# Patient Record
Sex: FEMALE | Race: WHITE | NOT HISPANIC OR LATINO | Employment: OTHER | URBAN - NONMETROPOLITAN AREA
[De-identification: names, ages, dates, MRNs, and addresses within clinical notes are randomized per-mention and may not be internally consistent; named-entity substitution may affect disease eponyms.]

---

## 2017-01-11 ENCOUNTER — HOSPITAL ENCOUNTER (OUTPATIENT)
Dept: GENERAL RADIOLOGY | Facility: HOSPITAL | Age: 57
Discharge: HOME | End: 2017-01-11
Attending: FAMILY MEDICINE

## 2017-01-18 ENCOUNTER — OFFICE VISIT (OUTPATIENT)
Dept: ORTHOPEDIC SURGERY | Facility: CLINIC | Age: 57
End: 2017-01-18

## 2017-01-18 VITALS — RESPIRATION RATE: 16 BRPM | HEIGHT: 63 IN | BODY MASS INDEX: 39.51 KG/M2 | WEIGHT: 223 LBS

## 2017-01-18 DIAGNOSIS — S91.302D OPEN WOUND OF FOOT EXCEPT TOES WITH COMPLICATION, LEFT, SUBSEQUENT ENCOUNTER: ICD-10-CM

## 2017-01-18 DIAGNOSIS — R60.0 EDEMA OF EXTREMITY OF UNKNOWN CAUSE: Primary | ICD-10-CM

## 2017-01-18 PROCEDURE — 99213 OFFICE O/P EST LOW 20 MIN: CPT | Performed by: PODIATRIST

## 2017-01-18 NOTE — PROGRESS NOTES
Subjective   Patient ID: Izabella Agudelo is a 56 y.o. female being seen in follow up for left diabetic foot wound, right plantar heel wound  She is here with her sister who states that been putting lotion on her lateral left foot wound.  She says she's been scratching her legs causing redness and erythema warmth and wounds on her anterior legs.    History of Present Illness    Review of Systems   Constitutional: Negative for diaphoresis, fever and unexpected weight change.   HENT: Negative for dental problem and sore throat.    Eyes: Negative for visual disturbance.   Respiratory: Negative for shortness of breath.    Cardiovascular: Negative for chest pain.   Gastrointestinal: Negative for abdominal pain, constipation, diarrhea, nausea and vomiting.   Genitourinary: Negative for difficulty urinating and frequency.   Neurological: Negative for headaches.   Hematological: Does not bruise/bleed easily.   All other systems reviewed and are negative.      Past Medical History   Diagnosis Date   • Cardiac abnormality    • Diabetes    • Glaucoma         No past surgical history on file.    No Known Allergies    Objective   Physical Exam   Constitutional: She is oriented to person, place, and time. She appears well-developed and well-nourished.   Neurological: She is alert and oriented to person, place, and time.   Psychiatric: She has a normal mood and affect.     Ortho Exam  Ortho Exam  Her a distal leg has multiple superficial excoriations and skin tears it's warm and erythematous.  May have a slight superficial cellulitis or skin infection.  The lateral foot wound is completely dry and crusted over with entire lateral aspect of the foot is still very dry flaky and cracked.  There is no more open wound no drainage no sign of infection.  Clinically I am more concerned about her leg today than I am her foot wound.  She also has 2+ edema to the left lower extremity.      Assessment/Plan  cellulitis of the left leg,  healed left foot wound, cirrhosis of skin, diabetes, edema of the lower extremity  Independent Review of Radiographic Studies:      Laboratory and Other Studies:     Medical Decision Making:        Procedures  Debridement Note        Izabella was seen today for wound check and follow-up.    Diagnoses and all orders for this visit:    Edema of extremity of unknown cause    Open wound of foot except toes with complication, left, subsequent encounter        Recommendations/Plan: I discussed with her a sister that we need to get some moisture in her scan in order to help prevent future wounds.  I want her to use a good diabetic foot cream or lotion.  I told her not to use Vaseline.  I recommend she keep a close eye on her leg as well if this gets any worse she is to let me know.  She may need some topical or oral antibiotics to help with this.  I also recommended compression stockings but she says she will wear them.  I'll see her back in about a month to ensure she's not developed any further wounds.      Return in about 4 weeks (around 2/15/2017).  Patient agreeable to call or return sooner for any concerns.

## 2017-01-18 NOTE — MR AVS SNAPSHOT
Izabella Katlyn Salcedoby   1/18/2017 1:15 PM   Office Visit    Dept Phone:  [phone]   Encounter #:  [id number]    Provider:  Franklyn Manning DPM   Department:  [facility]                Your Full Care Plan              Your Updated Medication List          This list is accurate as of: 1/18/17  1:51 PM.  Always use your most recent med list.                acarbose 25 MG tablet   Commonly known as:  PRECOSE       ARIPiprazole 30 MG tablet   Commonly known as:  ABILIFY       azithromycin 250 MG tablet   Commonly known as:  ZITHROMAX       B-D UF III MINI PEN NEEDLES 31G X 5 MM misc   Generic drug:  Insulin Pen Needle       carvedilol 12.5 MG tablet   Commonly known as:  COREG       ciprofloxacin 500 MG tablet   Commonly known as:  CIPRO       clindamycin 150 MG capsule   Commonly known as:  CLEOCIN       divalproex 500 MG 24 hr tablet   Commonly known as:  DEPAKOTE       fluconazole 150 MG tablet   Commonly known as:  DIFLUCAN       gabapentin 300 MG capsule   Commonly known as:  NEURONTIN       LANTUS SOLOSTAR 100 UNIT/ML injection pen   Generic drug:  Insulin Glargine       latanoprost 0.005 % ophthalmic solution   Commonly known as:  XALATAN       losartan 100 MG tablet   Commonly known as:  COZAAR       loxapine 25 MG capsule   Commonly known as:  LOXITANE       metFORMIN 500 MG tablet   Commonly known as:  GLUCOPHAGE       NOVOLOG FLEXPEN 100 UNIT/ML solution pen-injector sc pen   Generic drug:  insulin aspart       ONE TOUCH ULTRA TEST test strip   Generic drug:  glucose blood       ONETOUCH DELICA LANCETS 33G misc       raNITIdine 150 MG tablet   Commonly known as:  ZANTAC       silver sulfadiazine 1 % cream   Commonly known as:  SILVADENE, SSD       simvastatin 40 MG tablet   Commonly known as:  ZOCOR       sulfamethoxazole-trimethoprim 800-160 MG per tablet   Commonly known as:  BACTRIM DS,SEPTRA DS       traMADol 50 MG tablet    "  Commonly known as:  ULTRAM               You Were Diagnosed With        Codes Comments    Edema of extremity of unknown cause    -  Primary ICD-10-CM: R60.0  ICD-9-CM: 782.3     Open wound of foot except toes with complication, left, subsequent encounter     ICD-10-CM: S91.302D  ICD-9-CM: V58.89, 892.1       Medications to be Given to You by a Medical Professional     Due       Frequency    11/4/2016 collagenase ointment  Every 24 Hours Scheduled      Instructions     None    Patient Instructions History      Upcoming Appointments     Visit Type Date Time Department    OFFICE VISIT 1/18/2017  1:15 PM [facility]      MyChart Signup     Our records indicate that you have declined [employer] signup. If you would like to sign up for [employer], please email [email or web address] or call [phone] to obtain an activation code.             Other Info from Your Visit           Allergies     No Known Allergies      Reason for Visit     Left Foot - Follow-up     Wound Check           Vital Signs     Respirations Height Weight Body Mass Index Smoking Status       16 63\" (160 cm) 223 lb (101 kg) 39.5 kg/m2 Never Smoker       Problems and Diagnoses Noted     Fluid collection (edema) in the arms, legs, hands and feet    -  Primary    Open wound of foot except toes with complication, left, subsequent encounter            "

## 2017-05-24 ENCOUNTER — TRANSCRIBE ORDERS (OUTPATIENT)
Dept: MAMMOGRAPHY | Facility: HOSPITAL | Age: 57
End: 2017-05-24

## 2017-05-24 DIAGNOSIS — Z13.9 SCREENING: Primary | ICD-10-CM

## 2017-05-25 ENCOUNTER — TRANSCRIBE ORDERS (OUTPATIENT)
Dept: ADMINISTRATIVE | Facility: HOSPITAL | Age: 57
End: 2017-05-25

## 2017-05-25 ENCOUNTER — HOSPITAL ENCOUNTER (OUTPATIENT)
Dept: GENERAL RADIOLOGY | Facility: HOSPITAL | Age: 57
Discharge: HOME | End: 2017-05-25
Admitting: FAMILY MEDICINE

## 2017-05-25 DIAGNOSIS — M25.472 PAIN AND SWELLING OF ANKLE, LEFT: Primary | ICD-10-CM

## 2017-05-25 DIAGNOSIS — M25.572 PAIN AND SWELLING OF ANKLE, LEFT: Primary | ICD-10-CM

## 2017-05-25 PROCEDURE — 73620 X-RAY EXAM OF FOOT: CPT

## 2017-12-13 ENCOUNTER — APPOINTMENT (OUTPATIENT)
Dept: LAB | Facility: HOSPITAL | Age: 57
End: 2017-12-13

## 2017-12-13 ENCOUNTER — TRANSCRIBE ORDERS (OUTPATIENT)
Dept: ADMINISTRATIVE | Facility: HOSPITAL | Age: 57
End: 2017-12-13

## 2017-12-13 DIAGNOSIS — F39 PSYCHOSIS, AFFECTIVE (HCC): Primary | ICD-10-CM

## 2017-12-13 LAB
ALBUMIN SERPL-MCNC: 4.5 G/DL (ref 3.5–5)
ALBUMIN/GLOB SERPL: 1.6 G/DL (ref 1–2)
ALP SERPL-CCNC: 53 U/L (ref 38–126)
ALT SERPL W P-5'-P-CCNC: 30 U/L (ref 13–69)
ANION GAP SERPL CALCULATED.3IONS-SCNC: 14.3 MMOL/L
AST SERPL-CCNC: 18 U/L (ref 15–46)
BILIRUB SERPL-MCNC: 0.2 MG/DL (ref 0.2–1.3)
BUN BLD-MCNC: 9 MG/DL (ref 7–20)
BUN/CREAT SERPL: 18 (ref 7.1–23.5)
CALCIUM SPEC-SCNC: 9.7 MG/DL (ref 8.4–10.2)
CHLORIDE SERPL-SCNC: 89 MMOL/L (ref 98–107)
CHOLEST SERPL-MCNC: 127 MG/DL (ref 0–199)
CO2 SERPL-SCNC: 32 MMOL/L (ref 26–30)
CREAT BLD-MCNC: 0.5 MG/DL (ref 0.6–1.3)
GFR SERPL CREATININE-BSD FRML MDRD: 128 ML/MIN/1.73
GLOBULIN UR ELPH-MCNC: 2.9 GM/DL
GLUCOSE BLD-MCNC: 79 MG/DL (ref 74–98)
HDLC SERPL-MCNC: 43 MG/DL (ref 40–60)
LDLC SERPL CALC-MCNC: 66 MG/DL (ref 0–99)
LDLC/HDLC SERPL: 1.54 {RATIO}
POTASSIUM BLD-SCNC: 4.3 MMOL/L (ref 3.5–5.1)
PROT SERPL-MCNC: 7.4 G/DL (ref 6.3–8.2)
SODIUM BLD-SCNC: 131 MMOL/L (ref 137–145)
TRIGL SERPL-MCNC: 88 MG/DL
VLDLC SERPL-MCNC: 17.6 MG/DL

## 2017-12-13 PROCEDURE — 80165 DIPROPYLACETIC ACID FREE: CPT | Performed by: PSYCHIATRY & NEUROLOGY

## 2017-12-13 PROCEDURE — 80061 LIPID PANEL: CPT | Performed by: PSYCHIATRY & NEUROLOGY

## 2017-12-13 PROCEDURE — 80053 COMPREHEN METABOLIC PANEL: CPT | Performed by: PSYCHIATRY & NEUROLOGY

## 2017-12-13 PROCEDURE — 36415 COLL VENOUS BLD VENIPUNCTURE: CPT | Performed by: PSYCHIATRY & NEUROLOGY

## 2017-12-19 LAB — VALPROATE FREE SERPL-MCNC: 19.1 UG/ML (ref 6–22)

## 2019-01-14 ENCOUNTER — APPOINTMENT (OUTPATIENT)
Dept: GENERAL RADIOLOGY | Facility: HOSPITAL | Age: 59
End: 2019-01-14

## 2019-01-14 ENCOUNTER — HOSPITAL ENCOUNTER (INPATIENT)
Facility: HOSPITAL | Age: 59
LOS: 3 days | Discharge: HOME | End: 2019-01-17
Attending: EMERGENCY MEDICINE | Admitting: INTERNAL MEDICINE

## 2019-01-14 ENCOUNTER — APPOINTMENT (OUTPATIENT)
Dept: CARDIOLOGY | Facility: HOSPITAL | Age: 59
End: 2019-01-14
Attending: INTERNAL MEDICINE

## 2019-01-14 DIAGNOSIS — Z78.9 IMPAIRED MOBILITY AND ADLS: ICD-10-CM

## 2019-01-14 DIAGNOSIS — J18.9 PNEUMONIA OF LEFT LOWER LOBE DUE TO INFECTIOUS ORGANISM: Primary | ICD-10-CM

## 2019-01-14 DIAGNOSIS — Z74.09 IMPAIRED MOBILITY AND ADLS: ICD-10-CM

## 2019-01-14 DIAGNOSIS — Z74.09 IMPAIRED FUNCTIONAL MOBILITY, BALANCE, GAIT, AND ENDURANCE: ICD-10-CM

## 2019-01-14 DIAGNOSIS — J96.01 ACUTE RESPIRATORY FAILURE WITH HYPOXIA (HCC): ICD-10-CM

## 2019-01-14 DIAGNOSIS — J44.9 CHRONIC OBSTRUCTIVE PULMONARY DISEASE, UNSPECIFIED COPD TYPE (HCC): ICD-10-CM

## 2019-01-14 DIAGNOSIS — Z79.4 TYPE 2 DIABETES MELLITUS WITH COMPLICATION, WITH LONG-TERM CURRENT USE OF INSULIN (HCC): ICD-10-CM

## 2019-01-14 DIAGNOSIS — G93.41 ACUTE METABOLIC ENCEPHALOPATHY: ICD-10-CM

## 2019-01-14 DIAGNOSIS — E11.8 TYPE 2 DIABETES MELLITUS WITH COMPLICATION, WITH LONG-TERM CURRENT USE OF INSULIN (HCC): ICD-10-CM

## 2019-01-14 DIAGNOSIS — E16.2 HYPOGLYCEMIA: ICD-10-CM

## 2019-01-14 PROBLEM — F20.0 PARANOID SCHIZOPHRENIA: Status: ACTIVE | Noted: 2019-01-14

## 2019-01-14 PROBLEM — I10 ESSENTIAL HYPERTENSION: Status: ACTIVE | Noted: 2019-01-14

## 2019-01-14 PROBLEM — E87.1 HYPONATREMIA: Status: ACTIVE | Noted: 2019-01-14

## 2019-01-14 LAB
BACTERIA UR QL AUTO: ABNORMAL /HPF
BASOPHILS # BLD AUTO: 0.02 10*3/MM3 (ref 0–0.2)
BASOPHILS NFR BLD AUTO: 0.4 % (ref 0–2.5)
BILIRUB UR QL STRIP: NEGATIVE
CLARITY UR: CLEAR
COLOR UR: YELLOW
D-LACTATE SERPL-SCNC: 2 MMOL/L (ref 0.5–2)
DEPRECATED RDW RBC AUTO: 41.5 FL (ref 37–54)
EOSINOPHIL # BLD AUTO: 0.09 10*3/MM3 (ref 0–0.7)
EOSINOPHIL NFR BLD AUTO: 1.6 % (ref 0–7)
ERYTHROCYTE [DISTWIDTH] IN BLOOD BY AUTOMATED COUNT: 14.4 % (ref 11.5–14.5)
FLUAV AG NPH QL: NEGATIVE
FLUBV AG NPH QL IA: NEGATIVE
GLUCOSE BLDC GLUCOMTR-MCNC: 143 MG/DL (ref 70–130)
GLUCOSE BLDC GLUCOMTR-MCNC: 230 MG/DL (ref 70–130)
GLUCOSE BLDC GLUCOMTR-MCNC: 273 MG/DL (ref 70–130)
GLUCOSE BLDC GLUCOMTR-MCNC: 283 MG/DL (ref 70–130)
GLUCOSE UR STRIP-MCNC: ABNORMAL MG/DL
GRAN CASTS URNS QL MICRO: ABNORMAL /LPF
HBA1C MFR BLD: 6.6 % (ref 3–6)
HCT VFR BLD AUTO: 35.6 % (ref 37–47)
HGB BLD-MCNC: 12.1 G/DL (ref 12–16)
HGB UR QL STRIP.AUTO: ABNORMAL
HOLD SPECIMEN: NORMAL
HOLD SPECIMEN: NORMAL
HYALINE CASTS UR QL AUTO: ABNORMAL /LPF
IMM GRANULOCYTES # BLD AUTO: 0.03 10*3/MM3 (ref 0–0.06)
IMM GRANULOCYTES NFR BLD AUTO: 0.5 % (ref 0–0.6)
KETONES UR QL STRIP: ABNORMAL
LEUKOCYTE ESTERASE UR QL STRIP.AUTO: ABNORMAL
LYMPHOCYTES # BLD AUTO: 0.19 10*3/MM3 (ref 0.6–3.4)
LYMPHOCYTES NFR BLD AUTO: 3.3 % (ref 10–50)
MCH RBC QN AUTO: 26.9 PG (ref 27–31)
MCHC RBC AUTO-ENTMCNC: 34 G/DL (ref 30–37)
MCV RBC AUTO: 79.3 FL (ref 81–99)
MONOCYTES # BLD AUTO: 0.3 10*3/MM3 (ref 0–0.9)
MONOCYTES NFR BLD AUTO: 5.3 % (ref 0–12)
NEUTROPHILS # BLD AUTO: 5.07 10*3/MM3 (ref 2–6.9)
NEUTROPHILS NFR BLD AUTO: 88.9 % (ref 37–80)
NITRITE UR QL STRIP: NEGATIVE
NRBC BLD AUTO-RTO: 0 /100 WBC (ref 0–0)
NT-PROBNP SERPL-MCNC: 2230 PG/ML (ref 0–125)
PH UR STRIP.AUTO: 7 [PH] (ref 5–8)
PLATELET # BLD AUTO: 192 10*3/MM3 (ref 130–400)
PMV BLD AUTO: 8.8 FL (ref 6–12)
PROCALCITONIN SERPL-MCNC: 0.26 NG/ML
PROT UR QL STRIP: ABNORMAL
RBC # BLD AUTO: 4.49 10*6/MM3 (ref 4.2–5.4)
RBC # UR: ABNORMAL /HPF
REF LAB TEST METHOD: ABNORMAL
SP GR UR STRIP: 1.01 (ref 1–1.03)
SQUAMOUS #/AREA URNS HPF: ABNORMAL /HPF
TROPONIN I SERPL-MCNC: 0.05 NG/ML (ref 0–0.03)
UROBILINOGEN UR QL STRIP: ABNORMAL
WBC CLUMPS # UR AUTO: ABNORMAL /HPF
WBC NRBC COR # BLD: 5.7 10*3/MM3 (ref 4.8–10.8)
WBC UR QL AUTO: ABNORMAL /HPF
WHOLE BLOOD HOLD SPECIMEN: NORMAL
WHOLE BLOOD HOLD SPECIMEN: NORMAL

## 2019-01-14 PROCEDURE — 83605 ASSAY OF LACTIC ACID: CPT | Performed by: EMERGENCY MEDICINE

## 2019-01-14 PROCEDURE — 36415 COLL VENOUS BLD VENIPUNCTURE: CPT

## 2019-01-14 PROCEDURE — 94640 AIRWAY INHALATION TREATMENT: CPT

## 2019-01-14 PROCEDURE — 94799 UNLISTED PULMONARY SVC/PX: CPT

## 2019-01-14 PROCEDURE — 25010000002 FUROSEMIDE PER 20 MG: Performed by: EMERGENCY MEDICINE

## 2019-01-14 PROCEDURE — 84484 ASSAY OF TROPONIN QUANT: CPT | Performed by: EMERGENCY MEDICINE

## 2019-01-14 PROCEDURE — 87040 BLOOD CULTURE FOR BACTERIA: CPT | Performed by: EMERGENCY MEDICINE

## 2019-01-14 PROCEDURE — 83880 ASSAY OF NATRIURETIC PEPTIDE: CPT | Performed by: EMERGENCY MEDICINE

## 2019-01-14 PROCEDURE — 84145 PROCALCITONIN (PCT): CPT | Performed by: EMERGENCY MEDICINE

## 2019-01-14 PROCEDURE — 87804 INFLUENZA ASSAY W/OPTIC: CPT | Performed by: EMERGENCY MEDICINE

## 2019-01-14 PROCEDURE — 83036 HEMOGLOBIN GLYCOSYLATED A1C: CPT | Performed by: INTERNAL MEDICINE

## 2019-01-14 PROCEDURE — 25010000002 METHYLPREDNISOLONE PER 125 MG: Performed by: EMERGENCY MEDICINE

## 2019-01-14 PROCEDURE — 81001 URINALYSIS AUTO W/SCOPE: CPT | Performed by: EMERGENCY MEDICINE

## 2019-01-14 PROCEDURE — 71046 X-RAY EXAM CHEST 2 VIEWS: CPT

## 2019-01-14 PROCEDURE — 25010000002 CEFTRIAXONE SODIUM-DEXTROSE 1-3.74 GM-%(50ML) RECONSTITUTED SOLUTION: Performed by: EMERGENCY MEDICINE

## 2019-01-14 PROCEDURE — 80053 COMPREHEN METABOLIC PANEL: CPT | Performed by: EMERGENCY MEDICINE

## 2019-01-14 PROCEDURE — 85025 COMPLETE CBC W/AUTO DIFF WBC: CPT

## 2019-01-14 PROCEDURE — 99223 1ST HOSP IP/OBS HIGH 75: CPT | Performed by: INTERNAL MEDICINE

## 2019-01-14 PROCEDURE — 25010000002 ENOXAPARIN PER 10 MG: Performed by: INTERNAL MEDICINE

## 2019-01-14 PROCEDURE — 99285 EMERGENCY DEPT VISIT HI MDM: CPT

## 2019-01-14 PROCEDURE — 25010000002 AZITHROMYCIN: Performed by: EMERGENCY MEDICINE

## 2019-01-14 PROCEDURE — 93306 TTE W/DOPPLER COMPLETE: CPT

## 2019-01-14 PROCEDURE — 93005 ELECTROCARDIOGRAM TRACING: CPT

## 2019-01-14 PROCEDURE — 82962 GLUCOSE BLOOD TEST: CPT

## 2019-01-14 PROCEDURE — 93005 ELECTROCARDIOGRAM TRACING: CPT | Performed by: EMERGENCY MEDICINE

## 2019-01-14 RX ORDER — ONDANSETRON 2 MG/ML
4 INJECTION INTRAMUSCULAR; INTRAVENOUS EVERY 6 HOURS PRN
Status: DISCONTINUED | OUTPATIENT
Start: 2019-01-14 | End: 2019-01-17 | Stop reason: HOSPADM

## 2019-01-14 RX ORDER — ATORVASTATIN CALCIUM 20 MG/1
20 TABLET, FILM COATED ORAL NIGHTLY
Status: DISCONTINUED | OUTPATIENT
Start: 2019-01-14 | End: 2019-01-17 | Stop reason: HOSPADM

## 2019-01-14 RX ORDER — CEFTRIAXONE 1 G/50ML
1 INJECTION, SOLUTION INTRAVENOUS ONCE
Status: COMPLETED | OUTPATIENT
Start: 2019-01-14 | End: 2019-01-14

## 2019-01-14 RX ORDER — SODIUM CHLORIDE 0.9 % (FLUSH) 0.9 %
10 SYRINGE (ML) INJECTION AS NEEDED
Status: DISCONTINUED | OUTPATIENT
Start: 2019-01-14 | End: 2019-01-17 | Stop reason: HOSPADM

## 2019-01-14 RX ORDER — DEXTROSE MONOHYDRATE 25 G/50ML
25 INJECTION, SOLUTION INTRAVENOUS
Status: DISCONTINUED | OUTPATIENT
Start: 2019-01-14 | End: 2019-01-15

## 2019-01-14 RX ORDER — LOXAPINE SUCCINATE 25 MG/1
50 TABLET ORAL NIGHTLY
Status: DISCONTINUED | OUTPATIENT
Start: 2019-01-14 | End: 2019-01-14

## 2019-01-14 RX ORDER — ASCORBIC ACID 500 MG
500 TABLET ORAL 2 TIMES DAILY
Status: ON HOLD | COMMUNITY
End: 2020-11-16

## 2019-01-14 RX ORDER — CARVEDILOL 12.5 MG/1
12.5 TABLET ORAL 2 TIMES DAILY WITH MEALS
Status: DISCONTINUED | OUTPATIENT
Start: 2019-01-14 | End: 2019-01-17 | Stop reason: HOSPADM

## 2019-01-14 RX ORDER — ACETAMINOPHEN 325 MG/1
650 TABLET ORAL EVERY 4 HOURS PRN
Status: DISCONTINUED | OUTPATIENT
Start: 2019-01-14 | End: 2019-01-17 | Stop reason: HOSPADM

## 2019-01-14 RX ORDER — LOXAPINE SUCCINATE 25 MG/1
50 TABLET ORAL NIGHTLY
Status: DISCONTINUED | OUTPATIENT
Start: 2019-01-14 | End: 2019-01-17 | Stop reason: HOSPADM

## 2019-01-14 RX ORDER — SODIUM CHLORIDE 0.9 % (FLUSH) 0.9 %
3 SYRINGE (ML) INJECTION EVERY 12 HOURS SCHEDULED
Status: DISCONTINUED | OUTPATIENT
Start: 2019-01-14 | End: 2019-01-17 | Stop reason: HOSPADM

## 2019-01-14 RX ORDER — ARIPIPRAZOLE 5 MG/1
30 TABLET ORAL DAILY
Status: DISCONTINUED | OUTPATIENT
Start: 2019-01-14 | End: 2019-01-16

## 2019-01-14 RX ORDER — SODIUM CHLORIDE FOR INHALATION 3 %
4 VIAL, NEBULIZER (ML) INHALATION ONCE
Status: COMPLETED | OUTPATIENT
Start: 2019-01-14 | End: 2019-01-14

## 2019-01-14 RX ORDER — GABAPENTIN 300 MG/1
300 CAPSULE ORAL EVERY 12 HOURS SCHEDULED
Status: DISCONTINUED | OUTPATIENT
Start: 2019-01-14 | End: 2019-01-17 | Stop reason: HOSPADM

## 2019-01-14 RX ORDER — IPRATROPIUM BROMIDE AND ALBUTEROL SULFATE 2.5; .5 MG/3ML; MG/3ML
3 SOLUTION RESPIRATORY (INHALATION)
Status: DISCONTINUED | OUTPATIENT
Start: 2019-01-14 | End: 2019-01-17 | Stop reason: HOSPADM

## 2019-01-14 RX ORDER — CEFTRIAXONE 1 G/50ML
1 INJECTION, SOLUTION INTRAVENOUS EVERY 24 HOURS
Status: DISCONTINUED | OUTPATIENT
Start: 2019-01-15 | End: 2019-01-17 | Stop reason: HOSPADM

## 2019-01-14 RX ORDER — FUROSEMIDE 20 MG/1
20 TABLET ORAL DAILY PRN
COMMUNITY

## 2019-01-14 RX ORDER — IPRATROPIUM BROMIDE AND ALBUTEROL SULFATE 2.5; .5 MG/3ML; MG/3ML
3 SOLUTION RESPIRATORY (INHALATION) ONCE
Status: COMPLETED | OUTPATIENT
Start: 2019-01-14 | End: 2019-01-14

## 2019-01-14 RX ORDER — FUROSEMIDE 10 MG/ML
40 INJECTION INTRAMUSCULAR; INTRAVENOUS ONCE
Status: COMPLETED | OUTPATIENT
Start: 2019-01-14 | End: 2019-01-14

## 2019-01-14 RX ORDER — FAMOTIDINE 20 MG/1
20 TABLET, FILM COATED ORAL 2 TIMES DAILY
Status: DISCONTINUED | OUTPATIENT
Start: 2019-01-14 | End: 2019-01-17 | Stop reason: HOSPADM

## 2019-01-14 RX ORDER — ASCORBIC ACID 500 MG
500 TABLET ORAL 2 TIMES DAILY
Status: DISCONTINUED | OUTPATIENT
Start: 2019-01-14 | End: 2019-01-17 | Stop reason: HOSPADM

## 2019-01-14 RX ORDER — SODIUM CHLORIDE 0.9 % (FLUSH) 0.9 %
3-10 SYRINGE (ML) INJECTION AS NEEDED
Status: DISCONTINUED | OUTPATIENT
Start: 2019-01-14 | End: 2019-01-17 | Stop reason: HOSPADM

## 2019-01-14 RX ORDER — L.ACID,PARA/B.BIFIDUM/S.THERM 8B CELL
1 CAPSULE ORAL 2 TIMES DAILY
Status: DISCONTINUED | OUTPATIENT
Start: 2019-01-14 | End: 2019-01-17 | Stop reason: HOSPADM

## 2019-01-14 RX ORDER — IPRATROPIUM BROMIDE AND ALBUTEROL SULFATE 2.5; .5 MG/3ML; MG/3ML
3 SOLUTION RESPIRATORY (INHALATION) EVERY 4 HOURS PRN
Status: DISCONTINUED | OUTPATIENT
Start: 2019-01-14 | End: 2019-01-17 | Stop reason: HOSPADM

## 2019-01-14 RX ORDER — DEXTROSE AND SODIUM CHLORIDE 5; .45 G/100ML; G/100ML
75 INJECTION, SOLUTION INTRAVENOUS CONTINUOUS
Status: DISCONTINUED | OUTPATIENT
Start: 2019-01-14 | End: 2019-01-15

## 2019-01-14 RX ORDER — DIVALPROEX SODIUM 500 MG/1
1000 TABLET, EXTENDED RELEASE ORAL 2 TIMES DAILY
Status: DISCONTINUED | OUTPATIENT
Start: 2019-01-14 | End: 2019-01-17 | Stop reason: HOSPADM

## 2019-01-14 RX ORDER — GUAIFENESIN 600 MG/1
600 TABLET, EXTENDED RELEASE ORAL EVERY 12 HOURS SCHEDULED
Status: DISCONTINUED | OUTPATIENT
Start: 2019-01-14 | End: 2019-01-17 | Stop reason: HOSPADM

## 2019-01-14 RX ORDER — METHYLPREDNISOLONE SODIUM SUCCINATE 125 MG/2ML
125 INJECTION, POWDER, LYOPHILIZED, FOR SOLUTION INTRAMUSCULAR; INTRAVENOUS ONCE
Status: COMPLETED | OUTPATIENT
Start: 2019-01-14 | End: 2019-01-14

## 2019-01-14 RX ADMIN — ATORVASTATIN CALCIUM 20 MG: 20 TABLET, FILM COATED ORAL at 21:40

## 2019-01-14 RX ADMIN — Medication 1 CAPSULE: at 21:40

## 2019-01-14 RX ADMIN — OXYCODONE HYDROCHLORIDE AND ACETAMINOPHEN 500 MG: 500 TABLET ORAL at 21:40

## 2019-01-14 RX ADMIN — FAMOTIDINE 20 MG: 20 TABLET, FILM COATED ORAL at 21:40

## 2019-01-14 RX ADMIN — GABAPENTIN 300 MG: 300 CAPSULE ORAL at 21:40

## 2019-01-14 RX ADMIN — SODIUM CHLORIDE 500 ML: 9 INJECTION, SOLUTION INTRAVENOUS at 14:59

## 2019-01-14 RX ADMIN — CEFTRIAXONE 1 G: 1 INJECTION, SOLUTION INTRAVENOUS at 11:03

## 2019-01-14 RX ADMIN — FUROSEMIDE 40 MG: 10 INJECTION, SOLUTION INTRAMUSCULAR; INTRAVENOUS at 09:53

## 2019-01-14 RX ADMIN — SODIUM CHLORIDE SOLN NEBU 3% 4 ML: 3 NEBU SOLN at 16:32

## 2019-01-14 RX ADMIN — SODIUM CHLORIDE 500 ML: 9 INJECTION, SOLUTION INTRAVENOUS at 13:11

## 2019-01-14 RX ADMIN — ARIPIPRAZOLE 30 MG: 5 TABLET ORAL at 15:48

## 2019-01-14 RX ADMIN — IPRATROPIUM BROMIDE AND ALBUTEROL SULFATE 3 ML: .5; 3 SOLUTION RESPIRATORY (INHALATION) at 20:02

## 2019-01-14 RX ADMIN — DEXTROSE AND SODIUM CHLORIDE 75 ML/HR: 5; 450 INJECTION, SOLUTION INTRAVENOUS at 15:48

## 2019-01-14 RX ADMIN — IPRATROPIUM BROMIDE AND ALBUTEROL SULFATE 3 ML: .5; 3 SOLUTION RESPIRATORY (INHALATION) at 09:55

## 2019-01-14 RX ADMIN — DIVALPROEX SODIUM 1000 MG: 500 TABLET, EXTENDED RELEASE ORAL at 21:40

## 2019-01-14 RX ADMIN — IPRATROPIUM BROMIDE AND ALBUTEROL SULFATE 3 ML: .5; 3 SOLUTION RESPIRATORY (INHALATION) at 16:32

## 2019-01-14 RX ADMIN — CARVEDILOL 12.5 MG: 12.5 TABLET, FILM COATED ORAL at 19:10

## 2019-01-14 RX ADMIN — METHYLPREDNISOLONE SODIUM SUCCINATE 125 MG: 125 INJECTION, POWDER, FOR SOLUTION INTRAMUSCULAR; INTRAVENOUS at 09:53

## 2019-01-14 RX ADMIN — AZITHROMYCIN 500 MG: 500 INJECTION, POWDER, LYOPHILIZED, FOR SOLUTION INTRAVENOUS at 11:34

## 2019-01-14 RX ADMIN — ENOXAPARIN SODIUM 40 MG: 100 INJECTION SUBCUTANEOUS at 15:48

## 2019-01-14 RX ADMIN — GUAIFENESIN 600 MG: 600 TABLET, EXTENDED RELEASE ORAL at 21:40

## 2019-01-14 NOTE — ED NOTES
House supervisor called back saying that the room is being changed for the pt, she is now going to room 326.     Clari Ramirez, RN  01/14/19 1909

## 2019-01-14 NOTE — ED PROVIDER NOTES
TRIAGE CHIEF COMPLAINT:     Nursing and triage notes reviewed    Chief Complaint   Patient presents with   • Hypoglycemia   • Cough      HPI: Izabella Agudelo is a 58 y.o. female who presents to the emergency department complaining of hypoglycemia as well as cough.  Patient was found down at home by family.  Patient has a history of paranoid schizophrenia and does not speak much due to this condition some most history was obtained through family.  Patient's glucose was found to be 49 by EMS.  Patient was given dextrose prior to arrival.  Patient does have a history of COPD and uses oxygen at night but not around-the-clock.  Was found have a low oxygen saturation of 88% on room air.  Per family patient has had increased coughing over the past few days and they've noticed some wheezing as well.    REVIEW OF SYSTEMS: All other systems reviewed and are negative     PAST MEDICAL HISTORY:   Past Medical History:   Diagnosis Date   • Cardiac abnormality    • Diabetes (CMS/[facility])    • Glaucoma    • Schizophrenia, chronic condition (CMS/[facility])         FAMILY HISTORY:   Family History   Problem Relation Age of Onset   • Diabetes Other    • Glaucoma Other         SOCIAL HISTORY:   Social History     Socioeconomic History   • Marital status: Single     Spouse name: Not on file   • Number of children: Not on file   • Years of education: Not on file   • Highest education level: Not on file   Social Needs   • Financial resource strain: Not on file   • Food insecurity - worry: Not on file   • Food insecurity - inability: Not on file   • Transportation needs - medical: Not on file   • Transportation needs - non-medical: Not on file   Occupational History   • Not on file   Tobacco Use   • Smoking status: Never Smoker   Substance and Sexual Activity   • Alcohol use: No   • Drug use: No   • Sexual activity: Defer   Other Topics Concern   • Not on file   Social History Narrative   • Not on file        SURGICAL HISTORY:   History  reviewed. No pertinent surgical history.     CURRENT MEDICATIONS:      Medication List      CONTINUE taking these medications    B-D UF III MINI PEN NEEDLES 31G X 5 MM misc  Generic drug:  Insulin Pen Needle     ONETOUCH DELICA LANCETS 33G misc        ASK your doctor about these medications    acarbose 25 MG tablet  Commonly known as:  PRECOSE     ARIPiprazole 30 MG tablet  Commonly known as:  ABILIFY     azithromycin 250 MG tablet  Commonly known as:  ZITHROMAX     carvedilol 12.5 MG tablet  Commonly known as:  COREG     ciprofloxacin 500 MG tablet  Commonly known as:  CIPRO     clindamycin 150 MG capsule  Commonly known as:  CLEOCIN     divalproex 500 MG 24 hr tablet  Commonly known as:  DEPAKOTE     fluconazole 150 MG tablet  Commonly known as:  DIFLUCAN     furosemide 20 MG tablet  Commonly known as:  LASIX     gabapentin 300 MG capsule  Commonly known as:  NEURONTIN     LANTUS SOLOSTAR 100 UNIT/ML injection pen  Generic drug:  Insulin Glargine     latanoprost 0.005 % ophthalmic solution  Commonly known as:  XALATAN     losartan 100 MG tablet  Commonly known as:  COZAAR     loxapine 25 MG capsule  Commonly known as:  LOXITANE     metFORMIN 500 MG tablet  Commonly known as:  GLUCOPHAGE     NOVOLOG FLEXPEN 100 UNIT/ML solution pen-injector sc pen  Generic drug:  insulin aspart     ONE TOUCH ULTRA TEST test strip  Generic drug:  glucose blood     raNITIdine 150 MG tablet  Commonly known as:  ZANTAC     silver sulfadiazine 1 % cream  Commonly known as:  SILVADENE, SSD     simvastatin 40 MG tablet  Commonly known as:  ZOCOR     sulfamethoxazole-trimethoprim 800-160 MG per tablet  Commonly known as:  BACTRIM DS,SEPTRA DS     traMADol 50 MG tablet  Commonly known as:  ULTRAM     vitamin C 500 MG tablet  Commonly known as:  ASCORBIC ACID           ALLERGIES: Patient has no known allergies.     PHYSICAL EXAM:   VITAL SIGNS:   Vitals:    01/14/19 0849   BP: 142/77   Pulse: 80   Resp: 14   Temp: 98.3 °F (36.8 °C)   SpO2:  (!) 88%      CONSTITUTIONAL: Awake, appears non-toxic   HENT: Atraumatic, normocephalic, oral mucosa pink and moist, airway patent.  EYES: Conjunctiva clear   NECK: Trachea midline, non-tender, supple   CARDIOVASCULAR: Normal heart rate, Normal rhythm, No murmurs, rubs, gallops   PULMONARY/CHEST: Increased work of breathing but there are some diffuse scattered crackles with faint wheezes to auscultation throughout the lung fields.  ABDOMINAL: Non-distended, soft, non-tender - no rebound or guarding.  NEUROLOGIC: Non-focal, moving all four extremities, no gross sensory or motor deficits.   EXTREMITIES: No clubbing, cyanosis, or edema   SKIN: Warm, Dry, No erythema, No rash     ED COURSE / MEDICAL DECISION MAKING:   Izabella Agudelo is a 58 y.o. female who presents to the emergency department for evaluation of hypoglycemia and shortness of breath.  Does have some crackles and faint wheezes on auscultation throughout but does not appear to be in respiratory distress.  Patient is eating well I'll evaluate the patient.  EKG obtained on arrival was interpreted by me and revealed atrial fibrillation with a rate of 71 bpm.  There are some nonspecific ST and T wave changes.  There is no previous EKG for comparison.  This is an abnormal-appearing EKG.  obtained labs and imaging for further evaluation.  Chest x-ray revealed a left lower lobe pneumonia.  Patient also had an elevated BNP slightly elevated troponin.  Further discussion with family revealed that patient probably has some heart failure and is seen Dr. Garcia for this in the past.  Cultures and lactic acid were obtained.  Slight elevation in patients procalcitonin.   Patient placed on antibiotic's.  Given her hypoxia and laboratory findings we will admit patient to the hospital for further treatment and observation.    DECISION TO DISCHARGE/ADMIT: see ED care timeline     FINAL IMPRESSION:   1 -- pneumonia   2 -- hypoglycemia  3 --     Electronically signed by:  Luh Sampson MD, 1/14/2019 9:35 AM       Luh Sampson MD  01/14/19 1211

## 2019-01-14 NOTE — ED NOTES
Dr. Laughlin, Hospitalist transferred to Dr. Sampson at this time.     Arianne Hunt  01/14/19 2831

## 2019-01-14 NOTE — ED NOTES
Called house supervisor and explained to her that she needed to call the 3rd floor to let them know they were getting a pt.  She said she did tell them and would call them back to straighten it out.     Clari Ramirez, RN  01/14/19 7124

## 2019-01-14 NOTE — ED NOTES
Called 3rd floor to give report.  I was told by an RN that they were not aware that they were getting a pt and did not know what nurse was taking them.     Clari Ramirez, RN  01/14/19 7242

## 2019-01-14 NOTE — PLAN OF CARE
Problem: Patient Care Overview  Goal: Plan of Care Review  Outcome: Ongoing (interventions implemented as appropriate)   01/14/19 6124   Coping/Psychosocial   Plan of Care Reviewed With patient   OTHER   Outcome Summary new admit       Problem: Pneumonia (Adult)  Goal: Signs and Symptoms of Listed Potential Problems Will be Absent, Minimized or Managed (Pneumonia)  Outcome: Ongoing (interventions implemented as appropriate)      Problem: Fall Risk (Adult)  Goal: Identify Related Risk Factors and Signs and Symptoms  Outcome: Ongoing (interventions implemented as appropriate)    Goal: Absence of Fall  Outcome: Ongoing (interventions implemented as appropriate)      Problem: Skin Injury Risk (Adult)  Goal: Identify Related Risk Factors and Signs and Symptoms  Outcome: Ongoing (interventions implemented as appropriate)    Goal: Skin Health and Integrity  Outcome: Ongoing (interventions implemented as appropriate)      Problem: Urinary Tract Infection (Adult)  Goal: Signs and Symptoms of Listed Potential Problems Will be Absent, Minimized or Managed (Urinary Tract Infection)  Outcome: Ongoing (interventions implemented as appropriate)

## 2019-01-14 NOTE — H&P
[facility]   HISTORY AND PHYSICAL      Name:  Izabella Agudelo   Age:  58 y.o.  Sex:  female  :  1960  MRN:  [id number]   Visit Number:  [id number]  Admission Date:  2019  Date Of Service:  19  Primary Care Physician:  Francoise Bailey MD    Chief Complaint:     Generalized weakness and altered mental status.    History Of Presenting Illness:      This is a 58-year-old female with history of diabetes mellitus type 2 on insulin at home, schizophrenia was brought to the emergency room by ambulance with the above complaints.  The history is obtained from the patient as well as the medical chart from the emergency room.  Unfortunately, family members are not currently available.    Apparently, patient was noted to be unresponsive at home by her family and EMS was called.  When the EMS checked her fingerstick glucose level it was in the 40s and she received IV dextrose.  Patient was subsequently brought to the emergency room.  She was evaluated in the emergency room.  She was alert but was noted to have pulse ox oxygen saturation of 88% on room air.  She was afebrile and otherwise hemodynamically stable.  Blood work done in the emergency room showed blood glucose level of 102.  CMP and CBC otherwise was within normal limits.  Her pro-calcitonin was 0.26 and lactic acid was 2.  Chest x-ray showed left lower lobe pneumonia.  She was also noted to have a troponin of 0.053 and BNP of 2230.    Patient is currently lying down on the bed and is comfortable at rest.  She is able to answer a few questions appropriately.  She denies any heart problems.  She states that she lives with her mother and sister who give her the insulins.  She states that she takes Lantus as well as NovoLog.    Review Of Systems:     General ROS: Patient denies any fevers, chills or loss of consciousness. Complains of generalized weakness.  Respiratory ROS: Cough and chest  congestion.  Cardiovascular ROS: No history of chest pain or palpitations.   Gastrointestinal ROS: No history of nausea and vomiting. Denies any abdominal pain. No diarrhea.  Genito-Urinary ROS: No history of dysuria or hematuria.  Musculoskeletal ROS: No muscle pain. No calf pain.  Neurological ROS: No history of any focal weakness.  History of confusion.  Dermatological ROS: No history of any redness or pruritis.     Past Medical History:    Past Medical History:   Diagnosis Date   • Cardiac abnormality    • CHF (congestive heart failure) (CMS/[facility])    • COPD (chronic obstructive pulmonary disease) (CMS/[facility])    • Diabetes (CMS/[facility])    • Glaucoma    • Schizophrenia, chronic condition (CMS/[facility])      Past Surgical history:    History reviewed. No pertinent surgical history.    Social History:    Social History     Socioeconomic History   • Marital status: Single     Spouse name: Not on file   • Number of children: Not on file   • Years of education: Not on file   • Highest education level: Not on file   Social Needs   • Financial resource strain: Not on file   • Food insecurity - worry: Not on file   • Food insecurity - inability: Not on file   • Transportation needs - medical: Not on file   • Transportation needs - non-medical: Not on file   Occupational History   • Not on file   Tobacco Use   • Smoking status: Never Smoker   Substance and Sexual Activity   • Alcohol use: No   • Drug use: No   • Sexual activity: Defer   Other Topics Concern   • Not on file   Social History Narrative   • Not on file     Family History:    Family History   Problem Relation Age of Onset   • Diabetes Other    • Glaucoma Other      Allergies:      Patient has no known allergies.    Home Medications:    Prior to Admission Medications     Prescriptions Last Dose Informant Patient Reported? Taking?    acarbose (PRECOSE) 25 MG tablet 1/13/2019  Yes Yes    TAKE 1 TABLET BY MOUTH 3 TIMES A DAY AT THE START OF EACH MAIN MEAL.    ARIPiprazole  (ABILIFY) 30 MG tablet 1/13/2019  Yes Yes    20 mg.    B-D UF III MINI PEN NEEDLES 31G X 5 MM misc 1/13/2019  Yes Yes    USE 3 TIMES A DAY    carvedilol (COREG) 12.5 MG tablet 1/13/2019  Yes Yes    TAKE 1 TABLET BY ORAL ROUTE 2 TIMES EVERY DAY WITH FOOD    divalproex (DEPAKOTE) 500 MG 24 hr tablet 1/13/2019  Yes Yes    TAKE 2 TABLETS BY MOUTH TWICE A DAY    furosemide (LASIX) 20 MG tablet 1/13/2019  Yes Yes    Take 20 mg by mouth Daily As Needed.    gabapentin (NEURONTIN) 300 MG capsule 1/13/2019  Yes Yes    2 (Two) Times a Day.    LANTUS SOLOSTAR 100 UNIT/ML injection pen 1/13/2019  Yes Yes    INJECT 90 UNITS SUBCUTANEOUSLY AT BEDTIME OR AS DIRECTED    loxapine (LOXITANE) 25 MG capsule 1/13/2019  Yes Yes    TAKE 2 CAPSULES AT BEDTIME    metFORMIN (GLUCOPHAGE) 500 MG tablet 1/13/2019  Yes Yes    TAKE 2 TABLETS TWICE A DAY WITH MORNING AND EVENING MEALS    NOVOLOG FLEXPEN 100 UNIT/ML solution pen-injector sc pen 1/13/2019  Yes Yes    INJECT 14 UNITS BEFORE EACH MEAL    ONE TOUCH ULTRA TEST test strip 1/13/2019  Yes Yes    TEST 2 TIMES A DAY    ONETOUCH DELICA LANCETS 33G misc 1/13/2019  Yes Yes    TEST TWICE A DAY    ranitidine (ZANTAC) 150 MG tablet 1/13/2019  Yes Yes    TAKE 1 TABLET BY MOUTH TWICE A DAY    simvastatin (ZOCOR) 40 MG tablet 1/13/2019  Yes Yes    Take 40 mg by mouth Every Night.    vitamin C (ASCORBIC ACID) 500 MG tablet 1/13/2019  Yes Yes    Take 500 mg by mouth 2 (Two) Times a Day.    azithromycin (ZITHROMAX) 250 MG tablet   Yes No    TAKE 2 TABLETS BY MOUTH TODAY, THEN TAKE 1 TABLET DAILY FOR 4 DAYS    ciprofloxacin (CIPRO) 500 MG tablet   Yes No    TAKE 1 TABLET BY ORAL ROUTE EVERY 12 HOURS    clindamycin (CLEOCIN) 150 MG capsule   Yes No    collagenase ointment   No No    fluconazole (DIFLUCAN) 150 MG tablet   Yes No    TAKE 1 TABLET BY MOUTH ONCE. MAY REPEAT IN 1 WEEK.    latanoprost (XALATAN) 0.005 % ophthalmic solution   Yes No    losartan (COZAAR) 100 MG tablet   Yes No    silver  sulfadiazine (SILVADENE, SSD) 1 % cream   Yes No    APPLY TO AFFECTED AREA TWICE A DAY    sulfamethoxazole-trimethoprim (BACTRIM DS,SEPTRA DS) 800-160 MG per tablet   Yes No    TAKE 1 TABLET BY MOUTH EVERY 12 HOURS    traMADol (ULTRAM) 50 MG tablet   Yes No    TAKE 1 TABLET BY MOUTH EVERY 6 HOURS AS NEEDED FOR PAIN           ED Medications:    Medications   sodium chloride 0.9 % flush 10 mL (not administered)   sodium chloride 0.9 % bolus 500 mL (not administered)   ipratropium-albuterol (DUO-NEB) nebulizer solution 3 mL (3 mL Nebulization Given 1/14/19 0955)   methylPREDNISolone sodium succinate (SOLU-Medrol) injection 125 mg (125 mg Intravenous Given 1/14/19 0953)   furosemide (LASIX) injection 40 mg (40 mg Intravenous Given 1/14/19 0953)   cefTRIAXone (ROCEPHIN) IVPB 1 g/50ml dextrose (premix) (0 g Intravenous Stopped 1/14/19 1135)   AZITHROMYCIN 500 MG/250 ML 0.9% NS IVPB (vial-mate) (0 mg Intravenous Stopped 1/14/19 1244)     Vital Signs:    Temp:  [98 °F (36.7 °C)-98.3 °F (36.8 °C)] 98 °F (36.7 °C)  Heart Rate:  [74-80] 76  Resp:  [14-20] 18  BP: (118-157)/(73-90) 146/90        01/14/19  0849 01/14/19  1311   Weight: 86.2 kg (190 lb) 73.1 kg (161 lb 3.2 oz)     Body mass index is 26.99 kg/m².    Physical Exam:    General Appearance:  Alert and cooperative, not in any acute distress.   Head:  Atraumatic and normocephalic, without obvious abnormality.   Eyes:          PERRLA, conjunctivae and sclerae normal, no Icterus. No pallor. Extraocular movements are within normal limits.   Ears:  Ears appear intact with no abnormalities noted.   Throat: No oral lesions, no thrush, oral mucosa dry.  Edontulous.   Neck: Supple, trachea midline, no thyromegaly, no carotid bruit.   Back:   No kyphoscoliosis present. No tenderness to palpation,   range of motion normal.   Lungs:   Chest shape is normal. Breath sounds heard bilaterally equally.  No wheezing.  Bilateral basal crackles heard. No Pleural rub or bronchial  breathing.   Heart:  Normal S1 and S2, no murmur, no gallop, no rub. No JVD.   Abdomen:   Normal bowel sounds, no masses, no organomegaly. Soft, non-tender, obese, no guarding, no rebound tenderness.  Surgical scar noted in the right upper quadrant.   Extremities: Moves all extremities well, 2+ edema, no cyanosis, no clubbing.  Ecchymotic patches/bruising noted in bilateral knee joints.  No skin breakdown noted in the legs.   Pulses: Pulses palpable and equal bilaterally.   Skin: No bleeding or rash.   Neurologic: Alert and oriented x 3. Moves all four limbs equally. No tremors. No facial asymmetry.     Laboratory data:    I have reviewed the labs done in the emergency room.    Results from last 7 days   Lab Units  01/14/19   0854   SODIUM mmol/L  123*   POTASSIUM mmol/L  3.6   CHLORIDE mmol/L  83*   CO2 mmol/L  30.0   BUN mg/dL  12   CREATININE mg/dL  0.30*   CALCIUM mg/dL  8.6   BILIRUBIN mg/dL  0.6   ALK PHOS U/L  56   ALT (SGPT) U/L  26   AST (SGOT) U/L  39   GLUCOSE mg/dL  102*     Results from last 7 days   Lab Units  01/14/19   0854   WBC 10*3/mm3  5.70   HEMOGLOBIN g/dL  12.1   HEMATOCRIT %  35.6*   PLATELETS 10*3/mm3  192         Results from last 7 days   Lab Units  01/14/19   0854   TROPONIN I ng/mL  0.053*     Results from last 7 days   Lab Units  01/14/19   0854   PROBNP pg/mL  2,230.0*                 Results from last 7 days   Lab Units  01/14/19   1027   COLOR UA   Yellow   GLUCOSE UA   100 mg/dL (Trace)*   KETONES UA   15 mg/dL (1+)*   LEUKOCYTES UA   Trace*   PH, URINE   7.0   BILIRUBIN UA   Negative   UROBILINOGEN UA   1.0 E.U./dL     EKG:      EKG done in the emergency room was reviewed by me.  It shows atrial fibrillation with a ventricular rate of 71 bpm.  Left axis deviation noted.  No abnormal Q waves noted.  No significant ST-T changes noted.    Radiology:    Imaging Results (last 72 hours)     Procedure Component Value Units Date/Time    XR Chest 2 View [[id number]] Collected:  01/14/19 1023      Updated:  01/14/19 1034    Narrative:       TWO VIEW CHEST     INDICATION: Shortness of breath.     FINDINGS: Two views compared with 02/28/2012. EKG leads overlie the  chest. Cardiac silhouette is mildly prominent. The lung volumes are  diminished. No pneumothorax. Hazy airspace opacification involves the  left mid to lower lung zone. No significant effusion. Minimal  atelectasis or early infiltrate at the right base as well. Degenerative  changes in the spine and shoulders.       Impression:       Patchy airspace opacification greatest in the left lung as  described. Findings may represent developing infiltrates from pneumonia.  Short-term follow-up to resolution is recommended.     This report was finalized on 1/14/2019 10:32 AM by Rober Tirado MD.          Pneumonia of left lower lobe due to infectious organism (CMS/[facility])    Assessment:    1.  Acute metabolic encephalopathy secondary to hypoglycemia and hyponatremia, present on admission.  2.  Acute hypoxic respiratory failure secondary to #3, present on admission.  3.  Left lower lobe bacterial pneumonia, community-acquired, present on admission.  4.  Hyponatremia, likely secondary to #3, present on admission.  5.  Diabetes mellitus type 2 with insulin therapy.  6.  Paranoid schizophrenia.  7.  COPD, no evidence of exacerbation.  8.  Essential hypertension.    Plan:    Ms. Agudelo is currently being admitted to the medical floor with telemetry.  I do not suspect congestive heart failure as she looks pretty dry.  Unfortunately, she did get a dose of Lasix initially in the emergency room.  She is currently getting a normal saline bolus of 500 mL.  I will give her another 500 mL of normal saline bolus and place her on dextrose normal saline at 75 mL per hour.  She will be placed on hypoglycemia protocol.  We will hold insulin therapy at this time.    She does have left lower lobe pneumonia which seems to be community-acquired.  Blood cultures have been drawn.  We  will get sputum cultures.  She will be continued on Rocephin and azithromycin.  She will also be placed on bronchodilators and mucolytic agents as well as lactobacillus supplements.  She will be placed on Lovenox for DVT prophylaxis.  Further recommendations depend upon her clinical course.    Alvarez Laughlin MD  01/14/19  1:42 PM    Dictated utilizing Dragon dictation.

## 2019-01-15 LAB
ANION GAP SERPL CALCULATED.3IONS-SCNC: 13.5 MMOL/L (ref 10–20)
ANISOCYTOSIS BLD QL: ABNORMAL
BUN BLD-MCNC: 11 MG/DL (ref 7–20)
BUN/CREAT SERPL: 36.7 (ref 7.1–23.5)
CALCIUM SPEC-SCNC: 8.5 MG/DL (ref 8.4–10.2)
CHLORIDE SERPL-SCNC: 85 MMOL/L (ref 98–107)
CO2 SERPL-SCNC: 32 MMOL/L (ref 26–30)
CREAT BLD-MCNC: 0.3 MG/DL (ref 0.6–1.3)
DEPRECATED RDW RBC AUTO: 42.4 FL (ref 37–54)
ERYTHROCYTE [DISTWIDTH] IN BLOOD BY AUTOMATED COUNT: 14.7 % (ref 11.5–14.5)
GFR SERPL CREATININE-BSD FRML MDRD: >150 ML/MIN/1.73
GLUCOSE BLD-MCNC: 193 MG/DL (ref 74–98)
GLUCOSE BLDC GLUCOMTR-MCNC: 156 MG/DL (ref 70–130)
GLUCOSE BLDC GLUCOMTR-MCNC: 209 MG/DL (ref 70–130)
GLUCOSE BLDC GLUCOMTR-MCNC: 224 MG/DL (ref 70–130)
GLUCOSE BLDC GLUCOMTR-MCNC: 249 MG/DL (ref 70–130)
HCT VFR BLD AUTO: 34.2 % (ref 37–47)
HGB BLD-MCNC: 11.3 G/DL (ref 12–16)
LYMPHOCYTES # BLD MANUAL: 0.65 10*3/MM3 (ref 0.6–3.4)
LYMPHOCYTES NFR BLD MANUAL: 12 % (ref 10–50)
LYMPHOCYTES NFR BLD MANUAL: 3 % (ref 0–12)
MCH RBC QN AUTO: 26.3 PG (ref 27–31)
MCHC RBC AUTO-ENTMCNC: 33 G/DL (ref 30–37)
MCV RBC AUTO: 79.7 FL (ref 81–99)
MONOCYTES # BLD AUTO: 0.16 10*3/MM3 (ref 0–0.9)
NEUTROPHILS # BLD AUTO: 4.17 10*3/MM3 (ref 2–6.9)
NEUTROPHILS NFR BLD MANUAL: 73 % (ref 37–80)
NEUTS BAND NFR BLD MANUAL: 4 % (ref 0–6)
PLATELET # BLD AUTO: 201 10*3/MM3 (ref 130–400)
PMV BLD AUTO: 9.6 FL (ref 6–12)
POTASSIUM BLD-SCNC: 3.5 MMOL/L (ref 3.5–5.1)
RBC # BLD AUTO: 4.29 10*6/MM3 (ref 4.2–5.4)
SCAN SLIDE: NORMAL
SMALL PLATELETS BLD QL SMEAR: ADEQUATE
SODIUM BLD-SCNC: 127 MMOL/L (ref 137–145)
TROPONIN I SERPL-MCNC: 0.03 NG/ML (ref 0–0.03)
TSH SERPL DL<=0.05 MIU/L-ACNC: 1.03 MIU/ML (ref 0.47–4.68)
VARIANT LYMPHS NFR BLD MANUAL: 8 % (ref 0–0)
WBC MORPH BLD: NORMAL
WBC NRBC COR # BLD: 5.41 10*3/MM3 (ref 4.8–10.8)

## 2019-01-15 PROCEDURE — 80048 BASIC METABOLIC PNL TOTAL CA: CPT | Performed by: INTERNAL MEDICINE

## 2019-01-15 PROCEDURE — 99232 SBSQ HOSP IP/OBS MODERATE 35: CPT | Performed by: INTERNAL MEDICINE

## 2019-01-15 PROCEDURE — 87205 SMEAR GRAM STAIN: CPT | Performed by: INTERNAL MEDICINE

## 2019-01-15 PROCEDURE — 25010000002 ENOXAPARIN PER 10 MG: Performed by: INTERNAL MEDICINE

## 2019-01-15 PROCEDURE — 63710000001 INSULIN ASPART PER 5 UNITS: Performed by: INTERNAL MEDICINE

## 2019-01-15 PROCEDURE — 94799 UNLISTED PULMONARY SVC/PX: CPT

## 2019-01-15 PROCEDURE — 82962 GLUCOSE BLOOD TEST: CPT

## 2019-01-15 PROCEDURE — 25010000002 AZITHROMYCIN: Performed by: INTERNAL MEDICINE

## 2019-01-15 PROCEDURE — 97162 PT EVAL MOD COMPLEX 30 MIN: CPT

## 2019-01-15 PROCEDURE — 97165 OT EVAL LOW COMPLEX 30 MIN: CPT

## 2019-01-15 PROCEDURE — 87070 CULTURE OTHR SPECIMN AEROBIC: CPT | Performed by: INTERNAL MEDICINE

## 2019-01-15 PROCEDURE — 85025 COMPLETE CBC W/AUTO DIFF WBC: CPT | Performed by: INTERNAL MEDICINE

## 2019-01-15 PROCEDURE — 84484 ASSAY OF TROPONIN QUANT: CPT | Performed by: INTERNAL MEDICINE

## 2019-01-15 PROCEDURE — 85007 BL SMEAR W/DIFF WBC COUNT: CPT | Performed by: INTERNAL MEDICINE

## 2019-01-15 PROCEDURE — 25010000002 CEFTRIAXONE SODIUM-DEXTROSE 1-3.74 GM-%(50ML) RECONSTITUTED SOLUTION: Performed by: INTERNAL MEDICINE

## 2019-01-15 PROCEDURE — 84443 ASSAY THYROID STIM HORMONE: CPT | Performed by: INTERNAL MEDICINE

## 2019-01-15 RX ORDER — SODIUM CHLORIDE FOR INHALATION 3 %
4 VIAL, NEBULIZER (ML) INHALATION ONCE
Status: COMPLETED | OUTPATIENT
Start: 2019-01-15 | End: 2019-01-15

## 2019-01-15 RX ORDER — DEXTROSE MONOHYDRATE 25 G/50ML
25 INJECTION, SOLUTION INTRAVENOUS
Status: DISCONTINUED | OUTPATIENT
Start: 2019-01-15 | End: 2019-01-17 | Stop reason: HOSPADM

## 2019-01-15 RX ORDER — NICOTINE POLACRILEX 4 MG
1 LOZENGE BUCCAL
Status: DISCONTINUED | OUTPATIENT
Start: 2019-01-15 | End: 2019-01-17 | Stop reason: HOSPADM

## 2019-01-15 RX ADMIN — ACETAMINOPHEN 650 MG: 325 TABLET, FILM COATED ORAL at 09:55

## 2019-01-15 RX ADMIN — DIVALPROEX SODIUM 1000 MG: 500 TABLET, EXTENDED RELEASE ORAL at 09:57

## 2019-01-15 RX ADMIN — GABAPENTIN 300 MG: 300 CAPSULE ORAL at 09:57

## 2019-01-15 RX ADMIN — IPRATROPIUM BROMIDE AND ALBUTEROL SULFATE 3 ML: .5; 3 SOLUTION RESPIRATORY (INHALATION) at 07:23

## 2019-01-15 RX ADMIN — IPRATROPIUM BROMIDE AND ALBUTEROL SULFATE 3 ML: .5; 3 SOLUTION RESPIRATORY (INHALATION) at 19:43

## 2019-01-15 RX ADMIN — CARVEDILOL 12.5 MG: 12.5 TABLET, FILM COATED ORAL at 09:57

## 2019-01-15 RX ADMIN — INSULIN ASPART 3 UNITS: 100 INJECTION, SOLUTION INTRAVENOUS; SUBCUTANEOUS at 12:00

## 2019-01-15 RX ADMIN — CARVEDILOL 12.5 MG: 12.5 TABLET, FILM COATED ORAL at 17:25

## 2019-01-15 RX ADMIN — GUAIFENESIN 600 MG: 600 TABLET, EXTENDED RELEASE ORAL at 22:20

## 2019-01-15 RX ADMIN — GUAIFENESIN 600 MG: 600 TABLET, EXTENDED RELEASE ORAL at 09:57

## 2019-01-15 RX ADMIN — Medication 1 CAPSULE: at 09:57

## 2019-01-15 RX ADMIN — INSULIN ASPART 3 UNITS: 100 INJECTION, SOLUTION INTRAVENOUS; SUBCUTANEOUS at 22:20

## 2019-01-15 RX ADMIN — OXYCODONE HYDROCHLORIDE AND ACETAMINOPHEN 500 MG: 500 TABLET ORAL at 22:20

## 2019-01-15 RX ADMIN — SODIUM CHLORIDE SOLN NEBU 3% 4 ML: 3 NEBU SOLN at 00:59

## 2019-01-15 RX ADMIN — DIVALPROEX SODIUM 1000 MG: 500 TABLET, EXTENDED RELEASE ORAL at 22:20

## 2019-01-15 RX ADMIN — OXYCODONE HYDROCHLORIDE AND ACETAMINOPHEN 500 MG: 500 TABLET ORAL at 09:57

## 2019-01-15 RX ADMIN — FAMOTIDINE 20 MG: 20 TABLET, FILM COATED ORAL at 22:20

## 2019-01-15 RX ADMIN — SODIUM CHLORIDE, PRESERVATIVE FREE 3 ML: 5 INJECTION INTRAVENOUS at 22:20

## 2019-01-15 RX ADMIN — ATORVASTATIN CALCIUM 20 MG: 20 TABLET, FILM COATED ORAL at 22:20

## 2019-01-15 RX ADMIN — GABAPENTIN 300 MG: 300 CAPSULE ORAL at 22:20

## 2019-01-15 RX ADMIN — FAMOTIDINE 20 MG: 20 TABLET, FILM COATED ORAL at 09:57

## 2019-01-15 RX ADMIN — CEFTRIAXONE 1 G: 1 INJECTION, SOLUTION INTRAVENOUS at 12:00

## 2019-01-15 RX ADMIN — IPRATROPIUM BROMIDE AND ALBUTEROL SULFATE 3 ML: .5; 3 SOLUTION RESPIRATORY (INHALATION) at 00:59

## 2019-01-15 RX ADMIN — ARIPIPRAZOLE 30 MG: 5 TABLET ORAL at 09:56

## 2019-01-15 RX ADMIN — DEXTROSE AND SODIUM CHLORIDE 75 ML/HR: 5; 450 INJECTION, SOLUTION INTRAVENOUS at 05:45

## 2019-01-15 RX ADMIN — AZITHROMYCIN 500 MG: 500 INJECTION, POWDER, LYOPHILIZED, FOR SOLUTION INTRAVENOUS at 12:30

## 2019-01-15 RX ADMIN — INSULIN ASPART 3 UNITS: 100 INJECTION, SOLUTION INTRAVENOUS; SUBCUTANEOUS at 17:25

## 2019-01-15 RX ADMIN — ENOXAPARIN SODIUM 40 MG: 100 INJECTION SUBCUTANEOUS at 16:00

## 2019-01-15 RX ADMIN — Medication 1 CAPSULE: at 22:20

## 2019-01-15 RX ADMIN — LOXAPINE SUCCINATE 50 MG: 25 TABLET ORAL at 22:20

## 2019-01-15 NOTE — PROGRESS NOTES
Adult Nutrition  Assessment/PES    Patient Name:  Izabella Agudelo  YOB: 1960  MRN: [id number]  Admit Date:  1/14/2019    Assessment Date:  1/15/2019    Comments:  Rec #1: Continue current diet order; Maintaining positive trends. Pt may benefit from Consistent Carbohydrate diet modifier being added to diet order. Pt receiving 75% PO intake over 1 meal. Rec #2: Consider MVI with minerals daily. Rec #3: Continue to monitor/replace electrolytes PRN. RD to follow pt. Consult RD PRN.       Reason for Assessment     Row Name 01/15/19 1346          Reason for Assessment    Reason For Assessment  diagnosis/disease state;identified at risk by screening criteria     Diagnosis  diabetes diagnosis/complications;cardiac disease;pulmonary disease;infection/sepsis;neurologic conditions Pneumonia, Acute Resp. Fx, hypoxia, DM2, AMS, Hyponatremia, Acute met. enceph. AMS, COPD, HTN             Labs/Tests/Procedures/Meds     Row Name 01/15/19 1357          Labs/Procedures/Meds    Lab Results Reviewed  reviewed, pertinent     Lab Results Comments  Low: Na, Cl, Creat   High: Glu, HgbA1C        Medications    Pertinent Medications Reviewed  reviewed           Estimated/Assessed Needs     Row Name 01/15/19 1358          Calculation Measurements    Weight Used For Calculations  61.9 kg (136 lb 8 oz) ABW        Estimated/Assessed Needs    Additional Documentation  Fluid Requirements (Group);Prospect-St. Jeor Equation (Group);Protein Requirements (Group);Calorie Requirements (Group)        Calorie Requirements    Weight Used For Calorie Calculations  -- AF 1.3     Estimated Calorie Need Method  Prospect-St Jeor     Estimated Calorie Requirement Comment  ~8569-8620        KCAL/KG    14 Kcal/Kg (kcal)  866.82     15 Kcal/Kg (kcal)  928.74     18 Kcal/Kg (kcal)  1114.49     20 Kcal/Kg (kcal)  1238.32     25 Kcal/Kg (kcal)  1547.9     30 Kcal/Kg (kcal)  1857.48     35 Kcal/Kg (kcal)  2167.06     40 Kcal/Kg (kcal)  2476.64      45 Kcal/Kg (kcal)  2786.22     50 Kcal/Kg (kcal)  3095.8        Cochran-St. Jeor Equation    RMR (Cochran-St. Jeor Equation)  1201.86        Protein Requirements    Est Protein Requirement Amount (gms/kg)  1.5 gm protein 74-93 gm     Estimated Protein Requirements (gms/day)  92.87        Fluid Requirements    Estimated Fluid Requirement Method  Vasile-Segar Formula     Vasile-Segar Method (over 20 kg)  2738.32         Nutrition Prescription Ordered     Row Name 01/15/19 1400          Nutrition Prescription PO    Current PO Diet  Regular         Evaluation of Received Nutrient/Fluid Intake     Row Name 01/15/19 1358          Calculation Measurements    Weight Used For Calculations  61.9 kg (136 lb 8 oz) ABW        PO Evaluation    Number of Days PO Intake Evaluated  1 day     Number of Meals  1     % PO Intake  75         Evaluation of Prescribed Nutrient/Fluid Intake     Row Name 01/15/19 1358          Calculation Measurements    Weight Used For Calculations  61.9 kg (136 lb 8 oz) ABW             Problem/Interventions:  Problem 1     Row Name 01/15/19 1400          Nutrition Diagnoses Problem 1    Problem 1  Impaired Nutrient Utilization     Etiology (related to)  Medical Diagnosis     Endocrine  DM2     Signs/Symptoms (evidenced by)  Biochemical     Specific Labs Noted  Glucose;HgbA1C         Problem 2     Row Name 01/15/19 1401          Nutrition Diagnoses Problem 2    Problem 2  Impaired Nutrient Utilization     Etiology (related to)  Medical Diagnosis     Pulmonary/Critical Care  Pneumonia     Signs/Symptoms (evidenced by)  Biochemical     Specific Labs Noted  Other (comment) Procalcitonin         Problem 3     Row Name 01/15/19 1402          Nutrition Diagnoses Problem 3    Problem 3  Increased Nutrient Needs     Macronutrient  Kcal;Carbohydrate;Protein     Micronutrient  Vitamin;Mineral     Etiology (related to)  Medical Diagnosis     Pulmonary/Critical Care  COPD     Signs/Symptoms (evidenced by)  Other  (comment) pulmonary dysfunction             Intervention Goal     Row Name 01/15/19 1403          Intervention Goal    General  Meet nutritional needs for age/condition     PO  Meet estimated needs;Increase intake;PO intake (%)     PO Intake %  100 %     Weight  Maintain weight         Nutrition Intervention     Row Name 01/15/19 1404          Nutrition Intervention    RD/Tech Action  Follow Tx progress;Care plan reviewd;Encourage intake         Nutrition Prescription     Row Name 01/15/19 1404          Nutrition Prescription PO    PO Prescription  Other (comment) Continue current diet order     Common Modifiers  Consistent Carbohydrate Add modifier     New PO Prescription Ordered?  No, recommended        Other Orders    Obtain Weight  Daily     Obtain Weight Ordered?  No, recommended     Supplement  Vitamin mineral supplement     Supplement Ordered?  No, recommended         Education/Evaluation     Row Name 01/15/19 140          Education    Education  Will Instruct as appropriate        Monitor/Evaluation    Monitor  Per protocol;I&O;PO intake;Pertinent labs;Weight           Electronically signed by:  Shanice Wong RD  01/15/19 2:06 PM

## 2019-01-15 NOTE — PROGRESS NOTES
Continued Stay Note  RYLEY Rees     Patient Name: Izabella Agudelo  MRN: [id number]  Today's Date: 1/15/2019    Admit Date: 1/14/2019    Discharge Plan     Row Name 01/15/19 1101       Plan    Plan  Home     Patient/Family in Agreement with Plan  yes    Plan Comments  With pt permission called her sister Nazia Horvath Confirmed address and phone number  PT is independent with ADLS  with encouragement  Has oxygen 2 [employer] HS only  [employer]   She is able to afford  insulin medication  Glucose monitor strips  Sister drives her to the MD She reports that she usually checks her blood sugar at night but didn't the night before the admission pt refused glucose check  That nght She does not have home health at this time         Discharge Codes    No documentation.       Expected Discharge Date and Time     Expected Discharge Date Expected Discharge Time    Jan 17, 2019             Emi Pena

## 2019-01-15 NOTE — PLAN OF CARE
Problem: Patient Care Overview  Goal: Plan of Care Review  Outcome: Ongoing (interventions implemented as appropriate)   01/15/19 1327   Coping/Psychosocial   Plan of Care Reviewed With patient   OTHER   Outcome Summary Initial eval and tx completed. Impairments identified. Relevant goals established. Pt in agreement with POC.   Plan of Care Review   Progress no change

## 2019-01-15 NOTE — THERAPY EVALUATION
Acute Care - Occupational Therapy Initial Evaluation   [facility]     Patient Name: Izabella Agudelo  : 1960  MRN: [id number]  Today's Date: 1/15/2019  Onset of Illness/Injury or Date of Surgery: 19  Date of Referral to OT: 19  Referring Physician: Truman    Admit Date: 2019       ICD-10-CM ICD-9-CM   1. Pneumonia of left lower lobe due to infectious organism (CMS/HCC) J18.1 486   2. Acute respiratory failure with hypoxia (CMS/HCC) J96.01 518.81   3. Hypoglycemia E16.2 251.2   4. Impaired mobility and ADLs Z74.09 799.89     Patient Active Problem List   Diagnosis   • Pneumonia of left lower lobe due to infectious organism (CMS/HCC)   • Acute respiratory failure with hypoxia (CMS/HCC)   • Acute metabolic encephalopathy   • Hypoglycemia   • Type 2 diabetes mellitus with complication, with long-term current use of insulin (CMS/HCC)   • Paranoid schizophrenia (CMS/HCC)   • Hyponatremia   • Essential hypertension     Past Medical History:   Diagnosis Date   • Cardiac abnormality    • CHF (congestive heart failure) (CMS/HCC)    • COPD (chronic obstructive pulmonary disease) (CMS/HCC)    • Diabetes (CMS/HCC)    • Glaucoma    • Schizophrenia, chronic condition (CMS/HCC)      History reviewed. No pertinent surgical history.       OT ASSESSMENT FLOWSHEET (last 72 hours)      Occupational Therapy Evaluation     Row Name 01/15/19 1116                   OT Evaluation Time/Intention    Subjective Information  dizziness  -HE        Document Type  evaluation  -HE        Mode of Treatment  occupational therapy  -HE        Patient Effort  good  -HE        Symptoms Noted During/After Treatment  dizziness  -HE           General Information    Patient Profile Reviewed?  yes  -HE        Onset of Illness/Injury or Date of Surgery  19  -HE        Referring Physician  Truman  -GEOVANNA        Patient Observations  cooperative  -HE        General Observations of Patient  Pt received in supine on [id number]  -HE         Prior Level of Function  independent:;all household mobility;community mobility;transfer;ADL's  -HE        Equipment Currently Used at Home  oxygen  -HE        Existing Precautions/Restrictions  fall;oxygen therapy device and L/min  -HE           Relationship/Environment    Lives With  sibling(s)  -HE        Name(s) of Who Lives With Patient  Shweta Horvath  -HE        Family Caregiver if Needed  sibling(s)  -HE        Family Caregiver Names  Nazia Horvath  -HE           Resource/Environmental Concerns    Current Living Arrangements  hotel/motel  -HE        Resource/Environmental Concerns  home accessibility  -HE        Home Accessibility Concerns  stairs to enter home  -HE        Transportation Concerns  car, none  -HE           Cognitive Assessment/Intervention- PT/OT    Orientation Status (Cognition)  person;place  -HE        Follows Commands (Cognition)  follows one step commands;follows two step commands  -HE           Bed Mobility Assessment/Treatment    Bed Mobility Assessment/Treatment  supine-sit  -HE        Supine-Sit [place] (Bed Mobility)  verbal cues;minimum assist (75% patient effort)  -HE        Bed Mobility, Safety Issues  decreased use of arms for pushing/pulling;decreased use of legs for bridging/pushing  -HE        Assistive Device (Bed Mobility)  bed rails;head of bed elevated;draw sheet  -HE           Functional Mobility    Functional Mobility- Ind. Level  minimum assist (75% patient effort)  -HE        Functional Mobility- Device  other (see comments) Hand held assistance  -HE        Functional Mobility-Distance (Feet)  12  -HE           Transfer Assessment/Treatment    Transfer Assessment/Treatment  sit-stand transfer;stand-sit transfer  -HE           Sit-Stand Transfer    Sit-Stand [place] (Transfers)  minimum assist (75% patient effort);verbal cues  -HE        Assistive Device (Sit-Stand Transfers)  -- HHA  -HE           Stand-Sit Transfer    Stand-Sit [place]  (Transfers)  minimum assist (75% patient effort);verbal cues  -HE        Assistive Device (Stand-Sit Transfers)  -- HHA  -HE           ADL Assessment/Intervention    BADL Assessment/Intervention  bathing;upper body dressing;lower body dressing;grooming;feeding;toileting  -           Bathing Assessment/Intervention    Bathing [place] Level  moderate assist (50% patient effort)  -HE        Assistive Devices (Bathing)  long-handled sponge  -HE        Bathing Position  edge of bed sitting  -HE           Upper Body Dressing Assessment/Training    Upper Body Dressing [place] Level  set up  -HE        Upper Body Dressing Position  edge of bed sitting  -HE           Lower Body Dressing Assessment/Training    Lower Body Dressing [place] Level  lower body dressing skills;moderate assist (50% patient effort)  -HE        Lower Body Dressing Position  edge of bed sitting  -HE           Grooming Assessment/Training    [place] Level (Grooming)  set up;verbal cues  -HE        Grooming Position  edge of bed sitting  -HE           Self-Feeding Assessment/Training    [place] Level (Feeding)  independent  -HE        Position (Self-Feeding)  sitting up in bed  -HE           Toileting Assessment/Training    [place] Level (Toileting)  toileting skills;moderate assist (50% patient effort)  -HE        Assistive Devices (Toileting)  commode, bedside with drop arms  -HE           Positioning and Restraints    Pre-Treatment Position  in bed  -HE        Post Treatment Position  chair  -HE        In Chair  sitting;call light within reach;encouraged to call for assist  -HE           Pain Assessment    Additional Documentation  Pain Scale: Numbers Pre/Post-Treatment (Group)  -           Pain Scale: Numbers Pre/Post-Treatment    Pain Scale: Numbers, Pretreatment  0/10 - no pain  -        Pain Scale: Numbers, Post-Treatment  0/10 - no pain  -           Wound 01/14/19 1311 Right lateral elbow abrasion    Wound -  Properties Group Date first assessed: 01/14/19  -BN Time first assessed: 1311  -BN Present On Admission : yes  -BN Side: Right  -BN Orientation: lateral  -BN Location: elbow  -BN Type: abrasion  -BN Stage, Pressure Injury: other (see comments)  -BN Additional Comments: right elbow abrasion from fall  -BN Wound Outcome: Healed  -BN       Wound 01/14/19 1311 Right lateral knee abrasion    Wound - Properties Group Date first assessed: 01/14/19  -BN Time first assessed: 1311  -BN Present On Admission : yes  -BN Side: Right  -BN Orientation: lateral  -BN Location: knee  -BN Type: abrasion  -BN Stage, Pressure Injury: other (see comments)  -BN Additional Comments: right knee abrasion from fall at home  -BN Wound Outcome: Other  -BN       Clinical Impression (OT)    Date of Referral to OT  01/14/19  -HE        OT Diagnosis  Impaired ADLs and mobility  -HE        Criteria for Skilled Therapeutic Interventions Met (OT Eval)  yes  -HE        Rehab Potential (OT Eval)  good, to achieve stated therapy goals  -HE        Therapy Frequency (OT Eval)  3 times/wk  -HE        Care Plan Review (OT)  evaluation/treatment results reviewed;care plan/treatment goals reviewed  -HE        Anticipated Discharge Disposition (OT)  home;home with assist;home with home health  -HE           OT Goals    Bed Mobility Goal Selection (OT)  bed mobility, OT goal 1  -HE        Transfer Goal Selection (OT)  transfer, OT goal 1  -HE        Dressing Goal Selection (OT)  dressing, OT goal 1  -HE        Toileting Goal Selection (OT)  toileting, OT goal 1  -HE        Strength Goal Selection (OT)  strength, OT goal 1  -HE        Functional Mobility Goal Selection (OT)  functional mobility, OT goal 1  -HE        Additional Documentation  Strength Goal Selection (OT) (Row);Functional Mobility Selection (OT) (Row)  -HE           Bed Mobility Goal 1 (OT)    Activity/Assistive Device (Bed Mobility Goal 1, OT)  supine to sit  -HE        [place] Level/Cues  Needed (Bed Mobility Goal 1, OT)  verbal cues required  -HE        Time Frame (Bed Mobility Goal 1, OT)  1 week  -HE        Progress/Outcomes (Bed Mobility Goal 1, OT)  goal ongoing  -HE           Dressing Goal 1 (OT)    Activity/Assistive Device (Dressing Goal 1, OT)  lower body dressing  -HE        [place]/Cues Needed (Dressing Goal 1, OT)  verbal cues required;minimum assist (75% or more patient effort)  -HE        Time Frame (Dressing Goal 1, OT)  5 days  -HE        Progress/Outcome (Dressing Goal 1, OT)  goal ongoing  -HE           Toileting Goal 1 (OT)    Activity/Device (Toileting Goal 1, OT)  commode, bedside without drop arms;toileting skills, all  -HE        [place] Level/Cues Needed (Toileting Goal 1, OT)  standby assist  -HE        Time Frame (Toileting Goal 1, OT)  1 week  -HE        Progress/Outcome (Toileting Goal 1, OT)  goal ongoing  -HE           Strength Goal 1 (OT)    Strength Goal 1 (OT)  -- Pt to complete 10 reps x 1 set BUE ex with min resistance.  -HE        Time Frame (Strength Goal 1, OT)  3 days  -HE        Progress/Outcome (Strength Goal 1, OT)  goal ongoing  -HE           Functional Mobility Goal 1 (OT)    Activity/Assistive Device (Functional Mobility Goal 1, OT)  walker, rolling  -HE        [place] Level/Cues Needed (Functional Mobility Goal 1, OT)  standby assist  -HE        Distance Goal 1 (Functional Mobility, OT)  200  -HE        Time Frame (Functional Mobility Goal 1, OT)  1 week  -HE        Progress/Outcome (Functional Mobility Goal 1, OT)  goal ongoing  -HE           Patient Education Goal (OT)    Activity (Patient Education Goal, OT)  -- Pt to be independent with HEP.  -HE        [place]/Cues/Accuracy (Memory Goal 2, OT)  demonstrates adequately  -HE        Time Frame (Patient Education Goal, OT)  by discharge  -HE        Progress/Outcome (Patient Education Goal, OT)  goal ongoing  -HE          User Key  (r) = Recorded By, (t) = Taken By, (c) = Cosigned By     Initials Name Effective Dates    Mony Pollack RN 11/30/18 -     Vishal Ruano 03/07/18 -          Occupational Therapy Education     Title: PT OT SLP Therapies (In Progress)     Topic: Occupational Therapy (In Progress)     Point: Precautions (In Progress)     Description: Instruct learner(s) on prescribed precautions during self-care and functional transfers.    Learning Progress Summary           Patient Acceptance, E, NR by  at 1/15/2019  1:26 PM    Comment:  OT instructed on fall prevention.                               User Key     Initials Effective Dates Name Provider Type Discipline     03/07/18 -  Vishal Rivero Occupational Therapist OT                  OT Recommendation and Plan  Outcome Summary/Treatment Plan (OT)  Anticipated Discharge Disposition (OT): home, home with assist, home with home health  Therapy Frequency (OT Eval): 3 times/wk  Plan of Care Review  Plan of Care Reviewed With: patient  Plan of Care Reviewed With: patient  Outcome Summary: Initial eval and tx completed. Impairments identified. Relevant goals established. Pt in agreement with POC.    Outcome Measures     Row Name 01/15/19 1300 01/15/19 1116          How much help from another is currently needed...    Putting on and taking off regular lower body clothing?  --  2  -HE     Bathing (including washing, rinsing, and drying)  --  2  -HE     Toileting (which includes using toilet bed pan or urinal)  --  3  -HE     Putting on and taking off regular upper body clothing  --  4  -HE     Taking care of personal grooming (such as brushing teeth)  --  3  -HE     Eating meals  --  4  -HE     Score  --  18  -HE        Functional Assessment    Outcome Measure Options  10 Meter Walk  -HE  AM-PAC 6 Clicks Daily Activity (OT)  -HE       User Key  (r) = Recorded By, (t) = Taken By, (c) = Cosigned By    Initials Name Provider Type    Vishal Ruano Occupational Therapist          Time Calculation:   Time Calculation-  OT     Row Name 01/15/19 1329             Time Calculation- OT    OT Start Time  1116  -HE      OT Received On  01/15/19  -HE      OT Goal Re-Cert Due Date  01/25/19  -HE        User Key  (r) = Recorded By, (t) = Taken By, (c) = Cosigned By    Initials Name Provider Type    Vishal Ruano Occupational Therapist        Therapy Suggested Charges     Code   Minutes Charges    None           Therapy Charges for Today     Code Description Service Date Service Provider Modifiers Qty    [id number]  OT EVAL LOW COMPLEXITY 4 1/15/2019 Vishal Rivero GO 1               Vishal Rivero  1/15/2019

## 2019-01-15 NOTE — PLAN OF CARE
Problem: Patient Care Overview  Goal: Plan of Care Review  Outcome: Ongoing (interventions implemented as appropriate)   01/15/19 0721   Coping/Psychosocial   Plan of Care Reviewed With patient   Plan of Care Review   Progress improving     Goal: Individualization and Mutuality  Outcome: Ongoing (interventions implemented as appropriate)    Goal: Discharge Needs Assessment  Outcome: Ongoing (interventions implemented as appropriate)    Goal: Interprofessional Rounds/Family Conf  Outcome: Ongoing (interventions implemented as appropriate)      Problem: Pneumonia (Adult)  Goal: Signs and Symptoms of Listed Potential Problems Will be Absent, Minimized or Managed (Pneumonia)  Outcome: Ongoing (interventions implemented as appropriate)      Problem: Fall Risk (Adult)  Goal: Absence of Fall  Outcome: Ongoing (interventions implemented as appropriate)      Problem: Skin Injury Risk (Adult)  Goal: Skin Health and Integrity  Outcome: Ongoing (interventions implemented as appropriate)      Problem: Urinary Tract Infection (Adult)  Goal: Signs and Symptoms of Listed Potential Problems Will be Absent, Minimized or Managed (Urinary Tract Infection)  Outcome: Ongoing (interventions implemented as appropriate)

## 2019-01-15 NOTE — PLAN OF CARE
Problem: Patient Care Overview  Goal: Plan of Care Review  Outcome: Ongoing (interventions implemented as appropriate)   01/14/19 2030   Coping/Psychosocial   Plan of Care Reviewed With patient   OTHER   Outcome Summary pleasantly confused patient with bed alarm on at all times-IV fluids infusing-monitor blood sugar with coverage per protocol-monitor labs and continue to monitor patient   Coping/Psychosocial   Patient Agreement with Plan of Care agrees   Plan of Care Review   Progress no change     Goal: Individualization and Mutuality  Outcome: Ongoing (interventions implemented as appropriate)    Goal: Discharge Needs Assessment  Outcome: Ongoing (interventions implemented as appropriate)    Goal: Interprofessional Rounds/Family Conf  Outcome: Ongoing (interventions implemented as appropriate)      Problem: Pneumonia (Adult)  Goal: Signs and Symptoms of Listed Potential Problems Will be Absent, Minimized or Managed (Pneumonia)  Outcome: Ongoing (interventions implemented as appropriate)      Problem: Fall Risk (Adult)  Goal: Identify Related Risk Factors and Signs and Symptoms  Outcome: Outcome(s) achieved Date Met: 01/15/19    Goal: Absence of Fall  Outcome: Ongoing (interventions implemented as appropriate)      Problem: Skin Injury Risk (Adult)  Goal: Identify Related Risk Factors and Signs and Symptoms  Outcome: Outcome(s) achieved Date Met: 01/15/19    Goal: Skin Health and Integrity  Outcome: Ongoing (interventions implemented as appropriate)      Problem: Urinary Tract Infection (Adult)  Goal: Signs and Symptoms of Listed Potential Problems Will be Absent, Minimized or Managed (Urinary Tract Infection)  Outcome: Ongoing (interventions implemented as appropriate)

## 2019-01-15 NOTE — PROGRESS NOTES
[facility]    Name:  Izabella Agudelo   Age:  58 y.o.  Sex:  female  :  1960  MRN:  [id number]   Visit Number:  [id number]  Admission Date:  2019  Date Of Service:  01/15/19  Primary Care Physician:  Francoise Bailey MD     LOS: 1 day :  Patient Care Team:  Francoise Bailey MD as PCP - General:    Chief Complaint:      Follow-up of pneumonia.    Subjective / Interval History:     Ms. Agudelo is currently lying down in the bed and is feeling better.  She has not had any further episodes of hypoglycemia.  She has mild cough but denies any shortness of breath.  Denies any abdominal pain, nausea or vomiting.    Review of Systems:     General ROS: Patient denies any fevers, chills or loss of consciousness.  Respiratory ROS: Complains of cough and chest congestion.  Cardiovascular ROS: Denies chest pain or palpitations. No history of exertional chest pain.  Gastrointestinal ROS: Denies nausea and vomiting. Denies any abdominal pain. No diarrhea.  Neurological ROS: Denies any focal weakness. No loss of consciousness. Denies any numbness.  Dermatological ROS: Denies any redness or pruritis.    Vital Signs:    Temp:  [97.2 °F (36.2 °C)-98 °F (36.7 °C)] 97.6 °F (36.4 °C)  Heart Rate:  [56-91] 57  Resp:  [18-19] 19  BP: (154-178)/(81-99) 168/99    Intake and output:    I/O last 3 completed shifts:  In: 2336.3 [P.O.:240; I.V.:1046.3; IV Piggyback:1050]  Out: 2600 [Urine:2600]  No intake/output data recorded.    Physical Examination:    General Appearance:  Alert and cooperative, not in any acute distress.   Head:  Atraumatic and normocephalic, without obvious abnormality.   Eyes:          PERRLA, conjunctivae and sclerae normal, no Icterus. No pallor. Extra-occular movements are within normal limits.   Neck: Supple, trachea midline, no thyromegaly, no carotid bruit.   Lungs:   Chest shape is normal. Breath sounds heard bilaterally equally.  No wheezing.   Occasional scattered crackles heard. No Pleural rub or bronchial breathing.   Heart:  Normal S1 and S2, no murmur, no gallop, no rub. No JVD   Abdomen:   Normal bowel sounds, no masses, no organomegaly. Soft, non-tender, obese, no guarding, no rebound tenderness.   Extremities: Moves all extremities well, 1+ edema, no cyanosis, no clubbing. Ecchymotic patches/bruising noted in bilateral knee joints.   Skin: No bleeding or rash.   Neurologic: Awake, alert and oriented times 3. Moves all 4 extremities equally.   Laboratory results:    Results from last 7 days   Lab Units  01/15/19   0548  01/14/19   0854   SODIUM mmol/L  127*  123*   POTASSIUM mmol/L  3.5  3.6   CHLORIDE mmol/L  85*  83*   CO2 mmol/L  32.0*  30.0   BUN mg/dL  11  12   CREATININE mg/dL  0.30*  0.30*   CALCIUM mg/dL  8.5  8.6   BILIRUBIN mg/dL   --   0.6   ALK PHOS U/L   --   56   ALT (SGPT) U/L   --   26   AST (SGOT) U/L   --   39   GLUCOSE mg/dL  193*  102*     Results from last 7 days   Lab Units  01/15/19   0548  01/14/19   0854   WBC 10*3/mm3  5.41  5.70   HEMOGLOBIN g/dL  11.3*  12.1   HEMATOCRIT %  34.2*  35.6*   PLATELETS 10*3/mm3  201  192         Results from last 7 days   Lab Units  01/15/19   0548  01/14/19   0854   TROPONIN I ng/mL  0.026  0.053*     Results from last 7 days   Lab Units  01/14/19   1105  01/14/19   1100   BLOODCX   No growth at 24 hours  No growth at 24 hours     I have reviewed the patient's laboratory results.    Radiology results:    Imaging Results (last 24 hours)     ** No results found for the last 24 hours. **        Medication Review:     I have reviewed the patients active and prn medications.       Pneumonia of left lower lobe due to infectious organism (CMS/HCC)    Hypoglycemia    Acute respiratory failure with hypoxia (CMS/HCC)    Acute metabolic encephalopathy    Type 2 diabetes mellitus with complication, with long-term current use of insulin (CMS/HCC)    Paranoid schizophrenia (CMS/HCC)    Hyponatremia     Essential hypertension    Assessment:    1.  Acute metabolic encephalopathy secondary to hypoglycemia and hyponatremia, present on admission, resolved.  2.  Acute hypoxic respiratory failure secondary to #3, present on admission.  3.  Left lower lobe bacterial pneumonia, community-acquired, present on admission.  4.  Hyponatremia, likely secondary to #3, present on admission.  5.  Diabetes mellitus type 2 with insulin therapy.  6.  Paranoid schizophrenia.  7.  COPD, no evidence of exacerbation.  8.  Essential hypertension.    Plan:    Ms. Agudelo is currently doing better and has not had any further episodes of hypoglycemia.  We will start her on subcutaneous insulin protocol for coverage but I will hold off on Levemir at this time.  She is hemodynamically stable and I will discontinue the IV fluids at this time.  She will be continued on IV antibiotic therapy with Rocephin and oral azithromycin.  She will be continued on lactobacillus supplements.  We will consult physical therapy to get him out of bed and get walking.  She will be continued on bronchodilators and mucolytic agents.  She is on Lovenox for DVT prophylaxis.  An echocardiogram was done yesterday and the results are currently pending.  Hopefully, she should be able to go back home in the next 1-2 days.  She lives with her mother and sister Nazia.    Alvarez Laughlin MD  01/15/19  2:56 PM    Dictated utilizing Dragon dictation.

## 2019-01-16 LAB
GLUCOSE BLDC GLUCOMTR-MCNC: 126 MG/DL (ref 70–130)
GLUCOSE BLDC GLUCOMTR-MCNC: 192 MG/DL (ref 70–130)
GLUCOSE BLDC GLUCOMTR-MCNC: 245 MG/DL (ref 70–130)
GLUCOSE BLDC GLUCOMTR-MCNC: 274 MG/DL (ref 70–130)
MAXIMAL PREDICTED HEART RATE: 163 BPM
STRESS TARGET HR: 139 BPM

## 2019-01-16 PROCEDURE — 94799 UNLISTED PULMONARY SVC/PX: CPT

## 2019-01-16 PROCEDURE — 82962 GLUCOSE BLOOD TEST: CPT

## 2019-01-16 PROCEDURE — 97116 GAIT TRAINING THERAPY: CPT

## 2019-01-16 PROCEDURE — 25010000002 ENOXAPARIN PER 10 MG: Performed by: INTERNAL MEDICINE

## 2019-01-16 PROCEDURE — 25010000002 CEFTRIAXONE SODIUM-DEXTROSE 1-3.74 GM-%(50ML) RECONSTITUTED SOLUTION: Performed by: INTERNAL MEDICINE

## 2019-01-16 PROCEDURE — 97110 THERAPEUTIC EXERCISES: CPT

## 2019-01-16 PROCEDURE — 25010000002 AZITHROMYCIN: Performed by: INTERNAL MEDICINE

## 2019-01-16 PROCEDURE — 63710000001 INSULIN ASPART PER 5 UNITS: Performed by: INTERNAL MEDICINE

## 2019-01-16 PROCEDURE — 63710000001 INSULIN DETEMIR PER 5 UNITS: Performed by: INTERNAL MEDICINE

## 2019-01-16 PROCEDURE — 99232 SBSQ HOSP IP/OBS MODERATE 35: CPT | Performed by: INTERNAL MEDICINE

## 2019-01-16 RX ORDER — ARIPIPRAZOLE 5 MG/1
20 TABLET ORAL DAILY
Status: DISCONTINUED | OUTPATIENT
Start: 2019-01-17 | End: 2019-01-17 | Stop reason: HOSPADM

## 2019-01-16 RX ADMIN — GUAIFENESIN 600 MG: 600 TABLET, EXTENDED RELEASE ORAL at 21:53

## 2019-01-16 RX ADMIN — OXYCODONE HYDROCHLORIDE AND ACETAMINOPHEN 500 MG: 500 TABLET ORAL at 21:53

## 2019-01-16 RX ADMIN — AZITHROMYCIN 500 MG: 500 INJECTION, POWDER, LYOPHILIZED, FOR SOLUTION INTRAVENOUS at 11:59

## 2019-01-16 RX ADMIN — CARVEDILOL 12.5 MG: 12.5 TABLET, FILM COATED ORAL at 10:00

## 2019-01-16 RX ADMIN — INSULIN ASPART 2 UNITS: 100 INJECTION, SOLUTION INTRAVENOUS; SUBCUTANEOUS at 17:28

## 2019-01-16 RX ADMIN — CEFTRIAXONE 1 G: 1 INJECTION, SOLUTION INTRAVENOUS at 11:18

## 2019-01-16 RX ADMIN — FAMOTIDINE 20 MG: 20 TABLET, FILM COATED ORAL at 21:53

## 2019-01-16 RX ADMIN — GABAPENTIN 300 MG: 300 CAPSULE ORAL at 21:53

## 2019-01-16 RX ADMIN — DIVALPROEX SODIUM 1000 MG: 500 TABLET, EXTENDED RELEASE ORAL at 10:00

## 2019-01-16 RX ADMIN — INSULIN ASPART 4 UNITS: 100 INJECTION, SOLUTION INTRAVENOUS; SUBCUTANEOUS at 21:53

## 2019-01-16 RX ADMIN — DIVALPROEX SODIUM 1000 MG: 500 TABLET, EXTENDED RELEASE ORAL at 21:53

## 2019-01-16 RX ADMIN — CARVEDILOL 12.5 MG: 12.5 TABLET, FILM COATED ORAL at 17:28

## 2019-01-16 RX ADMIN — IPRATROPIUM BROMIDE AND ALBUTEROL SULFATE 3 ML: .5; 3 SOLUTION RESPIRATORY (INHALATION) at 12:54

## 2019-01-16 RX ADMIN — FAMOTIDINE 20 MG: 20 TABLET, FILM COATED ORAL at 10:04

## 2019-01-16 RX ADMIN — GUAIFENESIN 600 MG: 600 TABLET, EXTENDED RELEASE ORAL at 10:00

## 2019-01-16 RX ADMIN — ATORVASTATIN CALCIUM 20 MG: 20 TABLET, FILM COATED ORAL at 21:53

## 2019-01-16 RX ADMIN — ARIPIPRAZOLE 30 MG: 5 TABLET ORAL at 10:00

## 2019-01-16 RX ADMIN — ENOXAPARIN SODIUM 40 MG: 100 INJECTION SUBCUTANEOUS at 15:22

## 2019-01-16 RX ADMIN — INSULIN DETEMIR 15 UNITS: 100 INJECTION, SOLUTION SUBCUTANEOUS at 21:54

## 2019-01-16 RX ADMIN — SODIUM CHLORIDE, PRESERVATIVE FREE 3 ML: 5 INJECTION INTRAVENOUS at 10:04

## 2019-01-16 RX ADMIN — SODIUM CHLORIDE, PRESERVATIVE FREE 3 ML: 5 INJECTION INTRAVENOUS at 21:53

## 2019-01-16 RX ADMIN — IPRATROPIUM BROMIDE AND ALBUTEROL SULFATE 3 ML: .5; 3 SOLUTION RESPIRATORY (INHALATION) at 07:35

## 2019-01-16 RX ADMIN — IPRATROPIUM BROMIDE AND ALBUTEROL SULFATE 3 ML: .5; 3 SOLUTION RESPIRATORY (INHALATION) at 18:29

## 2019-01-16 RX ADMIN — Medication 1 CAPSULE: at 10:00

## 2019-01-16 RX ADMIN — Medication 1 CAPSULE: at 21:53

## 2019-01-16 RX ADMIN — OXYCODONE HYDROCHLORIDE AND ACETAMINOPHEN 500 MG: 500 TABLET ORAL at 10:00

## 2019-01-16 RX ADMIN — GABAPENTIN 300 MG: 300 CAPSULE ORAL at 10:04

## 2019-01-16 RX ADMIN — LOXAPINE SUCCINATE 50 MG: 25 TABLET ORAL at 21:53

## 2019-01-16 RX ADMIN — IPRATROPIUM BROMIDE AND ALBUTEROL SULFATE 3 ML: .5; 3 SOLUTION RESPIRATORY (INHALATION) at 00:14

## 2019-01-16 RX ADMIN — INSULIN ASPART 4 UNITS: 100 INJECTION, SOLUTION INTRAVENOUS; SUBCUTANEOUS at 11:59

## 2019-01-16 NOTE — PLAN OF CARE
Problem: Fall Risk (Adult)  Goal: Absence of Fall  Outcome: Ongoing (interventions implemented as appropriate)   01/16/19 8784   Fall Risk (Adult)   Absence of Fall making progress toward outcome

## 2019-01-16 NOTE — THERAPY TREATMENT NOTE
Acute Care - Physical Therapy Treatment Note  [facility]     Patient Name: Izabella Agudelo  : 1960  MRN: [id number]  Today's Date: 2019  Onset of Illness/Injury or Date of Surgery: 19  Date of Referral to PT: 19  Referring Physician: Alvarez Laughlin MD    Admit Date: 2019    Visit Dx:    ICD-10-CM ICD-9-CM   1. Pneumonia of left lower lobe due to infectious organism (CMS/HCC) J18.1 486   2. Acute respiratory failure with hypoxia (CMS/HCC) J96.01 518.81   3. Hypoglycemia E16.2 251.2   4. Impaired mobility and ADLs Z74.09 799.89   5. Impaired functional mobility, balance, gait, and endurance Z74.09 V49.89     Patient Active Problem List   Diagnosis   • Pneumonia of left lower lobe due to infectious organism (CMS/HCC)   • Acute respiratory failure with hypoxia (CMS/HCC)   • Acute metabolic encephalopathy   • Hypoglycemia   • Type 2 diabetes mellitus with complication, with long-term current use of insulin (CMS/HCC)   • Paranoid schizophrenia (CMS/HCC)   • Hyponatremia   • Essential hypertension       Therapy Treatment    Rehabilitation Treatment Summary     Row Name 19 1014             Treatment Time/Intention    Discipline  physical therapist  -LM      Document Type  therapy note (daily note)  -LM      Subjective Information  no complaints  -LM      Mode of Treatment  physical therapy  -LM      Patient/Family Observations  Pt in room with RN.  -LM      Care Plan Review  care plan/treatment goals reviewed;patient/other agree to care plan  -LM      Therapy Frequency (PT Clinical Impression)  daily  -LM      Patient Effort  good  -LM      Existing Precautions/Restrictions  fall  -LM      Patient Response to Treatment  Tolerated well.  -LM      Recorded by [LM] Danelle Alexandre, PT 19 1331      Row Name 19 1014             Safety Issues, Functional Mobility    Safety Issues Affecting Function (Mobility)  insight into deficits/self awareness;safety precautions  follow-through/compliance  -LM      Impairments Affecting Function (Mobility)  balance;cognition;endurance/activity tolerance;strength  -LM      Recorded by [LM] Danelle Alexandre, PT 01/16/19 1331      Row Name 01/16/19 1014             Bed Mobility Assessment/Treatment    Bed Mobility Assessment/Treatment  supine-sit  -LM      Supine-Sit [place] (Bed Mobility)  minimum assist (75% patient effort)  -LM      Bed Mobility, Safety Issues  decreased use of arms for pushing/pulling;decreased use of legs for bridging/pushing;impaired trunk control for bed mobility  -LM      Assistive Device (Bed Mobility)  bed rails;head of bed elevated  -LM      Recorded by [LM] Danelle Alexandre, PT 01/16/19 1331      Row Name 01/16/19 1014             Transfer Assessment/Treatment    Transfer Assessment/Treatment  sit-stand transfer;stand-sit transfer;bed-chair transfer  -LM      Recorded by [LM] Danelle Alexandre, PT 01/16/19 1331      Row Name 01/16/19 1014             Bed-Chair Transfer    Bed-Chair [place] (Transfers)  minimum assist (75% patient effort)  -LM      Assistive Device (Bed-Chair Transfers)  other (see comments) HHA  -LM      Recorded by [LM] Danelle Alexandre, PT 01/16/19 1331      Row Name 01/16/19 1014             Sit-Stand Transfer    Sit-Stand [place] (Transfers)  minimum assist (75% patient effort)  -LM      Assistive Device (Sit-Stand Transfers)  other (see comments) HHA  -LM      Recorded by [LM] Danelle Alexandre, PT 01/16/19 1331      Row Name 01/16/19 1014             Stand-Sit Transfer    Stand-Sit [place] (Transfers)  minimum assist (75% patient effort)  -LM      Assistive Device (Stand-Sit Transfers)  other (see comments) HHA  -LM      Recorded by [LM] Danelle Alexandre, PT 01/16/19 1331      Row Name 01/16/19 1014             Gait/Stairs Assessment/Training    Gait/Stairs Assessment/Training  gait/ambulation independence;gait/ambulation assistive device  -LM      [place] Level (Gait)  minimum assist  (75% patient effort)  -LM      Assistive Device (Gait)  other (see comments) HHA  -LM      Distance in Feet (Gait)  16  -LM      Pattern (Gait)  step-through  -LM      Deviations/Abnormal Patterns (Gait)  gait speed decreased;stride length decreased  -LM      Comment (Gait/Stairs)  Pt fears falling;leans backward  -LM      Recorded by [LM] Danelle Alexandre, PT 01/16/19 1331      Row Name 01/16/19 1014             Therapeutic Exercise    Lower Extremity (Therapeutic Exercise)  heel slides, bilateral;LAQ (long arc quad), bilateral;marching while seated;SLR (straight leg raise), bilateral  -LM      Exercise Type (Therapeutic Exercise)  AAROM (active assistive range of motion);AROM (active range of motion)  -LM      Position (Therapeutic Exercise)  seated  -LM      Sets/Reps (Therapeutic Exercise)  1 x 10 reps  -LM      Recorded by [LM] Danelle Alexandre, PT 01/16/19 1331      Row Name 01/16/19 1014             Pain Scale: Numbers Pre/Post-Treatment    Pain Scale: Numbers, Pretreatment  0/10 - no pain  -LM      Pain Scale: Numbers, Post-Treatment  0/10 - no pain  -LM      Recorded by [LM] Danelle Alexandre, PT 01/16/19 1331      Row Name                Wound 01/14/19 1311 Right lateral elbow abrasion    Wound - Properties Group Date first assessed: 01/14/19 [BN] Time first assessed: 1311 [BN] Present On Admission : yes [BN] Side: Right [BN] Orientation: lateral [BN] Location: elbow [BN] Type: abrasion [BN] Stage, Pressure Injury: other (see comments) [BN] Additional Comments: right elbow abrasion from fall [BN] Wound Outcome: Healed [BN] Recorded by:  [BN] Mony Taylor RN 01/14/19 1528    Row Name                Wound 01/14/19 1311 Right lateral knee abrasion    Wound - Properties Group Date first assessed: 01/14/19 [BN] Time first assessed: 1311 [BN] Present On Admission : yes [BN] Side: Right [BN] Orientation: lateral [BN] Location: knee [BN] Type: abrasion [BN] Stage, Pressure Injury: other (see comments) [BN]  Additional Comments: right knee abrasion from fall at home [BN] Wound Outcome: Other [BN] Recorded by:  [BN] Mony Taylor RN 01/14/19 1529    Row Name 01/16/19 1014             Outcome Summary/Treatment Plan (PT)    Daily Summary of Progress (PT)  progress toward functional goals as expected  -LM      Plan for Continued Treatment (PT)  Cont PT per POC.  -LM      Anticipated Discharge Disposition (PT)  home with home health  -LM      Recorded by [LM] Danelle Alexandre, PT 01/16/19 1331        User Key  (r) = Recorded By, (t) = Taken By, (c) = Cosigned By    Initials Name Effective Dates Discipline    BN Mony Taylor RN 11/30/18 -  Nurse    LM Danelle Alexandre, PT 04/03/18 -  PT          Wound 01/14/19 1311 Right lateral elbow abrasion (Active)   Dressing Appearance open to air 1/16/2019  8:00 AM   Closure Open to air 1/15/2019  8:00 PM       Wound 01/14/19 1311 Right lateral knee abrasion (Active)   Closure Open to air 1/16/2019  8:00 AM       Rehab Goal Summary     Row Name 01/16/19 1014             Bed Mobility Goal 1 (PT)    Progress/Outcomes (Bed Mobility Goal 1, PT)  goal ongoing  -LM         Transfer Goal 1 (PT)    Progress/Outcome (Transfer Goal 1, PT)  goal ongoing  -LM         Gait Training Goal 1 (PT)    Progress/Outcome (Gait Training Goal 1, PT)  goal ongoing  -LM         Patient Education Goal (PT)    Progress/Outcome (Patient Education Goal, PT)  goal ongoing  -LM        User Key  (r) = Recorded By, (t) = Taken By, (c) = Cosigned By    Initials Name Provider Type Discipline    LM Danelle Alexandre, PT Physical Therapist PT          Physical Therapy Education     Title: PT OT SLP Therapies (In Progress)     Topic: Physical Therapy (In Progress)     Point: Mobility training (Done)     Learning Progress Summary           Patient Acceptance, E,TB, VU by CASEY at 1/16/2019  1:32 PM    Comment:  Purpose of PT/POC.    Acceptance, E, VU by  at 1/15/2019  9:53 PM    Comment:  Role of PT                    Point: Home exercise program (Done)     Learning Progress Summary           Patient Acceptance, E,TB, VU by  at 1/16/2019  1:32 PM    Comment:  Purpose of PT/POC.                   Point: Precautions (Done)     Learning Progress Summary           Patient Acceptance, E,TB, VU by  at 1/16/2019  1:32 PM    Comment:  Purpose of PT/POC.                               User Key     Initials Effective Dates Name Provider Type Discipline     04/03/18 -  Lisa López, PT Physical Therapist PT     04/03/18 -  Danelle Alexandre, PT Physical Therapist PT                PT Recommendation and Plan  Anticipated Discharge Disposition (PT): home with home health  Therapy Frequency (PT Clinical Impression): daily  Outcome Summary/Treatment Plan (PT)  Daily Summary of Progress (PT): progress toward functional goals as expected  Plan for Continued Treatment (PT): Cont PT per POC.  Anticipated Discharge Disposition (PT): home with home health  Plan of Care Reviewed With: patient  Progress: improving  Outcome Summary: PT tx completed. Pt performs LE ther ex as indicated on care map. She is able to perform all mobility, including ambulating 16' with HHA, with min A. Trial RW.  Outcome Measures     Row Name 01/16/19 1300 01/16/19 1014 01/15/19 1300       How much help from another person do you currently need...    Turning from your back to your side while in flat bed without using bedrails?  3  -LM  --  --    Moving from lying on back to sitting on the side of a flat bed without bedrails?  3  -LM  --  --    Moving to and from a bed to a chair (including a wheelchair)?  3  -LM  --  --    Standing up from a chair using your arms (e.g., wheelchair, bedside chair)?  3  -LM  --  --    Climbing 3-5 steps with a railing?  2  -LM  --  --    To walk in hospital room?  3  -LM  --  --    AM-PAC 6 Clicks Score  17  -LM  --  --       Functional Assessment    Outcome Measure Options  AM-PAC 6 Clicks Basic Mobility (PT)  -LM  AM-PAC 6 Clicks Basic  Mobility (PT)  -  10 Meter Walk  -    Row Name 01/15/19 1127 01/15/19 1116          How much help from another person do you currently need...    Turning from your back to your side while in flat bed without using bedrails?  3  -JR  --     Moving from lying on back to sitting on the side of a flat bed without bedrails?  3  -JR  --     Moving to and from a bed to a chair (including a wheelchair)?  3  -JR  --     Standing up from a chair using your arms (e.g., wheelchair, bedside chair)?  3  -JR  --     Climbing 3-5 steps with a railing?  2  -JR  --     To walk in hospital room?  3  -JR  --     AM-PAC 6 Clicks Score  17  -JR  --        How much help from another is currently needed...    Putting on and taking off regular lower body clothing?  --  2  -HE     Bathing (including washing, rinsing, and drying)  --  2  -HE     Toileting (which includes using toilet bed pan or urinal)  --  3  -HE     Putting on and taking off regular upper body clothing  --  4  -HE     Taking care of personal grooming (such as brushing teeth)  --  3  -HE     Eating meals  --  4  -HE     Score  --  18  -HE        Functional Assessment    Outcome Measure Options  AM-PAC 6 Clicks Basic Mobility (PT)  -  AM-PAC 6 Clicks Daily Activity (OT)  -       User Key  (r) = Recorded By, (t) = Taken By, (c) = Cosigned By    Initials Name Provider Type    Lisa Lockhart, PT Physical Therapist    Danelle Zepeda, PT Physical Therapist    Vishal Ruano Occupational Therapist         Time Calculation:   PT Charges     Row Name 01/16/19 1335             Time Calculation    Start Time  1014  -LM      PT Received On  01/16/19  -         Time Calculation- PT    Total Timed Code Minutes- PT  24 minute(s)  -        User Key  (r) = Recorded By, (t) = Taken By, (c) = Cosigned By    Initials Name Provider Type    Danelle Zepeda, PT Physical Therapist        Therapy Suggested Charges     Code   Minutes Charges    None           Therapy Charges for  Today     Code Description Service Date Service Provider Modifiers Qty    47531255192 HC GAIT TRAINING EA 15 MIN 1/16/2019 Danelle Alexandre, PT GP 1    [id number] HC PT THER PROC EA 15 MIN 1/16/2019 Danelle Alexandre, PT GP 1          PT G-Codes  Outcome Measure Options: AM-PAC 6 Clicks Basic Mobility (PT)  AM-PAC 6 Clicks Score: 17  Score: 18    Danelle Alexandre, PT  1/16/2019

## 2019-01-16 NOTE — SIGNIFICANT NOTE
01/16/19 1512   Rehab Treatment   Discipline occupational therapist   Reason Treatment Not Performed patient/family declined treatment  (Pt had just gotten back to bed by nursing.  Family present.)

## 2019-01-16 NOTE — PLAN OF CARE
Problem: Patient Care Overview  Goal: Plan of Care Review  Outcome: Ongoing (interventions implemented as appropriate)   01/15/19 5702   Coping/Psychosocial   Plan of Care Reviewed With patient   OTHER   Outcome Summary PT evaluation completed. Patient demonstrates decreased strength, transfers, balance and gait. She is expected to benefit from additional PT services.

## 2019-01-16 NOTE — THERAPY EVALUATION
Acute Care - Physical Therapy Initial Evaluation  [facility]     Patient Name: Izabella Agudelo  : 1960  MRN: [id number]  Today's Date: 1/15/2019   Onset of Illness/Injury or Date of Surgery: 19  Date of Referral to PT: 19  Referring Physician: Alvarez Laughlin MD      Admit Date: 2019    Visit Dx:     ICD-10-CM ICD-9-CM   1. Pneumonia of left lower lobe due to infectious organism (CMS/HCC) J18.1 486   2. Acute respiratory failure with hypoxia (CMS/HCC) J96.01 518.81   3. Hypoglycemia E16.2 251.2   4. Impaired mobility and ADLs Z74.09 799.89   5. Impaired functional mobility, balance, gait, and endurance Z74.09 V49.89     Patient Active Problem List   Diagnosis   • Pneumonia of left lower lobe due to infectious organism (CMS/HCC)   • Acute respiratory failure with hypoxia (CMS/HCC)   • Acute metabolic encephalopathy   • Hypoglycemia   • Type 2 diabetes mellitus with complication, with long-term current use of insulin (CMS/HCC)   • Paranoid schizophrenia (CMS/HCC)   • Hyponatremia   • Essential hypertension     Past Medical History:   Diagnosis Date   • Cardiac abnormality    • CHF (congestive heart failure) (CMS/HCC)    • COPD (chronic obstructive pulmonary disease) (CMS/HCC)    • Diabetes (CMS/HCC)    • Glaucoma    • Schizophrenia, chronic condition (CMS/HCC)      History reviewed. No pertinent surgical history.     PT ASSESSMENT (last 12 hours)      Physical Therapy Evaluation     Row Name 01/15/19 1127          PT Evaluation Time/Intention    Subjective Information  complains of;dizziness  -JR     Document Type  evaluation  -JR     Mode of Treatment  physical therapy  -JR     Patient Effort  good  -JR     Symptoms Noted During/After Treatment  dizziness  -JR     Row Name 01/15/19 1127          General Information    Patient Profile Reviewed?  yes  -JR     Onset of Illness/Injury or Date of Surgery  19  -JR     Referring Physician  Alvarez Laughlin MD  -JR     Patient Observations   cooperative;agree to therapy;alert  -JR     Patient/Family Observations  No family present  -JR     General Observations of Patient  Supine with 3 LPM of oxygen per nasal cannula  -JR     Prior Level of Function  independent:;all household mobility  -JR     Equipment Currently Used at Home  oxygen  -JR     Pertinent History of Current Functional Problem  Pneumonia of LLL due to infectious organism  -JR     Existing Precautions/Restrictions  fall;oxygen therapy device and L/min  -JR     Risks Reviewed  patient:;increased discomfort  -JR     Benefits Reviewed  patient:;increase balance;increase strength;increase independence;improve function  -JR     Row Name 01/15/19 1127          Relationship/Environment    Primary Source of Support/Comfort  sibling(s)  -JR     Lives With  sibling(s)  -JR     Name(s) of Who Lives With Patient  Nazia  -     Row Name 01/15/19 1127          Cognitive Assessment/Intervention- PT/OT    Orientation Status (Cognition)  oriented to;person;place  -JR     Follows Commands (Cognition)  follows one step commands;verbal cues/prompting required;physical/tactile prompts required  -JR     Row Name 01/15/19 1127          Safety Issues, Functional Mobility    Safety Issues Affecting Function (Mobility)  insight into deficits/self awareness;safety precautions follow-through/compliance  -JR     Impairments Affecting Function (Mobility)  coordination;balance;cognition  -     Row Name 01/15/19 1127          Bed Mobility Assessment/Treatment    Bed Mobility Assessment/Treatment  supine-sit  -JR     Supine-Sit [place] (Bed Mobility)  minimum assist (75% patient effort)  -JR     Bed Mobility, Safety Issues  decreased use of legs for bridging/pushing;impaired trunk control for bed mobility;decreased use of arms for pushing/pulling  -JR     Assistive Device (Bed Mobility)  bed rails;head of bed elevated  -JR     Row Name 01/15/19 1127          Transfer Assessment/Treatment    Transfer  Assessment/Treatment  sit-stand transfer;stand-sit transfer  -     Sit-Stand [place] (Transfers)  minimum assist (75% patient effort)  -     Stand-Sit [place] (Transfers)  minimum assist (75% patient effort)  -     Row Name 01/15/19 1127          Sit-Stand Transfer    Assistive Device (Sit-Stand Transfers)  -- HHA  -     Row Name 01/15/19 1127          Stand-Sit Transfer    Assistive Device (Stand-Sit Transfers)  -- HHA  -     Row Name 01/15/19 1127          Gait/Stairs Assessment/Training    Gait/Stairs Assessment/Training  gait/ambulation independence;gait/ambulation assistive device  -     [place] Level (Gait)  minimum assist (75% patient effort)  -     Assistive Device (Gait)  -- HHA, could use RW  -     Distance in Feet (Gait)  12  -     Pattern (Gait)  step-through  -     Deviations/Abnormal Patterns (Gait)  gait speed decreased;stride length decreased;base of support, wide  -[facility] Name 01/15/19 1127          General ROM    GENERAL ROM COMMENTS  Grossly WFL  -[facility] Name 01/15/19 1127          MMT (Manual Muscle Testing)    General MMT Comments  BLE=3/5  -[facility] Name 01/15/19 1127          Pain Assessment    Additional Documentation  Pain Scale: Numbers Pre/Post-Treatment (Group)  -[facility] Name 01/15/19 1127          Pain Scale: Numbers Pre/Post-Treatment    Pain Scale: Numbers, Pretreatment  0/10 - no pain  -     Pain Scale: Numbers, Post-Treatment  0/10 - no pain  -     Row Name             Wound 01/14/19 1311 Right lateral elbow abrasion    Wound - Properties Group Date first assessed: 01/14/19  -BN Time first assessed: 1311  -BN Present On Admission : yes  -BN Side: Right  -BN Orientation: lateral  -BN Location: elbow  -BN Type: abrasion  -BN Stage, Pressure Injury: other (see comments)  -BN Additional Comments: right elbow abrasion from fall  -BN Wound Outcome: Healed  -BN    Row Name             Wound 01/14/19 1311 Right lateral knee abrasion     Wound - Properties Group Date first assessed: 01/14/19  -BN Time first assessed: 1311  -BN Present On Admission : yes  -BN Side: Right  -BN Orientation: lateral  -BN Location: knee  -BN Type: abrasion  -BN Stage, Pressure Injury: other (see comments)  -BN Additional Comments: right knee abrasion from fall at home  -BN Wound Outcome: Other  -BN    Row Name 01/15/19 1127          Coping    Observed Emotional State  calm;cooperative  -JR     Row Name 01/15/19 1127          Plan of Care Review    Plan of Care Reviewed With  patient  -JR     Row Name 01/15/19 1127          Physical Therapy Clinical Impression    Date of Referral to PT  01/14/19  -JR     PT Diagnosis (PT Clinical Impression)  weakness, poor balance,gait abn  -JR     Patient/Family Goals Statement (PT Clinical Impression)  Patient wants to go home  -JR     Criteria for Skilled Interventions Met (PT Clinical Impression)  yes;treatment indicated  -JR     Pathology/Pathophysiology Noted (Describe Specifically for Each System)  pulmonary;musculoskeletal;neuromuscular  -JR     Impairments Found (describe specific impairments)  aerobic capacity/endurance;gait, locomotion, and balance;muscle performance  -JR     Rehab Potential (PT Clinical Summary)  good, to achieve stated therapy goals  -JR     Care Plan Review (PT)  evaluation/treatment results reviewed;care plan/treatment goals reviewed;risks/benefits reviewed;current/potential barriers reviewed;patient/other agree to care plan  -JR     Row Name 01/15/19 1127          Physical Therapy Goals    Bed Mobility Goal Selection (PT)  bed mobility, PT goal 1  -JR     Transfer Goal Selection (PT)  transfer, PT goal 1  -JR     Gait Training Goal Selection (PT)  gait training, PT goal 1  -JR     Row Name 01/15/19 1127          Bed Mobility Goal 1 (PT)    Activity/Assistive Device (Bed Mobility Goal 1, PT)  bed mobility activities, all  -JR     [place] Level/Cues Needed (Bed Mobility Goal 1, PT)  conditional  independence  -JR     Time Frame (Bed Mobility Goal 1, PT)  2 weeks  -JR     Progress/Outcomes (Bed Mobility Goal 1, PT)  goal ongoing  -JR     Row Name 01/15/19 1127          Transfer Goal 1 (PT)    Activity/Assistive Device (Transfer Goal 1, PT)  sit-to-stand/stand-to-sit  -JR     [place] Level/Cues Needed (Transfer Goal 1, PT)  standby assist  -JR     Time Frame (Transfer Goal 1, PT)  2 weeks  -JR     Progress/Outcome (Transfer Goal 1, PT)  goal ongoing  -JR     Row Name 01/15/19 1127          Gait Training Goal 1 (PT)    Activity/Assistive Device (Gait Training Goal 1, PT)  gait (walking locomotion);assistive device use  -JR     [place] Level (Gait Training Goal 1, PT)  standby assist  -JR     Distance (Gait Goal 1, PT)  250  -JR     Time Frame (Gait Training Goal 1, PT)  2 weeks  -JR     Progress/Outcome (Gait Training Goal 1, PT)  goal ongoing  -JR     Row Name 01/15/19 1127          Patient Education Goal (PT)    Activity (Patient Education Goal, PT)  Perform HEP x 15  -JR     [place]/Cues/Accuracy (Memory Goal 2, PT)  demonstrates adequately  -JR     Time Frame (Patient Education Goal, PT)  2 weeks  -JR     Progress/Outcome (Patient Education Goal, PT)  goal ongoing  -JR     Row Name 01/15/19 1127          Positioning and Restraints    Pre-Treatment Position  in bed  -JR     Post Treatment Position  chair  -JR     In Chair  sitting;call light within reach;encouraged to call for assist  -JR       User Key  (r) = Recorded By, (t) = Taken By, (c) = Cosigned By    Initials Name Provider Type    Lisa Lockhart, PT Physical Therapist    Mony Pollack, RN Registered Nurse        Physical Therapy Education     Title: PT OT SLP Therapies (In Progress)     Topic: Physical Therapy (In Progress)     Point: Mobility training (Done)     Learning Progress Summary           Patient Acceptance, E, VU by  at 1/15/2019  9:53 PM    Comment:  Role of PT                               User Key      Initials Effective Dates Name Provider Type Discipline    JR 04/03/18 -  Lisa López, PT Physical Therapist PT              PT Recommendation and Plan  Anticipated Discharge Disposition (PT): home with home health  Planned Therapy Interventions (PT Eval): balance training, strengthening, bed mobility training, transfer training, gait training, patient/family education, home exercise program  Therapy Frequency (PT Clinical Impression): daily  Outcome Summary/Treatment Plan (PT)  Anticipated Discharge Disposition (PT): home with home health  Plan of Care Reviewed With: patient  Outcome Summary: PT evaluation completed.  Patient demonstrates decreased strength, transfers, balance and gait.  She is expected to benefit from additional PT services.  Outcome Measures     Row Name 01/15/19 1300 01/15/19 1127 01/15/19 1116       How much help from another person do you currently need...    Turning from your back to your side while in flat bed without using bedrails?  --  3  -JR  --    Moving from lying on back to sitting on the side of a flat bed without bedrails?  --  3  -JR  --    Moving to and from a bed to a chair (including a wheelchair)?  --  3  -JR  --    Standing up from a chair using your arms (e.g., wheelchair, bedside chair)?  --  3  -JR  --    Climbing 3-5 steps with a railing?  --  2  -JR  --    To walk in hospital room?  --  3  -JR  --    AM-PAC 6 Clicks Score  --  17  -JR  --       How much help from another is currently needed...    Putting on and taking off regular lower body clothing?  --  --  2  -HE    Bathing (including washing, rinsing, and drying)  --  --  2  -HE    Toileting (which includes using toilet bed pan or urinal)  --  --  3  -HE    Putting on and taking off regular upper body clothing  --  --  4  -HE    Taking care of personal grooming (such as brushing teeth)  --  --  3  -HE    Eating meals  --  --  4  -HE    Score  --  --  18  -HE       Functional Assessment    Outcome Measure Options  10  Meter Walk  -  AM-PAC 6 Clicks Basic Mobility (PT)  -  AM-PAC 6 Clicks Daily Activity (OT)  -      User Key  (r) = Recorded By, (t) = Taken By, (c) = Cosigned By    Initials Name Provider Type    Lisa Lockhart, PT Physical Therapist    Vishal Ruano Occupational Therapist         Time Calculation:   PT Charges     Row Name 01/15/19 2155             Time Calculation    Start Time  1127  -JR      PT Received On  01/15/19  -      PT Goal Re-Cert Due Date  01/25/19  -        User Key  (r) = Recorded By, (t) = Taken By, (c) = Cosigned By    Initials Name Provider Type    Lisa Lockhart, PT Physical Therapist        Therapy Suggested Charges     Code   Minutes Charges    None           Therapy Charges for Today     Code Description Service Date Service Provider Modifiers Qty    04519448157 HC PT EVAL MOD COMPLEXITY 4 1/15/2019 Lisa López, PT GP 1          PT G-Codes  Outcome Measure Options: 10 Meter Walk  AM-PAC 6 Clicks Score: 17  Score: 18      Lisa López, PT  1/15/2019

## 2019-01-16 NOTE — PLAN OF CARE
Problem: Patient Care Overview  Goal: Plan of Care Review  Outcome: Ongoing (interventions implemented as appropriate)   01/16/19 5703   Coping/Psychosocial   Plan of Care Reviewed With patient   OTHER   Outcome Summary PT tx completed. Pt performs LE ther ex as indicated on care map. She is able to perform all mobility, including ambulating 16' with HHA, with min A. Trial RW.   Coping/Psychosocial   Patient Agreement with Plan of Care agrees   Plan of Care Review   Progress improving

## 2019-01-16 NOTE — PLAN OF CARE
Problem: Pneumonia (Adult)  Goal: Signs and Symptoms of Listed Potential Problems Will be Absent, Minimized or Managed (Pneumonia)  Outcome: Ongoing (interventions implemented as appropriate)   01/16/19 6483   Goal/Outcome Evaluation   Problems Assessed (Pneumonia) all   Problems Present (Pneumonia) respiratory compromise

## 2019-01-17 VITALS
WEIGHT: 161 LBS | RESPIRATION RATE: 18 BRPM | HEART RATE: 66 BPM | DIASTOLIC BLOOD PRESSURE: 90 MMHG | SYSTOLIC BLOOD PRESSURE: 168 MMHG | TEMPERATURE: 97.9 F | OXYGEN SATURATION: 100 % | BODY MASS INDEX: 26.82 KG/M2 | HEIGHT: 65 IN

## 2019-01-17 LAB
ANION GAP SERPL CALCULATED.3IONS-SCNC: 8.8 MMOL/L (ref 10–20)
BASOPHILS # BLD AUTO: 0.03 10*3/MM3 (ref 0–0.2)
BASOPHILS NFR BLD AUTO: 0.6 % (ref 0–2.5)
BUN BLD-MCNC: 5 MG/DL (ref 7–20)
BUN/CREAT SERPL: 16.7 (ref 7.1–23.5)
CALCIUM SPEC-SCNC: 8.7 MG/DL (ref 8.4–10.2)
CHLORIDE SERPL-SCNC: 93 MMOL/L (ref 98–107)
CO2 SERPL-SCNC: 38 MMOL/L (ref 26–30)
CREAT BLD-MCNC: 0.3 MG/DL (ref 0.6–1.3)
DEPRECATED RDW RBC AUTO: 44.7 FL (ref 37–54)
EOSINOPHIL # BLD AUTO: 0.32 10*3/MM3 (ref 0–0.7)
EOSINOPHIL NFR BLD AUTO: 6.8 % (ref 0–7)
ERYTHROCYTE [DISTWIDTH] IN BLOOD BY AUTOMATED COUNT: 14.6 % (ref 11.5–14.5)
GFR SERPL CREATININE-BSD FRML MDRD: >150 ML/MIN/1.73
GLUCOSE BLD-MCNC: 86 MG/DL (ref 74–98)
GLUCOSE BLDC GLUCOMTR-MCNC: 82 MG/DL (ref 70–130)
HCT VFR BLD AUTO: 37.2 % (ref 37–47)
HGB BLD-MCNC: 11.9 G/DL (ref 12–16)
IMM GRANULOCYTES # BLD AUTO: 0.02 10*3/MM3 (ref 0–0.06)
IMM GRANULOCYTES NFR BLD AUTO: 0.4 % (ref 0–0.6)
LYMPHOCYTES # BLD AUTO: 1.64 10*3/MM3 (ref 0.6–3.4)
LYMPHOCYTES NFR BLD AUTO: 34.9 % (ref 10–50)
MCH RBC QN AUTO: 26.5 PG (ref 27–31)
MCHC RBC AUTO-ENTMCNC: 32 G/DL (ref 30–37)
MCV RBC AUTO: 82.9 FL (ref 81–99)
MONOCYTES # BLD AUTO: 0.47 10*3/MM3 (ref 0–0.9)
MONOCYTES NFR BLD AUTO: 10 % (ref 0–12)
NEUTROPHILS # BLD AUTO: 2.22 10*3/MM3 (ref 2–6.9)
NEUTROPHILS NFR BLD AUTO: 47.3 % (ref 37–80)
NRBC BLD AUTO-RTO: 0 /100 WBC (ref 0–0)
PLATELET # BLD AUTO: 235 10*3/MM3 (ref 130–400)
PMV BLD AUTO: 8.5 FL (ref 6–12)
POTASSIUM BLD-SCNC: 3.8 MMOL/L (ref 3.5–5.1)
RBC # BLD AUTO: 4.49 10*6/MM3 (ref 4.2–5.4)
SODIUM BLD-SCNC: 136 MMOL/L (ref 137–145)
TROPONIN I SERPL-MCNC: <0.012 NG/ML (ref 0–0.03)
WBC NRBC COR # BLD: 4.7 10*3/MM3 (ref 4.8–10.8)

## 2019-01-17 PROCEDURE — 94799 UNLISTED PULMONARY SVC/PX: CPT

## 2019-01-17 PROCEDURE — 94618 PULMONARY STRESS TESTING: CPT

## 2019-01-17 PROCEDURE — 80048 BASIC METABOLIC PNL TOTAL CA: CPT | Performed by: INTERNAL MEDICINE

## 2019-01-17 PROCEDURE — 85025 COMPLETE CBC W/AUTO DIFF WBC: CPT | Performed by: INTERNAL MEDICINE

## 2019-01-17 PROCEDURE — 82962 GLUCOSE BLOOD TEST: CPT

## 2019-01-17 PROCEDURE — 84484 ASSAY OF TROPONIN QUANT: CPT | Performed by: INTERNAL MEDICINE

## 2019-01-17 PROCEDURE — 85007 BL SMEAR W/DIFF WBC COUNT: CPT | Performed by: INTERNAL MEDICINE

## 2019-01-17 PROCEDURE — 99238 HOSP IP/OBS DSCHRG MGMT 30/<: CPT | Performed by: INTERNAL MEDICINE

## 2019-01-17 RX ORDER — INSULIN GLARGINE 100 [IU]/ML
40 INJECTION, SOLUTION SUBCUTANEOUS NIGHTLY
Refills: 10
Start: 2019-01-17

## 2019-01-17 RX ORDER — CEFUROXIME AXETIL 500 MG/1
500 TABLET ORAL EVERY 12 HOURS
Qty: 10 TABLET | Refills: 0 | Status: ON HOLD | OUTPATIENT
Start: 2019-01-17 | End: 2020-11-16

## 2019-01-17 RX ORDER — IPRATROPIUM BROMIDE AND ALBUTEROL SULFATE 2.5; .5 MG/3ML; MG/3ML
3 SOLUTION RESPIRATORY (INHALATION) EVERY 4 HOURS PRN
Qty: 360 ML | Refills: 0 | Status: ON HOLD | OUTPATIENT
Start: 2019-01-17 | End: 2020-11-16

## 2019-01-17 RX ORDER — ARIPIPRAZOLE 20 MG/1
30 TABLET ORAL DAILY
Start: 2019-01-17

## 2019-01-17 RX ADMIN — OXYCODONE HYDROCHLORIDE AND ACETAMINOPHEN 500 MG: 500 TABLET ORAL at 10:05

## 2019-01-17 RX ADMIN — FAMOTIDINE 20 MG: 20 TABLET, FILM COATED ORAL at 10:06

## 2019-01-17 RX ADMIN — Medication 1 CAPSULE: at 10:07

## 2019-01-17 RX ADMIN — IPRATROPIUM BROMIDE AND ALBUTEROL SULFATE 3 ML: .5; 3 SOLUTION RESPIRATORY (INHALATION) at 07:00

## 2019-01-17 RX ADMIN — CARVEDILOL 12.5 MG: 12.5 TABLET, FILM COATED ORAL at 10:07

## 2019-01-17 RX ADMIN — SODIUM CHLORIDE, PRESERVATIVE FREE 3 ML: 5 INJECTION INTRAVENOUS at 10:07

## 2019-01-17 RX ADMIN — DIVALPROEX SODIUM 1000 MG: 500 TABLET, EXTENDED RELEASE ORAL at 10:06

## 2019-01-17 RX ADMIN — GUAIFENESIN 600 MG: 600 TABLET, EXTENDED RELEASE ORAL at 10:06

## 2019-01-17 RX ADMIN — GABAPENTIN 300 MG: 300 CAPSULE ORAL at 10:07

## 2019-01-17 RX ADMIN — ARIPIPRAZOLE 20 MG: 5 TABLET ORAL at 10:04

## 2019-01-17 NOTE — PROGRESS NOTES
Exercise Oximetry    Patient Name:Izabella Agudelo   MRN: [id number]   Date: 01/17/19             ROOM AIR BASELINE   SpO2% 93 resting RA   Heart Rate 58   Blood Pressure      EXERCISE ON ROOM AIR SpO2% EXERCISE ON O2 @  LPM SpO2%   1 MINUTE 92 1 MINUTE    2 MINUTES 93 2 MINUTES    3 MINUTES 92 3 MINUTES    4 MINUTES 95 4 MINUTES    5 MINUTES 94 5 MINUTES    6 MINUTES 94 6 MINUTES               Distance Walked  50 FT Distance Walked   Dyspnea (Huan Scale)   Dyspnea (Huan Scale)   Fatigue (Huan Scale)   Fatigue (Huan Scale)   SpO2% Post Exercise  93% at RA SpO2% Post Exercise   HR Post Exercise  60 HR Post Exercise   Time to Recovery  5 minutes Time to Recovery     Comments: Pt walked slowly with assistance.

## 2019-01-17 NOTE — PROGRESS NOTES
[facility]    Name:  Izabella Agudelo   Age:  58 y.o.  Sex:  female  :  1960  MRN:  [id number]   Visit Number:  [id number]  Admission Date:  2019  Date Of Service:  19  Primary Care Physician:  Francoise Bailey MD     LOS: 2 days :  Patient Care Team:  Francoise Bailey MD as PCP - General:    Chief Complaint:      Follow-up of pneumonia.    Subjective / Interval History:     Ms. Agudelo is currently sitting up on the chair and is comfortable.  She did work with physical therapy.  She has not had any further episodes of hypoglycemia.  She has mild cough but denies any shortness of breath.  Denies any abdominal pain, nausea or vomiting.    Review of Systems:     General ROS: Patient denies any fevers, chills or loss of consciousness.  Respiratory ROS: Complains of cough and chest congestion.  Cardiovascular ROS: Denies chest pain or palpitations. No history of exertional chest pain.  Gastrointestinal ROS: Denies nausea and vomiting. Denies any abdominal pain. No diarrhea.  Neurological ROS: Denies any focal weakness. No loss of consciousness. Denies any numbness.  Dermatological ROS: Denies any redness or pruritis.    Vital Signs:    Temp:  [97.5 °F (36.4 °C)-98.3 °F (36.8 °C)] 98 °F (36.7 °C)  Heart Rate:  [62-72] 68  Resp:  [16-19] 18  BP: (129-168)/(70-99) 168/87    Intake and output:    I/O last 3 completed shifts:  In: 360 [P.O.:360]  Out:  [Urine:]  No intake/output data recorded.    Physical Examination:    General Appearance:  Alert and cooperative, not in any acute distress.   Head:  Atraumatic and normocephalic, without obvious abnormality.   Eyes:          PERRLA, conjunctivae and sclerae normal, no Icterus. No pallor. Extra-occular movements are within normal limits.   Neck: Supple, trachea midline, no thyromegaly, no carotid bruit.   Lungs:   Chest shape is normal. Breath sounds heard bilaterally equally.  No wheezing.   Occasional scattered crackles heard. No Pleural rub or bronchial breathing.   Heart:  Normal S1 and S2, no murmur, no gallop, no rub. No JVD   Abdomen:   Normal bowel sounds, no masses, no organomegaly. Soft, non-tender, obese, no guarding, no rebound tenderness.   Extremities: Moves all extremities well, 1+ edema, no cyanosis, no clubbing. Ecchymotic patches/bruising noted in bilateral knee joints.   Skin: No bleeding or rash.   Neurologic: Awake, alert and oriented times 3. Moves all 4 extremities equally.   Laboratory results:    Results from last 7 days   Lab Units 01/15/19  0548 01/14/19  0854   SODIUM mmol/L 127* 123*   POTASSIUM mmol/L 3.5 3.6   CHLORIDE mmol/L 85* 83*   CO2 mmol/L 32.0* 30.0   BUN mg/dL 11 12   CREATININE mg/dL 0.30* 0.30*   CALCIUM mg/dL 8.5 8.6   BILIRUBIN mg/dL  --  0.6   ALK PHOS U/L  --  56   ALT (SGPT) U/L  --  26   AST (SGOT) U/L  --  39   GLUCOSE mg/dL 193* 102*     Results from last 7 days   Lab Units 01/15/19  0548 01/14/19  0854   WBC 10*3/mm3 5.41 5.70   HEMOGLOBIN g/dL 11.3* 12.1   HEMATOCRIT % 34.2* 35.6*   PLATELETS 10*3/mm3 201 192         Results from last 7 days   Lab Units 01/15/19  0548 01/14/19  0854   TROPONIN I ng/mL 0.026 0.053*     Results from last 7 days   Lab Units 01/14/19  1105 01/14/19  1100   BLOODCX  No growth at 2 days No growth at 2 days     I have reviewed the patient's laboratory results.    Radiology results:    Imaging Results (last 24 hours)     ** No results found for the last 24 hours. **        Medication Review:     I have reviewed the patients active and prn medications.       Pneumonia of left lower lobe due to infectious organism (CMS/HCC)    Hypoglycemia    Acute respiratory failure with hypoxia (CMS/HCC)    Acute metabolic encephalopathy    Type 2 diabetes mellitus with complication, with long-term current use of insulin (CMS/HCC)    Paranoid schizophrenia (CMS/HCC)    Hyponatremia    Essential hypertension    Assessment:    1.  Acute  metabolic encephalopathy secondary to hypoglycemia and hyponatremia, present on admission, resolved.  2.  Acute hypoxic respiratory failure secondary to #3, present on admission.  3.  Left lower lobe bacterial pneumonia, community-acquired, present on admission.  4.  Hyponatremia, likely secondary to #3, present on admission, improving.  5.  Diabetes mellitus type 2 with insulin therapy.  6.  Paranoid schizophrenia.  7.  COPD, no evidence of exacerbation.  8.  Essential hypertension.  9.  Permanent atrial fibrillation, not on anticoagulation due to recurrent falls.    Plan:    Ms. Agudelo is currently doing better and has not had any further episodes of hypoglycemia.  She is currently on subcutaneous insulin protocol for coverage and I will also start her on low-dose Levemir basal insulin therapy.  She will be continued on IV antibiotic therapy with Rocephin and oral azithromycin for her pneumonia.  She will be continued on lactobacillus supplements.    Patient does have history of atrial fibrillation and I discussed the patient's condition with her sister Nazia over the phone.  According to her sister, patient did see Dr. Garcia at one point.  Apparently due to her recurrent falls, she was not placed on anticoagulation for her atrial fibrillation.  According to her sister, patient is on low-dose aspirin at home.    She is currently on bronchodilators and mucolytic agents.  She does not have any significant wheezing.  She is on Lovenox for DVT prophylaxis.  A 2-D echocardiogram done yesterday shows normal left ventricle ejection fraction of 55% with mild elevation of left ventricular diastolic pressure.  If she continues to do well, we should be able to discharge her home with home health tomorrow.  She will be discharged with oral antibiotic therapy.      Alvarez Laughlin MD  01/16/19  7:07 PM    Dictated utilizing Dragon dictation.

## 2019-01-17 NOTE — PLAN OF CARE
Problem: Patient Care Overview  Goal: Plan of Care Review  Outcome: Ongoing (interventions implemented as appropriate)   01/17/19 0301   Coping/Psychosocial   Plan of Care Reviewed With patient   OTHER   Outcome Summary VSS, patient has no complaints. Possible discharge home today.   Plan of Care Review   Progress improving     Goal: Individualization and Mutuality  Outcome: Ongoing (interventions implemented as appropriate)    Goal: Discharge Needs Assessment  Outcome: Ongoing (interventions implemented as appropriate)    Goal: Interprofessional Rounds/Family Conf  Outcome: Ongoing (interventions implemented as appropriate)      Problem: Pneumonia (Adult)  Goal: Signs and Symptoms of Listed Potential Problems Will be Absent, Minimized or Managed (Pneumonia)  Outcome: Ongoing (interventions implemented as appropriate)      Problem: Skin Injury Risk (Adult)  Goal: Skin Health and Integrity  Outcome: Ongoing (interventions implemented as appropriate)      Problem: Urinary Tract Infection (Adult)  Goal: Signs and Symptoms of Listed Potential Problems Will be Absent, Minimized or Managed (Urinary Tract Infection)  Outcome: Ongoing (interventions implemented as appropriate)

## 2019-01-17 NOTE — DISCHARGE SUMMARY
[facility]   DISCHARGE SUMMARY      Name:  Izabella Agudelo   Age:  58 y.o.  Sex:  female  :  1960  MRN:  [id number]   Visit Number:  [id number]    Admission Date:  2019  Date of Discharge:  2019  Primary Care Physician:  Francoise Bailey MD    Discharge Diagnoses:     1.  Acute metabolic encephalopathy secondary to hypoglycemia and hyponatremia, present on admission, resolved.  2.  Acute hypoxic respiratory failure secondary to #3, present on admission.  3.  Left lower lobe bacterial pneumonia, community-acquired, present on admission.  4.  Hyponatremia, likely secondary to #3, present on admission, improving.  5.  Diabetes mellitus type 2 with insulin therapy.  6.  Paranoid schizophrenia.  7.  COPD, no evidence of exacerbation.  8.  Essential hypertension.  9.  Permanent atrial fibrillation, not on anticoagulation due to recurrent falls.      Pneumonia of left lower lobe due to infectious organism (CMS/HCC)    Hypoglycemia    Acute respiratory failure with hypoxia (CMS/HCC)    Acute metabolic encephalopathy    Type 2 diabetes mellitus with complication, with long-term current use of insulin (CMS/HCC)    Paranoid schizophrenia (CMS/HCC)    Hyponatremia    Essential hypertension    Presenting Problem:    Pneumonia of left lower lobe due to infectious organism (CMS/HCC) [J18.1]     Consults:     Consulting Physician(s)             None          Procedures Performed:    None.    History of presenting illness:    This is a 58-year-old female with history of diabetes mellitus type 2 on insulin at home, schizophrenia was brought to the emergency room by ambulance with the above complaints.  The history is obtained from the patient as well as the medical chart from the emergency room.     Apparently, patient was noted to be unresponsive at home by her family and EMS was called.  When the EMS checked her fingerstick glucose level it was in the 40s and she received IV  dextrose.  Patient was subsequently brought to the emergency room.  She was evaluated in the emergency room.  She was alert but was noted to have pulse ox oxygen saturation of 88% on room air.  She was afebrile and otherwise hemodynamically stable.  Blood work done in the emergency room showed blood glucose level of 102.  CMP and CBC otherwise was within normal limits.  Her pro-calcitonin was 0.26 and lactic acid was 2.  Chest x-ray showed left lower lobe pneumonia.  She was also noted to have a troponin of 0.053 and BNP of 2230.     Patient is currently lying down on the bed and is comfortable at rest.  She is able to answer a few questions appropriately.  She denies any heart problems.  She states that she lives with her mother and sister who give her the insulins.  She states that she takes Lantus as well as NovoLog.    Hospital Course:    Ms. Agudelo was admitted to the medical floor with telemetry and was placed on dextrose infusion.  She was placed on hypoglycemia protocol and all her insulin therapy was placed on hold.  She was continued on IV antibiotic therapy with Rocephin and azithromycin for her pneumonia.  Patient did not have any further episodes of hypoglycemia and her sugars started increasing and at that point she was placed back on subcutaneous insulin protocol for coverage with NovoLog.  She was also started on lower dose of Levemir.  Patient takes 50 units of Lantus at night at home.  She also takes metformin and acarbose.    Patient improved with regards to her chest congestion and shortness of breath.  Her blood work remained stable.  She did not have any fevers during the hospital stay.  She was evaluated by physical therapy and was able to walk in the hallway.  She did have a 2-D echocardiogram and was noted to have normal left ventricular ejection fraction at 55%.  Mild elevation of left ventricular end-diastolic pressures.  No significant valvular abnormalities were noted.    Patient is  currently feeling better and will be discharged back home with home health.  She lives with her mother and sister Nazia.  I did discuss the patient's atrial fibrillation treatment with her sister Nazia.  According to the sister patient has seen a cardiologist in the past and was recommended to be off anticoagulation due to recurrent falls.  She was discharged home on 5 more days of antibiotic therapy with Ceftin.  Patient has been advised to follow-up with her primary care provider in one week.    Vital Signs:    Temp:  [97.6 °F (36.4 °C)-98.3 °F (36.8 °C)] 97.9 °F (36.6 °C)  Heart Rate:  [59-69] 66  Resp:  [16-20] 18  BP: (145-168)/(81-90) 168/90    Physical Exam:    General Appearance:  Alert and cooperative, not in any acute distress.   Head:  Atraumatic and normocephalic, without obvious abnormality.   Eyes:          PERRLA, conjunctivae and sclerae normal, no Icterus. No pallor. Extra-occular movements are within normal limits.   Ears:  Ears appear intact with no abnormalities noted.   Throat: No oral lesions, no thrush, oral mucosa moist.   Neck: Supple, trachea midline, no thyromegaly, no carotid bruit.   Back:   No kyphoscoliosis present. No tenderness to palpation,   range of motion normal.   Lungs:   Chest shape is normal. Breath sounds heard bilaterally equally.  No crackles or wheezing. No Pleural rub or bronchial breathing.   Heart:  Normal S1 and S2, no murmur, no gallop, no rub. No JVD.   Abdomen:   Normal bowel sounds, no masses, no organomegaly. Soft, non-tender, non-distended, no guarding, no rebound tenderness.   Extremities: Moves all extremities well, no edema, no cyanosis, no clubbing.   Pulses: Pulses palpable and equal bilaterally.   Skin: No bleeding, bruising or rash.   Lymph nodes: No palpable adenopathy.   Neurologic: Alert and oriented x 3. Moves all four limbs equally. No tremors. No facial asymetry.     Pertinent Lab Results:     Results from last 7 days   Lab Units 01/17/19  0613  01/15/19  0548 01/14/19  0854   SODIUM mmol/L 136* 127* 123*   POTASSIUM mmol/L 3.8 3.5 3.6   CHLORIDE mmol/L 93* 85* 83*   CO2 mmol/L 38.0* 32.0* 30.0   BUN mg/dL 5* 11 12   CREATININE mg/dL 0.30* 0.30* 0.30*   CALCIUM mg/dL 8.7 8.5 8.6   BILIRUBIN mg/dL  --   --  0.6   ALK PHOS U/L  --   --  56   ALT (SGPT) U/L  --   --  26   AST (SGOT) U/L  --   --  39   GLUCOSE mg/dL 86 193* 102*     Results from last 7 days   Lab Units 01/17/19  0640 01/15/19  0548 01/14/19  0854   WBC 10*3/mm3 4.70* 5.41 5.70   HEMOGLOBIN g/dL 11.9* 11.3* 12.1   HEMATOCRIT % 37.2 34.2* 35.6*   PLATELETS 10*3/mm3 235 201 192         Results from last 7 days   Lab Units 01/17/19  0640 01/15/19  0548 01/14/19  0854   TROPONIN I ng/mL <0.012 0.026 0.053*     Results from last 7 days   Lab Units 01/14/19  0854   PROBNP pg/mL 2,230.0*           Results from last 7 days   Lab Units 01/14/19  1027   COLOR UA  Yellow   GLUCOSE UA  100 mg/dL (Trace)*   KETONES UA  15 mg/dL (1+)*   LEUKOCYTES UA  Trace*   PH, URINE  7.0   BILIRUBIN UA  Negative   UROBILINOGEN UA  1.0 E.U./dL     Results from last 7 days   Lab Units 01/14/19  1105 01/14/19  1100   BLOODCX  No growth at 2 days No growth at 2 days     Pertinent Radiology Results:    Imaging Results (all)     Procedure Component Value Units Date/Time    XR Chest 2 View [[id number]] Collected:  01/14/19 1023     Updated:  01/14/19 1034    Narrative:       TWO VIEW CHEST     INDICATION: Shortness of breath.     FINDINGS: Two views compared with 02/28/2012. EKG leads overlie the  chest. Cardiac silhouette is mildly prominent. The lung volumes are  diminished. No pneumothorax. Hazy airspace opacification involves the  left mid to lower lung zone. No significant effusion. Minimal  atelectasis or early infiltrate at the right base as well. Degenerative  changes in the spine and shoulders.       Impression:       Patchy airspace opacification greatest in the left lung as  described. Findings may represent developing  infiltrates from pneumonia.  Short-term follow-up to resolution is recommended.     This report was finalized on 1/14/2019 10:32 AM by Rober Tirado MD.        Condition on Discharge:      Stable.    Code status during the hospital stay:    Code Status and Medical Interventions:   Ordered at: 01/14/19 1416     Code Status:    CPR     Medical Interventions (Level of Support Prior to Arrest):    Full     Discharge Disposition:    [place] [facility]    Discharge Medications:       Discharge Medications      New Medications      Instructions Start Date   cefuroxime 500 MG tablet  Commonly known as:  CEFTIN   500 mg, Oral, Every 12 Hours      ipratropium-albuterol 0.5-2.5 mg/3 ml nebulizer  Commonly known as:  DUO-NEB   3 mL, Nebulization, Every 4 Hours PRN         Changes to Medications      Instructions Start Date   ARIPiprazole 20 MG tablet  Commonly known as:  ABILIFY  What changed:    · medication strength  · how much to take   30 mg, Oral, Daily      LANTUS SOLOSTAR 100 UNIT/ML injection pen  Generic drug:  Insulin Glargine  What changed:  See the new instructions.   40 Units, Subcutaneous, Nightly         Continue These Medications      Instructions Start Date   acarbose 25 MG tablet  Commonly known as:  PRECOSE   TAKE 1 TABLET BY MOUTH 3 TIMES A DAY AT THE START OF EACH MAIN MEAL.      B-D UF III MINI PEN NEEDLES 31G X 5 MM misc  Generic drug:  Insulin Pen Needle   USE 3 TIMES A DAY      carvedilol 12.5 MG tablet  Commonly known as:  COREG   TAKE 1 TABLET BY ORAL ROUTE 2 TIMES EVERY DAY WITH FOOD      divalproex 500 MG 24 hr tablet  Commonly known as:  DEPAKOTE   TAKE 2 TABLETS BY MOUTH TWICE A DAY      furosemide 20 MG tablet  Commonly known as:  LASIX   20 mg, Oral, Daily PRN      gabapentin 300 MG capsule  Commonly known as:  NEURONTIN   2 Times Daily      loxapine 25 MG capsule  Commonly known as:  LOXITANE   TAKE 2 CAPSULES AT BEDTIME      metFORMIN 500 MG tablet  Commonly known as:  GLUCOPHAGE   TAKE 2  TABLETS TWICE A DAY WITH MORNING AND EVENING MEALS      NOVOLOG FLEXPEN 100 UNIT/ML solution pen-injector sc pen  Generic drug:  insulin aspart   INJECT 4 UNITS BEFORE EACH MEAL      ONE TOUCH ULTRA TEST test strip  Generic drug:  glucose blood   TEST 2 TIMES A DAY      ONETOUCH DELICA LANCETS 33G misc   TEST TWICE A DAY      raNITIdine 150 MG tablet  Commonly known as:  ZANTAC   TAKE 1 TABLET BY MOUTH TWICE A DAY      simvastatin 40 MG tablet  Commonly known as:  ZOCOR   40 mg, Oral, Nightly      vitamin C 500 MG tablet  Commonly known as:  ASCORBIC ACID   500 mg, Oral, 2 Times Daily           Discharge Diet:     Diet Instructions     Diet: Cardiac, Consistent Carbohydrate; Thin      Discharge Diet:   Cardiac  Consistent Carbohydrate       Fluid Consistency:  Thin        Activity at Discharge:     Activity Instructions     Activity as Tolerated          Follow-up Appointments:    Additional Instructions for the Follow-ups that You Need to Schedule     Ambulatory Referral to Home Health   As directed      Face to Face Visit Date:  1/17/2019    Follow-up Provider for Plan of Care?:  I treated the patient in an acute care facility and will not continue treatment after discharge.    Follow-up Provider:  FRANCOISE OCAMPO [[id number]]    Reason/Clinical Findings:  Pneumonia, Hypoglycemia, COPD, DM, HTN    Describe mobility limitations that make leaving home difficult:  Generalized weakness, Fall risk    Nursing/Therapeutic Services Requested:  Skilled Nursing Physical Therapy Occupational Therapy    Skilled nursing orders:  Medication education Mini-nebs COPD management Cardiopulmonary assessments    PT orders:  Therapeutic exercise Gait Training Transfer training Strengthening    Weight Bearing Status:  As Tolerated    Occupational orders:  Activities of daily living Strengthening    Frequency:  1 Week 1           Follow-up Information     Francoise Ocampo MD Follow up in 1 week(s).    Specialty:  Family  Medicine  Contact information:  [facility] [address] [phone]                 Test Results Pending at Discharge:     Order Current Status    Blood Culture - Blood, Hand, Left Preliminary result    Blood Culture - Blood, Hand, Right Preliminary result    Respiratory Culture - Sputum, Cough Preliminary result             Alvarez Laughiln MD  01/17/19  9:14 AM    Time spent: 25 min.    Dictated utilizing Dragon dictation.

## 2019-01-17 NOTE — PROGRESS NOTES
Continued Stay Note  [facility]     Patient Name: Izabella Agudelo  MRN: [id number]  Today's Date: 1/17/2019    Admit Date: 1/14/2019    Discharge Plan     Row Name 01/17/19 1001       Plan    Plan Comments  PT and her sister are decling home health .She does not have a nebulizer would like to use pt DME [employer] faxed to them they will deliver to the pt home nurse to call pt dionne when ready for DC         Discharge Codes    No documentation.       Expected Discharge Date and Time     Expected Discharge Date Expected Discharge Time    Jan 17, 2019             Emi Pena

## 2019-01-17 NOTE — PROGRESS NOTES
Case Management Discharge Note    Final Note: Discharged  home declining home health     Destination      No service has been selected for the patient.      Durable Medical Equipment      No service has been selected for the patient.      Dialysis/Infusion      No service has been selected for the patient.      Home Medical Care      No service has been selected for the patient.      Community Resources      No service has been selected for the patient.        Other: (private car )    Final Discharge Disposition Code: 01 - home or self-care

## 2019-01-18 ENCOUNTER — READMISSION MANAGEMENT (OUTPATIENT)
Dept: CALL CENTER | Facility: HOSPITAL | Age: 59
End: 2019-01-18

## 2019-01-18 LAB
BACTERIA SPEC RESP CULT: NORMAL
GRAM STN SPEC: NORMAL

## 2019-01-18 NOTE — OUTREACH NOTE
Prep Survey      Responses   Facility patient discharged from?  [facility]   Is patient eligible?  Yes   Discharge diagnosis  Acute metabolic encephalopathy secondary to hypoglycemia and hyponatremia, acute hypoxic resp. failure, LLL bacterial pneumonia, CAP, IDDN II, paranoid schizophrenia, COPD w/o AE, essential HTN, permanent AF, not on anticoagulation due to recurrent falls.    Does the patient have one of the following disease processes/diagnoses(primary or secondary)?  COPD/Pneumonia   Does the patient have Home health ordered?  No   Is there a DME ordered?  Yes   What DME was ordered?  Home nebulizer no agency named.    Comments regarding appointments  See AVS   Prep survey completed?  Yes          Marcela Varghese RN

## 2019-01-19 LAB
BACTERIA SPEC AEROBE CULT: NORMAL
BACTERIA SPEC AEROBE CULT: NORMAL

## 2019-01-21 ENCOUNTER — READMISSION MANAGEMENT (OUTPATIENT)
Dept: CALL CENTER | Facility: HOSPITAL | Age: 59
End: 2019-01-21

## 2019-01-21 NOTE — OUTREACH NOTE
COPD/PN Week 1 Survey      Responses   Facility patient discharged from?  [facility]   Does the patient have one of the following disease processes/diagnoses(primary or secondary)?  COPD/Pneumonia   Is there a successful TCM telephone encounter documented?  No   Was the primary reason for admission:  Pneumonia   Week 1 attempt successful?  Yes   Call start time  0942   Call end time  0954   General alerts for this patient  hx paranoid schizophrenia   Discharge diagnosis  Acute metabolic encephalopathy secondary to hypoglycemia and hyponatremia, acute hypoxic resp. failure, LLL bacterial pneumonia, CAP, IDDN II, paranoid schizophrenia, COPD w/o AE, essential HTN, permanent AF, not on anticoagulation due to recurrent falls.    Person spoke with today (if not patient) and relationship  Nazia najera   Meds reviewed with patient/caregiver?  Yes   Is the patient having any side effects they believe may be caused by any medication additions or changes?  No   Does the patient have all medications ordered at discharge?  Yes   Is the patient taking all medications as directed (includes completed medication regime)?  Yes   Does the patient have a primary care provider?   Yes   Does the patient have an appointment with their PCP or pulmonologist within 7 days of discharge?  Yes   Has home health visited the patient within 72 hours of discharge?  N/A   Psychosocial issues?  No   Comments  pt lives with Nazia najera, who manages pt's care, appts, meds, neb treatments   Did the patient receive a copy of their discharge instructions?  Yes   Nursing interventions  Reviewed instructions with patient   What is the patient's perception of their health status since discharge?  Improving   Nursing Interventions  Nurse provided patient education   Is the patient/caregiver able to teach back the hierarchy of who to call/visit for symptoms/problems? PCP, Specialist, Home health nurse, Urgent Care, ED, 911  Yes   Is the patient/caregiver  able to teach back signs and symptoms of worsening condition:  Fever/chills, Shortness of breath, Chest pain   Is the patient/caregiver able to teach back importance of completing antibiotic course of treatment?  Yes   Week 1 call completed?  Yes          Pari Martinez RN

## 2019-01-28 ENCOUNTER — READMISSION MANAGEMENT (OUTPATIENT)
Dept: CALL CENTER | Facility: HOSPITAL | Age: 59
End: 2019-01-28

## 2019-01-28 NOTE — OUTREACH NOTE
COPD/PN Week 2 Survey      Responses   Facility patient discharged from?  [facility]   Does the patient have one of the following disease processes/diagnoses(primary or secondary)?  COPD/Pneumonia   Was the primary reason for admission:  Pneumonia   Week 2 attempt successful?  No   Unsuccessful attempts  Attempt 1          Coral Angeles RN

## 2019-01-29 ENCOUNTER — APPOINTMENT (OUTPATIENT)
Dept: LAB | Facility: HOSPITAL | Age: 59
End: 2019-01-29

## 2019-01-29 ENCOUNTER — READMISSION MANAGEMENT (OUTPATIENT)
Dept: CALL CENTER | Facility: HOSPITAL | Age: 59
End: 2019-01-29

## 2019-01-29 ENCOUNTER — TRANSCRIBE ORDERS (OUTPATIENT)
Dept: ADMINISTRATIVE | Facility: HOSPITAL | Age: 59
End: 2019-01-29

## 2019-01-29 DIAGNOSIS — F60.1 SCHIZOID PERSONALITY (HCC): Primary | ICD-10-CM

## 2019-01-29 LAB
ALBUMIN SERPL-MCNC: 3.8 G/DL (ref 3.5–5)
ALBUMIN/GLOB SERPL: 1.3 G/DL (ref 1–2)
ALP SERPL-CCNC: 52 U/L (ref 38–126)
ALT SERPL W P-5'-P-CCNC: 21 U/L (ref 13–69)
ANION GAP SERPL CALCULATED.3IONS-SCNC: 10.1 MMOL/L (ref 10–20)
AST SERPL-CCNC: 17 U/L (ref 15–46)
BILIRUB SERPL-MCNC: 0.5 MG/DL (ref 0.2–1.3)
BUN BLD-MCNC: 8 MG/DL (ref 7–20)
BUN/CREAT SERPL: 20 (ref 7.1–23.5)
CALCIUM SPEC-SCNC: 9.1 MG/DL (ref 8.4–10.2)
CHLORIDE SERPL-SCNC: 94 MMOL/L (ref 98–107)
CHOLEST SERPL-MCNC: 102 MG/DL (ref 0–199)
CO2 SERPL-SCNC: 32 MMOL/L (ref 26–30)
CREAT BLD-MCNC: 0.4 MG/DL (ref 0.6–1.3)
GFR SERPL CREATININE-BSD FRML MDRD: >150 ML/MIN/1.73
GLOBULIN UR ELPH-MCNC: 2.9 GM/DL
GLUCOSE BLD-MCNC: 75 MG/DL (ref 74–98)
HDLC SERPL-MCNC: 46 MG/DL (ref 40–60)
LDLC SERPL CALC-MCNC: 48 MG/DL (ref 0–99)
LDLC/HDLC SERPL: 1.04 {RATIO}
POTASSIUM BLD-SCNC: 4.1 MMOL/L (ref 3.5–5.1)
PROT SERPL-MCNC: 6.7 G/DL (ref 6.3–8.2)
SODIUM BLD-SCNC: 132 MMOL/L (ref 137–145)
TRIGL SERPL-MCNC: 41 MG/DL
TSH SERPL DL<=0.05 MIU/L-ACNC: 2.67 MIU/ML (ref 0.47–4.68)
VALPROATE SERPL-MCNC: 84.9 MCG/ML (ref 50–100)
VLDLC SERPL-MCNC: 8.2 MG/DL

## 2019-01-29 PROCEDURE — 80061 LIPID PANEL: CPT | Performed by: PSYCHIATRY & NEUROLOGY

## 2019-01-29 PROCEDURE — 80053 COMPREHEN METABOLIC PANEL: CPT | Performed by: PSYCHIATRY & NEUROLOGY

## 2019-01-29 PROCEDURE — 80164 ASSAY DIPROPYLACETIC ACD TOT: CPT | Performed by: PSYCHIATRY & NEUROLOGY

## 2019-01-29 PROCEDURE — 84443 ASSAY THYROID STIM HORMONE: CPT | Performed by: PSYCHIATRY & NEUROLOGY

## 2019-01-29 PROCEDURE — 36415 COLL VENOUS BLD VENIPUNCTURE: CPT | Performed by: PSYCHIATRY & NEUROLOGY

## 2019-01-29 NOTE — OUTREACH NOTE
COPD/PN Week 2 Survey      Responses   Facility patient discharged from?  [facility]   Does the patient have one of the following disease processes/diagnoses(primary or secondary)?  COPD/Pneumonia   Was the primary reason for admission:  Pneumonia   Week 2 attempt successful?  No   Unsuccessful attempts  Attempt 2          Ruby Ramirez RN

## 2019-01-31 ENCOUNTER — READMISSION MANAGEMENT (OUTPATIENT)
Dept: CALL CENTER | Facility: HOSPITAL | Age: 59
End: 2019-01-31

## 2019-01-31 NOTE — OUTREACH NOTE
COPD/PN Week 3 Survey      Responses   Facility patient discharged from?  [facility]   Does the patient have one of the following disease processes/diagnoses(primary or secondary)?  COPD/Pneumonia   Was the primary reason for admission:  Pneumonia   Week 3 attempt successful?  No   Unsuccessful attempts  Attempt 1          Sun Marin RN

## 2019-02-01 ENCOUNTER — READMISSION MANAGEMENT (OUTPATIENT)
Dept: CALL CENTER | Facility: HOSPITAL | Age: 59
End: 2019-02-01

## 2019-02-01 NOTE — OUTREACH NOTE
COPD/PN Week 3 Survey      Responses   Facility patient discharged from?  [facility]   Does the patient have one of the following disease processes/diagnoses(primary or secondary)?  COPD/Pneumonia   Was the primary reason for admission:  Pneumonia   Week 3 attempt successful?  No   Unsuccessful attempts  Attempt 2          Naima Cheek RN

## 2019-02-27 LAB
ALBUMIN SERPL-MCNC: 4 G/DL (ref 3.5–5)
ALBUMIN/GLOB SERPL: 1.5 G/DL (ref 1–2)
ALP SERPL-CCNC: 56 U/L (ref 38–126)
ALT SERPL W P-5'-P-CCNC: 26 U/L (ref 13–69)
ANION GAP SERPL CALCULATED.3IONS-SCNC: 13.6 MMOL/L (ref 10–20)
AST SERPL-CCNC: 39 U/L (ref 15–46)
BILIRUB SERPL-MCNC: 0.6 MG/DL (ref 0.2–1.3)
BUN BLD-MCNC: 12 MG/DL (ref 7–20)
BUN/CREAT SERPL: 40 (ref 7.1–23.5)
CALCIUM SPEC-SCNC: 8.6 MG/DL (ref 8.4–10.2)
CHLORIDE SERPL-SCNC: 83 MMOL/L (ref 98–107)
CO2 SERPL-SCNC: 30 MMOL/L (ref 26–30)
CREAT BLD-MCNC: 0.3 MG/DL (ref 0.6–1.3)
GFR SERPL CREATININE-BSD FRML MDRD: >150 ML/MIN/1.73
GLOBULIN UR ELPH-MCNC: 2.7 GM/DL
GLUCOSE BLD-MCNC: 102 MG/DL (ref 74–98)
POTASSIUM BLD-SCNC: 3.6 MMOL/L (ref 3.5–5.1)
PROT SERPL-MCNC: 6.7 G/DL (ref 6.3–8.2)
SODIUM BLD-SCNC: 123 MMOL/L (ref 137–145)

## 2019-10-10 ENCOUNTER — APPOINTMENT (OUTPATIENT)
Dept: LAB | Facility: HOSPITAL | Age: 59
End: 2019-10-10

## 2019-10-10 ENCOUNTER — TRANSCRIBE ORDERS (OUTPATIENT)
Dept: ADMINISTRATIVE | Facility: HOSPITAL | Age: 59
End: 2019-10-10

## 2019-10-10 DIAGNOSIS — F20.9 SCHIZOPHRENIA, UNSPECIFIED TYPE (HCC): Primary | ICD-10-CM

## 2019-10-10 LAB
ALBUMIN SERPL-MCNC: 4.3 G/DL (ref 3.5–5.2)
ALBUMIN/GLOB SERPL: 1.7 G/DL
ALP SERPL-CCNC: 68 U/L (ref 39–117)
ALT SERPL W P-5'-P-CCNC: 12 U/L (ref 1–33)
ANION GAP SERPL CALCULATED.3IONS-SCNC: 11.4 MMOL/L (ref 5–15)
AST SERPL-CCNC: 12 U/L (ref 1–32)
BILIRUB SERPL-MCNC: 0.4 MG/DL (ref 0.2–1.2)
BUN BLD-MCNC: 7 MG/DL (ref 6–20)
BUN/CREAT SERPL: 15.9 (ref 7–25)
CALCIUM SPEC-SCNC: 9.1 MG/DL (ref 8.6–10.5)
CHLORIDE SERPL-SCNC: 91 MMOL/L (ref 98–107)
CHOLEST SERPL-MCNC: 101 MG/DL (ref 0–200)
CO2 SERPL-SCNC: 30.6 MMOL/L (ref 22–29)
CREAT BLD-MCNC: 0.44 MG/DL (ref 0.57–1)
GFR SERPL CREATININE-BSD FRML MDRD: 147 ML/MIN/1.73
GLOBULIN UR ELPH-MCNC: 2.5 GM/DL
GLUCOSE BLD-MCNC: 74 MG/DL (ref 65–99)
HDLC SERPL-MCNC: 46 MG/DL (ref 40–60)
LDLC SERPL CALC-MCNC: 43 MG/DL (ref 0–100)
LDLC/HDLC SERPL: 0.93 {RATIO}
POTASSIUM BLD-SCNC: 4.2 MMOL/L (ref 3.5–5.2)
PROT SERPL-MCNC: 6.8 G/DL (ref 6–8.5)
SODIUM BLD-SCNC: 133 MMOL/L (ref 136–145)
TRIGL SERPL-MCNC: 62 MG/DL (ref 0–150)
TSH SERPL DL<=0.05 MIU/L-ACNC: 3.32 UIU/ML (ref 0.27–4.2)
VALPROATE SERPL-MCNC: 85 MCG/ML (ref 50–125)
VLDLC SERPL-MCNC: 12.4 MG/DL (ref 5–40)

## 2019-10-10 PROCEDURE — 80061 LIPID PANEL: CPT | Performed by: PSYCHIATRY & NEUROLOGY

## 2019-10-10 PROCEDURE — 84443 ASSAY THYROID STIM HORMONE: CPT | Performed by: PSYCHIATRY & NEUROLOGY

## 2019-10-10 PROCEDURE — 80053 COMPREHEN METABOLIC PANEL: CPT | Performed by: PSYCHIATRY & NEUROLOGY

## 2019-10-10 PROCEDURE — 36415 COLL VENOUS BLD VENIPUNCTURE: CPT | Performed by: PSYCHIATRY & NEUROLOGY

## 2019-10-10 PROCEDURE — 80164 ASSAY DIPROPYLACETIC ACD TOT: CPT | Performed by: PSYCHIATRY & NEUROLOGY

## 2020-07-14 ENCOUNTER — HOSPITAL ENCOUNTER (EMERGENCY)
Facility: HOSPITAL | Age: 60
Discharge: HOME | End: 2020-07-14
Attending: EMERGENCY MEDICINE | Admitting: EMERGENCY MEDICINE

## 2020-07-14 ENCOUNTER — APPOINTMENT (OUTPATIENT)
Dept: GENERAL RADIOLOGY | Facility: HOSPITAL | Age: 60
End: 2020-07-14

## 2020-07-14 ENCOUNTER — APPOINTMENT (OUTPATIENT)
Dept: ULTRASOUND IMAGING | Facility: HOSPITAL | Age: 60
End: 2020-07-14

## 2020-07-14 VITALS
HEIGHT: 63 IN | DIASTOLIC BLOOD PRESSURE: 97 MMHG | SYSTOLIC BLOOD PRESSURE: 193 MMHG | TEMPERATURE: 97.6 F | HEART RATE: 56 BPM | BODY MASS INDEX: 32.28 KG/M2 | OXYGEN SATURATION: 99 % | WEIGHT: 182.2 LBS | RESPIRATION RATE: 16 BRPM

## 2020-07-14 DIAGNOSIS — L03.115 CELLULITIS OF RIGHT LEG: Primary | ICD-10-CM

## 2020-07-14 DIAGNOSIS — R30.0 DYSURIA: ICD-10-CM

## 2020-07-14 LAB
ALBUMIN SERPL-MCNC: 4.8 G/DL (ref 3.5–5.2)
ALBUMIN/GLOB SERPL: 1.5 G/DL
ALP SERPL-CCNC: 94 U/L (ref 39–117)
ALT SERPL W P-5'-P-CCNC: 14 U/L (ref 1–33)
ANION GAP SERPL CALCULATED.3IONS-SCNC: 9.1 MMOL/L (ref 5–15)
APTT PPP: 29.4 SECONDS (ref 24.5–37.2)
AST SERPL-CCNC: 17 U/L (ref 1–32)
BACTERIA UR QL AUTO: ABNORMAL /HPF
BASOPHILS # BLD AUTO: 0.04 10*3/MM3 (ref 0–0.2)
BASOPHILS NFR BLD AUTO: 0.6 % (ref 0–1.5)
BILIRUB SERPL-MCNC: 0.3 MG/DL (ref 0–1.2)
BILIRUB UR QL STRIP: NEGATIVE
BUN SERPL-MCNC: 8 MG/DL (ref 6–20)
BUN/CREAT SERPL: 16.3 (ref 7–25)
CALCIUM SPEC-SCNC: 10.1 MG/DL (ref 8.6–10.5)
CHLORIDE SERPL-SCNC: 91 MMOL/L (ref 98–107)
CLARITY UR: CLEAR
CO2 SERPL-SCNC: 31.9 MMOL/L (ref 22–29)
COLOR UR: YELLOW
CREAT SERPL-MCNC: 0.49 MG/DL (ref 0.57–1)
DEPRECATED RDW RBC AUTO: 46.1 FL (ref 37–54)
EOSINOPHIL # BLD AUTO: 0.19 10*3/MM3 (ref 0–0.4)
EOSINOPHIL NFR BLD AUTO: 3 % (ref 0.3–6.2)
ERYTHROCYTE [DISTWIDTH] IN BLOOD BY AUTOMATED COUNT: 14.8 % (ref 12.3–15.4)
GFR SERPL CREATININE-BSD FRML MDRD: 129 ML/MIN/1.73
GLOBULIN UR ELPH-MCNC: 3.1 GM/DL
GLUCOSE SERPL-MCNC: 202 MG/DL (ref 65–99)
GLUCOSE UR STRIP-MCNC: NEGATIVE MG/DL
HCT VFR BLD AUTO: 44.9 % (ref 34–46.6)
HGB BLD-MCNC: 14.4 G/DL (ref 12–15.9)
HGB UR QL STRIP.AUTO: ABNORMAL
HYALINE CASTS UR QL AUTO: ABNORMAL /LPF
IMM GRANULOCYTES # BLD AUTO: 0.03 10*3/MM3 (ref 0–0.05)
IMM GRANULOCYTES NFR BLD AUTO: 0.5 % (ref 0–0.5)
INR PPP: 1.04 (ref 0.9–1.1)
KETONES UR QL STRIP: NEGATIVE
LEUKOCYTE ESTERASE UR QL STRIP.AUTO: NEGATIVE
LYMPHOCYTES # BLD AUTO: 1.05 10*3/MM3 (ref 0.7–3.1)
LYMPHOCYTES NFR BLD AUTO: 16.6 % (ref 19.6–45.3)
MCH RBC QN AUTO: 27.4 PG (ref 26.6–33)
MCHC RBC AUTO-ENTMCNC: 32.1 G/DL (ref 31.5–35.7)
MCV RBC AUTO: 85.5 FL (ref 79–97)
MONOCYTES # BLD AUTO: 0.49 10*3/MM3 (ref 0.1–0.9)
MONOCYTES NFR BLD AUTO: 7.7 % (ref 5–12)
NEUTROPHILS NFR BLD AUTO: 4.53 10*3/MM3 (ref 1.7–7)
NEUTROPHILS NFR BLD AUTO: 71.6 % (ref 42.7–76)
NITRITE UR QL STRIP: NEGATIVE
NRBC BLD AUTO-RTO: 0 /100 WBC (ref 0–0.2)
NT-PROBNP SERPL-MCNC: 1528 PG/ML (ref 0–900)
PH UR STRIP.AUTO: 8 [PH] (ref 5–8)
PLATELET # BLD AUTO: 257 10*3/MM3 (ref 140–450)
PMV BLD AUTO: 9.4 FL (ref 6–12)
POTASSIUM SERPL-SCNC: 4.1 MMOL/L (ref 3.5–5.2)
PROT SERPL-MCNC: 7.9 G/DL (ref 6–8.5)
PROT UR QL STRIP: ABNORMAL
PROTHROMBIN TIME: 14 SECONDS (ref 12–15.1)
RBC # BLD AUTO: 5.25 10*6/MM3 (ref 3.77–5.28)
RBC # UR: ABNORMAL /HPF
REF LAB TEST METHOD: ABNORMAL
SODIUM SERPL-SCNC: 132 MMOL/L (ref 136–145)
SP GR UR STRIP: 1.01 (ref 1–1.03)
SQUAMOUS #/AREA URNS HPF: ABNORMAL /HPF
TROPONIN T SERPL-MCNC: <0.01 NG/ML (ref 0–0.03)
UROBILINOGEN UR QL STRIP: ABNORMAL
WBC # BLD AUTO: 6.33 10*3/MM3 (ref 3.4–10.8)
WBC UR QL AUTO: ABNORMAL /HPF

## 2020-07-14 PROCEDURE — 71046 X-RAY EXAM CHEST 2 VIEWS: CPT

## 2020-07-14 PROCEDURE — 85025 COMPLETE CBC W/AUTO DIFF WBC: CPT | Performed by: NURSE PRACTITIONER

## 2020-07-14 PROCEDURE — 93971 EXTREMITY STUDY: CPT

## 2020-07-14 PROCEDURE — 85730 THROMBOPLASTIN TIME PARTIAL: CPT | Performed by: NURSE PRACTITIONER

## 2020-07-14 PROCEDURE — 80053 COMPREHEN METABOLIC PANEL: CPT | Performed by: NURSE PRACTITIONER

## 2020-07-14 PROCEDURE — 93005 ELECTROCARDIOGRAM TRACING: CPT | Performed by: NURSE PRACTITIONER

## 2020-07-14 PROCEDURE — 81001 URINALYSIS AUTO W/SCOPE: CPT | Performed by: NURSE PRACTITIONER

## 2020-07-14 PROCEDURE — 84484 ASSAY OF TROPONIN QUANT: CPT | Performed by: NURSE PRACTITIONER

## 2020-07-14 PROCEDURE — 99284 EMERGENCY DEPT VISIT MOD MDM: CPT

## 2020-07-14 PROCEDURE — 85610 PROTHROMBIN TIME: CPT | Performed by: NURSE PRACTITIONER

## 2020-07-14 PROCEDURE — 83880 ASSAY OF NATRIURETIC PEPTIDE: CPT | Performed by: NURSE PRACTITIONER

## 2020-07-14 RX ORDER — SODIUM CHLORIDE 0.9 % (FLUSH) 0.9 %
10 SYRINGE (ML) INJECTION AS NEEDED
Status: DISCONTINUED | OUTPATIENT
Start: 2020-07-14 | End: 2020-07-14 | Stop reason: HOSPADM

## 2020-07-14 RX ORDER — CEPHALEXIN 500 MG/1
500 CAPSULE ORAL 3 TIMES DAILY
Qty: 21 CAPSULE | Refills: 0 | Status: ON HOLD | OUTPATIENT
Start: 2020-07-14 | End: 2020-11-16

## 2020-07-14 NOTE — ED PROVIDER NOTES
Subjective   History of Present Illness  Is a 60-year-old female who is a very poor historian comes in today complaining of right lower leg pain.  She is unsure of when this started.  She also complains of pain with urination.  Her niece was called back to assist with the interview and she reports that she started complaining of the pain this morning to her lower leg.  Review of Systems   Constitutional: Negative.    HENT: Negative.    Eyes: Negative.    Respiratory: Negative.    Cardiovascular: Positive for leg swelling.   Gastrointestinal: Negative.    Endocrine: Negative.    Genitourinary: Positive for dysuria.   Musculoskeletal: Negative.    Skin: Negative.    Allergic/Immunologic: Negative.    Neurological: Negative.    Hematological: Negative.    Psychiatric/Behavioral: Negative.        Past Medical History:   Diagnosis Date   • Cardiac abnormality    • CHF (congestive heart failure) (CMS/[facility])    • COPD (chronic obstructive pulmonary disease) (CMS/[facility])    • Diabetes (CMS/[facility])    • Glaucoma    • Schizophrenia, chronic condition (CMS/[facility])        No Known Allergies    History reviewed. No pertinent surgical history.    Family History   Problem Relation Age of Onset   • Diabetes Other    • Glaucoma Other        Social History     Socioeconomic History   • Marital status: Single     Spouse name: Not on file   • Number of children: Not on file   • Years of education: Not on file   • Highest education level: Not on file   Tobacco Use   • Smoking status: Never Smoker   Substance and Sexual Activity   • Alcohol use: No   • Drug use: No   • Sexual activity: Defer           Objective   Physical Exam   Constitutional: She appears well-developed and well-nourished.   Nursing note and vitals reviewed.  GEN: No acute distress  Head: Normocephalic, atraumatic  Eyes: Pupils equal round reactive to light  ENT: Posterior pharynx normal in appearance, oral mucosa is moist  Chest: Nontender to palpation  Cardiovascular: Regular  rate  Lungs: Clear to auscultation bilaterally  Abdomen: Soft, nontender, nondistended, no peritoneal signs  Extremities: Edema noted to the right calf, tender to touch, some redness noted.  Neuro: GCS 15  Psych: Mood and affect are appropriate      Procedures           ED Course  ED Course as of Jul 14 1252   Tue Jul 14, 2020   1202 EKG interpreted by me.  Atrial fibrillation.  Rate of 58.  Bradycardic.  Nonspecific ST segment changes.  Abnormal EKG    [CG]   1252 I discussed the findings with the patient and the niece.  Have advised her to follow-up with her primary care provider next 24 to 48 hours.  I given her strict return to care instructions and she is agreeable to this plan of care.    [TW]      ED Course User Index  [CG] Andres Nunes, DO  [TW] Destiny Miller, FLOWER                                           MDM  Number of Diagnoses or Management Options     Amount and/or Complexity of Data Reviewed  Clinical lab tests: ordered and reviewed  Tests in the radiology section of CPT®: ordered and reviewed  Review and summarize past medical records: yes  Discuss the patient with other providers: yes  Independent visualization of images, tracings, or specimens: yes    Risk of Complications, Morbidity, and/or Mortality  Presenting problems: moderate  Diagnostic procedures: moderate  Management options: moderate        Final diagnoses:   Cellulitis of right leg   Dysuria            Destiny Miller, FLOWER  07/14/20 1253

## 2020-11-15 ENCOUNTER — HOSPITAL ENCOUNTER (INPATIENT)
Facility: HOSPITAL | Age: 60
LOS: 1 days | Discharge: HOME | End: 2020-11-17
Attending: STUDENT IN AN ORGANIZED HEALTH CARE EDUCATION/TRAINING PROGRAM | Admitting: INTERNAL MEDICINE

## 2020-11-15 ENCOUNTER — APPOINTMENT (OUTPATIENT)
Dept: CT IMAGING | Facility: HOSPITAL | Age: 60
End: 2020-11-15

## 2020-11-15 ENCOUNTER — APPOINTMENT (OUTPATIENT)
Dept: GENERAL RADIOLOGY | Facility: HOSPITAL | Age: 60
End: 2020-11-15

## 2020-11-15 DIAGNOSIS — J18.9 COMMUNITY ACQUIRED PNEUMONIA OF RIGHT LUNG, UNSPECIFIED PART OF LUNG: Primary | ICD-10-CM

## 2020-11-15 DIAGNOSIS — R09.02 HYPOXIA: ICD-10-CM

## 2020-11-15 LAB
A-A DO2: 44.1 MMHG
ALBUMIN SERPL-MCNC: 4.2 G/DL (ref 3.5–5.2)
ALBUMIN/GLOB SERPL: 1.4 G/DL
ALP SERPL-CCNC: 109 U/L (ref 39–117)
ALT SERPL W P-5'-P-CCNC: 11 U/L (ref 1–33)
ANION GAP SERPL CALCULATED.3IONS-SCNC: 12.1 MMOL/L (ref 5–15)
ARTERIAL PATENCY WRIST A: ABNORMAL
AST SERPL-CCNC: 13 U/L (ref 1–32)
ATMOSPHERIC PRESS: 737 MMHG
BACTERIA UR QL AUTO: ABNORMAL /HPF
BASE EXCESS BLDA CALC-SCNC: 3.2 MMOL/L (ref 0–2)
BASOPHILS # BLD AUTO: 0.04 10*3/MM3 (ref 0–0.2)
BASOPHILS NFR BLD AUTO: 0.4 % (ref 0–1.5)
BDY SITE: ABNORMAL
BILIRUB SERPL-MCNC: 0.6 MG/DL (ref 0–1.2)
BILIRUB UR QL STRIP: NEGATIVE
BUN SERPL-MCNC: 11 MG/DL (ref 6–20)
BUN/CREAT SERPL: 19 (ref 7–25)
CALCIUM SPEC-SCNC: 10.5 MG/DL (ref 8.6–10.5)
CHLORIDE SERPL-SCNC: 96 MMOL/L (ref 98–107)
CLARITY UR: CLEAR
CO2 SERPL-SCNC: 27.9 MMOL/L (ref 22–29)
COHGB MFR BLD: 1.1 % (ref 0–2)
COLOR UR: YELLOW
CREAT SERPL-MCNC: 0.58 MG/DL (ref 0.57–1)
D-LACTATE SERPL-SCNC: 2.5 MMOL/L (ref 0.5–2)
DEPRECATED RDW RBC AUTO: 47.3 FL (ref 37–54)
EOSINOPHIL # BLD AUTO: 0.1 10*3/MM3 (ref 0–0.4)
EOSINOPHIL NFR BLD AUTO: 1 % (ref 0.3–6.2)
ERYTHROCYTE [DISTWIDTH] IN BLOOD BY AUTOMATED COUNT: 15.9 % (ref 12.3–15.4)
GFR SERPL CREATININE-BSD FRML MDRD: 106 ML/MIN/1.73
GLOBULIN UR ELPH-MCNC: 2.9 GM/DL
GLUCOSE SERPL-MCNC: 176 MG/DL (ref 65–99)
GLUCOSE UR STRIP-MCNC: NEGATIVE MG/DL
HCO3 BLDA-SCNC: 27.6 MMOL/L (ref 22–28)
HCT VFR BLD AUTO: 41.4 % (ref 34–46.6)
HCT VFR BLD CALC: 41.4 %
HGB BLD-MCNC: 13.4 G/DL (ref 12–15.9)
HGB UR QL STRIP.AUTO: ABNORMAL
HOLD SPECIMEN: NORMAL
HOLD SPECIMEN: NORMAL
HYALINE CASTS UR QL AUTO: ABNORMAL /LPF
IMM GRANULOCYTES # BLD AUTO: 0.03 10*3/MM3 (ref 0–0.05)
IMM GRANULOCYTES NFR BLD AUTO: 0.3 % (ref 0–0.5)
INHALED O2 CONCENTRATION: 21 %
KETONES UR QL STRIP: NEGATIVE
LEUKOCYTE ESTERASE UR QL STRIP.AUTO: NEGATIVE
LYMPHOCYTES # BLD AUTO: 0.33 10*3/MM3 (ref 0.7–3.1)
LYMPHOCYTES NFR BLD AUTO: 3.2 % (ref 19.6–45.3)
MAGNESIUM SERPL-MCNC: 1.5 MG/DL (ref 1.6–2.6)
MCH RBC QN AUTO: 26.4 PG (ref 26.6–33)
MCHC RBC AUTO-ENTMCNC: 32.4 G/DL (ref 31.5–35.7)
MCV RBC AUTO: 81.7 FL (ref 79–97)
METHGB BLD QL: 0.8 % (ref 0–1.5)
MODALITY: ABNORMAL
MONOCYTES # BLD AUTO: 0.73 10*3/MM3 (ref 0.1–0.9)
MONOCYTES NFR BLD AUTO: 7 % (ref 5–12)
NEUTROPHILS NFR BLD AUTO: 88.1 % (ref 42.7–76)
NEUTROPHILS NFR BLD AUTO: 9.17 10*3/MM3 (ref 1.7–7)
NITRITE UR QL STRIP: NEGATIVE
NOTE: ABNORMAL
NRBC BLD AUTO-RTO: 0 /100 WBC (ref 0–0.2)
OXYHGB MFR BLDV: 86 % (ref 94–99)
PCO2 BLDA: 40.5 MM HG (ref 35–45)
PCO2 TEMP ADJ BLD: ABNORMAL MM[HG]
PH BLDA: 7.44 PH UNITS (ref 7.3–7.5)
PH UR STRIP.AUTO: 8.5 [PH] (ref 5–8)
PH, TEMP CORRECTED: ABNORMAL
PLATELET # BLD AUTO: 258 10*3/MM3 (ref 140–450)
PMV BLD AUTO: 9.1 FL (ref 6–12)
PO2 BLDA: 55.1 MM HG (ref 75–100)
PO2 TEMP ADJ BLD: ABNORMAL MM[HG]
POTASSIUM SERPL-SCNC: 3.8 MMOL/L (ref 3.5–5.2)
PROCALCITONIN SERPL-MCNC: 0.08 NG/ML (ref 0–0.25)
PROT SERPL-MCNC: 7.1 G/DL (ref 6–8.5)
PROT UR QL STRIP: ABNORMAL
RBC # BLD AUTO: 5.07 10*6/MM3 (ref 3.77–5.28)
RBC # UR: ABNORMAL /HPF
REF LAB TEST METHOD: ABNORMAL
SAO2 % BLDCOA: 87.7 % (ref 94–100)
SARS-COV-2 RNA PNL SPEC NAA+PROBE: NOT DETECTED
SODIUM SERPL-SCNC: 136 MMOL/L (ref 136–145)
SP GR UR STRIP: 1.01 (ref 1–1.03)
SQUAMOUS #/AREA URNS HPF: ABNORMAL /HPF
TROPONIN T SERPL-MCNC: <0.01 NG/ML (ref 0–0.03)
UROBILINOGEN UR QL STRIP: ABNORMAL
VALPROATE SERPL-MCNC: 53 MCG/ML (ref 50–125)
VENTILATOR MODE: ABNORMAL
WBC # BLD AUTO: 10.4 10*3/MM3 (ref 3.4–10.8)
WBC UR QL AUTO: ABNORMAL /HPF
WHOLE BLOOD HOLD SPECIMEN: NORMAL
WHOLE BLOOD HOLD SPECIMEN: NORMAL

## 2020-11-15 PROCEDURE — 70450 CT HEAD/BRAIN W/O DYE: CPT

## 2020-11-15 PROCEDURE — 83036 HEMOGLOBIN GLYCOSYLATED A1C: CPT | Performed by: FAMILY MEDICINE

## 2020-11-15 PROCEDURE — 80164 ASSAY DIPROPYLACETIC ACD TOT: CPT | Performed by: STUDENT IN AN ORGANIZED HEALTH CARE EDUCATION/TRAINING PROGRAM

## 2020-11-15 PROCEDURE — 93005 ELECTROCARDIOGRAM TRACING: CPT | Performed by: STUDENT IN AN ORGANIZED HEALTH CARE EDUCATION/TRAINING PROGRAM

## 2020-11-15 PROCEDURE — 99284 EMERGENCY DEPT VISIT MOD MDM: CPT

## 2020-11-15 PROCEDURE — 36600 WITHDRAWAL OF ARTERIAL BLOOD: CPT

## 2020-11-15 PROCEDURE — 80053 COMPREHEN METABOLIC PANEL: CPT | Performed by: STUDENT IN AN ORGANIZED HEALTH CARE EDUCATION/TRAINING PROGRAM

## 2020-11-15 PROCEDURE — 84145 PROCALCITONIN (PCT): CPT | Performed by: NURSE PRACTITIONER

## 2020-11-15 PROCEDURE — 85025 COMPLETE CBC W/AUTO DIFF WBC: CPT | Performed by: STUDENT IN AN ORGANIZED HEALTH CARE EDUCATION/TRAINING PROGRAM

## 2020-11-15 PROCEDURE — 82375 ASSAY CARBOXYHB QUANT: CPT

## 2020-11-15 PROCEDURE — 84484 ASSAY OF TROPONIN QUANT: CPT | Performed by: STUDENT IN AN ORGANIZED HEALTH CARE EDUCATION/TRAINING PROGRAM

## 2020-11-15 PROCEDURE — 82805 BLOOD GASES W/O2 SATURATION: CPT

## 2020-11-15 PROCEDURE — 25010000002 MAGNESIUM SULFATE 2 GM/50ML SOLUTION: Performed by: NURSE PRACTITIONER

## 2020-11-15 PROCEDURE — 87040 BLOOD CULTURE FOR BACTERIA: CPT | Performed by: NURSE PRACTITIONER

## 2020-11-15 PROCEDURE — 87635 SARS-COV-2 COVID-19 AMP PRB: CPT | Performed by: NURSE PRACTITIONER

## 2020-11-15 PROCEDURE — 83605 ASSAY OF LACTIC ACID: CPT | Performed by: NURSE PRACTITIONER

## 2020-11-15 PROCEDURE — 83735 ASSAY OF MAGNESIUM: CPT | Performed by: STUDENT IN AN ORGANIZED HEALTH CARE EDUCATION/TRAINING PROGRAM

## 2020-11-15 PROCEDURE — 81001 URINALYSIS AUTO W/SCOPE: CPT | Performed by: STUDENT IN AN ORGANIZED HEALTH CARE EDUCATION/TRAINING PROGRAM

## 2020-11-15 PROCEDURE — 83050 HGB METHEMOGLOBIN QUAN: CPT

## 2020-11-15 PROCEDURE — 71045 X-RAY EXAM CHEST 1 VIEW: CPT

## 2020-11-15 PROCEDURE — 25010000002 PIPERACILLIN SOD-TAZOBACTAM PER 1 G: Performed by: NURSE PRACTITIONER

## 2020-11-15 RX ORDER — SODIUM CHLORIDE 0.9 % (FLUSH) 0.9 %
10 SYRINGE (ML) INJECTION AS NEEDED
Status: DISCONTINUED | OUTPATIENT
Start: 2020-11-15 | End: 2020-11-17 | Stop reason: HOSPADM

## 2020-11-15 RX ORDER — KETOROLAC TROMETHAMINE 30 MG/ML
10 INJECTION, SOLUTION INTRAMUSCULAR; INTRAVENOUS ONCE AS NEEDED
Status: DISCONTINUED | OUTPATIENT
Start: 2020-11-15 | End: 2020-11-15

## 2020-11-15 RX ORDER — MAGNESIUM SULFATE HEPTAHYDRATE 40 MG/ML
2 INJECTION, SOLUTION INTRAVENOUS ONCE
Status: COMPLETED | OUTPATIENT
Start: 2020-11-15 | End: 2020-11-15

## 2020-11-15 RX ADMIN — SODIUM CHLORIDE 1572 ML: 9 INJECTION, SOLUTION INTRAVENOUS at 21:03

## 2020-11-15 RX ADMIN — MAGNESIUM SULFATE IN WATER 2 G: 40 INJECTION, SOLUTION INTRAVENOUS at 22:29

## 2020-11-15 RX ADMIN — TAZOBACTAM SODIUM AND PIPERACILLIN SODIUM 3.38 G: 375; 3 INJECTION, SOLUTION INTRAVENOUS at 22:16

## 2020-11-16 PROBLEM — J96.01 ACUTE RESPIRATORY FAILURE WITH HYPOXEMIA: Status: ACTIVE | Noted: 2020-11-16

## 2020-11-16 PROBLEM — R09.02 HYPOXIA: Status: ACTIVE | Noted: 2020-11-16

## 2020-11-16 LAB
ANION GAP SERPL CALCULATED.3IONS-SCNC: 8.7 MMOL/L (ref 5–15)
BASOPHILS # BLD AUTO: 0.03 10*3/MM3 (ref 0–0.2)
BASOPHILS NFR BLD AUTO: 0.3 % (ref 0–1.5)
BUN SERPL-MCNC: 10 MG/DL (ref 6–20)
BUN/CREAT SERPL: 16.1 (ref 7–25)
CALCIUM SPEC-SCNC: 9.2 MG/DL (ref 8.6–10.5)
CHLORIDE SERPL-SCNC: 100 MMOL/L (ref 98–107)
CO2 SERPL-SCNC: 28.3 MMOL/L (ref 22–29)
CREAT SERPL-MCNC: 0.62 MG/DL (ref 0.57–1)
D-LACTATE SERPL-SCNC: 2.8 MMOL/L (ref 0.5–2)
DEPRECATED RDW RBC AUTO: 47.8 FL (ref 37–54)
EOSINOPHIL # BLD AUTO: 0.04 10*3/MM3 (ref 0–0.4)
EOSINOPHIL NFR BLD AUTO: 0.3 % (ref 0.3–6.2)
ERYTHROCYTE [DISTWIDTH] IN BLOOD BY AUTOMATED COUNT: 16.3 % (ref 12.3–15.4)
GFR SERPL CREATININE-BSD FRML MDRD: 99 ML/MIN/1.73
GLUCOSE BLDC GLUCOMTR-MCNC: 190 MG/DL (ref 70–130)
GLUCOSE BLDC GLUCOMTR-MCNC: 198 MG/DL (ref 70–130)
GLUCOSE BLDC GLUCOMTR-MCNC: 202 MG/DL (ref 70–130)
GLUCOSE BLDC GLUCOMTR-MCNC: 213 MG/DL (ref 70–130)
GLUCOSE SERPL-MCNC: 144 MG/DL (ref 65–99)
HBA1C MFR BLD: 6.9 % (ref 4.8–5.6)
HCT VFR BLD AUTO: 40.2 % (ref 34–46.6)
HGB BLD-MCNC: 13 G/DL (ref 12–15.9)
IMM GRANULOCYTES # BLD AUTO: 0.05 10*3/MM3 (ref 0–0.05)
IMM GRANULOCYTES NFR BLD AUTO: 0.4 % (ref 0–0.5)
LACTATE HOLD SPECIMEN: NORMAL
LYMPHOCYTES # BLD AUTO: 0.73 10*3/MM3 (ref 0.7–3.1)
LYMPHOCYTES NFR BLD AUTO: 6.4 % (ref 19.6–45.3)
MCH RBC QN AUTO: 26.4 PG (ref 26.6–33)
MCHC RBC AUTO-ENTMCNC: 32.3 G/DL (ref 31.5–35.7)
MCV RBC AUTO: 81.5 FL (ref 79–97)
MONOCYTES # BLD AUTO: 0.99 10*3/MM3 (ref 0.1–0.9)
MONOCYTES NFR BLD AUTO: 8.7 % (ref 5–12)
NEUTROPHILS NFR BLD AUTO: 83.9 % (ref 42.7–76)
NEUTROPHILS NFR BLD AUTO: 9.6 10*3/MM3 (ref 1.7–7)
NRBC BLD AUTO-RTO: 0 /100 WBC (ref 0–0.2)
PLATELET # BLD AUTO: 253 10*3/MM3 (ref 140–450)
PMV BLD AUTO: 9.3 FL (ref 6–12)
POTASSIUM SERPL-SCNC: 4.3 MMOL/L (ref 3.5–5.2)
RBC # BLD AUTO: 4.93 10*6/MM3 (ref 3.77–5.28)
SODIUM SERPL-SCNC: 137 MMOL/L (ref 136–145)
WBC # BLD AUTO: 11.44 10*3/MM3 (ref 3.4–10.8)

## 2020-11-16 PROCEDURE — 94640 AIRWAY INHALATION TREATMENT: CPT

## 2020-11-16 PROCEDURE — 25010000002 ENOXAPARIN PER 10 MG: Performed by: FAMILY MEDICINE

## 2020-11-16 PROCEDURE — 99222 1ST HOSP IP/OBS MODERATE 55: CPT | Performed by: FAMILY MEDICINE

## 2020-11-16 PROCEDURE — 82962 GLUCOSE BLOOD TEST: CPT

## 2020-11-16 PROCEDURE — 94799 UNLISTED PULMONARY SVC/PX: CPT

## 2020-11-16 PROCEDURE — 25010000002 CEFTRIAXONE: Performed by: FAMILY MEDICINE

## 2020-11-16 PROCEDURE — 63710000001 INSULIN DETEMIR PER 5 UNITS: Performed by: FAMILY MEDICINE

## 2020-11-16 PROCEDURE — 85025 COMPLETE CBC W/AUTO DIFF WBC: CPT | Performed by: FAMILY MEDICINE

## 2020-11-16 PROCEDURE — 63710000001 INSULIN ASPART PER 5 UNITS: Performed by: FAMILY MEDICINE

## 2020-11-16 PROCEDURE — 25010000002 AZITHROMYCIN 500 MG/250 ML: Performed by: FAMILY MEDICINE

## 2020-11-16 PROCEDURE — 83605 ASSAY OF LACTIC ACID: CPT | Performed by: NURSE PRACTITIONER

## 2020-11-16 PROCEDURE — 80048 BASIC METABOLIC PNL TOTAL CA: CPT | Performed by: FAMILY MEDICINE

## 2020-11-16 RX ORDER — ACETAMINOPHEN 160 MG/5ML
650 SOLUTION ORAL EVERY 4 HOURS PRN
Status: DISCONTINUED | OUTPATIENT
Start: 2020-11-16 | End: 2020-11-16

## 2020-11-16 RX ORDER — ATORVASTATIN CALCIUM 20 MG/1
20 TABLET, FILM COATED ORAL DAILY
Status: DISCONTINUED | OUTPATIENT
Start: 2020-11-16 | End: 2020-11-17 | Stop reason: HOSPADM

## 2020-11-16 RX ORDER — GABAPENTIN 300 MG/1
300 CAPSULE ORAL 2 TIMES DAILY
Status: DISCONTINUED | OUTPATIENT
Start: 2020-11-16 | End: 2020-11-17 | Stop reason: HOSPADM

## 2020-11-16 RX ORDER — ONDANSETRON 2 MG/ML
4 INJECTION INTRAMUSCULAR; INTRAVENOUS EVERY 6 HOURS PRN
Status: DISCONTINUED | OUTPATIENT
Start: 2020-11-16 | End: 2020-11-17 | Stop reason: HOSPADM

## 2020-11-16 RX ORDER — CEFTRIAXONE 1 G/50ML
1 INJECTION, SOLUTION INTRAVENOUS EVERY 24 HOURS
Status: DISCONTINUED | OUTPATIENT
Start: 2020-11-16 | End: 2020-11-16

## 2020-11-16 RX ORDER — DEXTROSE MONOHYDRATE 25 G/50ML
25 INJECTION, SOLUTION INTRAVENOUS
Status: DISCONTINUED | OUTPATIENT
Start: 2020-11-16 | End: 2020-11-17 | Stop reason: HOSPADM

## 2020-11-16 RX ORDER — DIVALPROEX SODIUM 500 MG/1
1000 TABLET, EXTENDED RELEASE ORAL 2 TIMES DAILY
Status: DISCONTINUED | OUTPATIENT
Start: 2020-11-16 | End: 2020-11-17 | Stop reason: HOSPADM

## 2020-11-16 RX ORDER — AMINO ACIDS/PROTEIN HYDROLYS 15G-100/30
30 LIQUID (ML) ORAL 2 TIMES DAILY
Status: DISCONTINUED | OUTPATIENT
Start: 2020-11-16 | End: 2020-11-17 | Stop reason: HOSPADM

## 2020-11-16 RX ORDER — CHOLECALCIFEROL (VITAMIN D3) 125 MCG
5 CAPSULE ORAL NIGHTLY PRN
Status: DISCONTINUED | OUTPATIENT
Start: 2020-11-16 | End: 2020-11-17 | Stop reason: HOSPADM

## 2020-11-16 RX ORDER — IPRATROPIUM BROMIDE AND ALBUTEROL SULFATE 2.5; .5 MG/3ML; MG/3ML
3 SOLUTION RESPIRATORY (INHALATION)
Status: DISCONTINUED | OUTPATIENT
Start: 2020-11-16 | End: 2020-11-17 | Stop reason: HOSPADM

## 2020-11-16 RX ORDER — ACETAMINOPHEN 325 MG/1
650 TABLET ORAL EVERY 4 HOURS PRN
Status: DISCONTINUED | OUTPATIENT
Start: 2020-11-16 | End: 2020-11-16

## 2020-11-16 RX ORDER — CARVEDILOL 12.5 MG/1
12.5 TABLET ORAL 2 TIMES DAILY WITH MEALS
Status: DISCONTINUED | OUTPATIENT
Start: 2020-11-16 | End: 2020-11-17 | Stop reason: HOSPADM

## 2020-11-16 RX ORDER — ACETAMINOPHEN 160 MG/5ML
650 SOLUTION ORAL EVERY 4 HOURS PRN
Status: DISCONTINUED | OUTPATIENT
Start: 2020-11-16 | End: 2020-11-17 | Stop reason: HOSPADM

## 2020-11-16 RX ORDER — ACETAMINOPHEN 325 MG/1
650 TABLET ORAL EVERY 4 HOURS PRN
Status: DISCONTINUED | OUTPATIENT
Start: 2020-11-16 | End: 2020-11-17 | Stop reason: HOSPADM

## 2020-11-16 RX ORDER — NICOTINE POLACRILEX 4 MG
1 LOZENGE BUCCAL
Status: DISCONTINUED | OUTPATIENT
Start: 2020-11-16 | End: 2020-11-17 | Stop reason: HOSPADM

## 2020-11-16 RX ORDER — SODIUM CHLORIDE 9 MG/ML
75 INJECTION, SOLUTION INTRAVENOUS CONTINUOUS
Status: DISCONTINUED | OUTPATIENT
Start: 2020-11-16 | End: 2020-11-17 | Stop reason: HOSPADM

## 2020-11-16 RX ORDER — ONDANSETRON 4 MG/1
4 TABLET, FILM COATED ORAL EVERY 6 HOURS PRN
Status: DISCONTINUED | OUTPATIENT
Start: 2020-11-16 | End: 2020-11-17 | Stop reason: HOSPADM

## 2020-11-16 RX ORDER — CHOLECALCIFEROL (VITAMIN D3) 125 MCG
5 CAPSULE ORAL NIGHTLY PRN
Status: DISCONTINUED | OUTPATIENT
Start: 2020-11-16 | End: 2020-11-16

## 2020-11-16 RX ORDER — IPRATROPIUM BROMIDE AND ALBUTEROL SULFATE 2.5; .5 MG/3ML; MG/3ML
3 SOLUTION RESPIRATORY (INHALATION) EVERY 4 HOURS PRN
Status: DISCONTINUED | OUTPATIENT
Start: 2020-11-16 | End: 2020-11-17 | Stop reason: HOSPADM

## 2020-11-16 RX ORDER — SODIUM CHLORIDE 0.9 % (FLUSH) 0.9 %
10 SYRINGE (ML) INJECTION AS NEEDED
Status: DISCONTINUED | OUTPATIENT
Start: 2020-11-16 | End: 2020-11-17 | Stop reason: HOSPADM

## 2020-11-16 RX ORDER — ARIPIPRAZOLE 5 MG/1
30 TABLET ORAL DAILY
Status: DISCONTINUED | OUTPATIENT
Start: 2020-11-16 | End: 2020-11-17 | Stop reason: HOSPADM

## 2020-11-16 RX ORDER — SODIUM CHLORIDE 0.9 % (FLUSH) 0.9 %
10 SYRINGE (ML) INJECTION EVERY 12 HOURS SCHEDULED
Status: DISCONTINUED | OUTPATIENT
Start: 2020-11-16 | End: 2020-11-17 | Stop reason: HOSPADM

## 2020-11-16 RX ORDER — ACETAMINOPHEN 650 MG/1
650 SUPPOSITORY RECTAL EVERY 4 HOURS PRN
Status: DISCONTINUED | OUTPATIENT
Start: 2020-11-16 | End: 2020-11-17 | Stop reason: HOSPADM

## 2020-11-16 RX ORDER — ACETAMINOPHEN 650 MG/1
650 SUPPOSITORY RECTAL EVERY 4 HOURS PRN
Status: DISCONTINUED | OUTPATIENT
Start: 2020-11-16 | End: 2020-11-16

## 2020-11-16 RX ADMIN — ATORVASTATIN CALCIUM 20 MG: 20 TABLET, FILM COATED ORAL at 10:00

## 2020-11-16 RX ADMIN — INSULIN DETEMIR 30 UNITS: 100 INJECTION, SOLUTION SUBCUTANEOUS at 22:27

## 2020-11-16 RX ADMIN — SODIUM CHLORIDE, PRESERVATIVE FREE 10 ML: 5 INJECTION INTRAVENOUS at 22:30

## 2020-11-16 RX ADMIN — INSULIN ASPART 4 UNITS: 100 INJECTION, SOLUTION INTRAVENOUS; SUBCUTANEOUS at 18:36

## 2020-11-16 RX ADMIN — Medication 30 ML: at 22:43

## 2020-11-16 RX ADMIN — SODIUM CHLORIDE 75 ML/HR: 9 INJECTION, SOLUTION INTRAVENOUS at 04:13

## 2020-11-16 RX ADMIN — IPRATROPIUM BROMIDE AND ALBUTEROL SULFATE 3 ML: .5; 3 SOLUTION RESPIRATORY (INHALATION) at 06:58

## 2020-11-16 RX ADMIN — SODIUM CHLORIDE 75 ML/HR: 9 INJECTION, SOLUTION INTRAVENOUS at 18:37

## 2020-11-16 RX ADMIN — GABAPENTIN 300 MG: 300 CAPSULE ORAL at 10:00

## 2020-11-16 RX ADMIN — CEFTRIAXONE 1 G: 1 INJECTION, POWDER, FOR SOLUTION INTRAMUSCULAR; INTRAVENOUS at 04:13

## 2020-11-16 RX ADMIN — SODIUM CHLORIDE, PRESERVATIVE FREE 10 ML: 5 INJECTION INTRAVENOUS at 10:00

## 2020-11-16 RX ADMIN — INSULIN ASPART 2 UNITS: 100 INJECTION, SOLUTION INTRAVENOUS; SUBCUTANEOUS at 06:31

## 2020-11-16 RX ADMIN — IPRATROPIUM BROMIDE AND ALBUTEROL SULFATE 3 ML: .5; 3 SOLUTION RESPIRATORY (INHALATION) at 12:51

## 2020-11-16 RX ADMIN — INSULIN ASPART 4 UNITS: 100 INJECTION, SOLUTION INTRAVENOUS; SUBCUTANEOUS at 12:02

## 2020-11-16 RX ADMIN — ENOXAPARIN SODIUM 40 MG: 40 INJECTION SUBCUTANEOUS at 05:22

## 2020-11-16 RX ADMIN — CARVEDILOL 12.5 MG: 12.5 TABLET, FILM COATED ORAL at 18:36

## 2020-11-16 RX ADMIN — ARIPIPRAZOLE 30 MG: 5 TABLET ORAL at 10:00

## 2020-11-16 RX ADMIN — GABAPENTIN 300 MG: 300 CAPSULE ORAL at 22:27

## 2020-11-16 RX ADMIN — AZITHROMYCIN 500 MG: 500 INJECTION, POWDER, LYOPHILIZED, FOR SOLUTION INTRAVENOUS at 05:23

## 2020-11-16 RX ADMIN — DIVALPROEX SODIUM 1000 MG: 500 TABLET, EXTENDED RELEASE ORAL at 10:00

## 2020-11-16 NOTE — ED NOTES
Contacted House Supervisor regarding admission.  She assigned ICU-4 as tele overflow.  RN notified.     Danielito April Aida  11/16/20 0010

## 2020-11-16 NOTE — PLAN OF CARE
Problem: Adult Inpatient Plan of Care  Goal: Plan of Care Review  Outcome: Ongoing, Progressing     Problem: Infection (Pneumonia)  Goal: Resolution of Infection Signs and Symptoms  Outcome: Ongoing, Progressing     Problem: Respiratory Compromise (Pneumonia)  Goal: Effective Oxygenation and Ventilation  Outcome: Ongoing, Progressing  Intervention: Optimize Oxygenation and Ventilation   Goal Outcome Evaluation:  Plan of Care Reviewed With: patient  Progress: no change   Patient tolerating 2L NC and IV antibiotic therapy. Oriented to room and plan of care. VSS. Will continue to monitor.

## 2020-11-16 NOTE — H&P
[facility]      Name:  Izabella Agudelo   Age:  60 y.o.  Sex:  female  :  1960  MRN:  [id number]   Visit Number:  [id number]  Admission Date:  11/15/2020  Date Of Service:  20  Primary Care Physician:  Francoise Bailey MD    Chief Complaint: Altered mental status        History Of Presenting Illness: The patient is a 60-year-old lady with past medical history of paranoid schizophrenia, diabetes mellitus type 2 insulin-dependent, COPD, chronic diastolic CHF, paroxysmal A. fib, frequent falls.  The patient is fairly debilitated and noted to have altered mental status today.  The patient's mother had told EMS that she was more sleepy than usual and she had also had fever and abnormal smelling urine.    The patient on my evaluation is sleepy and unable to participate with the conversation but she does wake up appropriately.  She has a history of altered mental status similarly.  She presented to the emergency room for further evaluation.  ERProvider reports she was 85% on room air.  Her magnesium was low at 1.5.  Chest x-ray showed a right lower lobe pneumonia with chronic emphysematous changes.  Her Covid screen was negative.  She her temp was 100.7 and her blood pressure elevated.  CT scan of the head was negative for acute changes.  There were no signs of CO2 retention.  She was initiated on Zosyn and magnesium.  Lactic 2.5, procalcitonin is low, white blood cell low, hemoglobin 13, platelet 258.  The hospitalist service was asked to admit.    The patient is admitted to telemetry but is in the ICU as a telemetry overflow.    Review Of Systems: Unable to obtain from confused patient with hypersomnolence          Past Medical History: Reviewed    Past Medical History:   Diagnosis Date   • Cardiac abnormality    • CHF (congestive heart failure) (CMS/HCC)    • COPD (chronic obstructive pulmonary disease) (CMS/HCC)    • Diabetes (CMS/[facility])    •  Glaucoma    • Schizophrenia, chronic condition (CMS/[facility])        Past Surgical history: Reviewed  History reviewed. No pertinent surgical history.  Right upper abdomen scar    Social History: Lives with her mom and her sister.  Denies current tobacco, alcohol, drug use.  Pediatric History   Patient Parents   • Not on file     Other Topics Concern   • Not on file   Social History Narrative   • Not on file       Family History: Reviewed    Family History   Problem Relation Age of Onset   • Diabetes Other    • Glaucoma Other        Allergies: Reviewed    Patient has no known allergies.    Home Medications:    Prior to Admission Medications     Prescriptions Last Dose Informant Patient Reported? Taking?    acarbose (PRECOSE) 25 MG tablet 11/15/2020  Yes Yes    TAKE 1 TABLET BY MOUTH 3 TIMES A DAY AT THE START OF EACH MAIN MEAL.    ARIPiprazole (ABILIFY) 20 MG tablet 11/15/2020  No Yes    Take 1.5 tablets by mouth Daily.    B-D UF III MINI PEN NEEDLES 31G X 5 MM misc 11/15/2020  Yes Yes    USE 3 TIMES A DAY    carvedilol (COREG) 12.5 MG tablet 11/15/2020  Yes Yes    TAKE 1 TABLET BY ORAL ROUTE 2 TIMES EVERY DAY WITH FOOD    divalproex (DEPAKOTE) 500 MG 24 hr tablet 11/15/2020  Yes Yes    TAKE 2 TABLETS BY MOUTH TWICE A DAY    furosemide (LASIX) 20 MG tablet Patient Taking Differently  Yes Yes    Take 20 mg by mouth Daily As Needed.    gabapentin (NEURONTIN) 300 MG capsule 11/15/2020  Yes Yes    2 (Two) Times a Day.    LANTUS SOLOSTAR 100 UNIT/ML injection pen 11/15/2020  No Yes    Inject 40 Units under the skin into the appropriate area as directed Every Night.    metFORMIN (GLUCOPHAGE) 500 MG tablet 11/15/2020  Yes Yes    TAKE 2 TABLETS TWICE A DAY WITH MORNING AND EVENING MEALS    ONE TOUCH ULTRA TEST test strip 11/15/2020  Yes Yes    TEST 2 TIMES A DAY    ONETOUCH DELICA LANCETS 33G misc 11/15/2020  Yes Yes    TEST TWICE A DAY    simvastatin (ZOCOR) 40 MG tablet 11/15/2020  Yes Yes    Take 40 mg by mouth Every Night.                ED Medications:    Medications   sodium chloride 0.9 % flush 10 mL (has no administration in time range)   sodium chloride 0.9 % flush 10 mL (has no administration in time range)   gabapentin (NEURONTIN) capsule 300 mg (has no administration in time range)   ARIPiprazole (ABILIFY) tablet 30 mg (has no administration in time range)   carvedilol (COREG) tablet 12.5 mg (has no administration in time range)   divalproex (DEPAKOTE) 24 hr tablet 1,000 mg (has no administration in time range)   atorvastatin (LIPITOR) tablet 20 mg (has no administration in time range)   sodium chloride 0.9 % flush 10 mL (has no administration in time range)   sodium chloride 0.9 % flush 10 mL (has no administration in time range)   ipratropium-albuterol (DUO-NEB) nebulizer solution 3 mL (has no administration in time range)   ipratropium-albuterol (DUO-NEB) nebulizer solution 3 mL (has no administration in time range)   Pharmacy to Dose enoxaparin (LOVENOX) (has no administration in time range)   cefTRIAXone (ROCEPHIN) IVPB 1 g/50ml dextrose (premix) (has no administration in time range)     And   AZITHROMYCIN 500 MG/250 ML 0.9% NS IVPB (vial-mate) (has no administration in time range)   acetaminophen (TYLENOL) tablet 650 mg (has no administration in time range)     Or   acetaminophen (TYLENOL) 160 MG/5ML solution 650 mg (has no administration in time range)     Or   acetaminophen (TYLENOL) suppository 650 mg (has no administration in time range)   melatonin tablet 5 mg (has no administration in time range)   ondansetron (ZOFRAN) tablet 4 mg (has no administration in time range)     Or   ondansetron (ZOFRAN) injection 4 mg (has no administration in time range)   insulin detemir (LEVEMIR) injection 30 Units (has no administration in time range)   dextrose (GLUTOSE) oral gel 1 tube (has no administration in time range)   dextrose (D50W) 25 g/ 50mL Intravenous Solution 25 g (has no administration in time range)   glucagon (human  recombinant) (GLUCAGEN DIAGNOSTIC) injection 1 mg (has no administration in time range)   insulin aspart (novoLOG) injection 0-9 Units (has no administration in time range)   sodium chloride 0.9% - IBW for BMI > 30 bolus 1,572 mL (0 mL Intravenous Stopped 11/15/20 2237)   piperacillin-tazobactam (ZOSYN) 3.375 g in iso-osmotic dextrose 50 ml (premix) (0 g Intravenous Stopped 11/15/20 2229)   magnesium sulfate 2g/50 mL (PREMIX) infusion (0 g Intravenous Stopped 11/15/20 2251)       Vital Signs:    Temp:  [98.9 °F (37.2 °C)-100.7 °F (38.2 °C)] 98.9 °F (37.2 °C)  Heart Rate:  [62-89] 62  Resp:  [16-20] 16  BP: (134-181)/(72-97) 149/78      Intake/Output Summary (Last 24 hours) at 11/16/2020 0341  Last data filed at 11/15/2020 2237  Gross per 24 hour   Intake 1622 ml   Output --   Net 1622 ml           11/15/20  2005 11/16/20  0200 11/16/20  0219   Weight: 90.7 kg (200 lb) 82.8 kg (182 lb 8 oz) 82.8 kg (182 lb 8 oz)       Body mass index is 31.33 kg/m².    Physical Exam:      General Appearance:   Elderly lady.  Appears older than stated age. Asleep and awakened on verbal response and quickly returns to sleep. no acute distress, hypersomnolent   Head:    Atraumatic and normocephalic, without obvious defect.   Eyes:            PERRLA, conjunctivae and sclerae normal, no icterus. No pallor. Extraocular movements are within normal limits.   Ears:    Ears appear intact with no abnormalities noted.   Throat:   No oral lesions, no thrush, oral mucosa moist.   Neck:   Supple, trachea midline, no thyromegaly       Lungs:     Chest shape is normal. Breath sounds heard bilaterally equally.  No crackles or wheezing. No Pleural rub or bronchial breathing.      Heart:    Normal S1 and S2, positive murmur, no gallop, no rub. No JVD   Abdomen:     Normal bowel sounds, no masses, no organomegaly. Soft        non-tender, non-distended, no guarding, no rebound                tenderness   Extremities:   Moves all extremities well, trace  bilateral lower leg edema, no cyanosis, no             clubbing   Pulses:   Pulses palpable and equal bilaterally   Skin:   No bleeding, bruising or rash; she has chronic bilateral erythema of the lower legs with diffuse skin changes consistent with chronic peripheral vascular dermatitis and venous stasis small ulceration   Lymph nodes:   No palpable adenopathy   Neurologic:  Sedation limited examination, sensation intact, moves arms and legs equally       EKG:    A. fib 74    Labs:    I have reviewed the labs done in the emergency room.  Results from last 7 days   Lab Units 11/15/20  2011   SODIUM mmol/L 136   POTASSIUM mmol/L 3.8   CHLORIDE mmol/L 96*   CO2 mmol/L 27.9   BUN mg/dL 11   CREATININE mg/dL 0.58   CALCIUM mg/dL 10.5   BILIRUBIN mg/dL 0.6   ALK PHOS U/L 109   ALT (SGPT) U/L 11   AST (SGOT) U/L 13   GLUCOSE mg/dL 176*     Results from last 7 days   Lab Units 11/15/20  2011   WBC 10*3/mm3 10.40   HEMOGLOBIN g/dL 13.4   HEMATOCRIT % 41.4   PLATELETS 10*3/mm3 258         Results from last 7 days   Lab Units 11/15/20  2011   TROPONIN T ng/mL <0.010                 Results from last 7 days   Lab Units 11/15/20  2214   PH, ARTERIAL pH units 7.442   PO2 ART mm Hg 55.1*   PCO2, ARTERIAL mm Hg 40.5   HCO3 ART mmol/L 27.6     Results from last 7 days   Lab Units 11/15/20  2132   COLOR UA  Yellow   GLUCOSE UA  Negative   KETONES UA  Negative   LEUKOCYTES UA  Negative   PH, URINE  8.5*   BILIRUBIN UA  Negative   UROBILINOGEN UA  0.2 E.U./dL     Pain Management Panel     There is no flowsheet data to display.              Radiology:    Imaging Results (Last 72 Hours)     Procedure Component Value Units Date/Time    CT Head Without Contrast [[id number]] Collected: 11/15/20 2230     Updated: 11/15/20 2231    Narrative:      FINAL REPORT    TECHNIQUE:  Multiple contiguous transaxial slices through the head were  obtained without the intravenous administration of contrast.    CLINICAL HISTORY:  alt LOC    FINDINGS:  There  is age-appropriate cortical atrophy with associated  ventricular enlargement. Patchy periventricular white matter low  attenuation is most consistent with chronic microvascular  ischemia. There is no acute infarct, hemorrhage or mass effect.  If concern persists, recommend MRI. There is no sinus air-fluid  level. The calvarium is intact.      Impression:      No acute intracranial abnormality    Authenticated by Radha Maria MD on 11/15/2020 10:30:08 PM    XR Chest 1 View [[id number]]  (Abnormal) Resulted: 11/15/20 2156     Updated: 11/15/20 2157          Assessment:    Acute respiratory failure with hypoxemia (CMS/[facility])    Pneumonia of left lower lobe due to infectious organism    Acute metabolic encephalopathy    Type 2 diabetes mellitus with complication, with long-term current use of insulin (CMS/[facility])    Essential hypertension    Schizophrenia, chronic condition (CMS/[facility])  Dehydration, prior to admission  Simple sepsis, with pneumonia, prior to admission:  fever, infection, hypoxic respiratory failure, altered mental status  COPD without exacerbation  Debility with frequent falls  Chronic paroxysmal A. fib    Plan:    Admit to the telemetry floor.  Continue oxygen, duo nebs, antibiotic therapy with Rocephin and azithromycin.  Magnesium replacement provided.  She does not currently appear to have a COPD exacerbation.  Check daily labs and adjust medications accordingly.  IV fluids given while she is altered and not eating and drinking well.  Further recommendations depend on the hospital course.      Medication risks and benefits were discussed in detail. Patient reported being satisfied with current treatment plan.    Advance Care Planning   ACP discussion was declined by the patient. Patient does not have an advance directive, declines further assistance.  Patient too sleepy to participate so she is full code status.  Lisa Rene DO  11/16/20  03:41 EST

## 2020-11-16 NOTE — PROGRESS NOTES
"Adult Nutrition  Assessment/PES    Patient Name:  Izabella Agudelo  YOB: 1960  MRN: [id number]  Admit Date:  11/15/2020    Assessment Date:  11/16/2020    Comments:    Recommend:  1. Continue current diet order as medically appropriate and tolerated.  2. Establish and encourage PO intake.  3. RD ordered Prostat BID.  4. Consider a multivitamin with minerals daily.     RD to follow pt and available PRN.      Reason for Assessment     Row Name 11/16/20 0922          Reason for Assessment    Reason For Assessment  per organizational policy;diagnosis/disease state;identified at risk by screening criteria     Diagnosis  cardiac disease;diabetes diagnosis/complications;neurologic conditions;pulmonary disease;infection/sepsis COPD, HTN, CHF, AFIB, DM 2, sepsis, PNA, acute resp failure, paranoid schizophrenia     Identified At Risk by Screening Criteria  BMI           Anthropometrics     Row Name 11/16/20 0923 11/16/20 0219       Anthropometrics    Height  162.6 cm (64\")  162.6 cm (64\")    Weight  --  82.8 kg (182 lb 8 oz)       Ideal Body Weight (IBW)    Ideal Body Weight (IBW) (kg)  55  55    % Ideal Body Weight  --  150.5       Body Mass Index (BMI)    BMI (kg/m2)  --  31.39    Row Name 11/16/20 0200          Anthropometrics    Height  162.6 cm (64\")     Weight  82.8 kg (182 lb 8 oz)        Ideal Body Weight (IBW)    Ideal Body Weight (IBW) (kg)  55     % Ideal Body Weight  150.5        Body Mass Index (BMI)    BMI (kg/m2)  31.39         Labs/Tests/Procedures/Meds     Row Name 11/16/20 0923          Labs/Procedures/Meds    Lab Results Reviewed  reviewed, pertinent     Lab Results Comments  Low: Mg; High: HgbA1c, Gluc        Medications    Pertinent Medications Reviewed  reviewed, pertinent     Pertinent Medications Comments  Lipitor, Novolog, Levemir         Physical Findings     Row Name 11/16/20 0923          Physical Findings    Overall Physical Appearance  obese         Estimated/Assessed Needs  " "   Row Name 11/16/20 0923 11/16/20 0219       Calculation Measurements    Weight Used For Calculations  66.7 kg (147 lb 0.8 oz) ADjusted BW  --    Height  162.6 cm (64\")  162.6 cm (64\")       Estimated/Assessed Needs    Additional Documentation  Calorie Requirements (Group);Protein Requirements (Group);[place]-St. Jeor Equation (Group);Fluid Requirements (Group)  --       Calorie Requirements    Estimated Calorie Need Method  [place]  --    Estimated Calorie Requirement Comment  0361-3417  --       [place]-St. Jeor Equation    RMR (Iredell-St. Jeor Equation)  1227  --    I[place]ell-St. Profitekor Activity Factors  -- 1.3 AF  --       Protein Requirements    Weight Used For Protein Calculations  66.7 kg (147 lb 0.8 oz) Adjusted BW  --    Est Protein Requirement Amount (gms/kg)  1.2 gm protein 66-80 grams  --    Estimated Protein Requirements (gms/day)  80.04  --       Fluid Requirements    Estimated Fluid Requirement Method  [place]-Segar Formula  --    [place]-Segar Method (over 20 kg)  2834  --    Row Name 11/16/20 0200          Calculation Measurements    Height  162.6 cm (64\")         Nutrition Prescription Ordered     Row Name 11/16/20 0925          Nutrition Prescription PO    Current PO Diet  Regular     Common Modifiers  Cardiac;Consistent Carbohydrate         Evaluation of Received Nutrient/Fluid Intake     Row Name 11/16/20 0923          PO Evaluation    Number of Days PO Intake Evaluated  Insufficient Data               Problem/Interventions:  Problem 1     Row Name 11/16/20 0925          Nutrition Diagnoses Problem 1    Problem 1  Altered Nutrition Related to Labs     Etiology (related to)  Medical Diagnosis     Endocrine  DM2     Signs/Symptoms (evidenced by)  Biochemical     Specific Labs Noted  HgbA1C;Glucose         Problem 2     Row Name 11/16/20 0926          Nutrition Diagnoses Problem 2    Problem 2  Overweight/Obesity     Etiology (related to)  Factors Affecting Nutrition     Reported/Observed " By  RN     Signs/Symptoms (evidenced by)  BMI     BMI  30 - 34.9         Problem 3     Row Name 11/16/20 0926          Nutrition Diagnoses Problem 3    Problem 3  Increased Nutrient Needs     Macronutrient  Kcal;Protein     Etiology (related to)  Medical Diagnosis     Pulmonary/Critical Care  Acute respiratory failure;Pneumonia;COPD     Signs/Symptoms (evidenced by)  Report/Observation     Reported/Observed By  MD           Intervention Goal     Row Name 11/16/20 0926          Intervention Goal    General  Improved nutrition related lab(s);Meet nutritional needs for age/condition     PO  Meet estimated needs;Establish PO;Tolerate PO     Weight  No significant weight loss         Nutrition Intervention     Row Name 11/16/20 0926          Nutrition Intervention    RD/Tech Action  Follow Tx progress;Encourage intake         Nutrition Prescription     Row Name 11/16/20 0927          Nutrition Prescription PO    PO Prescription  Begin/change supplement Continue diet as tolerated     Supplement  PRO liquid     Supplement Frequency  2 times a day     New PO Prescription Ordered?  Yes        Other Orders    Obtain Weight  Daily     Obtain Weight Ordered?  No, recommended     Supplement  Vitamin mineral supplement     Supplement Ordered?  No, recommended         Education/Evaluation     Row Name 11/16/20 0928          Education    Education  Education not appropriate at this time     Please explain  Defer until post ICU        Monitor/Evaluation    Monitor  Per protocol;I&O;PO intake;Supplement intake;Pertinent labs;Weight;Skin status           Electronically signed by:  Keyanna Iqbal RD  11/16/20 09:28 EST

## 2020-11-16 NOTE — ED PROVIDER NOTES
Subjective   History of Present Illness  This is a 60 year old female sent in by EMS for complaint of altered LOC. EMS reported her mother states she was more drowsy than normal.  They also report fever and strong smelling urine.  Review of Systems   Constitutional: Positive for fever.   HENT: Negative.    Eyes: Negative.    Respiratory: Negative.    Cardiovascular: Negative.    Gastrointestinal: Positive for abdominal pain.   Endocrine: Negative.    Genitourinary: Positive for dysuria.   Skin: Negative.    Allergic/Immunologic: Negative.    Neurological: Positive for weakness.   Psychiatric/Behavioral: Negative.        Past Medical History:   Diagnosis Date   • Cardiac abnormality    • CHF (congestive heart failure) (CMS/[facility])    • COPD (chronic obstructive pulmonary disease) (CMS/[facility])    • Diabetes (CMS/[facility])    • Glaucoma    • Schizophrenia, chronic condition (CMS/[facility])        No Known Allergies    History reviewed. No pertinent surgical history.    Family History   Problem Relation Age of Onset   • Diabetes Other    • Glaucoma Other        Social History     Socioeconomic History   • Marital status: Single     Spouse name: Not on file   • Number of children: Not on file   • Years of education: Not on file   • Highest education level: Not on file   Tobacco Use   • Smoking status: Never Smoker   Substance and Sexual Activity   • Alcohol use: No   • Drug use: No   • Sexual activity: Defer           Objective   Physical Exam  Vitals signs and nursing note reviewed.   Constitutional:       Appearance: Normal appearance. She is obese.   Neurological:      Mental Status: She is alert.     GEN: No acute distress  Head: Normocephalic, atraumatic  Eyes: Pupils equal round reactive to light  ENT: Posterior pharynx normal in appearance, oral mucosa is moist  Chest: Nontender to palpation  Cardiovascular: Regular rate  Lungs: Clear to auscultation bilaterally  Abdomen: Soft, nontender, nondistended, no peritoneal  signs  Extremities: No edema, normal appearance  Neuro: GCS 15, garbled speech but answers questions.  Patient reports this is her normal speech.  Psych: Mood and affect are appropriate      Procedures           ED Course  ED Course as of Nov 16 0005   Sun Nov 15, 2020   2015 Atrial fibrillation with a rate of 74.  Abnormal left axis deviation.  No significant ST segments.  Abnormal EKG.  Interpreted by me.    [DT]   6962 TECHNIQUE:  Multiple contiguous transaxial slices through the head were  obtained without the intravenous administration of contrast.     CLINICAL HISTORY:  alt LOC     FINDINGS:  There is age-appropriate cortical atrophy with associated  ventricular enlargement. Patchy periventricular white matter low  attenuation is most consistent with chronic microvascular  ischemia. There is no acute infarct, hemorrhage or mass effect.  If concern persists, recommend MRI. There is no sinus air-fluid  level. The calvarium is intact.     IMPRESSION:  No acute intracranial abnormality     Authenticated by Radha Maria MD on 11/15/2020 10:30:08 PM    [PF]      ED Course User Index  [DT] Jesus Lai MD  [PF] Cayden Kc, DO                                           MDM  Number of Diagnoses or Management Options     Amount and/or Complexity of Data Reviewed  Clinical lab tests: reviewed and ordered  Tests in the radiology section of CPT®: ordered and reviewed  Review and summarize past medical records: yes  Discuss the patient with other providers: yes  Independent visualization of images, tracings, or specimens: yes    Risk of Complications, Morbidity, and/or Mortality  Presenting problems: moderate  Diagnostic procedures: moderate  Management options: moderate      I received care from nurse practitioner in regards to work-up.  Work-up consistent with community-acquired pneumonia also hypoxia with lowest oxygen saturations 85% when she was sleeping.  Head CT was negative for any acute findings.  No  CO2 retention on blood gas.  Covid negative per lab testing.  Discussed with hospitalist service, will admit for hypoxia and community-acquired pneumonia.  Final diagnoses:   Hypoxia   Community acquired pneumonia of right lung, unspecified part of lung            Cayden Kc, DO  11/16/20 0005

## 2020-11-16 NOTE — ED NOTES
Called Lab and requested them to collect the lactic acid lab.                  Cj Kay RN  11/15/20 2032

## 2020-11-16 NOTE — PROGRESS NOTES
Discharge Planning Assessment   [facility]     Patient Name: Izabella Agudelo  MRN: [id number]  Today's Date: 11/16/2020    Admit Date: 11/15/2020    Discharge Needs Assessment     Row Name 11/16/20 1235       Living Environment    Lives With  sibling(s);parent(s)    Name(s) of Who Lives With Patient  sister and 90 year old mother    Unique Family Situation  sometimes stays at mother's home    Primary Care Provided by  parent(s);other (see comments)    Provides Primary Care For  no one    Family Caregiver if Needed  sibling(s);parent(s)    Able to Return to Prior Arrangements  yes       Resource/Environmental Concerns    Transportation Concerns  car, none       Discharge Needs Assessment    Readmission Within the Last 30 Days  no previous admission in last 30 days    Concerns to be Addressed  discharge planning    Discharge Coordination/Progress  family prefers home        Discharge Plan     Row Name 11/16/20 1238       Plan    Plan  Discharge planning, Spoke to sister whom she lives with along with her 90 year old mother. Verified address and PCP. No current home health, had o2 but refused to wear so it was returned. Has walker, but wont use. Home vs home with home health.        Continued Care and Services - Admitted Since 11/15/2020    Coordination has not been started for this encounter.       Expected Discharge Date and Time     Expected Discharge Date Expected Discharge Time    Nov 19, 2020         Demographic Summary     Row Name 11/16/20 1232       General Information    Admission Type  inpatient    Arrived From  home    Expected Length of Stay (LOS)  3    Referral Source  admission list    Reason for Consult  discharge planning    Preferred Language  English        Functional Status     Row Name 11/16/20 1232       Functional Status, IADL    Medications  other (see comments)    Meal Preparation  other (see comments)    Housekeeping  other (see comments)    Laundry  other (see comments)    Shopping   other (see comments)    IADL Comments  sister states not walking well has walker but refuses to use it.       Employment/    Employment Status  disabled        Psychosocial    No documentation.       Abuse/Neglect    No documentation.       Legal    No documentation.       Substance Abuse    No documentation.       Patient Forms    No documentation.           Gabby Disla, MINOR

## 2020-11-17 VITALS
HEIGHT: 64 IN | HEART RATE: 62 BPM | SYSTOLIC BLOOD PRESSURE: 163 MMHG | OXYGEN SATURATION: 97 % | DIASTOLIC BLOOD PRESSURE: 61 MMHG | RESPIRATION RATE: 18 BRPM | BODY MASS INDEX: 31.16 KG/M2 | TEMPERATURE: 98.3 F | WEIGHT: 182.5 LBS

## 2020-11-17 LAB
ANION GAP SERPL CALCULATED.3IONS-SCNC: 5.7 MMOL/L (ref 5–15)
BASOPHILS # BLD AUTO: 0.03 10*3/MM3 (ref 0–0.2)
BASOPHILS NFR BLD AUTO: 0.3 % (ref 0–1.5)
BUN SERPL-MCNC: 12 MG/DL (ref 6–20)
BUN/CREAT SERPL: 27.3 (ref 7–25)
CALCIUM SPEC-SCNC: 9 MG/DL (ref 8.6–10.5)
CHLORIDE SERPL-SCNC: 99 MMOL/L (ref 98–107)
CO2 SERPL-SCNC: 27.3 MMOL/L (ref 22–29)
CREAT SERPL-MCNC: 0.44 MG/DL (ref 0.57–1)
DEPRECATED RDW RBC AUTO: 50 FL (ref 37–54)
EOSINOPHIL # BLD AUTO: 0.36 10*3/MM3 (ref 0–0.4)
EOSINOPHIL NFR BLD AUTO: 4.1 % (ref 0.3–6.2)
ERYTHROCYTE [DISTWIDTH] IN BLOOD BY AUTOMATED COUNT: 16.5 % (ref 12.3–15.4)
GFR SERPL CREATININE-BSD FRML MDRD: 146 ML/MIN/1.73
GLUCOSE BLDC GLUCOMTR-MCNC: 132 MG/DL (ref 70–130)
GLUCOSE BLDC GLUCOMTR-MCNC: 212 MG/DL (ref 70–130)
GLUCOSE SERPL-MCNC: 144 MG/DL (ref 65–99)
HCT VFR BLD AUTO: 38.3 % (ref 34–46.6)
HGB BLD-MCNC: 12.2 G/DL (ref 12–15.9)
IMM GRANULOCYTES # BLD AUTO: 0.04 10*3/MM3 (ref 0–0.05)
IMM GRANULOCYTES NFR BLD AUTO: 0.5 % (ref 0–0.5)
LYMPHOCYTES # BLD AUTO: 1.19 10*3/MM3 (ref 0.7–3.1)
LYMPHOCYTES NFR BLD AUTO: 13.6 % (ref 19.6–45.3)
MCH RBC QN AUTO: 26.5 PG (ref 26.6–33)
MCHC RBC AUTO-ENTMCNC: 31.9 G/DL (ref 31.5–35.7)
MCV RBC AUTO: 83.1 FL (ref 79–97)
MONOCYTES # BLD AUTO: 0.79 10*3/MM3 (ref 0.1–0.9)
MONOCYTES NFR BLD AUTO: 9.1 % (ref 5–12)
NEUTROPHILS NFR BLD AUTO: 6.31 10*3/MM3 (ref 1.7–7)
NEUTROPHILS NFR BLD AUTO: 72.4 % (ref 42.7–76)
NRBC BLD AUTO-RTO: 0 /100 WBC (ref 0–0.2)
PLATELET # BLD AUTO: 213 10*3/MM3 (ref 140–450)
PMV BLD AUTO: 9.1 FL (ref 6–12)
POTASSIUM SERPL-SCNC: 4.3 MMOL/L (ref 3.5–5.2)
RBC # BLD AUTO: 4.61 10*6/MM3 (ref 3.77–5.28)
SODIUM SERPL-SCNC: 132 MMOL/L (ref 136–145)
WBC # BLD AUTO: 8.72 10*3/MM3 (ref 3.4–10.8)

## 2020-11-17 PROCEDURE — 94618 PULMONARY STRESS TESTING: CPT

## 2020-11-17 PROCEDURE — 25010000002 AZITHROMYCIN 500 MG/250 ML: Performed by: FAMILY MEDICINE

## 2020-11-17 PROCEDURE — 25010000002 CEFTRIAXONE: Performed by: FAMILY MEDICINE

## 2020-11-17 PROCEDURE — 63710000001 INSULIN ASPART PER 5 UNITS: Performed by: FAMILY MEDICINE

## 2020-11-17 PROCEDURE — 94799 UNLISTED PULMONARY SVC/PX: CPT

## 2020-11-17 PROCEDURE — 82962 GLUCOSE BLOOD TEST: CPT

## 2020-11-17 PROCEDURE — 80048 BASIC METABOLIC PNL TOTAL CA: CPT | Performed by: INTERNAL MEDICINE

## 2020-11-17 PROCEDURE — 99239 HOSP IP/OBS DSCHRG MGMT >30: CPT | Performed by: INTERNAL MEDICINE

## 2020-11-17 PROCEDURE — 85025 COMPLETE CBC W/AUTO DIFF WBC: CPT | Performed by: INTERNAL MEDICINE

## 2020-11-17 PROCEDURE — 25010000002 ENOXAPARIN PER 10 MG: Performed by: FAMILY MEDICINE

## 2020-11-17 RX ORDER — AZITHROMYCIN 500 MG/1
500 TABLET, FILM COATED ORAL DAILY
Qty: 3 TABLET | Refills: 0 | Status: SHIPPED | OUTPATIENT
Start: 2020-11-17 | End: 2021-12-18

## 2020-11-17 RX ORDER — DIVALPROEX SODIUM 500 MG/1
500 TABLET, DELAYED RELEASE ORAL 3 TIMES DAILY
COMMUNITY

## 2020-11-17 RX ORDER — CEFDINIR 300 MG/1
300 CAPSULE ORAL 2 TIMES DAILY
Qty: 6 CAPSULE | Refills: 0 | Status: SHIPPED | OUTPATIENT
Start: 2020-11-17 | End: 2020-11-20

## 2020-11-17 RX ADMIN — SODIUM CHLORIDE 75 ML/HR: 9 INJECTION, SOLUTION INTRAVENOUS at 08:46

## 2020-11-17 RX ADMIN — AZITHROMYCIN 500 MG: 500 INJECTION, POWDER, LYOPHILIZED, FOR SOLUTION INTRAVENOUS at 06:34

## 2020-11-17 RX ADMIN — ATORVASTATIN CALCIUM 20 MG: 20 TABLET, FILM COATED ORAL at 08:36

## 2020-11-17 RX ADMIN — DIVALPROEX SODIUM 1000 MG: 500 TABLET, EXTENDED RELEASE ORAL at 09:44

## 2020-11-17 RX ADMIN — INSULIN ASPART 4 UNITS: 100 INJECTION, SOLUTION INTRAVENOUS; SUBCUTANEOUS at 11:33

## 2020-11-17 RX ADMIN — Medication 30 ML: at 08:36

## 2020-11-17 RX ADMIN — IPRATROPIUM BROMIDE AND ALBUTEROL SULFATE 3 ML: .5; 3 SOLUTION RESPIRATORY (INHALATION) at 07:10

## 2020-11-17 RX ADMIN — ENOXAPARIN SODIUM 40 MG: 40 INJECTION SUBCUTANEOUS at 05:29

## 2020-11-17 RX ADMIN — GABAPENTIN 300 MG: 300 CAPSULE ORAL at 08:36

## 2020-11-17 RX ADMIN — SODIUM CHLORIDE, PRESERVATIVE FREE 10 ML: 5 INJECTION INTRAVENOUS at 08:37

## 2020-11-17 RX ADMIN — DIVALPROEX SODIUM 1000 MG: 500 TABLET, EXTENDED RELEASE ORAL at 00:20

## 2020-11-17 RX ADMIN — ARIPIPRAZOLE 30 MG: 5 TABLET ORAL at 08:35

## 2020-11-17 RX ADMIN — CARVEDILOL 12.5 MG: 12.5 TABLET, FILM COATED ORAL at 08:36

## 2020-11-17 RX ADMIN — CEFTRIAXONE 1 G: 1 INJECTION, POWDER, FOR SOLUTION INTRAMUSCULAR; INTRAVENOUS at 05:29

## 2020-11-17 NOTE — PROGRESS NOTES
Exercise Oximetry    Patient Name:Izabella Aguedlo   MRN: [id number]   Date: 11/17/20             ROOM AIR BASELINE   SpO2% 96   Heart Rate 59   Blood Pressure      EXERCISE ON ROOM AIR SpO2% EXERCISE ON O2 @ 0LPM SpO2%   1 MINUTE 96 1 MINUTE    2 MINUTES 95 2 MINUTES    3 MINUTES 94 3 MINUTES    4 MINUTES 94 4 MINUTES    5 MINUTES 91 5 MINUTES    6 MINUTES 91 6 MINUTES               Distance Walked  0 Distance Walked   Dyspnea (Huan Scale)  1 Dyspnea (Huan Scale)   Fatigue (Huan Scale) 2 Fatigue (Huan Scale)   SpO2% Post Exercise  95 SpO2% Post Exercise   HR Post Exercise  55 HR Post Exercise   Time to Recovery  2 Time to Recovery     Comments: PT unable to walk so we did arm lifts with a sat. of 91% or greater during the exercise.   Pt does not need o2.

## 2020-11-17 NOTE — PLAN OF CARE
Goal Outcome Evaluation:  Plan of Care Reviewed With: patient  Transferred from 3rd floor and now to be discharged home.  Vitals stable.  No acute events or reports of pain.

## 2020-11-18 ENCOUNTER — READMISSION MANAGEMENT (OUTPATIENT)
Dept: CALL CENTER | Facility: HOSPITAL | Age: 60
End: 2020-11-18

## 2020-11-18 NOTE — DISCHARGE SUMMARY
[facility]   DISCHARGE SUMMARY      Name:  Izabella Agudelo   Age:  60 y.o.  Sex:  female  :  1960  MRN:  [id number]   Visit Number:  [id number]    Admission Date:  11/15/2020  Date of Discharge:  2020  Primary Care Physician:  Francoise Bailey MD    Discharge Diagnoses:     Acute respiratory failure with hypoxemia (CMS/HCC)    Pneumonia of left lower lobe due to infectious organism    Acute metabolic encephalopathy    Type 2 diabetes mellitus with complication, with long-term current use of insulin (CMS/[facility])    Essential hypertension    Schizophrenia, chronic condition (CMS/[facility])  Dehydration, prior to admission  Simple sepsis, with pneumonia, prior to admission:  fever, infection, hypoxic respiratory failure, altered mental status  COPD without exacerbation  Debility with frequent falls  Chronic paroxysmal A. fib      Acute respiratory failure with hypoxemia (CMS/HCC)    Pneumonia of left lower lobe due to infectious organism    Acute metabolic encephalopathy    Type 2 diabetes mellitus with complication, with long-term current use of insulin (CMS/[facility])    Essential hypertension    Schizophrenia, chronic condition (CMS/[facility])      Presenting Problem:    Hypoxia [R09.02]     Consults:     Consults     No orders found from 10/17/2020 to 2020.        Consulting Physician(s)             None            Procedures Performed:           Hospital Course:    he patient is a 60-year-old lady with past medical history of paranoid schizophrenia, diabetes mellitus type 2 insulin-dependent, COPD, chronic diastolic CHF, paroxysmal A. fib, frequent falls.  EMS was called by patient's mother who is her primary caretaker stating that patient was more sleepy than usual with a fever.  She presented to the emergency room for further evaluation.  ERProvider reports she was 85% on room air.  Her magnesium was low at 1.5.  Chest x-ray showed a right lower lobe pneumonia with chronic  emphysematous changes.  Her Covid screen was negative.  She her temp was 100.7 and her blood pressure elevated.  CT scan of the head was negative for acute changes.  There were no signs of CO2 retention.  She was initiated on Zosyn and magnesium.  MRSA surveillance screen was negative.  Patient had significant improvement.  Antibiotics were deescalated and she was discharged to continue Omnicef to complete full course.  She did not require supplemental oxygen per 6-minute walking oximetry test.    Vital Signs:         Physical Exam:    General Appearance:  Alert and cooperative, not in any acute distress.   Head:  Atraumatic and normocephalic, without obvious abnormality.   Eyes:          PERRLA, conjunctivae and sclerae normal, no icterus. No pallor. Extraocular movements are within normal limits.   Ears:  Ears appear intact with no abnormalities noted.   Throat: No oral lesions, no thrush, oral mucosa moist.   Neck: Supple, trachea midline       Lungs:   Chest shape is normal. Breath sounds heard bilaterally equally.  No crackles or wheezing.    Heart:  Normal S1 and S2, no murmur, No JVD.   Abdomen:   Normal bowel sounds, Soft, nontender, nondistended, no guarding, no rebound tenderness.   Extremities: Moves all extremities well, no edema, no cyanosis, no clubbing.   Pulses: Pulses palpable and equal bilaterally.   Skin: No bleeding, bruising or rash.       Neurologic: Alert and oriented x 3. Moves all four limbs equally. No tremors. No facial asymmetry.     Pertinent Lab Results:     Results from last 7 days   Lab Units 11/17/20  0512 11/16/20  0430 11/15/20  2011   SODIUM mmol/L 132* 137 136   POTASSIUM mmol/L 4.3 4.3 3.8   CHLORIDE mmol/L 99 100 96*   CO2 mmol/L 27.3 28.3 27.9   BUN mg/dL 12 10 11   CREATININE mg/dL 0.44* 0.62 0.58   CALCIUM mg/dL 9.0 9.2 10.5   BILIRUBIN mg/dL  --   --  0.6   ALK PHOS U/L  --   --  109   ALT (SGPT) U/L  --   --  11   AST (SGOT) U/L  --   --  13   GLUCOSE mg/dL 144* 144* 176*       Results from last 7 days   Lab Units 11/17/20  0512 11/16/20  0430 11/15/20  2011   WBC 10*3/mm3 8.72 11.44* 10.40   HEMOGLOBIN g/dL 12.2 13.0 13.4   HEMATOCRIT % 38.3 40.2 41.4   PLATELETS 10*3/mm3 213 253 258         Results from last 7 days   Lab Units 11/15/20  2011   TROPONIN T ng/mL <0.010                 Results from last 7 days   Lab Units 11/15/20  2214   PH, ARTERIAL pH units 7.442   PO2 ART mm Hg 55.1*   PCO2, ARTERIAL mm Hg 40.5   HCO3 ART mmol/L 27.6     Results from last 7 days   Lab Units 11/15/20  2132   COLOR UA  Yellow   GLUCOSE UA  Negative   KETONES UA  Negative   LEUKOCYTES UA  Negative   PH, URINE  8.5*   BILIRUBIN UA  Negative   UROBILINOGEN UA  0.2 E.U./dL     Pain Management Panel     There is no flowsheet data to display.        Results from last 7 days   Lab Units 11/15/20  2150 11/15/20  2140   BLOODCX  No growth at 2 days No growth at 2 days       Pertinent Radiology Results:    Imaging Results (All)     Procedure Component Value Units Date/Time    XR Chest 1 View [[id number]] Collected: 11/16/20 0953     Updated: 11/16/20 1405    Narrative:      PROCEDURE: XR CHEST 1 VW-     HISTORY: Weak/Dizzy/AMS triage protocol     COMPARISON: 07/14/2020.     FINDINGS: The heart is normal in size. The mediastinum is unremarkable.  There is right basilar pneumonia. The left lung is clear. There is no  pneumothorax.  There are no acute osseous abnormalities.       Impression:      Right basilar pneumonia.     Continued followup is recommended.                 Images were reviewed, interpreted, and dictated by Dr. Cindy Harden M.D.  Transcribed by Boo Blunt PA-C.     This report was finalized on 11/16/2020 2:03 PM by Cindy Harden M.D..    CT Head Without Contrast [[id number]] Collected: 11/15/20 2230     Updated: 11/15/20 2231    Narrative:      FINAL REPORT    TECHNIQUE:  Multiple contiguous transaxial slices through the head were  obtained without the intravenous administration of  contrast.    CLINICAL HISTORY:  alt LOC    FINDINGS:  There is age-appropriate cortical atrophy with associated  ventricular enlargement. Patchy periventricular white matter low  attenuation is most consistent with chronic microvascular  ischemia. There is no acute infarct, hemorrhage or mass effect.  If concern persists, recommend MRI. There is no sinus air-fluid  level. The calvarium is intact.      Impression:      No acute intracranial abnormality    Authenticated by Radha Maria MD on 11/15/2020 10:30:08 PM          Echo:    Results for orders placed during the hospital encounter of 01/14/19   Adult Transthoracic Echo Complete W/ Cont if Necessary Per Protocol    Narrative · Left ventricular wall thickness is consistent with mild concentric   hypertrophy.     Technically adequate study   1) Mild LVH with normal LV systolic function ( EF is over 55%)  2) Moderate bi atrial enlargement with mild elevation in LVedp   3) Calcified posterior leaflet of the mitral valve with mild MR   4) Sigmoid septum with dynamic narrowing of the aortic outflow in   midsystolic - no gradient noted   5) Aortic sclerosis without stenosis   6) Mild TR with PAsp of 40 mm of hg  7) Mild Dilation of the RV with normal function          Condition on Discharge:      Stable.    Code status during the hospital stay:    Code Status and Medical Interventions:   Ordered at: 11/16/20 0356     Code Status:    CPR     Medical Interventions (Level of Support Prior to Arrest):    Full       Discharge Disposition:    Home or Self Care    Discharge Medications:       Discharge Medications      New Medications      Instructions Start Date   azithromycin 500 MG tablet  Commonly known as: Zithromax   500 mg, Oral, Daily      cefdinir 300 MG capsule  Commonly known as: OMNICEF   300 mg, Oral, 2 Times Daily         Continue These Medications      Instructions Start Date   acarbose 25 MG tablet  Commonly known as: PRECOSE   TAKE 1 TABLET BY MOUTH 3  TIMES A DAY AT THE START OF EACH MAIN MEAL.      ARIPiprazole 20 MG tablet  Commonly known as: ABILIFY   30 mg, Oral, Daily      B-D UF III MINI PEN NEEDLES 31G X 5 MM misc  Generic drug: Insulin Pen Needle   USE 3 TIMES A DAY      carvedilol 12.5 MG tablet  Commonly known as: COREG   TAKE 1 TABLET BY ORAL ROUTE 2 TIMES EVERY DAY WITH FOOD      divalproex 500 MG DR tablet  Commonly known as: DEPAKOTE   500 mg, Oral, 3 Times Daily      divalproex 500 MG 24 hr tablet  Commonly known as: DEPAKOTE   TAKE 2 TABLETS BY MOUTH TWICE A DAY      furosemide 20 MG tablet  Commonly known as: LASIX   20 mg, Oral, Daily PRN      gabapentin 300 MG capsule  Commonly known as: NEURONTIN   2 Times Daily      Lantus SoloStar 100 UNIT/ML injection pen  Generic drug: Insulin Glargine   40 Units, Subcutaneous, Nightly      metFORMIN 500 MG tablet  Commonly known as: GLUCOPHAGE   TAKE 2 TABLETS TWICE A DAY WITH MORNING AND EVENING MEALS      ONE TOUCH ULTRA TEST test strip  Generic drug: glucose blood   TEST 2 TIMES A DAY      OneTouch Delica Lancets 33G misc   TEST TWICE A DAY      simvastatin 40 MG tablet  Commonly known as: ZOCOR   40 mg, Oral, Nightly             Discharge Diet:         Activity at Discharge:         Follow-up Appointments:    Additional Instructions for the Follow-ups that You Need to Schedule     Discharge Follow-up with PCP   As directed       Currently Documented PCP:    Francoise Bailey MD    PCP Phone Number:    [phone]     Follow Up Details: 1-2 weeks           Follow-up Information     Francoise Bailey MD Follow up on 12/1/2020.    Specialty: Family Medicine  Why: @10am  Contact information:  [address] [place] [address]  [phone]             Francoise Bailey MD .    Specialty: Family Medicine  Contact information:  [address] [place] [id number]  [phone]                   No future appointments.    Additional Instructions for the Follow-ups that You Need to Schedule     Discharge  Follow-up with PCP   As directed       Currently Documented PCP:    Francoise Bailey MD    PCP Phone Number:    [phone]     Follow Up Details: 1-2 weeks               Test Results Pending at Discharge:    Pending Labs     Order Current Status    Green Top (No Gel) In process    [place] Draw In process    Blood Culture - Blood, Arm, Right Preliminary result    Blood Culture - Blood, Hand, Left Preliminary result             Ras Astudillo DO  11/18/20  16:36 EST    Time spent: Time: I spent  >30  minutes on this discharge activity which included: face-to-face encounter with the patient, reviewing the data in the system, coordination of the care with the nursing staff as well as consultants, documentation, and entering orders.        Dictated utilizing Dragon dictation.

## 2020-11-18 NOTE — PROGRESS NOTES
Case Management Discharge Note              Selected Continued Care - Discharged on 11/17/2020 Admission date: 11/15/2020 - Discharge disposition: Home or Self Care     Transportation Services  Private: Car    Final Discharge Disposition Code: 01 - home or self-care

## 2020-11-20 LAB
BACTERIA SPEC AEROBE CULT: NORMAL
BACTERIA SPEC AEROBE CULT: NORMAL

## 2020-11-23 ENCOUNTER — READMISSION MANAGEMENT (OUTPATIENT)
Dept: CALL CENTER | Facility: HOSPITAL | Age: 60
End: 2020-11-23

## 2020-11-23 NOTE — OUTREACH NOTE
COPD/PN Week 1 Survey      Responses   [facility] patient discharged from?  [facility]   Does the patient have one of the following disease processes/diagnoses(primary or secondary)?  COPD/Pneumonia   Was the primary reason for admission:  Pneumonia   Week 1 attempt successful?  Yes   Rescheduled  Rescheduled-pt requested   Discharge diagnosis  Acute respiratory failure with hypoxemia, pneumonia of LLL   Person spoke with today (if not patient) and relationship  niece, Beverly Martinez RN

## 2020-12-01 ENCOUNTER — READMISSION MANAGEMENT (OUTPATIENT)
Dept: CALL CENTER | Facility: HOSPITAL | Age: 60
End: 2020-12-01

## 2020-12-01 NOTE — OUTREACH NOTE
COPD/PN Week 2 Survey      Responses   [facility] patient discharged from?  [facility]   Does the patient have one of the following disease processes/diagnoses(primary or secondary)?  COPD/Pneumonia   Was the primary reason for admission:  Pneumonia   Week 2 attempt successful?  No   Unsuccessful attempts  Attempt 1          Marcela Alva RN

## 2020-12-03 ENCOUNTER — READMISSION MANAGEMENT (OUTPATIENT)
Dept: CALL CENTER | Facility: HOSPITAL | Age: 60
End: 2020-12-03

## 2020-12-03 NOTE — OUTREACH NOTE
COPD/PN Week 2 Survey      Responses   [facility] patient discharged from?  [facility]   Does the patient have one of the following disease processes/diagnoses(primary or secondary)?  COPD/Pneumonia   Was the primary reason for admission:  Pneumonia   Week 2 attempt successful?  No   Unsuccessful attempts  Attempt 2          Naima Cheek RN

## 2020-12-10 ENCOUNTER — READMISSION MANAGEMENT (OUTPATIENT)
Dept: CALL CENTER | Facility: HOSPITAL | Age: 60
End: 2020-12-10

## 2020-12-10 NOTE — OUTREACH NOTE
COPD/PN Week 3 Survey      Responses   [facility] patient discharged from?  [facility]   Does the patient have one of the following disease processes/diagnoses(primary or secondary)?  COPD/Pneumonia   Was the primary reason for admission:  Pneumonia   Week 3 attempt successful?  No   Unsuccessful attempts  Attempt 1          Naima Cheek RN

## 2020-12-14 ENCOUNTER — READMISSION MANAGEMENT (OUTPATIENT)
Dept: CALL CENTER | Facility: HOSPITAL | Age: 60
End: 2020-12-14

## 2020-12-14 NOTE — OUTREACH NOTE
COPD/PN Week 3 Survey      Responses   [facility] patient discharged from?  [facility]   Does the patient have one of the following disease processes/diagnoses(primary or secondary)?  COPD/Pneumonia   Was the primary reason for admission:  Pneumonia   Call end time  1327   Week 3 call completed?  Yes   Revoked  No further contact(revokes)-requires comment   Is the patient interested in additional calls from an ambulatory ?  NOTE:  applies to high risk patients requiring additional follow-up.  Yes   Graduated/Revoked comments  No answer to reschedule and 2 x NA          Era Rivero RN

## 2020-12-18 ENCOUNTER — EPISODE CHANGES (OUTPATIENT)
Dept: CASE MANAGEMENT | Facility: OTHER | Age: 60
End: 2020-12-18

## 2021-03-05 ENCOUNTER — APPOINTMENT (OUTPATIENT)
Dept: GENERAL RADIOLOGY | Facility: HOSPITAL | Age: 61
End: 2021-03-05

## 2021-03-05 ENCOUNTER — APPOINTMENT (OUTPATIENT)
Dept: CT IMAGING | Facility: HOSPITAL | Age: 61
End: 2021-03-05

## 2021-03-05 ENCOUNTER — HOSPITAL ENCOUNTER (EMERGENCY)
Facility: HOSPITAL | Age: 61
Discharge: HOME | End: 2021-03-05
Attending: EMERGENCY MEDICINE | Admitting: EMERGENCY MEDICINE

## 2021-03-05 VITALS
WEIGHT: 185 LBS | DIASTOLIC BLOOD PRESSURE: 81 MMHG | TEMPERATURE: 97.9 F | SYSTOLIC BLOOD PRESSURE: 164 MMHG | BODY MASS INDEX: 28.13 KG/M2 | HEART RATE: 67 BPM | RESPIRATION RATE: 20 BRPM | OXYGEN SATURATION: 98 %

## 2021-03-05 DIAGNOSIS — R77.8 ELEVATED TROPONIN: ICD-10-CM

## 2021-03-05 DIAGNOSIS — M25.561 PAIN IN BOTH KNEES, UNSPECIFIED CHRONICITY: ICD-10-CM

## 2021-03-05 DIAGNOSIS — L03.90 CELLULITIS, UNSPECIFIED CELLULITIS SITE: ICD-10-CM
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
## 2021-03-05 DIAGNOSIS — M25.562 PAIN IN BOTH KNEES, UNSPECIFIED CHRONICITY: ICD-10-CM

## 2021-03-05 LAB
A-A DO2: 22.1 MMHG
ALBUMIN SERPL-MCNC: 3.8 G/DL (ref 3.5–5.2)
ALBUMIN/GLOB SERPL: 1.3 G/DL
ALP SERPL-CCNC: 98 U/L (ref 39–117)
ALT SERPL W P-5'-P-CCNC: 11 U/L (ref 1–33)
ANION GAP SERPL CALCULATED.3IONS-SCNC: 7 MMOL/L (ref 5–15)
ARTERIAL PATENCY WRIST A: POSITIVE
AST SERPL-CCNC: 18 U/L (ref 1–32)
ATMOSPHERIC PRESS: 740 MMHG
BACTERIA UR QL AUTO: ABNORMAL /HPF
BASE EXCESS BLDA CALC-SCNC: 7.1 MMOL/L (ref 0–2)
BASOPHILS # BLD AUTO: 0.03 10*3/MM3 (ref 0–0.2)
BASOPHILS NFR BLD AUTO: 0.4 % (ref 0–1.5)
BDY SITE: ABNORMAL
BILIRUB SERPL-MCNC: 0.4 MG/DL (ref 0–1.2)
BILIRUB UR QL STRIP: NEGATIVE
BUN SERPL-MCNC: 9 MG/DL (ref 8–23)
BUN/CREAT SERPL: 19.6 (ref 7–25)
CALCIUM SPEC-SCNC: 8.7 MG/DL (ref 8.6–10.5)
CHLORIDE SERPL-SCNC: 95 MMOL/L (ref 98–107)
CLARITY UR: CLEAR
CO2 SERPL-SCNC: 30 MMOL/L (ref 22–29)
COHGB MFR BLD: 1.3 % (ref 0–2)
COLOR UR: YELLOW
CREAT SERPL-MCNC: 0.46 MG/DL (ref 0.57–1)
DEPRECATED RDW RBC AUTO: 46.5 FL (ref 37–54)
EOSINOPHIL # BLD AUTO: 0.14 10*3/MM3 (ref 0–0.4)
EOSINOPHIL NFR BLD AUTO: 1.7 % (ref 0.3–6.2)
ERYTHROCYTE [DISTWIDTH] IN BLOOD BY AUTOMATED COUNT: 15.7 % (ref 12.3–15.4)
GFR SERPL CREATININE-BSD FRML MDRD: 139 ML/MIN/1.73
GLOBULIN UR ELPH-MCNC: 2.9 GM/DL
GLUCOSE SERPL-MCNC: 87 MG/DL (ref 65–99)
GLUCOSE UR STRIP-MCNC: NEGATIVE MG/DL
HCO3 BLDA-SCNC: 31.4 MMOL/L (ref 22–28)
HCT VFR BLD AUTO: 45.5 % (ref 34–46.6)
HCT VFR BLD CALC: 43.8 %
HGB BLD-MCNC: 14.7 G/DL (ref 12–15.9)
HGB UR QL STRIP.AUTO: ABNORMAL
HYALINE CASTS UR QL AUTO: ABNORMAL /LPF
IMM GRANULOCYTES # BLD AUTO: 0.04 10*3/MM3 (ref 0–0.05)
IMM GRANULOCYTES NFR BLD AUTO: 0.5 % (ref 0–0.5)
INHALED O2 CONCENTRATION: 21 %
KETONES UR QL STRIP: NEGATIVE
LEUKOCYTE ESTERASE UR QL STRIP.AUTO: NEGATIVE
LYMPHOCYTES # BLD AUTO: 0.79 10*3/MM3 (ref 0.7–3.1)
LYMPHOCYTES NFR BLD AUTO: 9.7 % (ref 19.6–45.3)
MCH RBC QN AUTO: 26.3 PG (ref 26.6–33)
MCHC RBC AUTO-ENTMCNC: 32.3 G/DL (ref 31.5–35.7)
MCV RBC AUTO: 81.5 FL (ref 79–97)
METHGB BLD QL: 0.9 % (ref 0–1.5)
MODALITY: ABNORMAL
MONOCYTES # BLD AUTO: 0.63 10*3/MM3 (ref 0.1–0.9)
MONOCYTES NFR BLD AUTO: 7.7 % (ref 5–12)
NEUTROPHILS NFR BLD AUTO: 6.55 10*3/MM3 (ref 1.7–7)
NEUTROPHILS NFR BLD AUTO: 80 % (ref 42.7–76)
NITRITE UR QL STRIP: NEGATIVE
NOTE: ABNORMAL
NRBC BLD AUTO-RTO: 0 /100 WBC (ref 0–0.2)
OXYHGB MFR BLDV: 93.7 % (ref 94–99)
PCO2 BLDA: 42.4 MM HG (ref 35–45)
PCO2 TEMP ADJ BLD: ABNORMAL MM[HG]
PH BLDA: 7.48 PH UNITS (ref 7.3–7.5)
PH UR STRIP.AUTO: 7 [PH] (ref 5–8)
PH, TEMP CORRECTED: ABNORMAL
PLATELET # BLD AUTO: 236 10*3/MM3 (ref 140–450)
PMV BLD AUTO: 8.9 FL (ref 6–12)
PO2 BLDA: 75.6 MM HG (ref 75–100)
PO2 TEMP ADJ BLD: ABNORMAL MM[HG]
POTASSIUM SERPL-SCNC: 4.2 MMOL/L (ref 3.5–5.2)
PROCALCITONIN SERPL-MCNC: 0.05 NG/ML (ref 0–0.25)
PROT SERPL-MCNC: 6.7 G/DL (ref 6–8.5)
PROT UR QL STRIP: ABNORMAL
RBC # BLD AUTO: 5.58 10*6/MM3 (ref 3.77–5.28)
RBC # UR: ABNORMAL /HPF
REF LAB TEST METHOD: ABNORMAL
SAO2 % BLDCOA: 95.8 % (ref 94–100)
SODIUM SERPL-SCNC: 132 MMOL/L (ref 136–145)
SP GR UR STRIP: 1.01 (ref 1–1.03)
SQUAMOUS #/AREA URNS HPF: ABNORMAL /HPF
TROPONIN T SERPL-MCNC: 0.06 NG/ML (ref 0–0.03)
TROPONIN T SERPL-MCNC: 0.06 NG/ML (ref 0–0.03)
UROBILINOGEN UR QL STRIP: ABNORMAL
VENTILATOR MODE: ABNORMAL
WBC # BLD AUTO: 8.18 10*3/MM3 (ref 3.4–10.8)
WBC UR QL AUTO: ABNORMAL /HPF

## 2021-03-05 PROCEDURE — 84484 ASSAY OF TROPONIN QUANT: CPT | Performed by: EMERGENCY MEDICINE

## 2021-03-05 PROCEDURE — 99283 EMERGENCY DEPT VISIT LOW MDM: CPT

## 2021-03-05 PROCEDURE — 73560 X-RAY EXAM OF KNEE 1 OR 2: CPT

## 2021-03-05 PROCEDURE — 93005 ELECTROCARDIOGRAM TRACING: CPT | Performed by: EMERGENCY MEDICINE

## 2021-03-05 PROCEDURE — 81001 URINALYSIS AUTO W/SCOPE: CPT | Performed by: EMERGENCY MEDICINE

## 2021-03-05 PROCEDURE — 80053 COMPREHEN METABOLIC PANEL: CPT | Performed by: EMERGENCY MEDICINE

## 2021-03-05 PROCEDURE — 82805 BLOOD GASES W/O2 SATURATION: CPT

## 2021-03-05 PROCEDURE — 85025 COMPLETE CBC W/AUTO DIFF WBC: CPT | Performed by: EMERGENCY MEDICINE

## 2021-03-05 PROCEDURE — 70450 CT HEAD/BRAIN W/O DYE: CPT

## 2021-03-05 PROCEDURE — 36600 WITHDRAWAL OF ARTERIAL BLOOD: CPT

## 2021-03-05 PROCEDURE — 84145 PROCALCITONIN (PCT): CPT | Performed by: EMERGENCY MEDICINE

## 2021-03-05 PROCEDURE — 71045 X-RAY EXAM CHEST 1 VIEW: CPT

## 2021-03-05 PROCEDURE — 82375 ASSAY CARBOXYHB QUANT: CPT

## 2021-03-05 PROCEDURE — 83050 HGB METHEMOGLOBIN QUAN: CPT

## 2021-03-05 RX ORDER — CEPHALEXIN 500 MG/1
500 CAPSULE ORAL 4 TIMES DAILY
Qty: 28 CAPSULE | Refills: 0 | Status: SHIPPED | OUTPATIENT
Start: 2021-03-05 | End: 2021-03-12

## 2021-03-05 NOTE — ED PROVIDER NOTES
TRIAGE CHIEF COMPLAINT:     Nursing and triage notes reviewed    Chief Complaint   Patient presents with   • Fall      HPI: Izabella Agudelo is a 61 y.o. female who presents to the emergency department complaining of a fall and leg pain.  Patient reportedly at home and fell onto her knee causing some discomfort.  Patient had trouble getting up on her own.  Family called EMS.  They state patient is at her current baseline, she has a history of schizophrenia and is a poor historian.  EMS also noted patient had redness in both her lower extremities.  Patient appears well and not uncomfortable on arrival in the emergency department.  History is somewhat limited given patient is a poor historian however it does not appear that she has been ill recently with no obvious fevers or chills.  She denies any chest pain or coughing or shortness of breath.    REVIEW OF SYSTEMS: All other systems reviewed and are negative     PAST MEDICAL HISTORY:   Past Medical History:   Diagnosis Date   • Cardiac abnormality    • CHF (congestive heart failure) (CMS/[facility])    • COPD (chronic obstructive pulmonary disease) (CMS/[facility])    • Diabetes (CMS/[facility])    • Glaucoma    • Schizophrenia, chronic condition (CMS/[facility])         FAMILY HISTORY:   Family History   Problem Relation Age of Onset   • Diabetes Other    • Glaucoma Other         SOCIAL HISTORY:   Social History     Socioeconomic History   • Marital status: Single     Spouse name: Not on file   • Number of children: Not on file   • Years of education: Not on file   • Highest education level: Not on file   Tobacco Use   • Smoking status: Never Smoker   • Smokeless tobacco: Never Used   Substance and Sexual Activity   • Alcohol use: No   • Drug use: No   • Sexual activity: Defer        SURGICAL HISTORY:   History reviewed. No pertinent surgical history.     CURRENT MEDICATIONS:      Medication List      START taking these medications    cephalexin 500 MG capsule  Commonly known as:  KEFLEX  Take 1 capsule by mouth 4 (Four) Times a Day for 7 days.        CONTINUE taking these medications    acarbose 25 MG tablet  Commonly known as: PRECOSE     ARIPiprazole 20 MG tablet  Commonly known as: ABILIFY  Take 1.5 tablets by mouth Daily.     azithromycin 500 MG tablet  Commonly known as: Zithromax  Take 1 tablet by mouth Daily.     B-D UF III MINI PEN NEEDLES 31G X 5 MM misc  Generic drug: Insulin Pen Needle     carvedilol 12.5 MG tablet  Commonly known as: COREG     * divalproex 500 MG DR tablet  Commonly known as: DEPAKOTE     * divalproex 500 MG 24 hr tablet  Commonly known as: DEPAKOTE ER     furosemide 20 MG tablet  Commonly known as: LASIX     gabapentin 300 MG capsule  Commonly known as: NEURONTIN     Lantus SoloStar 100 UNIT/ML injection pen  Generic drug: Insulin Glargine  Inject 40 Units under the skin into the appropriate area as directed Every Night.     metFORMIN 500 MG tablet  Commonly known as: GLUCOPHAGE     ONE TOUCH ULTRA TEST test strip  Generic drug: glucose blood     OneTouch Delica Lancets 33G misc     simvastatin 40 MG tablet  Commonly known as: ZOCOR         * This list has 2 medication(s) that are the same as other medications prescribed for you. Read the directions carefully, and ask your doctor or other care provider to review them with you.               Where to Get Your Medications      These medications were sent to [facility]/pharmacy #[id number] - [place], [place] - [address] [place] [address] - [phone]  - [phone]   [address] [facility] [address]    Phone: [phone]   · cephalexin 500 MG capsule          ALLERGIES: Penicillins     PHYSICAL EXAM:   VITAL SIGNS:   Vitals:    03/05/21 1432   BP:    Pulse:    Resp:    Temp:    SpO2: 98%      CONSTITUTIONAL: Awake, appears non-toxic   HENT: Atraumatic, normocephalic, oral mucosa pink and moist, airway patent. Nares patent without drainage. External ears normal.   EYES: Conjunctiva clear  NECK: Trachea midline   CARDIOVASCULAR: Normal  heart rate, Normal rhythm, No murmurs, rubs, gallops   PULMONARY/CHEST: Clear to auscultation, no rhonchi, wheezes, or rales. Symmetrical breath sounds.  ABDOMINAL: Non-distended, soft, non-tender - no rebound or guarding.  NEUROLOGIC: Non-focal, moving all four extremities, no gross sensory or motor deficits.   EXTREMITIES: No clubbing, cyanosis.  No obvious tenderness at the knees.  There is mild erythema with warmth in the bilateral feet, ankles, up to the mid shin.  Distal pulses are intact.  SKIN: Warm, Dry, No erythema, No rash     ED COURSE / MEDICAL DECISION MAKING:   Izabella Agudelo is a 61 y.o. female who presents to the emergency department for evaluation of a fall and leg pain.  Patient nondistressed on arrival in the emergency department.  Vital signs are stable.  Physical exam does reveal some red warm skin on the ankles and feet to the mid shin bilaterally.  Exam otherwise is largely unremarkable.  Given patient is a very poor historian will obtain some labs and x-rays for further evaluation of her symptoms.        DECISION TO DISCHARGE/ADMIT: see ED care timeline     FINAL IMPRESSION:   1 -- fall   2 -- bilateral lower extremity cellulitis  3 --nonspecific elevated troponin    Electronically signed by: Luh Sampson MD, 3/6/2021 07:24 JELENA Nunes DO  I received this patient on signout from Dr. Sampson.  Patient had presented to the ED status post fall with knee pain.  Physical exam did show some slight speech abnormality.  Patient also had nonsevere bilateral lower extremity cellulitis.  Work-up showed slightly elevated troponin but it did not increase and the patient denied any chest pain.  I did obtain a CT of her head after initial evaluation secondary to the speech abnormalities and family thought that her speech may be off.  I was then able to contact the patient's sister who cares for her and she does indicate that her speech is normal.  She talked to her on the phone  and felt that she was at her baseline.  I do not believe that she has had an acute CVA event based on the discussion with the patient's sister.  CT of her head was negative.  Further imaging was negative for acute process.  Feel that she is appropriate for discharge with close follow-up.  Patient is agreeable to this plan.     Andres Nunes, DO  03/06/21 0779

## 2021-03-11 ENCOUNTER — NURSING HOME (OUTPATIENT)
Dept: FAMILY MEDICINE CLINIC | Facility: CLINIC | Age: 61
End: 2021-03-11

## 2021-03-11 VITALS
RESPIRATION RATE: 18 BRPM | DIASTOLIC BLOOD PRESSURE: 63 MMHG | OXYGEN SATURATION: 96 % | SYSTOLIC BLOOD PRESSURE: 114 MMHG | TEMPERATURE: 97.9 F | BODY MASS INDEX: 26.46 KG/M2 | WEIGHT: 174 LBS | HEART RATE: 66 BPM

## 2021-03-11 DIAGNOSIS — F20.9 SCHIZOPHRENIA, CHRONIC CONDITION (HCC): ICD-10-CM

## 2021-03-11 DIAGNOSIS — I63.9 ACUTE ISCHEMIC STROKE (HCC): Primary | ICD-10-CM

## 2021-03-11 DIAGNOSIS — Z78.9 IMPAIRED MOBILITY AND ADLS: ICD-10-CM

## 2021-03-11 DIAGNOSIS — Z74.09 IMPAIRED MOBILITY AND ADLS: ICD-10-CM

## 2021-03-11 DIAGNOSIS — Z79.4 TYPE 2 DIABETES MELLITUS WITH COMPLICATION, WITH LONG-TERM CURRENT USE OF INSULIN (HCC): ICD-10-CM

## 2021-03-11 DIAGNOSIS — I10 ESSENTIAL HYPERTENSION: ICD-10-CM

## 2021-03-11 DIAGNOSIS — I48.20 CHRONIC ATRIAL FIBRILLATION (HCC): ICD-10-CM

## 2021-03-11 DIAGNOSIS — Z79.01 ANTICOAGULATED: ICD-10-CM

## 2021-03-11 DIAGNOSIS — E11.8 TYPE 2 DIABETES MELLITUS WITH COMPLICATION, WITH LONG-TERM CURRENT USE OF INSULIN (HCC): ICD-10-CM

## 2021-03-11 PROCEDURE — 99310 SBSQ NF CARE HIGH MDM 45: CPT | Performed by: NURSE PRACTITIONER

## 2021-03-12 ENCOUNTER — NURSING HOME (OUTPATIENT)
Dept: FAMILY MEDICINE CLINIC | Facility: CLINIC | Age: 61
End: 2021-03-12

## 2021-03-12 VITALS
DIASTOLIC BLOOD PRESSURE: 90 MMHG | BODY MASS INDEX: 26.49 KG/M2 | WEIGHT: 174.25 LBS | RESPIRATION RATE: 18 BRPM | SYSTOLIC BLOOD PRESSURE: 159 MMHG | OXYGEN SATURATION: 94 % | HEART RATE: 68 BPM | TEMPERATURE: 97.9 F

## 2021-03-12 DIAGNOSIS — Z79.4 TYPE 2 DIABETES MELLITUS WITH DIABETIC NEUROPATHY, WITH LONG-TERM CURRENT USE OF INSULIN (HCC): ICD-10-CM

## 2021-03-12 DIAGNOSIS — Z78.9 IMPAIRED MOBILITY AND ADLS: ICD-10-CM

## 2021-03-12 DIAGNOSIS — R19.7 DIARRHEA, UNSPECIFIED TYPE: ICD-10-CM

## 2021-03-12 DIAGNOSIS — Z74.09 IMPAIRED MOBILITY AND ADLS: ICD-10-CM

## 2021-03-12 DIAGNOSIS — R11.10 NON-INTRACTABLE VOMITING, PRESENCE OF NAUSEA NOT SPECIFIED, UNSPECIFIED VOMITING TYPE: Primary | ICD-10-CM

## 2021-03-12 DIAGNOSIS — E11.40 TYPE 2 DIABETES MELLITUS WITH DIABETIC NEUROPATHY, WITH LONG-TERM CURRENT USE OF INSULIN (HCC): ICD-10-CM

## 2021-03-12 PROCEDURE — 99309 SBSQ NF CARE MODERATE MDM 30: CPT | Performed by: NURSE PRACTITIONER

## 2021-03-12 RX ORDER — GABAPENTIN 300 MG/1
300 CAPSULE ORAL 2 TIMES DAILY
Qty: 60 CAPSULE | Refills: 2 | Status: SHIPPED | OUTPATIENT
Start: 2021-03-12 | End: 2021-09-07 | Stop reason: SDUPTHER

## 2021-03-14 PROBLEM — Z78.9 IMPAIRED MOBILITY AND ADLS: Status: ACTIVE | Noted: 2021-03-14

## 2021-03-14 PROBLEM — J18.9 PNEUMONIA OF LEFT LOWER LOBE DUE TO INFECTIOUS ORGANISM: Status: RESOLVED | Noted: 2019-01-14 | Resolved: 2021-03-14

## 2021-03-14 PROBLEM — Z74.09 IMPAIRED MOBILITY AND ADLS: Status: ACTIVE | Noted: 2021-03-14

## 2021-03-14 PROBLEM — J96.01 ACUTE RESPIRATORY FAILURE WITH HYPOXIA: Status: RESOLVED | Noted: 2019-01-14 | Resolved: 2021-03-14

## 2021-03-14 PROBLEM — E16.2 HYPOGLYCEMIA: Status: RESOLVED | Noted: 2019-01-14 | Resolved: 2021-03-14

## 2021-03-14 PROBLEM — J96.01 ACUTE RESPIRATORY FAILURE WITH HYPOXEMIA (HCC): Status: RESOLVED | Noted: 2020-11-16 | Resolved: 2021-03-14

## 2021-03-14 PROBLEM — G93.41 ACUTE METABOLIC ENCEPHALOPATHY: Status: RESOLVED | Noted: 2019-01-14 | Resolved: 2021-03-14

## 2021-03-15 NOTE — PROGRESS NOTES
Nursing Home Follow Up Note      Fabian Santos DO []   [facility] [x]  [address] [place] [address]  Phone: [phone]  Fax: [phone] Yohan Cuellar MD []    Chaz Mandel DO []   [address] [place] [address]  Phone: [phone]  Fax: [phone]     PATIENT NAME: Izabella Agudelo                                                                          YOB: 1960           DATE OF SERVICE: 03/11/2021  FACILITY:  [][facility]   [] [facility]   [x] [facility]   [] [facility]   [] Other ______________________________________________________________________      CHIEF COMPLAINT:    Medication and records review      HISTORY OF PRESENT ILLNESS:     Patient is a 60 yo female recently admitted to facility from [facility]  for rehabilitation and strengthening s/p an acute ischemic stroke. She has known history of Afib and was not on an anticoagulant. Stroke was believed to be cardioembolic. She was started on ASA, Lipitor and Apixaban. Here Coreg was stopped due to QT prolongation, and she was started on Lisinopril. She was admitted to facility for rehabilitation r/t physical deconditioning. She is A&O x 1 but according to family this is her baseline, related to her history of severe Schizophrenia. She requires assistance with most ADLs. She is resting in bed today without complaints and no s/s of any distress.     She has history of HTN and DM which will be managed during admission to facility. Her blood sugars have been slightly elevated during her first couple days of admission, so will continue to monitor.     PAST MEDICAL & SURGICAL HISTORY:   Past Medical History:   Diagnosis Date   • Cardiac abnormality    • CHF (congestive heart failure) (CMS/HCC)    • COPD (chronic obstructive pulmonary disease) (CMS/HCC)    • Diabetes (CMS/HCC)    • Glaucoma    • Schizophrenia, chronic condition (CMS/HCC)       No past surgical history on file.      MEDICATIONS:  I have  reviewed and reconciled the patients medication list in the patients chart at the skilled nursing facility today.      ALLERGIES:    Allergies   Allergen Reactions   • Penicillins Rash         SOCIAL HISTORY:    Social History     Socioeconomic History   • Marital status: Single     Spouse name: Not on file   • Number of children: Not on file   • Years of education: Not on file   • Highest education level: Not on file   Tobacco Use   • Smoking status: Never Smoker   • Smokeless tobacco: Never Used   Substance and Sexual Activity   • Alcohol use: No   • Drug use: No   • Sexual activity: Defer       FAMILY HISTORY:    Family History   Problem Relation Age of Onset   • Diabetes Other    • Glaucoma Other        REVIEW OF SYSTEMS:    Review of Systems   Constitutional: Negative for activity change, appetite change, chills, diaphoresis, fatigue, fever, unexpected weight gain and unexpected weight loss.   HENT: Negative for congestion, ear pain, mouth sores, nosebleeds, postnasal drip, rhinorrhea, sinus pressure, sneezing, sore throat, swollen glands and trouble swallowing.    Eyes: Negative for blurred vision, pain, discharge, redness, itching and visual disturbance.   Respiratory: Negative for apnea, cough, choking, chest tightness, shortness of breath, wheezing and stridor.    Cardiovascular: Negative for chest pain, palpitations and leg swelling.   Gastrointestinal: Negative for abdominal distention, abdominal pain, blood in stool, constipation, diarrhea, nausea, vomiting, GERD and indigestion.   Endocrine: Negative for polydipsia and polyuria.   Genitourinary: Negative for decreased urine volume, difficulty urinating, dysuria, flank pain, frequency, hematuria, urgency and urinary incontinence.   Musculoskeletal: Positive for arthralgias. Negative for back pain, gait problem, joint swelling and myalgias.   Skin: Negative for color change, dry skin, rash and skin lesions.   Allergic/Immunologic: Negative for  environmental allergies.   Neurological: Positive for memory problem. Negative for dizziness, seizures, speech difficulty, weakness and confusion.   Psychiatric/Behavioral: Negative for behavioral problems, dysphoric mood, hallucinations, sleep disturbance and depressed mood. The patient is not nervous/anxious.          PHYSICAL EXAMINATION:   VITAL SIGNS:   Vitals:    03/11/21 1308   BP: 114/63   Pulse: 66   Resp: 18   Temp: 97.9 °F (36.6 °C)   SpO2: 96%   Weight: 78.9 kg (174 lb)       Physical Exam  Vitals and nursing note reviewed.   Constitutional:       General: She is not in acute distress.     Appearance: She is well-developed.   HENT:      Head: Normocephalic.      Right Ear: External ear normal.      Left Ear: External ear normal.      Nose: Nose normal.      Mouth/Throat:      Pharynx: No oropharyngeal exudate.   Eyes:      Conjunctiva/sclera: Conjunctivae normal.      Pupils: Pupils are equal, round, and reactive to light.   Neck:      Thyroid: No thyromegaly.      Trachea: No tracheal deviation.   Cardiovascular:      Rate and Rhythm: Normal rate and regular rhythm.      Heart sounds: Normal heart sounds. No murmur.   Pulmonary:      Effort: Pulmonary effort is normal. No respiratory distress.      Breath sounds: Normal breath sounds. No wheezing or rales.   Chest:      Chest wall: No tenderness.   Abdominal:      General: Bowel sounds are normal. There is no distension.      Palpations: Abdomen is soft. There is no splenomegaly or mass.      Tenderness: There is no abdominal tenderness. There is no guarding or rebound.      Hernia: No hernia is present.   Musculoskeletal:         General: No tenderness. Normal range of motion.      Cervical back: Normal range of motion and neck supple.   Lymphadenopathy:      Cervical: No cervical adenopathy.      Right cervical: No superficial, deep or posterior cervical adenopathy.     Left cervical: No superficial, deep or posterior cervical adenopathy.   Skin:      General: Skin is warm and dry.      Findings: No rash.   Neurological:      Mental Status: She is alert. Mental status is at baseline. She is disoriented.      Motor: Weakness present.      Gait: Gait abnormal.   Psychiatric:         Mood and Affect: Affect is flat.         Thought Content: Thought content normal.         Cognition and Memory: Cognition is impaired. Memory is impaired.         RECORDS REVIEW:   I have reviewed and interpreted the discharge summary and medications    ASSESSMENT     Diagnoses and all orders for this visit:    1. Acute ischemic stroke (CMS/HCC) (Primary)    2. Chronic atrial fibrillation (CMS/HCC)    3. Anticoagulated    4. Schizophrenia, chronic condition (CMS/HCC)    5. Type 2 diabetes mellitus with complication, with long-term current use of insulin (CMS/[facility])    6. Essential hypertension    7. Impaired mobility and ADLs        PLAN     Patient is a 60 yo female recently admitted to facility from [facility]  for rehabilitation and strengthening s/p an acute ischemic stroke. She has known history of Afib and was not on an anticoagulant. Stroke was believed to be cardioembolic. She was started on ASA, Lipitor and Apixaban. Here Coreg was stopped due to QT prolongation, and she was started on Lisinopril. She was admitted to facility for rehabilitation r/t physical deconditioning. She is A&O x 1 but according to family this is her baseline, related to her history of severe Schizophrenia. She requires assistance with most ADLs. She is resting in bed today without complaints and no s/s of any distress. She has follow up appointment on 5/12 with Stroke clinic.     She has history of HTN and DM which will be managed during admission to facility. Her blood sugars have been slightly elevated during her first couple days of admission, so will continue to monitor. She is on Metformin and mealtime insulin. Will adjust accordingly.    PT and nursing to continue supportive care for all ADLs.    Nursing  encouraged to keep me informed of any acute changes, lack of improvement, or any new concerning symptoms.    [x]  Discussed Patient in detail with nursing/staff, addressed all needs today.     [x]  Plan of Care Reviewed   [x]  PT/OT Reviewed   []  Order Changes  []  Discharge Plans Reviewed  [x]  Advance Directive on file with Nursing Home.   [x]  POA on file with Nursing Home.   [x]  Code Status listed: [x]  Full Code   []  DNR       I spent 40 minutes caring for Ms Agudelo on this date of service. This time includes time spent by me in the following activities:preparing for the visit, performing a medically appropriate examination and/or evaluation , counseling and educating the patient/family/caregiver, ordering medications, tests, or procedures, documenting information in the medical record and care coordination                  Fariha Cardona, [facility].

## 2021-03-16 NOTE — PROGRESS NOTES
Nursing Home Follow Up Note      Fabian Santos DO []   [facility] [x]  [address] [place] [address]  Phone: [phone]  Fax: [phone] Yohan Cuellar MD []    Chaz Mandel DO []   [address] [place] [address]  Phone: [phone]  Fax: [phone]     PATIENT NAME: Izabella Agudelo                                                                          YOB: 1960           DATE OF SERVICE: 03/12/2021  FACILITY:  [][place]   [] [place]   [x] [facility]   [] [facility]   [] Other ______________________________________________________________________      CHIEF COMPLAINT:    Vomiting and diarrhea      HISTORY OF PRESENT ILLNESS:     Patient has had several episodes of vomiting and diarrhea this am, after eating breakfast. She was given Zofran earlier and is resting in bed at this time. Several other residents in the facility have had some diarrhea and vomiting this morning as well, so it is most likely something with breakfast, or viral.     Nursing also requesting prescription or patient's Gabapentin that is used to treat her DPN.    PAST MEDICAL & SURGICAL HISTORY:   Past Medical History:   Diagnosis Date   • Cardiac abnormality    • CHF (congestive heart failure) (CMS/[facility])    • COPD (chronic obstructive pulmonary disease) (CMS/[facility])    • Diabetes (CMS/[facility])    • Glaucoma    • Schizophrenia, chronic condition (CMS/[facility])       No past surgical history on file.      MEDICATIONS:  I have reviewed and reconciled the patients medication list in the patients chart at the skilled nursing facility today.      ALLERGIES:    Allergies   Allergen Reactions   • Penicillins Rash         SOCIAL HISTORY:    Social History     Socioeconomic History   • Marital status: Single     Spouse name: Not on file   • Number of children: Not on file   • Years of education: Not on file   • Highest education level: Not on file   Tobacco Use   • Smoking status: Never Smoker   •  Smokeless tobacco: Never Used   Substance and Sexual Activity   • Alcohol use: No   • Drug use: No   • Sexual activity: Defer       FAMILY HISTORY:    Family History   Problem Relation Age of Onset   • Diabetes Other    • Glaucoma Other        REVIEW OF SYSTEMS:    Review of Systems   Constitutional: Negative for activity change, appetite change, chills, diaphoresis, fatigue, fever, unexpected weight gain and unexpected weight loss.   HENT: Negative for congestion, ear pain, mouth sores, nosebleeds, postnasal drip, rhinorrhea, sinus pressure, sneezing, sore throat, swollen glands and trouble swallowing.    Eyes: Negative for blurred vision, pain, discharge, redness, itching and visual disturbance.   Respiratory: Negative for apnea, cough, choking, chest tightness, shortness of breath, wheezing and stridor.    Cardiovascular: Negative for chest pain, palpitations and leg swelling.   Gastrointestinal: Positive for diarrhea and vomiting. Negative for abdominal distention, abdominal pain, blood in stool, constipation, nausea, GERD and indigestion.   Endocrine: Negative for polydipsia and polyuria.   Genitourinary: Positive for urinary incontinence. Negative for decreased urine volume, difficulty urinating, dysuria, flank pain, frequency, hematuria and urgency.   Musculoskeletal: Positive for arthralgias. Negative for back pain, gait problem, joint swelling and myalgias.   Skin: Negative for color change, dry skin, rash and skin lesions.   Allergic/Immunologic: Negative for environmental allergies.   Neurological: Positive for speech difficulty, weakness, memory problem and confusion. Negative for dizziness and seizures.   Psychiatric/Behavioral: Negative for behavioral problems, dysphoric mood, hallucinations, sleep disturbance and depressed mood. The patient is not nervous/anxious.          PHYSICAL EXAMINATION:   VITAL SIGNS:   Vitals:    03/12/21 1149   BP: 159/90   Pulse: 68   Resp: 18   Temp: 97.9 °F (36.6 °C)      SpO2: 94%   Weight: 79 kg (174 lb 4 oz)       Physical Exam  Vitals and nursing note reviewed.   Constitutional:       General: She is not in acute distress.     Appearance: She is well-developed.   HENT:      Head: Normocephalic.      Right Ear: External ear normal.      Left Ear: External ear normal.      Nose: Nose normal.      Mouth/Throat:      Pharynx: No oropharyngeal exudate.   Eyes:      Conjunctiva/sclera: Conjunctivae normal.      Pupils: Pupils are equal, round, and reactive to light.   Neck:      Thyroid: No thyromegaly.      Trachea: No tracheal deviation.   Cardiovascular:      Rate and Rhythm: Normal rate and regular rhythm.      Heart sounds: Normal heart sounds. No murmur.   Pulmonary:      Effort: Pulmonary effort is normal. No respiratory distress.      Breath sounds: Normal breath sounds. No wheezing or rales.   Chest:      Chest wall: No tenderness.   Abdominal:      General: Bowel sounds are increased. There is no distension.      Palpations: Abdomen is soft. There is no splenomegaly or mass.      Tenderness: There is no abdominal tenderness. There is no guarding or rebound.      Hernia: No hernia is present.   Musculoskeletal:         General: No tenderness. Normal range of motion.      Cervical back: Normal range of motion and neck supple.   Lymphadenopathy:      Cervical: No cervical adenopathy.      Right cervical: No superficial, deep or posterior cervical adenopathy.     Left cervical: No superficial, deep or posterior cervical adenopathy.   Skin:     General: Skin is warm and dry.      Findings: No rash.   Neurological:      Mental Status: She is alert. Mental status is at baseline. She is disoriented.      Motor: Weakness present.      Gait: Gait abnormal.   Psychiatric:         Mood and Affect: Affect is flat.         Thought Content: Thought content normal.         Cognition and Memory: Cognition is impaired. Memory is impaired.         RECORDS REVIEW:   I have reviewed and  interpreted the discharge summary and medications    ASSESSMENT     Diagnoses and all orders for this visit:    1. Non-intractable vomiting, presence of nausea not specified, unspecified vomiting type (Primary)    2. Diarrhea, unspecified type    3. Type 2 diabetes mellitus with diabetic neuropathy, with long-term current use of insulin (CMS/[facility])  -     gabapentin (NEURONTIN) 300 MG capsule; Take 1 capsule by mouth 2 (Two) Times a Day for 30 days.  Dispense: 60 capsule; Refill: 2    4. Impaired mobility and ADLs        PLAN  Diarrhea/vomiting/diarrhea  -Patient with several episodes of vomiting and diarrhea after breakfast this morning. Zofran has helped with this. Nursing to give Zofran ODT 4 mg q 6 hours x 2 days then prn. Get BMP Monday.    Diabetic Neuropathy  -Gabapentin refilled today for treatment. Nursing to give daily as directed. Will follow up and continue to monitor.     Nursing encouraged to keep me informed of any acute changes, lack of improvement, or any new concerning symptoms.    [x]  Discussed Patient in detail with nursing/staff, addressed all needs today.     [x]  Plan of Care Reviewed   [x]  PT/OT Reviewed   []  Order Changes  []  Discharge Plans Reviewed  [x]  Advance Directive on file with Nursing Home.   [x]  POA on file with Nursing Home.   [x]  Code Status listed: [x]  Full Code   []  DNR       “I confirm accuracy of unchanged data/findings which have been carried forward from previous visit, as well as I have updated appropriately those that have changed.”                  Fariha Cardona, APRN.

## 2021-04-01 ENCOUNTER — TELEPHONE (OUTPATIENT)
Dept: FAMILY MEDICINE CLINIC | Facility: CLINIC | Age: 61
End: 2021-04-01

## 2021-04-01 NOTE — TELEPHONE ENCOUNTER
RESIDENTS SISTER (ALBERT LYNN [phone]) IS NEEDING A LETTER FOR EMERGENCY GUARDIANSHIP FOR MEDICAID.  THE FACILITY IS HAVING DIFFICULTY WITH PAYMENT UNLESS MEDICAID RECEIVES STATEMENT STATING THAT THE RESIDENT IS UNABLE TO MAKE DECISIONS FOR HERSELF AND NEEDS A GUARDIAN TO HELP.    SISTER IS IN PROCESS AND AWAITING A COURT DATE FOR GUARDIANSHIP.  NEEDS ASAP

## 2021-04-07 ENCOUNTER — NURSING HOME (OUTPATIENT)
Dept: FAMILY MEDICINE CLINIC | Facility: CLINIC | Age: 61
End: 2021-04-07

## 2021-04-07 VITALS
WEIGHT: 170 LBS | TEMPERATURE: 98.1 F | DIASTOLIC BLOOD PRESSURE: 68 MMHG | BODY MASS INDEX: 25.85 KG/M2 | HEART RATE: 64 BPM | OXYGEN SATURATION: 98 % | RESPIRATION RATE: 18 BRPM | SYSTOLIC BLOOD PRESSURE: 122 MMHG

## 2021-04-07 DIAGNOSIS — I10 ESSENTIAL HYPERTENSION: ICD-10-CM

## 2021-04-07 DIAGNOSIS — F20.0 PARANOID SCHIZOPHRENIA (HCC): ICD-10-CM

## 2021-04-07 DIAGNOSIS — E11.8 TYPE 2 DIABETES MELLITUS WITH COMPLICATION, WITH LONG-TERM CURRENT USE OF INSULIN (HCC): ICD-10-CM

## 2021-04-07 DIAGNOSIS — Z79.4 TYPE 2 DIABETES MELLITUS WITH COMPLICATION, WITH LONG-TERM CURRENT USE OF INSULIN (HCC): ICD-10-CM

## 2021-04-07 DIAGNOSIS — F20.9 SCHIZOPHRENIA, CHRONIC CONDITION (HCC): ICD-10-CM

## 2021-04-07 DIAGNOSIS — Z74.09 IMPAIRED MOBILITY AND ADLS: Primary | ICD-10-CM

## 2021-04-07 DIAGNOSIS — Z78.9 IMPAIRED MOBILITY AND ADLS: Primary | ICD-10-CM

## 2021-04-07 DIAGNOSIS — I63.9 ACUTE ISCHEMIC STROKE (HCC): ICD-10-CM

## 2021-04-07 PROCEDURE — 99306 1ST NF CARE HIGH MDM 50: CPT | Performed by: FAMILY MEDICINE

## 2021-04-13 ENCOUNTER — NURSING HOME (OUTPATIENT)
Dept: FAMILY MEDICINE CLINIC | Facility: CLINIC | Age: 61
End: 2021-04-13

## 2021-04-13 DIAGNOSIS — Z78.9 IMPAIRED MOBILITY AND ADLS: ICD-10-CM

## 2021-04-13 DIAGNOSIS — Z74.09 IMPAIRED MOBILITY AND ADLS: ICD-10-CM

## 2021-04-13 DIAGNOSIS — F91.9 BEHAVIOR DISTURBANCE: ICD-10-CM

## 2021-04-13 DIAGNOSIS — F20.9 SCHIZOPHRENIA, CHRONIC CONDITION (HCC): Primary | ICD-10-CM

## 2021-04-13 PROCEDURE — 99309 SBSQ NF CARE MODERATE MDM 30: CPT | Performed by: NURSE PRACTITIONER

## 2021-04-14 PROBLEM — I63.9 ACUTE ISCHEMIC STROKE: Status: ACTIVE | Noted: 2021-04-14

## 2021-04-14 NOTE — TELEPHONE ENCOUNTER
RESIDENTS DAUGHTER CALLED AGAIN TODAY REGARDING STATEMENT FOR MEDICAID.  WOULD IT BE POSSIBLE FOR NP TO WRITE THE NOTE FOR THIS.  PLEASE ADVISE.

## 2021-04-14 NOTE — PROGRESS NOTES
Nursing Home History/Physical        Fabian DO Danielle [x]   [facility] []  [address] [place] [address]  Phone: [phone]  Fax: [phone] Yohan Cuellar MD []  Chaz Mandel DO []  [address] [place] [address]  Phone: [phone]  Fax: [phone]     PATIENT NAME: Izabella Agudelo                                                                          YOB: 1960           DATE OF SERVICE: 04/07/2021  FACILITY:  []  [place]  []  [place]   [x]  [facility]   [] [facility]    [] Other ______________________________________________________________________     CHIEF COMPLAINT:  Impaired mobility and ADLs/physical deconditioning/chronic schizophrenia, paranoid/chronic atrial fibrillation/status post acute ischemic stroke      HISTORY OF PRESENT ILLNESS:   Patient is a 61-year-old female who is status post acute ischemic stroke, felt to be cardioembolic from underlying known chronic atrial fibrillation.  She was not anticoagulated prior to this event, unknown reasons.  She required hospitalization and subsequent rehabilitative, acute, and subsequently transitioned to this facility due to her chronic comorbid conditions of paranoid schizophrenia and impairment to mobility and ADLs.  This was worsened due to physical deconditioning during her acute ischemic stroke.  She also has known diabetes mellitus and hypertension.    She has been acclimating well to the facility, occasional behaviors initially upon arrival.  Tolerating all p.o. intake.  No reports of any epistaxis, hemoptysis, hematuria or BRB/BTS.  She has a planned follow-up appointment with [facility].  She continues to be receptive/responsive to supportive care for mobilization assistance/transfers and ADLs.    No reports of any cyclical/persistent hypoglycemic episodes.  Vital signs have been stable.    [x]Scheduled visit for coordination of long term/rehabilitative care issues and chronic  medical management needs.    PAST MEDICAL & SURGICAL HISTORY:   Past Medical History:   Diagnosis Date   • Cardiac abnormality    • CHF (congestive heart failure) (CMS/[facility])    • COPD (chronic obstructive pulmonary disease) (CMS/[facility])    • Diabetes (CMS/[facility])    • Glaucoma    • Schizophrenia, chronic condition (CMS/[facility])       No past surgical history on file.      MEDICATIONS:  I have reviewed and reconciled the patients medication list in the patients chart at the skilled nursing facility today.      ALLERGIES:    Allergies   Allergen Reactions   • Penicillins Rash         SOCIAL HISTORY:    Social History     Socioeconomic History   • Marital status: Single     Spouse name: Not on file   • Number of children: Not on file   • Years of education: Not on file   • Highest education level: Not on file   Tobacco Use   • Smoking status: Never Smoker   • Smokeless tobacco: Never Used   Substance and Sexual Activity   • Alcohol use: No   • Drug use: No   • Sexual activity: Defer       FAMILY HISTORY:    Family History   Problem Relation Age of Onset   • Diabetes Other    • Glaucoma Other        REVIEW OF SYSTEMS:    Review of Systems  · Appetite: Fair []   Good [x]   Poor []   Weight Loss []  [x]  Weight Stable   Unavoidable Weight Loss []  Tolerating Tube Feeding []    Supplements Provided []   · Constitutional: Negative for fever, chills, diaphoresis or fatigue and weight change.  Patient is interactive but a poor historian.  · HENT: No dysphagia; no changes to vision/hearing/smell/taste; no epistaxis  · Eyes: Negative for redness and visual disturbance.   · Respiratory: Negative for shortness of breath, chest pain, cough or chest tightness.   · Cardiovascular: Negative for chest pain and palpitations.   · Gastrointestinal: Negative for abdominal distention, abdominal pain and blood in stool.   · Endocrine: Negative for cold intolerance and heat intolerance.   · Genitourinary: Negative for difficulty urinating, dysuria and  frequency.Negative for hematuria   · Musculoskeletal: Chronic myalgias and arthralgias  · Integumentary: No open wounds, rash or concerning skin lesions  · Neurological: Negative for syncope, weakness and headaches.   · Hematological: Negative for adenopathy. Does not bruise/bleed easily.   · Immunological: Negative for reported allergies or immunological disorders  · Psychological: Chronic behavioral issues, no acute changes.    PHYSICAL EXAMINATION:   VITAL SIGNS:   Vitals:    04/07/21 1140   BP: 122/68   Pulse: 64   Resp: 18   Temp: 98.1 °F (36.7 °C)   SpO2: 98%         Physical Exam  General Appearance:  [x]  Alert   [x]  Oriented x person  [x]  No acute distress     [x]  Confused, chronically []  Disoriented   []  Comatose   Head:  Atraumatic and normocephalic, without obvious abnormality.   Eyes:         PERRLA, conjunctivae and sclerae normal, no Icterus. No pallor. Extra-occular movements are within normal limits.   Ears:  Ears appear intact with no abnormalities noted.   Throat: No oral lesions, no thrush, oral mucosa moist.   Neck: Supple, trachea midline, no thyromegaly, no carotid bruit.   Back:   No kyphoscoliosis. No tenderness to palpation.   Lungs:   Chest shape is normal. Breath sounds heard bilaterally equally.  No wheezing.  Audible air exchange noted all lung fields.   Heart:  Normal S1 and S2, no murmur, no gallop, no rub. No JVD.   Abdomen:   Normal bowel sounds, no masses, no organomegaly. Soft, non-tender, non-distended, no guarding.  No CVA tenderness.   Extremities: Moves all extremities, without edema, cyanosis or clubbing.  Frail build.   Poor core strength and stability.   Pulses: Pulses palpable and equal bilaterally.   Skin: No bleeding or rash.  Generalized dry skin noted.  Age-related atrophy of skin.   Neurologic: [x] Normal speech []  Normal mental status    [x] Cranial nerves II through XII intact   [x]  No anosmia [x]  DTR 2+ [x]  Proprioception intact  [x]  No focal  motor/sensory deficits      Psych/Mood:                    [x]  No acute changes, flat affect[]  Depressed      Urinary:      [x]  Continent  [x]  Incontinent, at times[]  Retention  []  F/C     []  UTI w/treatment in progress      ASSESSMENT     Diagnoses and all orders for this visit:    1. Impaired mobility and ADLs (Primary)    2. Type 2 diabetes mellitus with complication, with long-term current use of insulin (CMS/[facility])    3. Essential hypertension    4. Schizophrenia, chronic condition (CMS/[facility])    5. Paranoid schizophrenia (CMS/[facility])    6. Acute ischemic stroke (CMS/[facility])        PLAN  Plan continuation of supportive care as needed for any mobilization/transfer assistance, fall precautions in place.  Continue to follow her overall nutritional status additionally.  Assist ADLs as needed.  Maintain appropriate hydration status, without reports of any significant deficits at this time.    Continue current course of mood stabilizer treatment regimen.  No acute behavioral changes noted, she is sleeping well.    No reports of any cyclical/persistent hypoglycemic episodes.  Continue blood glucose monitoring, further changes to her treatment regimen will be made based on clinical need.  Periodic surveillance hemoglobin A1c additionally.  Avoid prolonged fasting, maintain appropriate hydration status.  Continue healthy dietary choices.    Vital signs stable, appears hemodynamically asymptomatic.  Heart rate well controlled additionally.    Surveillance labs as needed.    No new neurological deficits.    Patient is alert and oriented to person, she does not demonstrate the capacity to make medical or financial decisions on her own.  It is my understanding that the family is working on getting emergency guardianship to address these concerns and needs.  [x]  Discussed Patient in detail with nursing/staff, addressed all needs today.     [x]  Plan of Care Reviewed   []  PT/OT Reviewed   []  Order Changes  []  Discharge Plans  Reviewed  []  Code Status Change        I spent 45 minutes caring for Izabella on this date of service. This time includes time spent by me in the following activities:preparing for the visit, reviewing tests, performing a medically appropriate examination and/or evaluation , counseling and educating the patient/family/caregiver, ordering medications, tests, or procedures, documenting information in the medical record and care coordination    Fabian Santos   04/07/2021

## 2021-04-15 VITALS
SYSTOLIC BLOOD PRESSURE: 129 MMHG | DIASTOLIC BLOOD PRESSURE: 78 MMHG | WEIGHT: 174 LBS | RESPIRATION RATE: 18 BRPM | BODY MASS INDEX: 26.46 KG/M2 | TEMPERATURE: 97.5 F | OXYGEN SATURATION: 97 % | HEART RATE: 67 BPM

## 2021-04-15 NOTE — PROGRESS NOTES
Nursing Home Follow Up Note      Fabian Santos DO []   [facility] [x]  [address] [place] [address]  Phone: [phone]  Fax: [phone] Yohan Cuellar MD []    Chaz Mandel DO []   [address] [place] [address]  Phone: [phone]  Fax: [phone]     PATIENT NAME: Izabella Agudelo                                                                          YOB: 1960           DATE OF SERVICE: 04/13/2021  FACILITY:  [][place]   [] [place]   [x] [facility]   [] [facility]   [] Other ______________________________________________________________________      CHIEF COMPLAINT:    Increased moods and behaviors      HISTORY OF PRESENT ILLNESS:     Patient with increased mood and behaviors the past couple days. Per nursing, she has been agitated with staff and even hitting at staff on occasion. She is pleasant during visit today, but confused.     PAST MEDICAL & SURGICAL HISTORY:   Past Medical History:   Diagnosis Date   • Cardiac abnormality    • CHF (congestive heart failure) (CMS/[facility])    • COPD (chronic obstructive pulmonary disease) (CMS/[facility])    • Diabetes (CMS/[facility])    • Glaucoma    • Schizophrenia, chronic condition (CMS/[facility])       History reviewed. No pertinent surgical history.      MEDICATIONS:  I have reviewed and reconciled the patients medication list in the patients chart at the skilled nursing facility today.      ALLERGIES:    Allergies   Allergen Reactions   • Penicillins Rash         SOCIAL HISTORY:    Social History     Socioeconomic History   • Marital status: Single     Spouse name: Not on file   • Number of children: Not on file   • Years of education: Not on file   • Highest education level: Not on file   Tobacco Use   • Smoking status: Never Smoker   • Smokeless tobacco: Never Used   Substance and Sexual Activity   • Alcohol use: No   • Drug use: No   • Sexual activity: Defer       FAMILY HISTORY:    Family History   Problem Relation Age  of Onset   • Diabetes Other    • Glaucoma Other        REVIEW OF SYSTEMS:    Review of Systems   Constitutional: Negative for activity change, appetite change, chills, diaphoresis, fatigue, fever, unexpected weight gain and unexpected weight loss.   HENT: Negative for congestion, ear pain, mouth sores, nosebleeds, postnasal drip, rhinorrhea, sinus pressure, sneezing, sore throat, swollen glands and trouble swallowing.    Eyes: Negative for blurred vision, pain, discharge, redness, itching and visual disturbance.   Respiratory: Negative for apnea, cough, choking, chest tightness, shortness of breath, wheezing and stridor.    Cardiovascular: Negative for chest pain, palpitations and leg swelling.   Gastrointestinal: Negative for abdominal distention, abdominal pain, blood in stool, constipation, diarrhea, nausea, vomiting, GERD and indigestion.   Endocrine: Negative for polydipsia and polyuria.   Genitourinary: Positive for urinary incontinence. Negative for decreased urine volume, difficulty urinating, dysuria, flank pain, frequency, hematuria and urgency.   Musculoskeletal: Positive for arthralgias. Negative for back pain, gait problem, joint swelling and myalgias.   Skin: Negative for color change, dry skin, rash and skin lesions.   Allergic/Immunologic: Negative for environmental allergies.   Neurological: Positive for speech difficulty, weakness, memory problem and confusion. Negative for dizziness and seizures.   Psychiatric/Behavioral: Negative for behavioral problems, dysphoric mood, hallucinations, sleep disturbance and depressed mood. The patient is not nervous/anxious.          PHYSICAL EXAMINATION:   VITAL SIGNS:   Vitals:    04/13/21 1039   BP: 129/78   Pulse: 67   Resp: 18   Temp: 97.5 °F (36.4 °C)   SpO2: 97%   Weight: 78.9 kg (174 lb)       Physical Exam  Vitals and nursing note reviewed.   Constitutional:       General: She is not in acute distress.     Appearance: She is well-developed.   HENT:       Head: Normocephalic.      Right Ear: External ear normal.      Left Ear: External ear normal.      Nose: Nose normal.      Mouth/Throat:      Pharynx: No oropharyngeal exudate.   Eyes:      Conjunctiva/sclera: Conjunctivae normal.      Pupils: Pupils are equal, round, and reactive to light.   Neck:      Thyroid: No thyromegaly.      Trachea: No tracheal deviation.   Cardiovascular:      Rate and Rhythm: Normal rate and regular rhythm.      Heart sounds: Normal heart sounds. No murmur heard.     Pulmonary:      Effort: Pulmonary effort is normal. No respiratory distress.      Breath sounds: Normal breath sounds. No wheezing or rales.   Chest:      Chest wall: No tenderness.   Abdominal:      General: Bowel sounds are increased. There is no distension.      Palpations: Abdomen is soft. There is no splenomegaly or mass.      Tenderness: There is no abdominal tenderness. There is no guarding or rebound.      Hernia: No hernia is present.   Musculoskeletal:         General: No tenderness. Normal range of motion.      Cervical back: Normal range of motion and neck supple.   Lymphadenopathy:      Cervical: No cervical adenopathy.      Right cervical: No superficial, deep or posterior cervical adenopathy.     Left cervical: No superficial, deep or posterior cervical adenopathy.   Skin:     General: Skin is warm and dry.      Findings: No rash.   Neurological:      Mental Status: She is alert. Mental status is at baseline. She is disoriented.      Motor: Weakness present.      Gait: Gait abnormal.   Psychiatric:         Mood and Affect: Affect is flat.         Thought Content: Thought content normal.         Cognition and Memory: Cognition is impaired. Memory is impaired.         RECORDS REVIEW:   I have reviewed and interpreted the discharge summary and medications    ASSESSMENT     Diagnoses and all orders for this visit:    1. Schizophrenia, chronic condition (CMS/[facility]) (Primary)    2. Behavior disturbance    3. Impaired  mobility and ADLs        PLAN    Schizophrenia/behaviors  -Increase Depakote to 1000 mg at 1200 as well. Psych is following as well. Will follow up and continue to monitor.     Nursing encouraged to keep me informed of any acute changes, lack of improvement, or any new concerning symptoms.    Staff to continue supportive care for all ADLs.    [x]  Discussed Patient in detail with nursing/staff, addressed all needs today.     [x]  Plan of Care Reviewed   [x]  PT/OT Reviewed   []  Order Changes  []  Discharge Plans Reviewed  [x]  Advance Directive on file with Nursing Home.   [x]  POA on file with Nursing Home.   [x]  Code Status listed: [x]  Full Code   []  DNR       “I confirm accuracy of unchanged data/findings which have been carried forward from previous visit, as well as I have updated appropriately those that have changed.”                    Fariha Cardona, APRN.

## 2021-05-04 ENCOUNTER — NURSING HOME (OUTPATIENT)
Dept: FAMILY MEDICINE CLINIC | Facility: CLINIC | Age: 61
End: 2021-05-04

## 2021-05-04 VITALS
HEART RATE: 64 BPM | RESPIRATION RATE: 16 BRPM | DIASTOLIC BLOOD PRESSURE: 73 MMHG | BODY MASS INDEX: 26.76 KG/M2 | TEMPERATURE: 97.6 F | WEIGHT: 176 LBS | OXYGEN SATURATION: 98 % | SYSTOLIC BLOOD PRESSURE: 116 MMHG

## 2021-05-04 DIAGNOSIS — F20.9 SCHIZOPHRENIA, CHRONIC CONDITION (HCC): ICD-10-CM

## 2021-05-04 DIAGNOSIS — Z86.73 HISTORY OF ISCHEMIC STROKE: ICD-10-CM

## 2021-05-04 DIAGNOSIS — R41.82 ACUTE ON CHRONIC ALTERATION IN MENTAL STATUS: Primary | ICD-10-CM

## 2021-05-04 DIAGNOSIS — Z78.9 IMPAIRED MOBILITY AND ADLS: ICD-10-CM

## 2021-05-04 DIAGNOSIS — Z74.09 IMPAIRED MOBILITY AND ADLS: ICD-10-CM

## 2021-05-04 PROCEDURE — 99309 SBSQ NF CARE MODERATE MDM 30: CPT | Performed by: NURSE PRACTITIONER

## 2021-05-05 ENCOUNTER — DOCUMENTATION (OUTPATIENT)
Dept: FAMILY MEDICINE CLINIC | Facility: CLINIC | Age: 61
End: 2021-05-05
Payer: MEDICARE

## 2021-05-05 VITALS
HEART RATE: 52 BPM | BODY MASS INDEX: 26.91 KG/M2 | RESPIRATION RATE: 16 BRPM | OXYGEN SATURATION: 95 % | SYSTOLIC BLOOD PRESSURE: 138 MMHG | WEIGHT: 177 LBS | DIASTOLIC BLOOD PRESSURE: 78 MMHG | TEMPERATURE: 98 F

## 2021-05-05 NOTE — PROGRESS NOTES
Nursing Home Follow Up Note      Fabian Santos DO []   [facility] [x]  [address] [place] [address]  Phone: [phone]  Fax: [phone] Yohan Cuellar MD []    Chaz Mandel DO []   [address] [place] [address]  Phone: [phone]  Fax: [phone]     PATIENT NAME: Izabella Agudelo                                                                          YOB: 1960           DATE OF SERVICE: 05/4/2021  [place]ACILITY:  [][place]   [] [place]   [x] [facility]   [] [facility]   [] Other ______________________________________________________________________      CHIEF COMPLAINT:    Increased confusion      HISTORY OF PRESENT ILLNESS:     Patient with increased confusion since yesterday per nursing.  They report that she also has not been acting herself since yesterday.  She is resting in bed during visit today and appears to be at baseline.  She has no complaints today and has no signs and symptoms of any distress.    PAST MEDICAL & SURGICAL HISTORY:   Past Medical History:   Diagnosis Date   • Cardiac abnormality    • CHF (congestive heart failure) (CMS/[facility])    • COPD (chronic obstructive pulmonary disease) (CMS/[facility])    • Diabetes (CMS/[facility])    • Glaucoma    • Schizophrenia, chronic condition (CMS/[facility])       No past surgical history on file.      MEDICATIONS:  I have reviewed and reconciled the patients medication list in the patients chart at the skilled nursing facility today.      ALLERGIES:    Allergies   Allergen Reactions   • Penicillins Rash         SOCIAL HISTORY:    Social History     Socioeconomic History   • Marital status: Single     Spouse name: Not on file   • Number of children: Not on file   • Years of education: Not on file   • Highest education level: Not on file   Tobacco Use   • Smoking status: Never Smoker   • Smokeless tobacco: Never Used   Substance and Sexual Activity   • Alcohol use: No   • Drug use: No   • Sexual activity: Defer        FAMILY HISTORY:    Family History   Problem Relation Age of Onset   • Diabetes Other    • Glaucoma Other        REVIEW OF SYSTEMS:    Review of Systems   Constitutional: Positive for activity change (per nursing). Negative for appetite change, chills, diaphoresis, fatigue, fever, unexpected weight gain and unexpected weight loss.   HENT: Negative for congestion, ear pain, mouth sores, nosebleeds, postnasal drip, rhinorrhea, sinus pressure, sneezing, sore throat, swollen glands and trouble swallowing.    Eyes: Negative for blurred vision, pain, discharge, redness, itching and visual disturbance.   Respiratory: Negative for apnea, cough, choking, chest tightness, shortness of breath, wheezing and stridor.    Cardiovascular: Negative for chest pain, palpitations and leg swelling.   Gastrointestinal: Negative for abdominal distention, abdominal pain, blood in stool, constipation, diarrhea, nausea, vomiting, GERD and indigestion.   Endocrine: Negative for polydipsia and polyuria.   Genitourinary: Positive for urinary incontinence. Negative for decreased urine volume, difficulty urinating, dysuria, flank pain, frequency, hematuria and urgency.   Musculoskeletal: Positive for arthralgias. Negative for back pain, gait problem, joint swelling and myalgias.   Skin: Negative for color change, dry skin, rash and skin lesions.   Allergic/Immunologic: Negative for environmental allergies.   Neurological: Positive for speech difficulty, weakness, memory problem and confusion (increased per nursing). Negative for dizziness and seizures.   Psychiatric/Behavioral: Negative for behavioral problems, dysphoric mood, hallucinations, sleep disturbance and depressed mood. The patient is not nervous/anxious.          PHYSICAL EXAMINATION:   VITAL SIGNS:   Vitals:    05/04/21 1345   BP: 116/73   Pulse: 64   Resp: 16   Temp: 97.6 °F (36.4 °C)   SpO2: 98%   Weight: 79.8 kg (176 lb)       Physical Exam  Vitals and nursing note reviewed.    Constitutional:       General: She is not in acute distress.     Appearance: She is well-developed.   HENT:      Head: Normocephalic.      Right Ear: External ear normal.      Left Ear: External ear normal.      Nose: Nose normal.      Mouth/Throat:      Pharynx: No oropharyngeal exudate.   Eyes:      Conjunctiva/sclera: Conjunctivae normal.   Neck:      Thyroid: No thyromegaly.      Trachea: No tracheal deviation.   Cardiovascular:      Rate and Rhythm: Normal rate and regular rhythm.      Heart sounds: Normal heart sounds. No murmur heard.     Pulmonary:      Effort: Pulmonary effort is normal. No respiratory distress.      Breath sounds: Normal breath sounds. No wheezing or rales.   Chest:      Chest wall: No tenderness.   Abdominal:      General: Bowel sounds are increased. There is no distension.      Palpations: Abdomen is soft. There is no splenomegaly or mass.      Tenderness: There is no abdominal tenderness. There is no guarding or rebound.      Hernia: No hernia is present.   Musculoskeletal:         General: No tenderness. Normal range of motion.      Cervical back: Normal range of motion and neck supple.   Lymphadenopathy:      Cervical: No cervical adenopathy.      Right cervical: No superficial, deep or posterior cervical adenopathy.     Left cervical: No superficial, deep or posterior cervical adenopathy.   Skin:     General: Skin is warm and dry.      Findings: No rash.   Neurological:      Mental Status: She is alert. Mental status is at baseline. She is disoriented.      Motor: Weakness present.      Gait: Gait abnormal.   Psychiatric:         Mood and Affect: Affect is flat.         Thought Content: Thought content normal.         Cognition and Memory: Cognition is impaired. Memory is impaired.         RECORDS REVIEW:   I have reviewed and interpreted the discharge summary and medications    ASSESSMENT     Diagnoses and all orders for this visit:    1. Acute on chronic alteration in mental  status (Primary)    2. Schizophrenia, chronic condition (CMS/[facility])    3. Acute ischemic stroke (CMS/[facility])    4. Impaired mobility and ADLs        PLAN    Acute on chronic mental status change/Schizophrenia/history stroke  -Patient with some increased confusion and behavior changes per nursing.  Patient does have a history of this related to her schizophrenia disorder.  Will check UA with culture, CBC and BMP.  Will follow-up with results and continue to monitor.    Nursing encouraged to keep me informed of any acute changes, lack of improvement, or any new concerning symptoms.    Staff to continue supportive care for all ADLs.    [x]  Discussed Patient in detail with nursing/staff, addressed all needs today.     [x]  Plan of Care Reviewed   [x]  PT/OT Reviewed   []  Order Changes  []  Discharge Plans Reviewed  [x]  Advance Directive on file with Nursing Home.   [x]  POA on file with Nursing Home.   [x]  Code Status listed: [x]  Full Code   []  DNR       “I confirm accuracy of unchanged data/findings which have been carried forward from previous visit, as well as I have updated appropriately those that have changed.”                      Fariha Cardona, APRN.

## 2021-05-06 ENCOUNTER — NURSING HOME (OUTPATIENT)
Dept: FAMILY MEDICINE CLINIC | Facility: CLINIC | Age: 61
End: 2021-05-06

## 2021-05-06 VITALS
RESPIRATION RATE: 20 BRPM | BODY MASS INDEX: 26.91 KG/M2 | TEMPERATURE: 97.5 F | SYSTOLIC BLOOD PRESSURE: 138 MMHG | OXYGEN SATURATION: 97 % | HEART RATE: 77 BPM | WEIGHT: 177 LBS | DIASTOLIC BLOOD PRESSURE: 74 MMHG

## 2021-05-06 DIAGNOSIS — Z78.9 IMPAIRED MOBILITY AND ADLS: ICD-10-CM

## 2021-05-06 DIAGNOSIS — Z74.09 IMPAIRED MOBILITY AND ADLS: ICD-10-CM

## 2021-05-06 DIAGNOSIS — R11.2 NON-INTRACTABLE VOMITING WITH NAUSEA, UNSPECIFIED VOMITING TYPE: ICD-10-CM

## 2021-05-06 DIAGNOSIS — Z91.89 AT HIGH RISK FOR ASPIRATION: ICD-10-CM

## 2021-05-06 DIAGNOSIS — R41.89 COGNITIVE IMPAIRMENT: ICD-10-CM

## 2021-05-06 PROCEDURE — 99309 SBSQ NF CARE MODERATE MDM 30: CPT | Performed by: NURSE PRACTITIONER

## 2021-05-09 NOTE — PROGRESS NOTES
Nursing Home Follow Up Note      Fabian Santos DO []   [facility] [x]  [address] [place] [address]  Phone: [phone]  Fax: [phone] Yohan Cuellar MD []    Chaz Mandel DO []   [address] [place] [address]  Phone: [phone]  Fax: [phone]     PATIENT NAME: Izabella Agudelo                                                                          YOB: 1960           DATE OF SERVICE: 05/6/2021  FACILITY:  [][facility]   [] [place]   [x] [facility]   [] [facility]   [] Other ______________________________________________________________________      CHIEF COMPLAINT:    Nausea and vomiting       HISTORY OF PRESENT ILLNESS:     Nursing reports that patient has had several episodes of nausea and vomiting since very early this morning, before 0600. They are also concerned she aspirated. She has been given Zofran and is now having any nausea or vomiting at this time she is not feeling well.  Due to her cognitive impairment she is a poor historian.  Per [facility] EMR she does not appear to be constipated.  She has not had any fever and other vital signs are within normal limits.    PAST MEDICAL & SURGICAL HISTORY:   Past Medical History:   Diagnosis Date   • Cardiac abnormality    • CHF (congestive heart failure) (CMS/[facility])    • COPD (chronic obstructive pulmonary disease) (CMS/[facility])    • Diabetes (CMS/[facility])    • Glaucoma    • Schizophrenia, chronic condition (CMS/[facility])       No past surgical history on file.      MEDICATIONS:  I have reviewed and reconciled the patients medication list in the patients chart at the skilled nursing facility today.      ALLERGIES:    Allergies   Allergen Reactions   • Penicillins Rash         SOCIAL HISTORY:    Social History     Socioeconomic History   • Marital status: Single     Spouse name: Not on file   • Number of children: Not on file   • Years of education: Not on file   • Highest education level: Not on file   Tobacco Use    • Smoking status: Never Smoker   • Smokeless tobacco: Never Used   Substance and Sexual Activity   • Alcohol use: No   • Drug use: No   • Sexual activity: Defer       FAMILY HISTORY:    Family History   Problem Relation Age of Onset   • Diabetes Other    • Glaucoma Other        REVIEW OF SYSTEMS:    Review of Systems   Reason unable to perform ROS: ROS per nursing and patient.   Constitutional: Positive for activity change (per nursing). Negative for appetite change, chills, diaphoresis, fatigue, fever, unexpected weight gain and unexpected weight loss.   HENT: Negative for congestion, ear pain, mouth sores, nosebleeds, postnasal drip, rhinorrhea, sinus pressure, sneezing, sore throat, swollen glands and trouble swallowing.    Eyes: Negative for blurred vision, pain, discharge, redness, itching and visual disturbance.   Respiratory: Negative for apnea, cough, choking, chest tightness, shortness of breath, wheezing and stridor.    Cardiovascular: Negative for chest pain, palpitations and leg swelling.   Gastrointestinal: Positive for nausea and vomiting. Negative for abdominal distention, abdominal pain, blood in stool, constipation, diarrhea, GERD and indigestion.   Endocrine: Negative for polydipsia and polyuria.   Genitourinary: Positive for urinary incontinence. Negative for decreased urine volume, difficulty urinating, dysuria, flank pain, frequency, hematuria and urgency.   Musculoskeletal: Positive for arthralgias. Negative for back pain, gait problem, joint swelling and myalgias.   Skin: Negative for color change, dry skin, rash and skin lesions.   Allergic/Immunologic: Negative for environmental allergies.   Neurological: Positive for speech difficulty, weakness, memory problem and confusion (increased per nursing). Negative for dizziness and seizures.   Psychiatric/Behavioral: Negative for behavioral problems, dysphoric mood, hallucinations, sleep disturbance and depressed mood. The patient is not  nervous/anxious.          PHYSICAL EXAMINATION:   VITAL SIGNS:   Vitals:    05/06/21 1323   BP: 138/74   Pulse: 77   Resp: 20   Temp: 97.5 °F (36.4 °C)   SpO2: 97%   Weight: 80.3 kg (177 lb)       Physical Exam  Vitals and nursing note reviewed.   Constitutional:       General: She is not in acute distress.     Appearance: She is well-developed.   HENT:      Head: Normocephalic.      Right Ear: External ear normal.      Left Ear: External ear normal.      Nose: Nose normal.      Mouth/Throat:      Pharynx: No oropharyngeal exudate.   Eyes:      Conjunctiva/sclera: Conjunctivae normal.   Neck:      Thyroid: No thyromegaly.      Trachea: No tracheal deviation.   Cardiovascular:      Rate and Rhythm: Normal rate and regular rhythm.      Heart sounds: Normal heart sounds. No murmur heard.     Pulmonary:      Effort: Pulmonary effort is normal. No respiratory distress.      Breath sounds: Normal breath sounds. No wheezing or rales.   Chest:      Chest wall: No tenderness.   Abdominal:      General: Bowel sounds are increased. There is no distension.      Palpations: Abdomen is soft. There is no splenomegaly or mass.      Tenderness: There is no abdominal tenderness. There is no guarding or rebound.      Hernia: No hernia is present.   Musculoskeletal:         General: No tenderness. Normal range of motion.      Cervical back: Normal range of motion and neck supple.   Lymphadenopathy:      Cervical: No cervical adenopathy.      Right cervical: No superficial, deep or posterior cervical adenopathy.     Left cervical: No superficial, deep or posterior cervical adenopathy.   Skin:     General: Skin is warm and dry.      Findings: No rash.   Neurological:      Mental Status: She is alert. Mental status is at baseline. She is disoriented.      Motor: Weakness present.      Gait: Gait abnormal.   Psychiatric:         Mood and Affect: Affect is flat.         Thought Content: Thought content normal.         Cognition and Memory:  Cognition is impaired. Memory is impaired.         RECORDS REVIEW:   I have reviewed and interpreted the discharge summary and medications    ASSESSMENT     Diagnoses and all orders for this visit:    1. Non-intractable vomiting with nausea, unspecified vomiting type    2. At high risk for aspiration    3. Cognitive impairment    4. Impaired mobility and ADLs        PLAN    Nausea/vomiting/risk for aspiration  -Patient with several episodes of nausea and vomiting since early hours of the morning. She is not vomiting at this time. Will obtain STAT KUB. Will also obtain CXR due to risk aspiration.     Nursing encouraged to keep me informed of any acute changes, lack of improvement, or any new concerning symptoms.    Staff to continue supportive care for all ADLs.    [x]  Discussed Patient in detail with nursing/staff, addressed all needs today.     [x]  Plan of Care Reviewed   [x]  PT/OT Reviewed   []  Order Changes  []  Discharge Plans Reviewed  [x]  Advance Directive on file with Nursing Home.   [x]  POA on file with Nursing Home.   [x]  Code Status listed: [x]  Full Code   []  DNR     “I confirm accuracy of unchanged data/findings which have been carried forward from previous visit, as well as I have updated appropriately those that have changed.”                        Fariha Cardona, APRN.

## 2021-06-01 ENCOUNTER — NURSING HOME (OUTPATIENT)
Dept: FAMILY MEDICINE CLINIC | Facility: CLINIC | Age: 61
End: 2021-06-01

## 2021-06-01 VITALS
WEIGHT: 177 LBS | HEART RATE: 56 BPM | DIASTOLIC BLOOD PRESSURE: 60 MMHG | SYSTOLIC BLOOD PRESSURE: 132 MMHG | TEMPERATURE: 97.5 F | OXYGEN SATURATION: 93 % | BODY MASS INDEX: 26.91 KG/M2 | RESPIRATION RATE: 20 BRPM

## 2021-06-01 DIAGNOSIS — Z78.9 IMPAIRED MOBILITY AND ADLS: ICD-10-CM

## 2021-06-01 DIAGNOSIS — I95.9 HYPOTENSION, UNSPECIFIED HYPOTENSION TYPE: Primary | ICD-10-CM

## 2021-06-01 DIAGNOSIS — Z74.09 IMPAIRED MOBILITY AND ADLS: ICD-10-CM

## 2021-06-01 DIAGNOSIS — Z86.73 HISTORY OF ISCHEMIC STROKE: ICD-10-CM

## 2021-06-01 DIAGNOSIS — F20.9 SCHIZOPHRENIA, CHRONIC CONDITION (HCC): ICD-10-CM

## 2021-06-01 PROCEDURE — 99309 SBSQ NF CARE MODERATE MDM 30: CPT | Performed by: NURSE PRACTITIONER

## 2021-06-03 PROBLEM — Z86.73 HISTORY OF ISCHEMIC STROKE: Status: ACTIVE | Noted: 2021-06-03

## 2021-06-03 NOTE — PROGRESS NOTES
Nursing Home Follow Up Note      Fabian Santos DO []   [facility] [x]  [address] [place] [address]  Phone: [phone]  Fax: [phone] Yohan Cuellar MD []    Chaz Mandel DO []   [address] [place] [address]  Phone: [phone]  Fax: [phone]     PATIENT NAME: Izabella Agudelo                                                                          YOB: 1960           DATE OF SERVICE: 06/1/2021  FACILITY:  [][facility]   [] [place]   [x] [facility]   [] [facility]   [] Other ______________________________________________________________________      CHIEF COMPLAINT:    Hypotension      HISTORY OF PRESENT ILLNESS:     Documented episode of hypotension last night, but none since. No history of hypotension reported in PCC. She was not symptomatic per nursing. Due to her cognitive deficit r/t her history of stroke and schizophrenia, she is a poor historian. VS WNL at this time. She is resting in bed without complaints or s/s of any distress.      PAST MEDICAL & SURGICAL HISTORY:   Past Medical History:   Diagnosis Date   • Cardiac abnormality    • CHF (congestive heart failure) (CMS/[facility])    • COPD (chronic obstructive pulmonary disease) (CMS/[facility])    • Diabetes (CMS/[facility])    • Glaucoma    • Schizophrenia, chronic condition (CMS/[facility])       No past surgical history on file.      MEDICATIONS:  I have reviewed and reconciled the patients medication list in the patients chart at the skilled nursing facility today.      ALLERGIES:    Allergies   Allergen Reactions   • Penicillins Rash         SOCIAL HISTORY:    Social History     Socioeconomic History   • Marital status: Single     Spouse name: Not on file   • Number of children: Not on file   • Years of education: Not on file   • Highest education level: Not on file   Tobacco Use   • Smoking status: Never Smoker   • Smokeless tobacco: Never Used   Substance and Sexual Activity   • Alcohol use: No   • Drug  use: No   • Sexual activity: Defer       FAMILY HISTORY:    Family History   Problem Relation Age of Onset   • Diabetes Other    • Glaucoma Other        REVIEW OF SYSTEMS:    Review of Systems   Reason unable to perform ROS: ROS per nursing and patient.   Constitutional: Negative for activity change, appetite change, chills, diaphoresis, fatigue, fever, unexpected weight gain and unexpected weight loss.   HENT: Negative for congestion, ear pain, mouth sores, nosebleeds, postnasal drip, rhinorrhea, sinus pressure, sneezing, sore throat, swollen glands and trouble swallowing.    Eyes: Negative for blurred vision, pain, discharge, redness, itching and visual disturbance.   Respiratory: Negative for apnea, cough, choking, chest tightness, shortness of breath, wheezing and stridor.    Cardiovascular: Negative for chest pain, palpitations and leg swelling.   Gastrointestinal: Negative for abdominal distention, abdominal pain, blood in stool, constipation, diarrhea, nausea, vomiting, GERD and indigestion.   Endocrine: Negative for polydipsia and polyuria.   Genitourinary: Positive for urinary incontinence. Negative for decreased urine volume, difficulty urinating, dysuria, flank pain, frequency, hematuria and urgency.   Musculoskeletal: Positive for arthralgias. Negative for back pain, gait problem, joint swelling and myalgias.   Skin: Negative for color change, dry skin, rash and skin lesions.   Allergic/Immunologic: Negative for environmental allergies.   Neurological: Positive for speech difficulty, weakness, memory problem and confusion. Negative for dizziness and seizures.   Psychiatric/Behavioral: Negative for behavioral problems, dysphoric mood, hallucinations, sleep disturbance and depressed mood. The patient is not nervous/anxious.          PHYSICAL EXAMINATION:   VITAL SIGNS:   Vitals:    06/01/21 1603   BP: 132/60   Pulse: 56   Resp: 20   Temp: 97.5 °F (36.4 °C)   SpO2: 93%   Weight: 80.3 kg (177 lb)        Physical Exam  Vitals and nursing note reviewed.   Constitutional:       General: She is not in acute distress.     Appearance: She is well-developed.   HENT:      Head: Normocephalic.      Right Ear: External ear normal.      Left Ear: External ear normal.      Nose: Nose normal.      Mouth/Throat:      Pharynx: No oropharyngeal exudate.   Eyes:      Conjunctiva/sclera: Conjunctivae normal.   Neck:      Thyroid: No thyromegaly.      Trachea: No tracheal deviation.   Cardiovascular:      Rate and Rhythm: Normal rate and regular rhythm.      Heart sounds: Normal heart sounds. No murmur heard.     Pulmonary:      Effort: Pulmonary effort is normal. No respiratory distress.      Breath sounds: Normal breath sounds. No wheezing or rales.   Chest:      Chest wall: No tenderness.   Abdominal:      General: Bowel sounds are normal. There is no distension.      Palpations: Abdomen is soft. There is no splenomegaly or mass.      Tenderness: There is no abdominal tenderness. There is no guarding or rebound.      Hernia: No hernia is present.   Musculoskeletal:         General: No tenderness. Normal range of motion.      Cervical back: Normal range of motion and neck supple.   Lymphadenopathy:      Cervical: No cervical adenopathy.      Right cervical: No superficial, deep or posterior cervical adenopathy.     Left cervical: No superficial, deep or posterior cervical adenopathy.   Skin:     General: Skin is warm and dry.      Findings: No rash.   Neurological:      Mental Status: She is alert. Mental status is at baseline. She is disoriented.      Motor: Weakness present.      Gait: Gait abnormal.   Psychiatric:         Mood and Affect: Affect is flat.         Thought Content: Thought content normal.         Cognition and Memory: Cognition is impaired. Memory is impaired.         RECORDS REVIEW:   I have reviewed and interpreted the discharge summary and medications    ASSESSMENT     Diagnoses and all orders for this  visit:    1. Hypotension, unspecified hypotension type (Primary)    2. Schizophrenia, chronic condition (CMS/HCC)    3. History of ischemic stroke    4. Impaired mobility and ADLs        PLAN    Hypotension  -One episode of hypotension documented during night. No episodes since. She has not been symptomatic. Will check CBC and CMP in am. Will follow up with results.     Nursing encouraged to keep me informed of any acute changes, lack of improvement, or any new concerning symptoms.    Staff to continue supportive care for all ADLs.    [x]  Discussed Patient in detail with nursing/staff, addressed all needs today.     [x]  Plan of Care Reviewed   [x]  PT/OT Reviewed   [x]  Order Changes  []  Discharge Plans Reviewed  [x]  Advance Directive on file with Nursing Home.   [x]  POA on file with Nursing Home.   [x]  Code Status listed: [x]  Full Code   []  DNR     “I confirm accuracy of unchanged data/findings which have been carried forward from previous visit, as well as I have updated appropriately those that have changed.”                        Fariha Cardona, APRN.

## 2021-06-07 ENCOUNTER — NURSING HOME (OUTPATIENT)
Dept: FAMILY MEDICINE CLINIC | Facility: CLINIC | Age: 61
End: 2021-06-07

## 2021-06-07 VITALS
WEIGHT: 164 LBS | DIASTOLIC BLOOD PRESSURE: 63 MMHG | BODY MASS INDEX: 24.94 KG/M2 | HEART RATE: 67 BPM | RESPIRATION RATE: 20 BRPM | SYSTOLIC BLOOD PRESSURE: 121 MMHG | OXYGEN SATURATION: 93 % | TEMPERATURE: 97.5 F

## 2021-06-07 DIAGNOSIS — Z86.73 HISTORY OF ISCHEMIC STROKE: ICD-10-CM

## 2021-06-07 DIAGNOSIS — R63.4 WEIGHT LOSS: ICD-10-CM

## 2021-06-07 DIAGNOSIS — F20.9 SCHIZOPHRENIA, CHRONIC CONDITION (HCC): ICD-10-CM

## 2021-06-07 DIAGNOSIS — Z74.09 IMPAIRED MOBILITY AND ADLS: ICD-10-CM

## 2021-06-07 DIAGNOSIS — Z78.9 IMPAIRED MOBILITY AND ADLS: ICD-10-CM

## 2021-06-07 DIAGNOSIS — R63.8 DECREASED ORAL INTAKE: Primary | ICD-10-CM

## 2021-06-07 PROCEDURE — 99309 SBSQ NF CARE MODERATE MDM 30: CPT | Performed by: NURSE PRACTITIONER

## 2021-06-08 NOTE — PROGRESS NOTES
Nursing Home Follow Up Note      Fabian Santos DO []   [facility] [x]  [address] [place] [address]  Phone: [phone]  Fax: [phone] Yohan Cuellar MD []    Chaz Mandel DO []   [address] [place] [address]  Phone: [phone]  Fax: [phone]     PATIENT NAME: Izabella Agudelo                                                                          YOB: 1960           DATE OF SERVICE: 06/7/2021  FACILITY:  [][facility]   [] [place]   [x] [facility]   [] [facility]   [] Other ______________________________________________________________________      CHIEF COMPLAINT:    Decreased intake and weight loss      HISTORY OF PRESENT ILLNESS:     Patient with decreased appetite and weight loss for the past month. She has lost 16 pounds in the past month, this was verified with a re weigh. She is resting in bed without complaints or distress today. PT reports she has been weaker and not working very well with them either.        PAST MEDICAL & SURGICAL HISTORY:   Past Medical History:   Diagnosis Date   • Cardiac abnormality    • CHF (congestive heart failure) (CMS/[facility])    • COPD (chronic obstructive pulmonary disease) (CMS/[facility])    • Diabetes (CMS/[facility])    • Glaucoma    • Schizophrenia, chronic condition (CMS/[facility])       No past surgical history on file.      MEDICATIONS:  I have reviewed and reconciled the patients medication list in the patients chart at the skilled nursing facility today.      ALLERGIES:    Allergies   Allergen Reactions   • Penicillins Rash         SOCIAL HISTORY:    Social History     Socioeconomic History   • Marital status: Single     Spouse name: Not on file   • Number of children: Not on file   • Years of education: Not on file   • Highest education level: Not on file   Tobacco Use   • Smoking status: Never Smoker   • Smokeless tobacco: Never Used   Substance and Sexual Activity   • Alcohol use: No   • Drug use: No   • Sexual  activity: Defer       FAMILY HISTORY:    Family History   Problem Relation Age of Onset   • Diabetes Other    • Glaucoma Other        REVIEW OF SYSTEMS:    Review of Systems   Reason unable to perform ROS: ROS per nursing and patient.   Constitutional: Positive for activity change, appetite change and unexpected weight loss. Negative for chills, diaphoresis, fatigue, fever and unexpected weight gain.   HENT: Negative for congestion, ear pain, mouth sores, nosebleeds, postnasal drip, rhinorrhea, sinus pressure, sneezing, sore throat, swollen glands and trouble swallowing.    Eyes: Negative for blurred vision, pain, discharge, redness, itching and visual disturbance.   Respiratory: Negative for apnea, cough, choking, chest tightness, shortness of breath, wheezing and stridor.    Cardiovascular: Negative for chest pain, palpitations and leg swelling.   Gastrointestinal: Negative for abdominal distention, abdominal pain, blood in stool, constipation, diarrhea, nausea, vomiting, GERD and indigestion.   Endocrine: Negative for polydipsia and polyuria.   Genitourinary: Positive for urinary incontinence. Negative for decreased urine volume, difficulty urinating, dysuria, flank pain, frequency, hematuria and urgency.   Musculoskeletal: Positive for arthralgias. Negative for back pain, gait problem, joint swelling and myalgias.   Skin: Negative for color change, dry skin, rash and skin lesions.   Allergic/Immunologic: Negative for environmental allergies.   Neurological: Positive for speech difficulty, weakness, memory problem and confusion. Negative for dizziness and seizures.   Psychiatric/Behavioral: Negative for behavioral problems, dysphoric mood, hallucinations, sleep disturbance and depressed mood. The patient is not nervous/anxious.          PHYSICAL EXAMINATION:   VITAL SIGNS:   Vitals:    06/07/21 1454   BP: 121/63   Pulse: 67   Resp: 20   Temp: 97.5 °F (36.4 °C)   SpO2: 93%   Weight: 74.4 kg (164 lb)       Physical  Exam  Vitals and nursing note reviewed.   Constitutional:       General: She is not in acute distress.     Appearance: She is well-developed.   HENT:      Head: Normocephalic.      Right Ear: External ear normal.      Left Ear: External ear normal.      Nose: Nose normal.      Mouth/Throat:      Pharynx: No oropharyngeal exudate.   Eyes:      Conjunctiva/sclera: Conjunctivae normal.   Neck:      Thyroid: No thyromegaly.      Trachea: No tracheal deviation.   Cardiovascular:      Rate and Rhythm: Normal rate and regular rhythm.      Heart sounds: Normal heart sounds. No murmur heard.     Pulmonary:      Effort: Pulmonary effort is normal. No respiratory distress.      Breath sounds: Normal breath sounds. No wheezing or rales.   Chest:      Chest wall: No tenderness.   Abdominal:      General: Bowel sounds are normal. There is no distension.      Palpations: Abdomen is soft. There is no splenomegaly or mass.      Tenderness: There is no abdominal tenderness. There is no guarding or rebound.      Hernia: No hernia is present.   Musculoskeletal:         General: No tenderness. Normal range of motion.      Cervical back: Normal range of motion and neck supple.   Lymphadenopathy:      Cervical: No cervical adenopathy.      Right cervical: No superficial, deep or posterior cervical adenopathy.     Left cervical: No superficial, deep or posterior cervical adenopathy.   Skin:     General: Skin is warm and dry.      Findings: No rash.   Neurological:      Mental Status: She is alert. Mental status is at baseline. She is disoriented.      Motor: Weakness present.      Gait: Gait abnormal.   Psychiatric:         Mood and Affect: Affect is flat.         Thought Content: Thought content normal.         Cognition and Memory: Cognition is impaired. Memory is impaired.         RECORDS REVIEW:   I have reviewed and interpreted the discharge summary and medications    ASSESSMENT     Diagnoses and all orders for this visit:    1.  Decreased oral intake (Primary)    2. Weight loss    3. Schizophrenia, chronic condition (CMS/HCC)    4. History of ischemic stroke    5. Impaired mobility and ADLs        PLAN    Decreased intake/weight loss/schizophrenia  -Nursing to make patient feeder. Stop Trazodone and start Remeron 7.5 mg qhs, increase to 15 mg qhs in 1 week. Increase meal supplements to 60 ml tid. Add fortified oats with breakfast and lunch. Will follow up and continue to monitor.     Nursing encouraged to keep me informed of any acute changes, lack of improvement, or any new concerning symptoms.    Staff to continue supportive care for all ADLs.    [x]  Discussed Patient in detail with nursing/staff, addressed all needs today.     [x]  Plan of Care Reviewed   [x]  PT/OT Reviewed   [x]  Order Changes  []  Discharge Plans Reviewed  [x]  Advance Directive on file with Nursing Home.   [x]  POA on file with Nursing Home.   [x]  Code Status listed: [x]  Full Code   []  DNR     “I confirm accuracy of unchanged data/findings which have been carried forward from previous visit, as well as I have updated appropriately those that have changed.”                        Fariha Cardona, APRN.

## 2021-06-14 ENCOUNTER — NURSING HOME (OUTPATIENT)
Dept: FAMILY MEDICINE CLINIC | Facility: CLINIC | Age: 61
End: 2021-06-14

## 2021-06-14 VITALS
DIASTOLIC BLOOD PRESSURE: 71 MMHG | WEIGHT: 164 LBS | SYSTOLIC BLOOD PRESSURE: 134 MMHG | RESPIRATION RATE: 16 BRPM | TEMPERATURE: 97.6 F | BODY MASS INDEX: 24.94 KG/M2 | OXYGEN SATURATION: 97 % | HEART RATE: 76 BPM

## 2021-06-14 DIAGNOSIS — T42.6X1A VALPROIC ACID TOXICITY, ACCIDENTAL OR UNINTENTIONAL, INITIAL ENCOUNTER: Primary | ICD-10-CM

## 2021-06-14 DIAGNOSIS — F20.9 SCHIZOPHRENIA, CHRONIC CONDITION (HCC): ICD-10-CM

## 2021-06-14 DIAGNOSIS — T14.8XXA SKIN EXCORIATION: ICD-10-CM

## 2021-06-14 DIAGNOSIS — Z78.9 IMPAIRED MOBILITY AND ADLS: ICD-10-CM

## 2021-06-14 DIAGNOSIS — Z74.09 IMPAIRED MOBILITY AND ADLS: ICD-10-CM

## 2021-06-14 PROCEDURE — 99309 SBSQ NF CARE MODERATE MDM 30: CPT | Performed by: NURSE PRACTITIONER

## 2021-06-15 NOTE — PROGRESS NOTES
Nursing Home Follow Up Note      Fabian Santos DO []   [facility] [x]  [address] [place] [address]  Phone: [phone]  Fax: [phone] Yohan Cuellar MD []    Chaz Mandel DO []   [address] [place] [address]  Phone: [phone]  Fax: [phone]     PATIENT NAME: Izabella Agudelo                                                                          YOB: 1960           DATE OF SERVICE: 06/14/2021  FACILITY:  [][place]   [] [place]   [x] [facility]   [] [facility]   [] Other ______________________________________________________________________      CHIEF COMPLAINT:    Abnormal labs      HISTORY OF PRESENT ILLNESS:      Patient had labs obtained and results today report a Depakote level of greater than 150.  She has been more lethargic the past several days and this could be the reason.     Nursing also reports some excoriation to bilateral labia that was noted today.  She is incontinent of bowel and bladder and has been having some diarrhea.       PAST MEDICAL & SURGICAL HISTORY:   Past Medical History:   Diagnosis Date   • Cardiac abnormality    • CHF (congestive heart failure) (CMS/[facility])    • COPD (chronic obstructive pulmonary disease) (CMS/[facility])    • Diabetes (CMS/[facility])    • Glaucoma    • Schizophrenia, chronic condition (CMS/[facility])       No past surgical history on file.      MEDICATIONS:  I have reviewed and reconciled the patients medication list in the patients chart at the skilled nursing facility today.      ALLERGIES:    Allergies   Allergen Reactions   • Penicillins Rash         SOCIAL HISTORY:    Social History     Socioeconomic History   • Marital status: Single     Spouse name: Not on file   • Number of children: Not on file   • Years of education: Not on file   • Highest education level: Not on file   Tobacco Use   • Smoking status: Never Smoker   • Smokeless tobacco: Never Used   Substance and Sexual Activity   • Alcohol use: No   •  Drug use: No   • Sexual activity: Defer       FAMILY HISTORY:    Family History   Problem Relation Age of Onset   • Diabetes Other    • Glaucoma Other        REVIEW OF SYSTEMS:    Review of Systems   Reason unable to perform ROS: ROS per nursing and patient.   Constitutional: Positive for activity change, appetite change and unexpected weight loss. Negative for chills, diaphoresis, fatigue, fever and unexpected weight gain.   HENT: Negative for congestion, ear pain, mouth sores, nosebleeds, postnasal drip, rhinorrhea, sinus pressure, sneezing, sore throat, swollen glands and trouble swallowing.    Eyes: Negative for blurred vision, pain, discharge, redness, itching and visual disturbance.   Respiratory: Negative for apnea, cough, choking, chest tightness, shortness of breath, wheezing and stridor.    Cardiovascular: Negative for chest pain, palpitations and leg swelling.   Gastrointestinal: Positive for diarrhea. Negative for abdominal distention, abdominal pain, blood in stool, constipation, nausea, vomiting, GERD and indigestion.   Endocrine: Negative for polydipsia and polyuria.   Genitourinary: Positive for urinary incontinence. Negative for decreased urine volume, difficulty urinating, dysuria, flank pain, frequency, hematuria and urgency.   Musculoskeletal: Positive for arthralgias. Negative for back pain, gait problem, joint swelling and myalgias.   Skin: Negative for color change, dry skin, rash and skin lesions.        Excoriation to the labia   Allergic/Immunologic: Negative for environmental allergies.   Neurological: Positive for speech difficulty, weakness, memory problem and confusion. Negative for dizziness and seizures.   Psychiatric/Behavioral: Negative for behavioral problems, dysphoric mood, hallucinations, sleep disturbance and depressed mood. The patient is not nervous/anxious.          PHYSICAL EXAMINATION:   VITAL SIGNS:   Vitals:    06/14/21 1028   BP: 134/71   Pulse: 76   Resp: 16   Temp:  97.6 °F (36.4 °C)   SpO2: 97%   Weight: 74.4 kg (164 lb)       Physical Exam  Vitals and nursing note reviewed.   Constitutional:       General: She is not in acute distress.     Appearance: She is well-developed.   HENT:      Head: Normocephalic.      Right Ear: External ear normal.      Left Ear: External ear normal.      Nose: Nose normal.      Mouth/Throat:      Pharynx: No oropharyngeal exudate.   Eyes:      Conjunctiva/sclera: Conjunctivae normal.   Neck:      Thyroid: No thyromegaly.      Trachea: No tracheal deviation.   Cardiovascular:      Rate and Rhythm: Normal rate and regular rhythm.      Heart sounds: Normal heart sounds. No murmur heard.     Pulmonary:      Effort: Pulmonary effort is normal. No respiratory distress.      Breath sounds: Normal breath sounds. No wheezing or rales.   Chest:      Chest wall: No tenderness.   Abdominal:      General: Bowel sounds are normal. There is no distension.      Palpations: Abdomen is soft. There is no splenomegaly or mass.      Tenderness: There is no abdominal tenderness. There is no guarding or rebound.      Hernia: No hernia is present.   Musculoskeletal:         General: No tenderness. Normal range of motion.      Cervical back: Normal range of motion and neck supple.   Lymphadenopathy:      Cervical: No cervical adenopathy.      Right cervical: No superficial, deep or posterior cervical adenopathy.     Left cervical: No superficial, deep or posterior cervical adenopathy.   Skin:     General: Skin is warm and dry.      Findings: No rash.   Neurological:      Mental Status: She is alert. Mental status is at baseline. She is disoriented.      Motor: Weakness present.      Gait: Gait abnormal.   Psychiatric:         Mood and Affect: Affect is flat.         Thought Content: Thought content normal.         Cognition and Memory: Cognition is impaired. Memory is impaired.         RECORDS REVIEW:   I have reviewed and interpreted the discharge summary and  medications    ASSESSMENT     Diagnoses and all orders for this visit:    1. Valproic acid toxicity, accidental or unintentional, initial encounter (Primary)    2. Schizophrenia, chronic condition (CMS/HCC)    3. Skin excoriation    4. Impaired mobility and ADLs        PLAN    Valproic acid toxicity/schizophrenia  -Hold Depakote for 3 days and recheck level on Thursday.  She takes Depakote for her schizophrenia so once levels are normal, will restart Depakote at lower dose.  Will follow-up and continue to monitor.    Nursing encouraged to keep me informed of any acute changes, lack of improvement, or any new concerning symptoms.    Staff to continue supportive care for all ADLs.    [x]  Discussed Patient in detail with nursing/staff, addressed all needs today.     [x]  Plan of Care Reviewed   [x]  PT/OT Reviewed   [x]  Order Changes  []  Discharge Plans Reviewed  [x]  Advance Directive on file with Nursing Home.   [x]  POA on file with Nursing Home.   [x]  Code Status listed: [x]  Full Code   []  DNR     “I confirm accuracy of unchanged data/findings which have been carried forward from previous visit, as well as I have updated appropriately those that have changed.”                        Fariha Cardona, APRN.

## 2021-06-16 ENCOUNTER — NURSING HOME (OUTPATIENT)
Dept: FAMILY MEDICINE CLINIC | Facility: CLINIC | Age: 61
End: 2021-06-16

## 2021-06-16 VITALS
HEART RATE: 75 BPM | RESPIRATION RATE: 18 BRPM | OXYGEN SATURATION: 97 % | DIASTOLIC BLOOD PRESSURE: 70 MMHG | BODY MASS INDEX: 24.94 KG/M2 | SYSTOLIC BLOOD PRESSURE: 122 MMHG | TEMPERATURE: 97.6 F | WEIGHT: 164 LBS

## 2021-06-16 DIAGNOSIS — I10 ESSENTIAL HYPERTENSION: ICD-10-CM

## 2021-06-16 DIAGNOSIS — Z78.9 IMPAIRED MOBILITY AND ADLS: Primary | ICD-10-CM

## 2021-06-16 DIAGNOSIS — E11.8 TYPE 2 DIABETES MELLITUS WITH COMPLICATION, WITH LONG-TERM CURRENT USE OF INSULIN (HCC): ICD-10-CM

## 2021-06-16 DIAGNOSIS — Z74.09 IMPAIRED MOBILITY AND ADLS: Primary | ICD-10-CM

## 2021-06-16 DIAGNOSIS — E78.5 DYSLIPIDEMIA: ICD-10-CM

## 2021-06-16 DIAGNOSIS — Z79.4 TYPE 2 DIABETES MELLITUS WITH COMPLICATION, WITH LONG-TERM CURRENT USE OF INSULIN (HCC): ICD-10-CM

## 2021-06-16 DIAGNOSIS — F20.0 PARANOID SCHIZOPHRENIA (HCC): ICD-10-CM

## 2021-06-16 DIAGNOSIS — T42.6X5D: ICD-10-CM

## 2021-06-16 PROCEDURE — 99309 SBSQ NF CARE MODERATE MDM 30: CPT | Performed by: FAMILY MEDICINE

## 2021-06-16 NOTE — PROGRESS NOTES
Nursing Home Progress Note        Fabian Santos DO [x]  [facility] []  [address] [place] [address]  Phone: [phone]  Fax: [phone] Yohan Cuellar MD []  Chaz Mandel DO []  [address] [place] [address]  Phone: [phone]  Fax: [phone]     PATIENT NAME: Izabella Agudelo                                                                          YOB: 1960           DATE OF SERVICE: 06/16/2021  FACILITY: []  [facility]  [] [place]  [x]  [facility]  [] [facility]  []  Other ______________________________________________________________________      CHIEF COMPLAINT:  Impaired mobility and ADL/paranoid schizophrenia/diabetes mellitus treated with insulin/hypertension/dyslipidemia      HISTORY OF PRESENT ILLNESS:   [x]  Follow Up visit for coordination of long term care issues and chronic medical management of Diagnoses and all orders for this visit:    1. Impaired mobility and ADLs (Primary)    2. Paranoid schizophrenia (CMS/[facility])    3. Type 2 diabetes mellitus with complication, with long-term current use of insulin (CMS/[facility])    4. Essential hypertension    5. Dyslipidemia    Nursing/staff reports the patient remains receptive to supportive care for mobilization/transfer assistance.  No appreciable nutritional decline.  No hydration issues reported.    Patient's valproic acid level was noted to be significantly elevated, with associated symptoms.  Dosing has been decreased, pending repeat for surveillance.  Symptoms have begun to resolve.    No reports of any cyclical/persistent hypoglycemic episodes.    Vital signs have been stable.      PAST MEDICAL & SURGICAL HISTORY:   Past Medical History:   Diagnosis Date   • Cardiac abnormality    • CHF (congestive heart failure) (CMS/HCC)    • COPD (chronic obstructive pulmonary disease) (CMS/HCC)    • Diabetes (CMS/[facility])    • Glaucoma    • Schizophrenia, chronic condition (CMS/[facility])       No past surgical  history on file.      MEDICATIONS:  I have reviewed and reconciled the patients medication list in the patients chart at the skilled nursing facility today.      ALLERGIES:    Allergies   Allergen Reactions   • Penicillins Rash         SOCIAL HISTORY:    Social History     Socioeconomic History   • Marital status: Single     Spouse name: Not on file   • Number of children: Not on file   • Years of education: Not on file   • Highest education level: Not on file   Tobacco Use   • Smoking status: Never Smoker   • Smokeless tobacco: Never Used   Substance and Sexual Activity   • Alcohol use: No   • Drug use: No   • Sexual activity: Defer       FAMILY HISTORY:    Family History   Problem Relation Age of Onset   • Diabetes Other    • Glaucoma Other        REVIEW OF SYSTEMS:    Review of Systems  · Appetite: Fair []   Good [x]   Poor []   Weight Loss []  [x]  Weight Stable   Unavoidable Weight Loss []  Tolerating Tube Feeding []    Supplements Provided []   · Constitutional: Negative for fever, chills, diaphoresis or fatigue and weight change.  Patient is interactive but a poor historian.  · HENT: No dysphagia; no changes to vision/hearing/smell/taste; no epistaxis  · Eyes: Negative for redness and visual disturbance.   · Respiratory: Negative for shortness of breath, chest pain, cough or chest tightness.   · Cardiovascular: Negative for chest pain and palpitations.   · Gastrointestinal: Negative for abdominal distention, abdominal pain and blood in stool.   · Endocrine: Negative for cold intolerance and heat intolerance.   · Genitourinary: Negative for difficulty urinating, dysuria and frequency.Negative for hematuria   · Musculoskeletal: Chronic myalgias and arthralgias  · Integumentary: No open wounds, rash or concerning skin lesions  · Neurological: Negative for syncope, weakness and headaches.   · Hematological: Negative for adenopathy. Does not bruise/bleed easily.   · Immunological: Negative for reported allergies  or immunological disorders  · Psychological: Chronic behavioral issues, no acute changes.    PHYSICAL EXAMINATION:   VITAL SIGNS:   Vitals:    06/16/21 1038   BP: 122/70   Pulse: 75   Resp: 18   Temp: 97.6 °F (36.4 °C)   SpO2: 97%       Physical Exam    General Appearance:  [x]  Alert   [x]  Oriented x person  [x]  No acute distress     [x]  Confused, chronically []  Disoriented   []  Comatose   Head:  Atraumatic and normocephalic, without obvious abnormality.   Eyes:         PERRLA, conjunctivae and sclerae normal, no Icterus. No pallor. Extra-occular movements are within normal limits.   Ears:  Ears appear intact with no abnormalities noted.   Throat: No oral lesions, no thrush, oral mucosa moist.   Neck: Supple, trachea midline, no thyromegaly, no carotid bruit.   Back:   No kyphoscoliosis. No tenderness to palpation.   Lungs:   Chest shape is normal. Breath sounds heard bilaterally equally.  No wheezing.  Audible air exchange noted all lung fields.   Heart:  Normal S1 and S2, no murmur, no gallop, no rub. No JVD.   Abdomen:   Normal bowel sounds, no masses, no organomegaly. Soft, non-tender, non-distended, no guarding.  No CVA tenderness.   Extremities: Moves all extremities, without edema, cyanosis or clubbing.  Frail build.   Poor core strength and stability.   Pulses: Pulses palpable and equal bilaterally.   Skin:  Labial excoriation as per nursing report.  Generalized dry skin noted.  Age-related atrophy of skin.   Neurologic: [x] Normal speech []  Normal mental status    [x] Cranial nerves II through XII intact   [x]  No anosmia [x]  DTR 2+ [x]  Proprioception intact  [x]  No focal motor/sensory deficits      Psych/Mood:                    [x]  No acute changes, flat affect[]  Depressed      Urinary:      [x]  Continent  [x]  Incontinent, at times[]  Retention  []  F/C     []  UTI w/treatment in progress         ASSESSMENT     Diagnoses and all orders for this visit:    1. Impaired mobility and ADLs  (Primary)    2. Paranoid schizophrenia (CMS/[facility])    3. Type 2 diabetes mellitus with complication, with long-term current use of insulin (CMS/[facility])    4. Essential hypertension    5. Dyslipidemia          PLAN  Agree with reduction in dosing of valproic acid, plan to follow repeat laboratory evaluation this week.  Further changes can be made based on clinical need.  Medication being used for behavioral effects.    Continue healthy dietary choices, avoidance of prolonged fasting periods.  Maintain appropriate hydration status.  Continue blood glucose monitoring, further changes to her treatment regimen will be made based on clinical need.    Vital signs demonstrate hemodynamic stability, blood pressure is at goal.    Surveillance labs otherwise when needed.    Agree with treatment to labial excoriation as per most recent peer provider evaluation.    [x]  Discussed Patient in detail with nursing/staff, addressed all needs today.     [x]  Plan of Care Reviewed   []  PT/OT Reviewed   []  Order Changes  []  Discharge Plans Reviewed   []  Code Status Changes     I spent 30 minutes caring for Izabella on this date of service. This time includes time spent by me in the following activities:preparing for the visit, performing a medically appropriate examination and/or evaluation , counseling and educating the patient/family/caregiver, ordering medications, tests, or procedures, documenting information in the medical record and care coordination    I have reviewed and updated all copied forward information, as appropriate.  I attest to the accuracy and relevance of any unchanged information.       Fabian Santos DO  06/16/2021

## 2021-06-21 ENCOUNTER — NURSING HOME (OUTPATIENT)
Dept: FAMILY MEDICINE CLINIC | Facility: CLINIC | Age: 61
End: 2021-06-21

## 2021-06-21 VITALS
BODY MASS INDEX: 26.15 KG/M2 | WEIGHT: 172 LBS | SYSTOLIC BLOOD PRESSURE: 112 MMHG | HEART RATE: 61 BPM | RESPIRATION RATE: 20 BRPM | TEMPERATURE: 97.8 F | OXYGEN SATURATION: 95 % | DIASTOLIC BLOOD PRESSURE: 63 MMHG

## 2021-06-21 DIAGNOSIS — Z86.73 HISTORY OF ISCHEMIC STROKE: ICD-10-CM

## 2021-06-21 DIAGNOSIS — F20.9 SCHIZOPHRENIA, CHRONIC CONDITION (HCC): ICD-10-CM

## 2021-06-21 DIAGNOSIS — Z74.09 IMPAIRED MOBILITY AND ADLS: ICD-10-CM

## 2021-06-21 DIAGNOSIS — T42.6X1S: Primary | ICD-10-CM

## 2021-06-21 DIAGNOSIS — R63.2 APPETITE INCREASE: ICD-10-CM

## 2021-06-21 DIAGNOSIS — Z78.9 IMPAIRED MOBILITY AND ADLS: ICD-10-CM

## 2021-06-21 PROCEDURE — 99309 SBSQ NF CARE MODERATE MDM 30: CPT | Performed by: NURSE PRACTITIONER

## 2021-06-22 NOTE — PROGRESS NOTES
Nursing Home Follow Up Note      Fabian Santos DO []   [facility] [x]  [address] [place] [address]  Phone: [phone]  Fax: [phone] Yohan Cuellar MD []    Chaz Mandel DO []   [address] [place] [address]  Phone: [phone]  Fax: [phone]     PATIENT NAME: Izabella Agudelo                                                                          YOB: 1960           DATE OF SERVICE: 06/21/2021  FACILITY:  [][facility]   [] [place]   [x] [facility]   [] [facility]   [] Other ______________________________________________________________________      CHIEF COMPLAINT:    Follow-up elevated valproic acid level    Follow-up decreased appetite and weight loss    HISTORY OF PRESENT ILLNESS:     Patient with increased lethargy, and decreased appetite with weight loss so valproic acid level checked.  Her level was greater than 150 so Depakote was stopped and level rechecked on Friday.  Her level was 70 on Friday and she was much less lethargic and eating better.  Her Depakote dose was changed to 500 mg 3 times daily.  She takes this for her schizophrenia.  She has continued to be non-lethargic and is eating very well.  She is sitting up in her wheelchair snacking today.  She has gained 9 pounds in the past week.    PAST MEDICAL & SURGICAL HISTORY:   Past Medical History:   Diagnosis Date   • Cardiac abnormality    • CHF (congestive heart failure) (CMS/HCC)    • COPD (chronic obstructive pulmonary disease) (CMS/HCC)    • Diabetes (CMS/HCC)    • Glaucoma    • Schizophrenia, chronic condition (CMS/HCC)       No past surgical history on file.      MEDICATIONS:  I have reviewed and reconciled the patients medication list in the patients chart at the skilled nursing facility today.      ALLERGIES:    Allergies   Allergen Reactions   • Penicillins Rash         SOCIAL HISTORY:    Social History     Socioeconomic History   • Marital status: Single     Spouse name:  Not on file   • Number of children: Not on file   • Years of education: Not on file   • Highest education level: Not on file   Tobacco Use   • Smoking status: Never Smoker   • Smokeless tobacco: Never Used   Substance and Sexual Activity   • Alcohol use: No   • Drug use: No   • Sexual activity: Defer       FAMILY HISTORY:    Family History   Problem Relation Age of Onset   • Diabetes Other    • Glaucoma Other        REVIEW OF SYSTEMS:    Review of Systems   Reason unable to perform ROS: ROS per nursing and patient.   Constitutional: Negative for activity change, appetite change, chills, diaphoresis, fatigue, fever, unexpected weight gain and unexpected weight loss.   HENT: Negative for congestion, ear pain, mouth sores, nosebleeds, postnasal drip, rhinorrhea, sinus pressure, sneezing, sore throat, swollen glands and trouble swallowing.    Eyes: Negative for blurred vision, pain, discharge, redness, itching and visual disturbance.   Respiratory: Negative for apnea, cough, choking, chest tightness, shortness of breath, wheezing and stridor.    Cardiovascular: Negative for chest pain, palpitations and leg swelling.   Gastrointestinal: Negative for abdominal distention, abdominal pain, blood in stool, constipation, diarrhea, nausea, vomiting, GERD and indigestion.   Endocrine: Negative for polydipsia and polyuria.   Genitourinary: Positive for urinary incontinence. Negative for decreased urine volume, difficulty urinating, dysuria, flank pain, frequency, hematuria and urgency.   Musculoskeletal: Positive for arthralgias. Negative for back pain, gait problem, joint swelling and myalgias.   Skin: Negative for color change, dry skin, rash and skin lesions.        Excoriation to the labia   Allergic/Immunologic: Negative for environmental allergies.   Neurological: Positive for speech difficulty, weakness, memory problem and confusion. Negative for dizziness and seizures.   Psychiatric/Behavioral: Negative for behavioral  problems, dysphoric mood, hallucinations, sleep disturbance and depressed mood. The patient is not nervous/anxious.          PHYSICAL EXAMINATION:   VITAL SIGNS:   Vitals:    06/21/21 1525   BP: 112/63   Pulse: 61   Resp: 20   Temp: 97.8 °F (36.6 °C)   SpO2: 95%   Weight: 78 kg (172 lb)       Physical Exam  Vitals and nursing note reviewed.   Constitutional:       General: She is not in acute distress.     Appearance: She is well-developed.   HENT:      Head: Normocephalic.      Right Ear: External ear normal.      Left Ear: External ear normal.      Nose: Nose normal.      Mouth/Throat:      Pharynx: No oropharyngeal exudate.   Eyes:      Conjunctiva/sclera: Conjunctivae normal.   Neck:      Thyroid: No thyromegaly.      Trachea: No tracheal deviation.   Cardiovascular:      Rate and Rhythm: Normal rate and regular rhythm.      Heart sounds: Normal heart sounds. No murmur heard.     Pulmonary:      Effort: Pulmonary effort is normal. No respiratory distress.      Breath sounds: Normal breath sounds. No wheezing or rales.   Chest:      Chest wall: No tenderness.   Abdominal:      General: Bowel sounds are normal. There is no distension.      Palpations: Abdomen is soft. There is no splenomegaly or mass.      Tenderness: There is no abdominal tenderness. There is no guarding or rebound.      Hernia: No hernia is present.   Musculoskeletal:         General: No tenderness. Normal range of motion.      Cervical back: Normal range of motion and neck supple.   Lymphadenopathy:      Cervical: No cervical adenopathy.      Right cervical: No superficial, deep or posterior cervical adenopathy.     Left cervical: No superficial, deep or posterior cervical adenopathy.   Skin:     General: Skin is warm and dry.      Findings: No rash.   Neurological:      Mental Status: She is alert. Mental status is at baseline. She is disoriented.      Motor: Weakness present.      Gait: Gait abnormal.   Psychiatric:         Mood and Affect:  Mood normal.         Behavior: Behavior normal.         Cognition and Memory: Cognition is impaired. Memory is impaired.         RECORDS REVIEW:   I have reviewed and interpreted the discharge summary and medications    ASSESSMENT     Diagnoses and all orders for this visit:    1. Valproic acid toxicity, accidental or unintentional, sequela (Primary)    2. Schizophrenia, chronic condition (CMS/HCC)    3. Appetite increase    4. History of ischemic stroke    5. Impaired mobility and ADLs        PLAN    Valproic acid toxicity/schizophrenia  -Valproic acid 70 Friday. Depakote was restarted at 500 mg tid.   She takes Depakote for her schizophrenia. Check Valproic acid level in 2 weeks. Appetite much improved and she has gained 9 pounds in the past week.  Will follow-up and continue to monitor.    Nursing encouraged to keep me informed of any acute changes, lack of improvement, or any new concerning symptoms.    Staff to continue supportive care for all ADLs.    [x]  Discussed Patient in detail with nursing/staff, addressed all needs today.     [x]  Plan of Care Reviewed   [x]  PT/OT Reviewed   [x]  Order Changes  []  Discharge Plans Reviewed  [x]  Advance Directive on file with Nursing Home.   [x]  POA on file with Nursing Home.   [x]  Code Status listed: [x]  Full Code   []  DNR     “I confirm accuracy of unchanged data/findings which have been carried forward from previous visit, as well as I have updated appropriately those that have changed.”                          Fariha Cardona, FLOWER.

## 2021-07-19 ENCOUNTER — NURSING HOME (OUTPATIENT)
Dept: FAMILY MEDICINE CLINIC | Facility: CLINIC | Age: 61
End: 2021-07-19

## 2021-07-19 VITALS
SYSTOLIC BLOOD PRESSURE: 130 MMHG | RESPIRATION RATE: 20 BRPM | BODY MASS INDEX: 25.24 KG/M2 | OXYGEN SATURATION: 95 % | TEMPERATURE: 97.7 F | WEIGHT: 166 LBS | HEART RATE: 74 BPM | DIASTOLIC BLOOD PRESSURE: 62 MMHG

## 2021-07-19 DIAGNOSIS — Z74.09 IMPAIRED MOBILITY AND ADLS: ICD-10-CM

## 2021-07-19 DIAGNOSIS — T42.6X1S: ICD-10-CM

## 2021-07-19 DIAGNOSIS — F20.9 SCHIZOPHRENIA, CHRONIC CONDITION (HCC): ICD-10-CM

## 2021-07-19 DIAGNOSIS — Z78.9 IMPAIRED MOBILITY AND ADLS: ICD-10-CM

## 2021-07-19 DIAGNOSIS — R63.2 APPETITE INCREASE: ICD-10-CM

## 2021-07-19 PROCEDURE — 99309 SBSQ NF CARE MODERATE MDM 30: CPT | Performed by: NURSE PRACTITIONER

## 2021-07-19 NOTE — PROGRESS NOTES
Nursing Home Follow Up Note      Fabian Santos DO []   [facility] [x]  [address] [place] [address]  Phone: [phone]  Fax: [phone] Yohan Cuellar MD []    Chaz Mandel DO []   [address] [place] [address]  Phone: [phone]  Fax: [phone]     PATIENT NAME: Izabella Agudelo                                                                          YOB: 1960           DATE OF SERVICE: 07/19/2021  FACILITY:  [][facility]   [] [facility]   [x] [facility]   [] [facility]   [] Other ______________________________________________________________________      CHIEF COMPLAINT:    Follow-up elevated valproic acid level    Follow-up decreased appetite and weight loss    HISTORY OF PRESENT ILLNESS:     Patient with increased lethargy, and decreased appetite with weight loss so valproic acid level checked several weeks ago.  Her level was greater than 150. Her repeat 70 on Friday and she was much less lethargic and eating better.  Her Depakote dose was changed to 500 mg 3 times daily.  She takes this for her schizophrenia.  She has continued to be non-lethargic and is eating very well.  Weight remains stable from 130-132.    PAST MEDICAL & SURGICAL HISTORY:   Past Medical History:   Diagnosis Date   • Cardiac abnormality    • CHF (congestive heart failure) (CMS/HCC)    • COPD (chronic obstructive pulmonary disease) (CMS/HCC)    • Diabetes (CMS/HCC)    • Glaucoma    • Schizophrenia, chronic condition (CMS/HCC)       No past surgical history on file.      MEDICATIONS:  I have reviewed and reconciled the patients medication list in the patients chart at the skilled nursing facility today.      ALLERGIES:    Allergies   Allergen Reactions   • Penicillins Rash         SOCIAL HISTORY:    Social History     Socioeconomic History   • Marital status: Single     Spouse name: Not on file   • Number of children: Not on file   • Years of education: Not on file   • Highest  education level: Not on file   Tobacco Use   • Smoking status: Never Smoker   • Smokeless tobacco: Never Used   Substance and Sexual Activity   • Alcohol use: No   • Drug use: No   • Sexual activity: Defer       FAMILY HISTORY:    Family History   Problem Relation Age of Onset   • Diabetes Other    • Glaucoma Other        REVIEW OF SYSTEMS:    Review of Systems   Reason unable to perform ROS: ROS per nursing and patient.   Constitutional: Negative for activity change, appetite change, chills, diaphoresis, fatigue, fever, unexpected weight gain and unexpected weight loss.   HENT: Negative for congestion, ear pain, mouth sores, nosebleeds, postnasal drip, rhinorrhea, sinus pressure, sneezing, sore throat, swollen glands and trouble swallowing.    Eyes: Negative for blurred vision, pain, discharge, redness, itching and visual disturbance.   Respiratory: Negative for apnea, cough, choking, chest tightness, shortness of breath, wheezing and stridor.    Cardiovascular: Negative for chest pain, palpitations and leg swelling.   Gastrointestinal: Negative for abdominal distention, abdominal pain, blood in stool, constipation, diarrhea, nausea, vomiting, GERD and indigestion.   Endocrine: Negative for polydipsia and polyuria.   Genitourinary: Positive for urinary incontinence. Negative for decreased urine volume, difficulty urinating, dysuria, flank pain, frequency, hematuria and urgency.   Musculoskeletal: Positive for arthralgias. Negative for back pain, gait problem, joint swelling and myalgias.   Skin: Negative for color change, dry skin, rash and skin lesions.   Allergic/Immunologic: Negative for environmental allergies.   Neurological: Positive for speech difficulty, weakness, memory problem and confusion. Negative for dizziness and seizures.   Psychiatric/Behavioral: Negative for behavioral problems, dysphoric mood, hallucinations, sleep disturbance and depressed mood. The patient is not nervous/anxious.           PHYSICAL EXAMINATION:   VITAL SIGNS:   Vitals:    07/19/21 1533   BP: 130/62   Pulse: 74   Resp: 20   Temp: 97.7 °F (36.5 °C)   SpO2: 95%   Weight: 75.3 kg (166 lb)       Physical Exam  Vitals and nursing note reviewed.   Constitutional:       General: She is not in acute distress.     Appearance: She is well-developed.   HENT:      Head: Normocephalic.      Right Ear: External ear normal.      Left Ear: External ear normal.      Nose: Nose normal.      Mouth/Throat:      Pharynx: No oropharyngeal exudate.   Eyes:      Conjunctiva/sclera: Conjunctivae normal.   Neck:      Thyroid: No thyromegaly.      Trachea: No tracheal deviation.   Cardiovascular:      Rate and Rhythm: Normal rate and regular rhythm.      Heart sounds: Normal heart sounds. No murmur heard.     Pulmonary:      Effort: Pulmonary effort is normal. No respiratory distress.      Breath sounds: Normal breath sounds. No wheezing or rales.   Chest:      Chest wall: No tenderness.   Abdominal:      General: Bowel sounds are normal. There is no distension.      Palpations: Abdomen is soft. There is no splenomegaly or mass.      Tenderness: There is no abdominal tenderness. There is no guarding or rebound.      Hernia: No hernia is present.   Musculoskeletal:         General: No tenderness. Normal range of motion.      Cervical back: Normal range of motion and neck supple.   Lymphadenopathy:      Cervical: No cervical adenopathy.      Right cervical: No superficial, deep or posterior cervical adenopathy.     Left cervical: No superficial, deep or posterior cervical adenopathy.   Skin:     General: Skin is warm and dry.      Findings: No rash.   Neurological:      Mental Status: She is alert. Mental status is at baseline. She is disoriented.      Motor: Weakness present.      Gait: Gait abnormal.   Psychiatric:         Mood and Affect: Mood normal.         Behavior: Behavior normal.         Cognition and Memory: Cognition is impaired. Memory is  impaired.         RECORDS REVIEW:   I have reviewed and interpreted the discharge summary and medications    ASSESSMENT     Diagnoses and all orders for this visit:    1. Valproic acid toxicity, accidental or unintentional, sequela    2. Appetite increase    3. Schizophrenia, chronic condition (CMS/HCC)    4. Impaired mobility and ADLs        PLAN    Valproic acid toxicity/schizophrenia/appetite increase  -Valproic acid level 57 on 7/13.  Level and schizophrenia have been stable since her dose was decreased 1 month ago.  She has also been eating much better and weight has remained stable the past month.  No signs and symptoms of any distress.  We will continue to monitor routinely with screening labs.  Will follow-up and continue to monitor.    Nursing encouraged to keep me informed of any acute changes, lack of improvement, or any new concerning symptoms.    Staff to continue supportive care for all ADLs.    [x]  Discussed Patient in detail with nursing/staff, addressed all needs today.     [x]  Plan of Care Reviewed   [x]  PT/OT Reviewed   [x]  Order Changes  []  Discharge Plans Reviewed  [x]  Advance Directive on file with Nursing Home.   [x]  POA on file with Nursing Home.   [x]  Code Status listed: [x]  Full Code   []  DNR     “I confirm accuracy of unchanged data/findings which have been carried forward from previous visit, as well as I have updated appropriately those that have changed.”                              Fariha Cardona, [facility].

## 2021-07-21 ENCOUNTER — NURSING HOME (OUTPATIENT)
Dept: FAMILY MEDICINE CLINIC | Facility: CLINIC | Age: 61
End: 2021-07-21

## 2021-07-21 VITALS
WEIGHT: 166 LBS | DIASTOLIC BLOOD PRESSURE: 67 MMHG | HEART RATE: 67 BPM | OXYGEN SATURATION: 95 % | TEMPERATURE: 97.6 F | SYSTOLIC BLOOD PRESSURE: 122 MMHG | BODY MASS INDEX: 25.24 KG/M2 | RESPIRATION RATE: 20 BRPM

## 2021-07-21 DIAGNOSIS — I10 ESSENTIAL HYPERTENSION: ICD-10-CM

## 2021-07-21 DIAGNOSIS — E11.8 TYPE 2 DIABETES MELLITUS WITH COMPLICATION, WITH LONG-TERM CURRENT USE OF INSULIN (HCC): ICD-10-CM

## 2021-07-21 DIAGNOSIS — Z78.9 IMPAIRED MOBILITY AND ADLS: Primary | ICD-10-CM

## 2021-07-21 DIAGNOSIS — I69.90 LATE EFFECTS OF CVA (CEREBROVASCULAR ACCIDENT): ICD-10-CM

## 2021-07-21 DIAGNOSIS — Z74.09 IMPAIRED MOBILITY AND ADLS: Primary | ICD-10-CM

## 2021-07-21 DIAGNOSIS — Z79.4 TYPE 2 DIABETES MELLITUS WITH COMPLICATION, WITH LONG-TERM CURRENT USE OF INSULIN (HCC): ICD-10-CM

## 2021-07-21 DIAGNOSIS — F20.9 SCHIZOPHRENIA, CHRONIC CONDITION (HCC): ICD-10-CM

## 2021-07-21 DIAGNOSIS — M54.50 LUMBAR PAIN: ICD-10-CM

## 2021-07-21 PROCEDURE — 99309 SBSQ NF CARE MODERATE MDM 30: CPT | Performed by: FAMILY MEDICINE

## 2021-07-21 NOTE — PROGRESS NOTES
Nursing Home Progress Note        Fabian Santos DO [x]  [facility] []  [address] [place] [address]  Phone: [phone]  Fax: [phone] Yohan Cuellar MD []  Chaz Mandel DO []  [address] [place] [address]  Phone: [phone]  Fax: [phone]     PATIENT NAME: Izabella Agudelo                                                                          YOB: 1960           DATE OF SERVICE: 7/21/2021  FACILITY: []  [place]  [] [place]  [x]  [facility]  [] [facility]  []  Other ______________________________________________________________________      CHIEF COMPLAINT:  Impaired mobility and ADL/paranoid schizophrenia/diabetes mellitus treated with insulin/hypertension/dyslipidemia      HISTORY OF PRESENT ILLNESS:   [x]  Follow Up visit for coordination of long term care issues and chronic medical management of Diagnoses and all orders for this visit:    1. Impaired mobility and ADLs (Primary)    2. Schizophrenia, chronic condition (CMS/[facility])    3. Type 2 diabetes mellitus with complication, with long-term current use of insulin (CMS/[facility])    4. Essential hypertension    5. Late effects of CVA (cerebrovascular accident)    6. Lumbar pain    Patient has been more resistant lately to staff moving her, complaining of some thoracic and lumbar discomfort.  No reports of any falls or injuries.  No overlying skin rashes.  Nutritionally she has done well, good hydration.    No acute behavioral changes reported with the exception of less receptive to mobilization/transfer assistance.    No reports of any cyclical/persistent hypoglycemia.    Vital signs have been stable.    No new neurological deficits.      PAST MEDICAL & SURGICAL HISTORY:   Past Medical History:   Diagnosis Date   • Cardiac abnormality    • CHF (congestive heart failure) (CMS/[facility])    • COPD (chronic obstructive pulmonary disease) (CMS/[facility])    • Diabetes (CMS/[facility])    • Glaucoma    • Schizophrenia,  chronic condition (CMS/HCC)       No past surgical history on file.      MEDICATIONS:  I have reviewed and reconciled the patients medication list in the patients chart at the skilled nursing facility today.      ALLERGIES:    Allergies   Allergen Reactions   • Penicillins Rash         SOCIAL HISTORY:    Social History     Socioeconomic History   • Marital status: Single     Spouse name: Not on file   • Number of children: Not on file   • Years of education: Not on file   • Highest education level: Not on file   Tobacco Use   • Smoking status: Never Smoker   • Smokeless tobacco: Never Used   Substance and Sexual Activity   • Alcohol use: No   • Drug use: No   • Sexual activity: Defer       FAMILY HISTORY:    Family History   Problem Relation Age of Onset   • Diabetes Other    • Glaucoma Other        REVIEW OF SYSTEMS:    Review of Systems  · Appetite: Fair []   Good [x]   Poor []   Weight Loss []  [x]  Weight Stable   Unavoidable Weight Loss []  Tolerating Tube Feeding []    Supplements Provided []   · Constitutional: Negative for fever, chills, diaphoresis or fatigue and weight change.  Patient is interactive but a poor historian.  · HENT: No dysphagia; no changes to vision/hearing/smell/taste; no epistaxis  · Eyes: Negative for redness and visual disturbance.   · Respiratory: Negative for shortness of breath, chest pain, cough or chest tightness.   · Cardiovascular: Negative for chest pain and palpitations.   · Gastrointestinal: Negative for abdominal distention, abdominal pain and blood in stool.   · Endocrine: Negative for cold intolerance and heat intolerance.   · Genitourinary: Negative for difficulty urinating, dysuria and frequency.Negative for hematuria   · Musculoskeletal: Chronic myalgias and arthralgias.  Worsened lumbago as per above.  · Integumentary: No open wounds, rash or concerning skin lesions  · Neurological: Negative for syncope, weakness and headaches.   · Hematological: Negative for  adenopathy. Does not bruise/bleed easily.   · Immunological: Negative for reported allergies or immunological disorders  · Psychological: Chronic behavioral issues, no acute changes.    PHYSICAL EXAMINATION:   VITAL SIGNS:   Vitals:    07/21/21 1133   BP: 122/67   Pulse: 67   Resp: 20   Temp: 97.6 °F (36.4 °C)   SpO2: 95%       Physical Exam    General Appearance:  [x]  Alert   [x]  Oriented x person  [x]  No acute distress     [x]  Confused, chronically []  Disoriented   []  Comatose   Head:  Atraumatic and normocephalic, without obvious abnormality.   Eyes:         PERRLA, conjunctivae and sclerae normal, no Icterus. No pallor. Extra-occular movements are within normal limits.   Ears:  Ears appear intact with no abnormalities noted.   Throat: No oral lesions, no thrush, oral mucosa moist.   Neck: Supple, trachea midline, no thyromegaly, no carotid bruit.   Back:   No kyphoscoliosis. No tenderness to palpation.   Lungs:   Chest shape is normal. Breath sounds heard bilaterally equally.  No wheezing.  Audible air exchange noted all lung fields.   Heart:  Normal S1 and S2, no murmur, no gallop, no rub. No JVD.   Abdomen:   Normal bowel sounds, no masses, no organomegaly. Soft, non-tender, non-distended, no guarding.  No CVA tenderness.   Extremities: Moves all extremities, without edema, cyanosis or clubbing.  Frail build.   Poor core strength and stability.   Pulses: Pulses palpable and equal bilaterally.   Skin:  Labial excoriation as per nursing report.  Generalized dry skin noted.  Age-related atrophy of skin.   Neurologic: [x] Normal speech []  Normal mental status    [x] Cranial nerves II through XII intact   [x]  No anosmia [x]  DTR 2+ [x]  Proprioception intact  [x]  No focal motor/sensory deficits      Psych/Mood:                    [x]  No acute changes, flat affect[]  Depressed      Urinary:      [x]  Continent  [x]  Incontinent, at times[]  Retention  []  F/C     []  UTI w/treatment in progress          ASSESSMENT     Diagnoses and all orders for this visit:    1. Impaired mobility and ADLs (Primary)    2. Schizophrenia, chronic condition (CMS/[facility])    3. Type 2 diabetes mellitus with complication, with long-term current use of insulin (CMS/[facility])    4. Essential hypertension    5. Late effects of CVA (cerebrovascular accident)    6. Lumbar pain          PLAN  Plan to evaluate thoracic and lumbar spine with x-rays.  No obvious findings of trauma or injury to explain her symptoms at this time.    Continue to follow her overall psychiatric state of being, no acute behavioral changes reported.  Continue current medication regimen, periodic surveillance labs as indicated.    Continue to avoid prolonged fasting periods, maintain appropriate hydration status.  Continue healthy dietary choices.  Periodic hemoglobin A1c with dose adjustment as indicated to maximize blood glucose control.    Vital signs demonstrate hemodynamic stability.  Blood pressure is at goal.    No new neurological deficits.    Continue supportive care for mobilization/transfer symptoms, as well as ADLs of knee.      [x]  Discussed Patient in detail with nursing/staff, addressed all needs today.     [x]  Plan of Care Reviewed   []  PT/OT Reviewed   []  Order Changes  []  Discharge Plans Reviewed   []  Code Status Changes     I spent 30 minutes caring for Izabella on this date of service. This time includes time spent by me in the following activities:preparing for the visit, performing a medically appropriate examination and/or evaluation , counseling and educating the patient/family/caregiver, ordering medications, tests, or procedures, documenting information in the medical record and care coordination    I have reviewed and updated all copied forward information, as appropriate.  I attest to the accuracy and relevance of any unchanged information.       Fabian Santos DO  7/21/2021

## 2021-07-23 ENCOUNTER — NURSING HOME (OUTPATIENT)
Dept: FAMILY MEDICINE CLINIC | Facility: CLINIC | Age: 61
End: 2021-07-23

## 2021-07-23 VITALS
BODY MASS INDEX: 25.24 KG/M2 | RESPIRATION RATE: 20 BRPM | HEART RATE: 68 BPM | DIASTOLIC BLOOD PRESSURE: 65 MMHG | SYSTOLIC BLOOD PRESSURE: 120 MMHG | TEMPERATURE: 97.7 F | OXYGEN SATURATION: 96 % | WEIGHT: 166 LBS

## 2021-07-23 DIAGNOSIS — R19.7 DIARRHEA, UNSPECIFIED TYPE: ICD-10-CM

## 2021-07-23 DIAGNOSIS — Z78.9 IMPAIRED MOBILITY AND ADLS: ICD-10-CM

## 2021-07-23 DIAGNOSIS — R10.84 GENERALIZED ABDOMINAL PAIN: ICD-10-CM

## 2021-07-23 DIAGNOSIS — I69.90 LATE EFFECTS OF CVA (CEREBROVASCULAR ACCIDENT): ICD-10-CM

## 2021-07-23 DIAGNOSIS — M54.6 ACUTE THORACIC BACK PAIN, UNSPECIFIED BACK PAIN LATERALITY: ICD-10-CM

## 2021-07-23 DIAGNOSIS — M54.50 PAIN, LUMBAR REGION: Primary | ICD-10-CM

## 2021-07-23 DIAGNOSIS — R11.2 NON-INTRACTABLE VOMITING WITH NAUSEA, UNSPECIFIED VOMITING TYPE: ICD-10-CM

## 2021-07-23 DIAGNOSIS — Z74.09 IMPAIRED MOBILITY AND ADLS: ICD-10-CM

## 2021-07-23 PROCEDURE — 99309 SBSQ NF CARE MODERATE MDM 30: CPT | Performed by: NURSE PRACTITIONER

## 2021-07-25 NOTE — PROGRESS NOTES
Nursing Home Follow Up Note      Fabian Santos DO []   [facility] [x]  [address] [place] [address]  Phone: [phone]  Fax: [phone] Yohan Cuellar MD []    Chaz Mandel DO []   [address] [place] [address]  Phone: [phone]  Fax: [phone]     PATIENT NAME: Izabella Agudelo                                                                          YOB: 1960           DATE OF SERVICE: 07/23/2021  FACILITY:  [][facility]   [] [place]   [x] [facility]   [] [facility]   [] Other ______________________________________________________________________      CHIEF COMPLAINT:    Follow-up back pain    Abdominal pain, diarrhea and vomiting    HISTORY OF PRESENT ILLNESS:     Patient with complaints of lumbar and thoracic spine pain for the past several days.  X-rays performed and results report no acute abnormal findings.  She has been receiving Tylenol for the pain.    Patient has complaints today of abdominal and also had an episode of vomiting this morning.  She has had several episodes of diarrhea for the past couple days.    PAST MEDICAL & SURGICAL HISTORY:   Past Medical History:   Diagnosis Date   • Cardiac abnormality    • CHF (congestive heart failure) (CMS/[facility])    • COPD (chronic obstructive pulmonary disease) (CMS/[facility])    • Diabetes (CMS/[facility])    • Glaucoma    • Schizophrenia, chronic condition (CMS/[facility])       No past surgical history on file.      MEDICATIONS:  I have reviewed and reconciled the patients medication list in the patients chart at the skilled nursing facility today.      ALLERGIES:    Allergies   Allergen Reactions   • Penicillins Rash         SOCIAL HISTORY:    Social History     Socioeconomic History   • Marital status: Single     Spouse name: Not on file   • Number of children: Not on file   • Years of education: Not on file   • Highest education level: Not on file   Tobacco Use   • Smoking status: Never Smoker   • Smokeless  tobacco: Never Used   Substance and Sexual Activity   • Alcohol use: No   • Drug use: No   • Sexual activity: Defer       FAMILY HISTORY:    Family History   Problem Relation Age of Onset   • Diabetes Other    • Glaucoma Other        REVIEW OF SYSTEMS:    Review of Systems   Reason unable to perform ROS: ROS per nursing and patient.   Constitutional: Negative for activity change, appetite change, chills, diaphoresis, fatigue, fever, unexpected weight gain and unexpected weight loss.   HENT: Negative for congestion, ear pain, mouth sores, nosebleeds, postnasal drip, rhinorrhea, sinus pressure, sneezing, sore throat, swollen glands and trouble swallowing.    Eyes: Negative for blurred vision, pain, discharge, redness, itching and visual disturbance.   Respiratory: Negative for apnea, cough, choking, chest tightness, shortness of breath, wheezing and stridor.    Cardiovascular: Negative for chest pain, palpitations and leg swelling.   Gastrointestinal: Positive for abdominal pain, diarrhea and nausea. Negative for abdominal distention, blood in stool, constipation, vomiting, GERD and indigestion.   Endocrine: Negative for polydipsia and polyuria.   Genitourinary: Positive for urinary incontinence. Negative for decreased urine volume, difficulty urinating, dysuria, flank pain, frequency, hematuria and urgency.   Musculoskeletal: Positive for arthralgias. Negative for back pain, gait problem, joint swelling and myalgias.   Skin: Negative for color change, dry skin, rash and skin lesions.   Allergic/Immunologic: Negative for environmental allergies.   Neurological: Positive for speech difficulty, weakness, memory problem and confusion. Negative for dizziness and seizures.   Psychiatric/Behavioral: Negative for behavioral problems, dysphoric mood, hallucinations, sleep disturbance and depressed mood. The patient is not nervous/anxious.          PHYSICAL EXAMINATION:   VITAL SIGNS:   Vitals:    07/23/21 1432   BP: 120/65    Pulse: 68   Resp: 20   Temp: 97.7 °F (36.5 °C)   SpO2: 96%   Weight: 75.3 kg (166 lb)       Physical Exam  Vitals and nursing note reviewed.   Constitutional:       General: She is not in acute distress.     Appearance: She is well-developed.   HENT:      Head: Normocephalic.      Right Ear: External ear normal.      Left Ear: External ear normal.      Nose: Nose normal.      Mouth/Throat:      Pharynx: No oropharyngeal exudate.   Eyes:      Conjunctiva/sclera: Conjunctivae normal.   Neck:      Thyroid: No thyromegaly.      Trachea: No tracheal deviation.   Cardiovascular:      Rate and Rhythm: Normal rate and regular rhythm.      Heart sounds: Normal heart sounds. No murmur heard.     Pulmonary:      Effort: Pulmonary effort is normal. No respiratory distress.      Breath sounds: Normal breath sounds. No wheezing or rales.   Chest:      Chest wall: No tenderness.   Abdominal:      General: Bowel sounds are normal. There is no distension.      Palpations: Abdomen is soft. There is no splenomegaly or mass.      Tenderness: There is no abdominal tenderness. There is no guarding or rebound.      Hernia: No hernia is present.   Musculoskeletal:         General: No tenderness.      Cervical back: Normal range of motion and neck supple.      Thoracic back: Decreased range of motion.      Lumbar back: Decreased range of motion.      Comments: Pain with ROM   Lymphadenopathy:      Cervical: No cervical adenopathy.      Right cervical: No superficial, deep or posterior cervical adenopathy.     Left cervical: No superficial, deep or posterior cervical adenopathy.   Skin:     General: Skin is warm and dry.      Findings: No rash.   Neurological:      Mental Status: She is alert. Mental status is at baseline. She is disoriented.      Motor: Weakness present.      Gait: Gait abnormal.   Psychiatric:         Mood and Affect: Mood normal.         Behavior: Behavior normal.         Cognition and Memory: Cognition is impaired.  Memory is impaired.         RECORDS REVIEW:   I have reviewed and interpreted the discharge summary and medications    ASSESSMENT     Diagnoses and all orders for this visit:    1. Pain, lumbar region (Primary)    2. Acute thoracic back pain, unspecified back pain laterality    3. Generalized abdominal pain    4. Diarrhea, unspecified type    5. Non-intractable vomiting with nausea, unspecified vomiting type    6. Late effects of CVA (cerebrovascular accident)    7. Impaired mobility and ADLs        PLAN    Lumbar and thoracic pain  -X-rays of lumbar and thoracic spine negative for any acute findings per reports.  She also has complaints of abdominal pain today and has had some vomiting and diarrhea.  In the past she has had constipation with the symptoms so this could be causing the back pain as well.  Will obtain KUB.  If KUB is negative will obtain stools for culture and other labs and continue to work-up her back pain.  Will follow-up and continue to monitor.    Nursing encouraged to keep me informed of any acute changes, lack of improvement, or any new concerning symptoms.    Staff to continue supportive care for all ADLs.    [x]  Discussed Patient in detail with nursing/staff, addressed all needs today.     [x]  Plan of Care Reviewed   [x]  PT/OT Reviewed   [x]  Order Changes  []  Discharge Plans Reviewed  [x]  Advance Directive on file with Nursing Home.   [x]  POA on file with Nursing Home.   [x]  Code Status listed: [x]  Full Code   []  DNR     “I confirm accuracy of unchanged data/findings which have been carried forward from previous visit, as well as I have updated appropriately those that have changed.”                                Fariha Cardona, APRN.

## 2021-07-28 ENCOUNTER — NURSING HOME (OUTPATIENT)
Dept: FAMILY MEDICINE CLINIC | Facility: CLINIC | Age: 61
End: 2021-07-28

## 2021-07-28 VITALS
OXYGEN SATURATION: 95 % | BODY MASS INDEX: 25.24 KG/M2 | WEIGHT: 166 LBS | SYSTOLIC BLOOD PRESSURE: 123 MMHG | RESPIRATION RATE: 20 BRPM | HEART RATE: 70 BPM | DIASTOLIC BLOOD PRESSURE: 72 MMHG | TEMPERATURE: 98 F

## 2021-07-28 DIAGNOSIS — Z74.09 IMPAIRED MOBILITY AND ADLS: ICD-10-CM

## 2021-07-28 DIAGNOSIS — Z78.9 IMPAIRED MOBILITY AND ADLS: ICD-10-CM

## 2021-07-28 DIAGNOSIS — R41.89 COGNITIVE IMPAIRMENT: ICD-10-CM

## 2021-07-28 DIAGNOSIS — M54.6 ACUTE MIDLINE THORACIC BACK PAIN: ICD-10-CM

## 2021-07-28 DIAGNOSIS — I69.90 LATE EFFECTS OF CVA (CEREBROVASCULAR ACCIDENT): ICD-10-CM

## 2021-07-28 DIAGNOSIS — M54.50 ACUTE MIDLINE LOW BACK PAIN, UNSPECIFIED WHETHER SCIATICA PRESENT: Primary | ICD-10-CM

## 2021-07-28 PROCEDURE — 99309 SBSQ NF CARE MODERATE MDM 30: CPT | Performed by: NURSE PRACTITIONER

## 2021-07-29 NOTE — PROGRESS NOTES
Nursing Home Follow Up Note      Fabian Santos DO []   [facility] [x]  [address] [place] [address]  Phone: [phone]  Fax: [phone] Yohan Cuellar MD []    Chaz Mandel DO []   [address] [place] [address]  Phone: [phone]  Fax: [phone]     PATIENT NAME: Izabella Agudelo                                                                          YOB: 1960           DATE OF SERVICE: 07/28/2021  FACILITY:  [][facility]   [][facility]   [x] [facility]   [] [facility]   [] Other ______________________________________________________________________      CHIEF COMPLAINT:    Follow-up back pain    Follow-up abdominal pain, diarrhea and vomiting    HISTORY OF PRESENT ILLNESS:     Patient continues to complain of pain but nursing reports that it has not been as bad and she does not complain as often.  She is sitting up in her chair during visit today and has no complaints today or signs and symptoms of any distress.     Patient with complaints of abdominal pain, diarrhea and vomiting last week so KUB was performed and reported no abnormal findings.  Per nursing, she has had no more reports of abdominal pain and has had no vomiting or diarrhea since.  Denies abdominal pain today.    PAST MEDICAL & SURGICAL HISTORY:   Past Medical History:   Diagnosis Date   • Cardiac abnormality    • CHF (congestive heart failure) (CMS/HCC)    • COPD (chronic obstructive pulmonary disease) (CMS/[facility])    • Diabetes (CMS/[facility])    • Glaucoma    • Schizophrenia, chronic condition (CMS/[facility])       No past surgical history on file.      MEDICATIONS:  I have reviewed and reconciled the patients medication list in the patients chart at the skilled nursing facility today.      ALLERGIES:    Allergies   Allergen Reactions   • Penicillins Rash         SOCIAL HISTORY:    Social History     Socioeconomic History   • Marital status: Single     Spouse name: Not on file   • Number of  children: Not on file   • Years of education: Not on file   • Highest education level: Not on file   Tobacco Use   • Smoking status: Never Smoker   • Smokeless tobacco: Never Used   Substance and Sexual Activity   • Alcohol use: No   • Drug use: No   • Sexual activity: Defer       FAMILY HISTORY:    Family History   Problem Relation Age of Onset   • Diabetes Other    • Glaucoma Other        REVIEW OF SYSTEMS:    Review of Systems   Reason unable to perform ROS: ROS per nursing and patient.   Constitutional: Negative for activity change, appetite change, chills, diaphoresis, fatigue, fever, unexpected weight gain and unexpected weight loss.   HENT: Negative for congestion, ear pain, mouth sores, nosebleeds, postnasal drip, rhinorrhea, sinus pressure, sneezing, sore throat, swollen glands and trouble swallowing.    Eyes: Negative for blurred vision, pain, discharge, redness, itching and visual disturbance.   Respiratory: Negative for apnea, cough, choking, chest tightness, shortness of breath, wheezing and stridor.    Cardiovascular: Negative for chest pain, palpitations and leg swelling.   Gastrointestinal: Negative for abdominal distention, abdominal pain, blood in stool, constipation, diarrhea, nausea, vomiting, GERD and indigestion.   Endocrine: Negative for polydipsia and polyuria.   Genitourinary: Positive for urinary incontinence. Negative for decreased urine volume, difficulty urinating, dysuria, flank pain, frequency, hematuria and urgency.   Musculoskeletal: Positive for arthralgias. Negative for back pain, gait problem, joint swelling and myalgias.   Skin: Negative for color change, dry skin, rash and skin lesions.   Allergic/Immunologic: Negative for environmental allergies.   Neurological: Positive for speech difficulty, weakness, memory problem and confusion. Negative for dizziness and seizures.   Psychiatric/Behavioral: Negative for behavioral problems, dysphoric mood, hallucinations, sleep disturbance  and depressed mood. The patient is not nervous/anxious.          PHYSICAL EXAMINATION:   VITAL SIGNS:   Vitals:    07/28/21 1437   BP: 123/72   Pulse: 70   Resp: 20   Temp: 98 °F (36.7 °C)   SpO2: 95%   Weight: 75.3 kg (166 lb)       Physical Exam  Vitals and nursing note reviewed.   Constitutional:       General: She is not in acute distress.     Appearance: She is well-developed.   HENT:      Head: Normocephalic.      Right Ear: External ear normal.      Left Ear: External ear normal.      Nose: Nose normal.      Mouth/Throat:      Pharynx: No oropharyngeal exudate.   Eyes:      Conjunctiva/sclera: Conjunctivae normal.   Neck:      Thyroid: No thyromegaly.      Trachea: No tracheal deviation.   Cardiovascular:      Rate and Rhythm: Normal rate and regular rhythm.      Heart sounds: Normal heart sounds. No murmur heard.     Pulmonary:      Effort: Pulmonary effort is normal. No respiratory distress.      Breath sounds: Normal breath sounds. No wheezing or rales.   Chest:      Chest wall: No tenderness.   Abdominal:      General: Bowel sounds are normal. There is no distension.      Palpations: Abdomen is soft. There is no splenomegaly or mass.      Tenderness: There is no abdominal tenderness. There is no guarding or rebound.      Hernia: No hernia is present.   Musculoskeletal:         General: No tenderness.      Cervical back: Normal range of motion and neck supple.      Thoracic back: Decreased range of motion.      Lumbar back: Decreased range of motion.      Comments: Pain with ROM   Lymphadenopathy:      Cervical: No cervical adenopathy.      Right cervical: No superficial, deep or posterior cervical adenopathy.     Left cervical: No superficial, deep or posterior cervical adenopathy.   Skin:     General: Skin is warm and dry.      Findings: No rash.   Neurological:      Mental Status: She is alert. Mental status is at baseline. She is disoriented.      Motor: Weakness present.      Gait: Gait abnormal.    Psychiatric:         Mood and Affect: Mood normal.         Behavior: Behavior normal.         Cognition and Memory: Cognition is impaired. Memory is impaired.         RECORDS REVIEW:   I have reviewed and interpreted the discharge summary and medications    ASSESSMENT     Diagnoses and all orders for this visit:    1. Acute midline low back pain, unspecified whether sciatica present (Primary)    2. Acute midline thoracic back pain    3. Cognitive impairment    4. Late effects of CVA (cerebrovascular accident)    5. Impaired mobility and ADLs        PLAN    Lumbar and thoracic pain/cognitive impairment.   -X-ray reports negative for any acute findings last week.  She is receiving as needed Tylenol but given her cognitive impairment and inability ask sometimes will schedule Tylenol 650 mg p.o every 6 hours.  Will follow-up and continue to monitor.    Nursing encouraged to keep me informed of any acute changes, lack of improvement, or any new concerning symptoms.    Staff to continue supportive care for all ADLs.    [x]  Discussed Patient in detail with nursing/staff, addressed all needs today.     [x]  Plan of Care Reviewed   [x]  PT/OT Reviewed   [x]  Order Changes  []  Discharge Plans Reviewed  [x]  Advance Directive on file with Nursing Home.   [x]  POA on file with Nursing Home.   [x]  Code Status listed: [x]  Full Code   []  DNR     “I confirm accuracy of unchanged data/findings which have been carried forward from previous visit, as well as I have updated appropriately those that have changed.”                                Fariha Cradona, [facility].

## 2021-08-02 ENCOUNTER — NURSING HOME (OUTPATIENT)
Dept: FAMILY MEDICINE CLINIC | Facility: CLINIC | Age: 61
End: 2021-08-02

## 2021-08-02 VITALS
DIASTOLIC BLOOD PRESSURE: 74 MMHG | BODY MASS INDEX: 25.24 KG/M2 | TEMPERATURE: 97 F | HEART RATE: 72 BPM | OXYGEN SATURATION: 95 % | WEIGHT: 166 LBS | RESPIRATION RATE: 20 BRPM | SYSTOLIC BLOOD PRESSURE: 130 MMHG

## 2021-08-02 DIAGNOSIS — F20.9 SCHIZOPHRENIA, CHRONIC CONDITION (HCC): ICD-10-CM

## 2021-08-02 DIAGNOSIS — Z78.9 IMPAIRED MOBILITY AND ADLS: ICD-10-CM

## 2021-08-02 DIAGNOSIS — Z74.09 IMPAIRED MOBILITY AND ADLS: ICD-10-CM

## 2021-08-02 DIAGNOSIS — R45.4 BEHAVIOR-IRRITABILITY: Primary | ICD-10-CM

## 2021-08-02 DIAGNOSIS — I69.90 LATE EFFECTS OF CVA (CEREBROVASCULAR ACCIDENT): ICD-10-CM

## 2021-08-02 PROCEDURE — 99309 SBSQ NF CARE MODERATE MDM 30: CPT | Performed by: NURSE PRACTITIONER

## 2021-08-03 NOTE — PROGRESS NOTES
Nursing Home Follow Up Note      Fabian Santos DO []   [facility] [x]  [address] [place] [address]  Phone: [phone]  Fax: [phone] Yohan Cuellar MD []    Chaz Mandel DO []   [address] [place] [address]  Phone: [phone]  Fax: [phone]     PATIENT NAME: Izabella Agudelo                                                                          YOB: 1960           DATE OF SERVICE: 08/2/2021  FACILITY:  [][facility]   [facility]   [x] [facility]   [] [facility]   [] Other ______________________________________________________________________      CHIEF COMPLAINT:    Increased behaviors past couple days    HISTORY OF PRESENT ILLNESS:     Nursing reports that patient has had a increased behaviors, yelling at the staff, on Friday and Saturday.  They received no reports of her having behaviors yesterday and she has not had any today.  She is resting in bed at this time with no complaints or signs and symptoms of any distress.    PAST MEDICAL & SURGICAL HISTORY:   Past Medical History:   Diagnosis Date   • Cardiac abnormality    • CHF (congestive heart failure) (CMS/[facility])    • COPD (chronic obstructive pulmonary disease) (CMS/[facility])    • Diabetes (CMS/[facility])    • Glaucoma    • Schizophrenia, chronic condition (CMS/[facility])       No past surgical history on file.      MEDICATIONS:  I have reviewed and reconciled the patients medication list in the patients chart at the skilled nursing facility today.      ALLERGIES:    Allergies   Allergen Reactions   • Penicillins Rash         SOCIAL HISTORY:    Social History     Socioeconomic History   • Marital status: Single     Spouse name: Not on file   • Number of children: Not on file   • Years of education: Not on file   • Highest education level: Not on file   Tobacco Use   • Smoking status: Never Smoker   • Smokeless tobacco: Never Used   Substance and Sexual Activity   • Alcohol use: No   • Drug use: No   • Sexual  activity: Defer       FAMILY HISTORY:    Family History   Problem Relation Age of Onset   • Diabetes Other    • Glaucoma Other        REVIEW OF SYSTEMS:    Review of Systems   Reason unable to perform ROS: ROS per nursing and patient.   Constitutional: Negative for activity change, appetite change, chills, diaphoresis, fatigue, fever, unexpected weight gain and unexpected weight loss.   HENT: Negative for congestion, ear pain, mouth sores, nosebleeds, postnasal drip, rhinorrhea, sinus pressure, sneezing, sore throat, swollen glands and trouble swallowing.    Eyes: Negative for blurred vision, pain, discharge, redness, itching and visual disturbance.   Respiratory: Negative for apnea, cough, choking, chest tightness, shortness of breath, wheezing and stridor.    Cardiovascular: Negative for chest pain, palpitations and leg swelling.   Gastrointestinal: Negative for abdominal distention, abdominal pain, blood in stool, constipation, diarrhea, nausea, vomiting, GERD and indigestion.   Endocrine: Negative for polydipsia and polyuria.   Genitourinary: Positive for urinary incontinence. Negative for decreased urine volume, difficulty urinating, dysuria, flank pain, frequency, hematuria and urgency.   Musculoskeletal: Positive for arthralgias. Negative for back pain, gait problem, joint swelling and myalgias.   Skin: Negative for color change, dry skin, rash and skin lesions.   Allergic/Immunologic: Negative for environmental allergies.   Neurological: Positive for speech difficulty, weakness, memory problem and confusion. Negative for dizziness and seizures.   Psychiatric/Behavioral: Positive for agitation and behavioral problems. Negative for dysphoric mood, hallucinations, sleep disturbance and depressed mood. The patient is not nervous/anxious.          PHYSICAL EXAMINATION:   VITAL SIGNS:   Vitals:    08/02/21 1610   BP: 130/74   Pulse: 72   Resp: 20   Temp: 97 °F (36.1 °C)   SpO2: 95%   Weight: 75.3 kg (166 lb)        Physical Exam  Vitals and nursing note reviewed.   Constitutional:       General: She is not in acute distress.     Appearance: She is well-developed.   HENT:      Head: Normocephalic.      Right Ear: External ear normal.      Left Ear: External ear normal.      Nose: Nose normal.      Mouth/Throat:      Pharynx: No oropharyngeal exudate.   Eyes:      Conjunctiva/sclera: Conjunctivae normal.   Neck:      Thyroid: No thyromegaly.      Trachea: No tracheal deviation.   Cardiovascular:      Rate and Rhythm: Normal rate and regular rhythm.      Heart sounds: Normal heart sounds. No murmur heard.     Pulmonary:      Effort: Pulmonary effort is normal. No respiratory distress.      Breath sounds: Normal breath sounds. No wheezing or rales.   Chest:      Chest wall: No tenderness.   Abdominal:      General: Bowel sounds are normal. There is no distension.      Palpations: Abdomen is soft. There is no splenomegaly or mass.      Tenderness: There is no abdominal tenderness. There is no guarding or rebound.      Hernia: No hernia is present.   Musculoskeletal:         General: No tenderness.      Cervical back: Normal range of motion and neck supple.      Comments: Pain with ROM   Lymphadenopathy:      Cervical: No cervical adenopathy.      Right cervical: No superficial, deep or posterior cervical adenopathy.     Left cervical: No superficial, deep or posterior cervical adenopathy.   Skin:     General: Skin is warm and dry.      Findings: No rash.   Neurological:      Mental Status: She is alert. Mental status is at baseline. She is disoriented.      Motor: Weakness present.      Gait: Gait abnormal.   Psychiatric:         Mood and Affect: Mood normal.         Behavior: Behavior normal.         Cognition and Memory: Cognition is impaired. Memory is impaired.         RECORDS REVIEW:   I have reviewed and interpreted the discharge summary and medications    ASSESSMENT     Diagnoses and all orders for this visit:    1.  Behavior-irritability (Primary)    2. Schizophrenia, chronic condition (CMS/HCC)    3. Late effects of CVA (cerebrovascular accident)    4. Impaired mobility and ADLs        PLAN    Behavior/irritability/schizophrenia  -Patient with increased behaviors and irritability the past couple days. She does have history of this due to her schizophrenia but will check [facility] C&S for further evaluation. Will follow-up and continue to monitor.     Nursing encouraged to keep me informed of any acute changes, lack of improvement, or any new concerning symptoms.    Staff to continue supportive care for all ADLs.    [x]  Discussed Patient in detail with nursing/staff, addressed all needs today.     [x]  Plan of Care Reviewed   [x]  PT/OT Reviewed   [x]  Order Changes  []  Discharge Plans Reviewed  [x]  Advance Directive on file with Nursing Home.   [x]  POA on file with Nursing Home.   [x]  Code Status listed: [x]  Full Code   []  DNR     “I confirm accuracy of unchanged data/findings which have been carried forward from previous visit, as well as I have updated appropriately those that have changed.”                                  Fariha Cardona, APRN.

## 2021-08-18 ENCOUNTER — NURSING HOME (OUTPATIENT)
Dept: FAMILY MEDICINE CLINIC | Facility: CLINIC | Age: 61
End: 2021-08-18
Payer: MEDICARE

## 2021-08-18 DIAGNOSIS — Z78.9 IMPAIRED MOBILITY AND ADLS: Primary | ICD-10-CM

## 2021-08-18 DIAGNOSIS — F20.9 SCHIZOPHRENIA, CHRONIC CONDITION: ICD-10-CM

## 2021-08-18 DIAGNOSIS — I10 ESSENTIAL HYPERTENSION: ICD-10-CM

## 2021-08-18 DIAGNOSIS — Z74.09 IMPAIRED MOBILITY AND ADLS: Primary | ICD-10-CM

## 2021-08-18 DIAGNOSIS — I69.90 LATE EFFECTS OF CVA (CEREBROVASCULAR ACCIDENT): ICD-10-CM

## 2021-08-18 DIAGNOSIS — E11.8 TYPE 2 DIABETES MELLITUS WITH COMPLICATION, WITH LONG-TERM CURRENT USE OF INSULIN: ICD-10-CM

## 2021-08-18 DIAGNOSIS — Z79.4 TYPE 2 DIABETES MELLITUS WITH COMPLICATION, WITH LONG-TERM CURRENT USE OF INSULIN: ICD-10-CM

## 2021-08-18 PROCEDURE — 99309 SBSQ NF CARE MODERATE MDM 30: CPT | Performed by: FAMILY MEDICINE

## 2021-08-18 NOTE — PROGRESS NOTES
Nursing Home Progress Note        Fabian Snatos DO [x]  [facility] []  [address] [place] [address]  Phone: [phone]  Fax: [phone] Yohan Cuellar MD []  Chaz Mandel DO []  [address] [place] [address]  Phone: [phone]  Fax: [phone]     PATIENT NAME: Izabella Agudelo                                                                          YOB: 1960           DATE OF SERVICE: 8/18/2021  FACILITY: []  [place] [facility]  [] [facility]  [x]  [facility]  [] [facility]  []  Other ______________________________________________________________________      CHIEF COMPLAINT:  Impaired mobility and ADL/paranoid schizophrenia/diabetes mellitus treated with insulin/hypertension/dyslipidemia      HISTORY OF PRESENT ILLNESS:   [x]  Follow Up visit for coordination of long term care issues and chronic medical management of Diagnoses and all orders for this visit:    1. Impaired mobility and ADLs (Primary)    2. Late effects of CVA (cerebrovascular accident)    3. Schizophrenia, chronic condition (CMS/[facility])    4. Type 2 diabetes mellitus with complication, with long-term current use of insulin (CMS/[facility])    5. Essential hypertension          PAST MEDICAL & SURGICAL HISTORY:   Past Medical History:   Diagnosis Date   • Cardiac abnormality    • CHF (congestive heart failure) (CMS/HCC)    • COPD (chronic obstructive pulmonary disease) (CMS/[facility])    • Diabetes (CMS/[facility])    • Glaucoma    • Schizophrenia, chronic condition (CMS/[facility])       No past surgical history on file.      MEDICATIONS:  I have reviewed and reconciled the patients medication list in the patients chart at the skilled nursing facility today.      ALLERGIES:    Allergies   Allergen Reactions   • Penicillins Rash         SOCIAL HISTORY:    Social History     Socioeconomic History   • Marital status: Single     Spouse name: Not on file   • Number of children: Not on file   • Years of education: Not on file   •  Highest education level: Not on file   Tobacco Use   • Smoking status: Never Smoker   • Smokeless tobacco: Never Used   Substance and Sexual Activity   • Alcohol use: No   • Drug use: No   • Sexual activity: Defer       FAMILY HISTORY:    Family History   Problem Relation Age of Onset   • Diabetes Other    • Glaucoma Other        REVIEW OF SYSTEMS:    Review of Systems  · Appetite: Fair []   Good [x]   Poor []   Weight Loss []  [x]  Weight Stable   Unavoidable Weight Loss []  Tolerating Tube Feeding []    Supplements Provided []   · Constitutional: Negative for fever, chills, diaphoresis or fatigue and weight change.  Patient is interactive but a poor historian.  · HENT: No dysphagia; no changes to vision/hearing/smell/taste; no epistaxis  · Eyes: Negative for redness and visual disturbance.   · Respiratory: Negative for shortness of breath, chest pain, cough or chest tightness.   · Cardiovascular: Negative for chest pain and palpitations.   · Gastrointestinal: Negative for abdominal distention, abdominal pain and blood in stool.   · Endocrine: Negative for cold intolerance and heat intolerance.   · Genitourinary: Negative for difficulty urinating, dysuria and frequency.Negative for hematuria   · Musculoskeletal: Chronic myalgias and arthralgias.  Worsened lumbago as per above.  · Integumentary: No open wounds, rash or concerning skin lesions  · Neurological: Negative for syncope, weakness and headaches.   · Hematological: Negative for adenopathy. Does not bruise/bleed easily.   · Immunological: Negative for reported allergies or immunological disorders  · Psychological: Chronic behavioral issues, no acute changes.    PHYSICAL EXAMINATION:   VITAL SIGNS:   There were no vitals filed for this visit.    Physical Exam    General Appearance:  [x]  Alert   [x]  Oriented x person  [x]  No acute distress     [x]  Confused, chronically []  Disoriented   []  Comatose   Head:  Atraumatic and normocephalic, without obvious  abnormality.   Eyes:         PERRLA, conjunctivae and sclerae normal, no Icterus. No pallor. Extra-occular movements are within normal limits.   Ears:  Ears appear intact with no abnormalities noted.   Throat: No oral lesions, no thrush, oral mucosa moist.   Neck: Supple, trachea midline, no thyromegaly, no carotid bruit.   Back:   No kyphoscoliosis. No tenderness to palpation.   Lungs:   Chest shape is normal. Breath sounds heard bilaterally equally.  No wheezing.  Audible air exchange noted all lung fields.   Heart:  Normal S1 and S2, no murmur, no gallop, no rub. No JVD.   Abdomen:   Normal bowel sounds, no masses, no organomegaly. Soft, non-tender, non-distended, no guarding.  No CVA tenderness.   Extremities: Moves all extremities, without edema, cyanosis or clubbing.  Frail build.   Poor core strength and stability.   Pulses: Pulses palpable and equal bilaterally.   Skin:  Labial excoriation as per nursing report.  Generalized dry skin noted.  Age-related atrophy of skin.   Neurologic: [x] Normal speech []  Normal mental status    [x] Cranial nerves II through XII intact   [x]  No anosmia [x]  DTR 2+ [x]  Proprioception intact  [x]  No focal motor/sensory deficits      Psych/Mood:                    [x]  No acute changes, flat affect[]  Depressed      Urinary:      [x]  Continent  [x]  Incontinent, at times[]  Retention  []  F/C     []  UTI w/treatment in progress         ASSESSMENT     Diagnoses and all orders for this visit:    1. Impaired mobility and ADLs (Primary)    2. Late effects of CVA (cerebrovascular accident)    3. Schizophrenia, chronic condition (CMS/[facility])    4. Type 2 diabetes mellitus with complication, with long-term current use of insulin (CMS/[facility])    5. Essential hypertension          PLAN        [x]  Discussed Patient in detail with nursing/staff, addressed all needs today.     [x]  Plan of Care Reviewed   []  PT/OT Reviewed   []  Order Changes  []  Discharge Plans Reviewed   []  Code Status  Changes     I spent 30 minutes caring for Izabella on this date of service. This time includes time spent by me in the following activities:preparing for the visit, performing a medically appropriate examination and/or evaluation , counseling and educating the patient/family/caregiver, ordering medications, tests, or procedures, documenting information in the medical record and care coordination    I have reviewed and updated all copied forward information, as appropriate.  I attest to the accuracy and relevance of any unchanged information.       Fabian Santos DO  8/18/2021

## 2021-08-19 VITALS
RESPIRATION RATE: 20 BRPM | HEART RATE: 73 BPM | WEIGHT: 172 LBS | TEMPERATURE: 97.6 F | BODY MASS INDEX: 26.15 KG/M2 | SYSTOLIC BLOOD PRESSURE: 134 MMHG | DIASTOLIC BLOOD PRESSURE: 72 MMHG | OXYGEN SATURATION: 95 %

## 2021-09-07 DIAGNOSIS — Z79.4 TYPE 2 DIABETES MELLITUS WITH DIABETIC NEUROPATHY, WITH LONG-TERM CURRENT USE OF INSULIN (HCC): ICD-10-CM

## 2021-09-07 DIAGNOSIS — E11.40 TYPE 2 DIABETES MELLITUS WITH DIABETIC NEUROPATHY, WITH LONG-TERM CURRENT USE OF INSULIN (HCC): ICD-10-CM

## 2021-09-07 RX ORDER — GABAPENTIN 300 MG/1
300 CAPSULE ORAL 2 TIMES DAILY
Qty: 60 CAPSULE | Refills: 2 | Status: SHIPPED | OUTPATIENT
Start: 2021-09-07 | End: 2021-12-07 | Stop reason: SDUPTHER

## 2021-09-15 ENCOUNTER — DOCUMENTATION (OUTPATIENT)
Dept: FAMILY MEDICINE CLINIC | Facility: CLINIC | Age: 61
End: 2021-09-15
Payer: MEDICARE

## 2021-09-15 VITALS
DIASTOLIC BLOOD PRESSURE: 64 MMHG | RESPIRATION RATE: 20 BRPM | OXYGEN SATURATION: 96 % | TEMPERATURE: 97.6 F | WEIGHT: 169 LBS | SYSTOLIC BLOOD PRESSURE: 128 MMHG | HEART RATE: 78 BPM | BODY MASS INDEX: 25.7 KG/M2

## 2021-10-20 ENCOUNTER — DOCUMENTATION (OUTPATIENT)
Dept: FAMILY MEDICINE CLINIC | Facility: CLINIC | Age: 61
End: 2021-10-20
Payer: MEDICARE

## 2021-10-20 VITALS
BODY MASS INDEX: 26 KG/M2 | RESPIRATION RATE: 18 BRPM | OXYGEN SATURATION: 97 % | DIASTOLIC BLOOD PRESSURE: 50 MMHG | HEART RATE: 98 BPM | TEMPERATURE: 97.8 F | SYSTOLIC BLOOD PRESSURE: 115 MMHG | WEIGHT: 171 LBS

## 2021-12-07 DIAGNOSIS — E11.40 TYPE 2 DIABETES MELLITUS WITH DIABETIC NEUROPATHY, WITH LONG-TERM CURRENT USE OF INSULIN (HCC): ICD-10-CM

## 2021-12-07 DIAGNOSIS — Z79.4 TYPE 2 DIABETES MELLITUS WITH DIABETIC NEUROPATHY, WITH LONG-TERM CURRENT USE OF INSULIN (HCC): ICD-10-CM

## 2021-12-07 RX ORDER — GABAPENTIN 300 MG/1
300 CAPSULE ORAL 2 TIMES DAILY
Qty: 60 CAPSULE | Refills: 2 | Status: SHIPPED | OUTPATIENT
Start: 2021-12-07 | End: 2022-03-01 | Stop reason: SDUPTHER

## 2021-12-15 ENCOUNTER — NURSING HOME (OUTPATIENT)
Dept: FAMILY MEDICINE CLINIC | Facility: CLINIC | Age: 61
End: 2021-12-15

## 2021-12-15 VITALS
SYSTOLIC BLOOD PRESSURE: 124 MMHG | BODY MASS INDEX: 24.02 KG/M2 | HEART RATE: 72 BPM | OXYGEN SATURATION: 96 % | WEIGHT: 158 LBS | RESPIRATION RATE: 18 BRPM | TEMPERATURE: 97.9 F | DIASTOLIC BLOOD PRESSURE: 70 MMHG

## 2021-12-15 DIAGNOSIS — Z79.4 TYPE 2 DIABETES MELLITUS WITH COMPLICATION, WITH LONG-TERM CURRENT USE OF INSULIN (HCC): ICD-10-CM

## 2021-12-15 DIAGNOSIS — I10 ESSENTIAL HYPERTENSION: ICD-10-CM

## 2021-12-15 DIAGNOSIS — Z79.4 CONTROLLED TYPE 2 DIABETES MELLITUS WITH DIABETIC AMYOTROPHY, WITH LONG-TERM CURRENT USE OF INSULIN (HCC): ICD-10-CM

## 2021-12-15 DIAGNOSIS — E11.8 TYPE 2 DIABETES MELLITUS WITH COMPLICATION, WITH LONG-TERM CURRENT USE OF INSULIN (HCC): ICD-10-CM

## 2021-12-15 DIAGNOSIS — E11.44 CONTROLLED TYPE 2 DIABETES MELLITUS WITH DIABETIC AMYOTROPHY, WITH LONG-TERM CURRENT USE OF INSULIN (HCC): ICD-10-CM

## 2021-12-15 DIAGNOSIS — Z78.9 IMPAIRED MOBILITY AND ADLS: Primary | ICD-10-CM

## 2021-12-15 DIAGNOSIS — Z74.09 IMPAIRED MOBILITY AND ADLS: Primary | ICD-10-CM

## 2021-12-15 DIAGNOSIS — F20.0 PARANOID SCHIZOPHRENIA (HCC): ICD-10-CM

## 2021-12-15 PROCEDURE — 99309 SBSQ NF CARE MODERATE MDM 30: CPT | Performed by: FAMILY MEDICINE

## 2021-12-18 PROBLEM — Z79.4: Status: ACTIVE | Noted: 2021-12-18

## 2021-12-18 PROBLEM — E11.44: Status: ACTIVE | Noted: 2021-12-18

## 2021-12-18 NOTE — PROGRESS NOTES
Nursing Home Progress Note        Fabian Santos DO [x]  [facility] []  [address] [place] [address]  Phone: [phone]  Fax: [phone] Yohan Cuellar MD []  Chaz Mandel DO []  [address] [place] [address]  Phone: [phone]  Fax: [phone]     PATIENT NAME: Izabella Agudelo                                                                          YOB: 1960           DATE OF SERVICE: 12/15/2021  FACILITY: []  [place]  [] [facility]  [x]  [facility]  [] [facility]  []  Other ______________________________________________________________________      CHIEF COMPLAINT:  Impaired mobility and ADL/paranoid schizophrenia/diabetes mellitus treated with insulin/hypertension/dyslipidemia/diabetic amyotrophy      HISTORY OF PRESENT ILLNESS:   [x]  Follow Up visit for coordination of long term care issues and chronic medical management of Diagnoses and all orders for this visit:    1. Impaired mobility and ADLs (Primary)    2. Type 2 diabetes mellitus with complication, with long-term current use of insulin (HCC)    3. Essential hypertension    4. Paranoid schizophrenia ([facility])    5. 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  No reports of any falls or injuries.  Doing well with nutrition/hydration, no reported deficits.    No acute behavioral changes.    Vital signs have been stable.    No reports of any cyclical/persistent hypoglycemic episodes.      PAST MEDICAL & SURGICAL HISTORY:   Past Medical History:   Diagnosis Date   • Cardiac abnormality    • CHF (congestive heart failure) ([facility])    • COPD (chronic obstructive pulmonary disease) ([facility])    • Diabetes ([facility])    • Glaucoma    • Schizophrenia, chronic condition ([facility])       No past surgical history on file.       MEDICATIONS:  I have reviewed and reconciled the patients medication list in the patients chart at the skilled nursing facility today.      ALLERGIES:    Allergies   Allergen Reactions   • Penicillins Rash         SOCIAL HISTORY:    Social History     Socioeconomic History   • Marital status: Single   Tobacco Use   • Smoking status: Never Smoker   • Smokeless tobacco: Never Used   Substance and Sexual Activity   • Alcohol use: No   • Drug use: No   • Sexual activity: Defer       FAMILY HISTORY:    Family History   Problem Relation Age of Onset   • Diabetes Other    • Glaucoma Other        REVIEW OF SYSTEMS:    Review of Systems  · Appetite: Fair []   Good [x]   Poor []   Weight Loss []  [x]  Weight Stable   Unavoidable Weight Loss []  Tolerating Tube Feeding []    Supplements Provided []   · Constitutional: Negative for fever, chills, diaphoresis or fatigue and weight change.  Patient is interactive but a poor historian.  · HENT: No dysphagia; no changes to vision/hearing/smell/taste; no epistaxis  · Eyes: Negative for redness and visual disturbance.   · Respiratory: Negative for shortness of breath, chest pain, cough or chest tightness.   · Cardiovascular: Negative for chest pain and palpitations.   · Gastrointestinal: Negative for abdominal distention, abdominal pain and blood in stool.   · Endocrine: Negative for cold intolerance and heat intolerance.   · Genitourinary: Negative for difficulty urinating, dysuria and frequency.Negative for hematuria   · Musculoskeletal: Chronic myalgias and arthralgias.  Worsened lumbago as per above.  · Integumentary: No open wounds, rash or concerning skin lesions  · Neurological: Negative for syncope, weakness and headaches.   · Hematological: Negative for adenopathy. Does not bruise/bleed easily.   · Immunological: Negative for reported allergies or immunological disorders  · Psychological: Chronic behavioral issues, no acute changes.    PHYSICAL EXAMINATION:   VITAL  SIGNS:   Vitals:    12/15/21 1700   BP: 124/70   Pulse: 72   Resp: 18   Temp: 97.9 °F (36.6 °C)   SpO2: 96%       Physical Exam    General Appearance:  [x]  Alert   [x]  Oriented x person  [x]  No acute distress     [x]  Confused, chronically []  Disoriented   []  Comatose   Head:  Atraumatic and normocephalic, without obvious abnormality.   Eyes:         PERRLA, conjunctivae and sclerae normal, no Icterus. No pallor. Extra-occular movements are within normal limits.   Ears:  Ears appear intact with no abnormalities noted.   Throat: No oral lesions, no thrush, oral mucosa moist.   Neck: Supple, trachea midline, no thyromegaly, no carotid bruit.   Back:   No kyphoscoliosis. No tenderness to palpation.   Lungs:   Chest shape is normal. Breath sounds heard bilaterally equally.  No wheezing.  Audible air exchange noted all lung fields.   Heart:  Normal S1 and S2, no murmur, no gallop, no rub. No JVD.   Abdomen:   Normal bowel sounds, no masses, no organomegaly. Soft, non-tender, non-distended, no guarding.  No CVA tenderness.   Extremities: Moves all extremities, without edema, cyanosis or clubbing.  Frail build.   Poor core strength and stability.   Pulses: Pulses palpable and equal bilaterally.   Skin:  Labial excoriation as per nursing report.  Generalized dry skin noted.  Age-related atrophy of skin.   Neurologic: [x] Normal speech []  Normal mental status    [x] Cranial nerves II through XII intact   [x]  No anosmia [x]  DTR 2+ [x]  Proprioception intact  [x]  Chronic motor/sensory deficits      Psych/Mood:                    [x]  No acute changes, flat affect[]  Depressed      Urinary:      [x]  Continent  [x]  Incontinent, at times[]  Retention  []  F/C     []  UTI w/treatment in progress         ASSESSMENT     Diagnoses and all orders for this visit:    1. Impaired mobility and ADLs (Primary)    2. Type 2 diabetes mellitus with complication, with long-term current use of insulin (HCC)    3. Essential  hypertension    4. Paranoid schizophrenia (HCC)    5. 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  Continue to follow her overall nutritional/hydration status.  Fall precautions in place.    No acute behavioral changes at this time.    Vital signs demonstrate hemodynamic stability, blood pressure is at goal.    Continue avoidance of prolonged fasting periods, maintain healthy dietary choices.  Of stress importance of maintaining appropriate hydration status.  Continue blood glucose monitoring, as well as periodic hemoglobin A1c with further changes to treatment regimen to maximize blood glucose control.    Surveillance labs when needed.      [x]  Discussed Patient in detail with nursing/staff, addressed all needs today.     [x]  Plan of Care Reviewed   []  PT/OT Reviewed   []  Order Changes  []  Discharge Plans Reviewed   []  Code Status Changes     I spent 30 minutes caring for Izabella on this date of service. This time includes time spent by me in the following activities:preparing for the visit, performing a medically appropriate examination and/or evaluation , counseling and educating the patient/family/caregiver, ordering medications, tests, or procedures, documenting information in the medical record and care coordination    I have reviewed and updated all copied forward information, as appropriate.  I attest to the accuracy and relevance of any unchanged information.       Fabian Santos DO  12/15/2021

## 2022-01-19 ENCOUNTER — NURSING HOME (OUTPATIENT)
Dept: FAMILY MEDICINE CLINIC | Facility: CLINIC | Age: 62
End: 2022-01-19

## 2022-01-19 DIAGNOSIS — Z79.4 TYPE 2 DIABETES MELLITUS WITH COMPLICATION, WITH LONG-TERM CURRENT USE OF INSULIN: ICD-10-CM

## 2022-01-19 DIAGNOSIS — E11.8 TYPE 2 DIABETES MELLITUS WITH COMPLICATION, WITH LONG-TERM CURRENT USE OF INSULIN: ICD-10-CM

## 2022-01-19 DIAGNOSIS — F20.0 PARANOID SCHIZOPHRENIA: ICD-10-CM

## 2022-01-19 DIAGNOSIS — I10 ESSENTIAL HYPERTENSION: ICD-10-CM

## 2022-01-19 DIAGNOSIS — Z78.9 IMPAIRED MOBILITY AND ADLS: Primary | ICD-10-CM

## 2022-01-19 DIAGNOSIS — Z74.09 IMPAIRED MOBILITY AND ADLS: Primary | ICD-10-CM

## 2022-01-19 DIAGNOSIS — I69.90 LATE EFFECTS OF CVA (CEREBROVASCULAR ACCIDENT): ICD-10-CM

## 2022-01-19 PROCEDURE — 99309 SBSQ NF CARE MODERATE MDM 30: CPT | Performed by: FAMILY MEDICINE

## 2022-01-20 VITALS
BODY MASS INDEX: 24.02 KG/M2 | RESPIRATION RATE: 20 BRPM | DIASTOLIC BLOOD PRESSURE: 75 MMHG | TEMPERATURE: 98.1 F | WEIGHT: 158 LBS | SYSTOLIC BLOOD PRESSURE: 122 MMHG | HEART RATE: 77 BPM | OXYGEN SATURATION: 97 %

## 2022-01-31 NOTE — PROGRESS NOTES
Nursing Home Progress Note        Fabian Santos DO [x]  [facility] []  [address] [place] [address]  Phone: [phone]  Fax: [phone] Yohan Cuellar MD []  Chaz Mandel DO []  [address] [place] [address]  Phone: [phone]  Fax: [phone]     PATIENT NAME: Izabella Agudelo                                                                          YOB: 1960           DATE OF SERVICE: 1/19/2022  FACILITY: []  [place]  [] [place]  [x]  [facility]  [] [facility]  []  Other ______________________________________________________________________      CHIEF COMPLAINT:  Impaired mobility and ADL/paranoid schizophrenia/diabetes mellitus treated with insulin/hypertension/dyslipidemia/diabetic amyotrophy      HISTORY OF PRESENT ILLNESS:   [x]  Follow Up visit for coordination of long term care issues and chronic medical management of Diagnoses and all orders for this visit:    1. Impaired mobility and ADLs (Primary)    2. Late effects of CVA (cerebrovascular accident)    3. Essential hypertension    4. Type 2 diabetes mellitus with complication, with long-term current use of insulin ([facility])    5. Paranoid schizophrenia ([facility])    No reports of any acute behavioral changes.  Patient sleeping well.    Continues to be receptive/responsive to supportive care for mobilization/transfer assistance, and ADLs when needed.  No reports of any nutritional/hydration deficits.    No reports of any falls or injuries.  No new neurological deficits.    Vital signs have been stable.    No reports of any cyclical/persistent hypoglycemic episodes.    Pain has appeared clinically well controlled.      PAST MEDICAL & SURGICAL HISTORY:   Past Medical History:   Diagnosis Date   • Cardiac abnormality    • CHF (congestive heart failure) ([facility])    • COPD (chronic obstructive pulmonary disease) ([facility])    • Diabetes ([facility])    • Glaucoma    • Schizophrenia, chronic condition ([facility])       No  past surgical history on file.      MEDICATIONS:  I have reviewed and reconciled the patients medication list in the patients chart at the skilled nursing facility today.      ALLERGIES:    Allergies   Allergen Reactions   • Penicillins Rash         SOCIAL HISTORY:    Social History     Socioeconomic History   • Marital status: Single   Tobacco Use   • Smoking status: Never Smoker   • Smokeless tobacco: Never Used   Substance and Sexual Activity   • Alcohol use: No   • Drug use: No   • Sexual activity: Defer       FAMILY HISTORY:    Family History   Problem Relation Age of Onset   • Diabetes Other    • Glaucoma Other        REVIEW OF SYSTEMS:    Review of Systems  · Appetite: Fair []   Good [x]   Poor []   Weight Loss []  [x]  Weight Stable   Unavoidable Weight Loss []  Tolerating Tube Feeding []    Supplements Provided []   · Constitutional: Negative for fever, chills, diaphoresis or fatigue and weight change.  Patient is interactive but a poor historian.  · HENT: No dysphagia; no changes to vision/hearing/smell/taste; no epistaxis  · Eyes: Negative for redness and visual disturbance.   · Respiratory: Negative for shortness of breath, chest pain, cough or chest tightness.   · Cardiovascular: Negative for chest pain and palpitations.   · Gastrointestinal: Negative for abdominal distention, abdominal pain and blood in stool.   · Endocrine: Negative for cold intolerance and heat intolerance.   · Genitourinary: Negative for difficulty urinating, dysuria and frequency.Negative for hematuria   · Musculoskeletal: Chronic myalgias and arthralgias.  Worsened lumbago as per above.  · Integumentary: No open wounds, rash or concerning skin lesions  · Neurological: Negative for syncope, weakness and headaches.   · Hematological: Negative for adenopathy. Does not bruise/bleed easily.   · Immunological: Negative for reported allergies or immunological disorders  · Psychological: Chronic behavioral issues, no acute  changes.    PHYSICAL EXAMINATION:   VITAL SIGNS:   Vitals:    01/19/22 1542   BP: 122/75   Pulse: 77   Resp: 20   Temp: 98.1 °F (36.7 °C)   SpO2: 97%       Physical Exam    General Appearance:  [x]  Alert   [x]  Oriented x person  [x]  No acute distress     [x]  Confused, chronically []  Disoriented   []  Comatose   Head:  Atraumatic and normocephalic, without obvious abnormality.   Eyes:         PERRLA, conjunctivae and sclerae normal, no Icterus. No pallor. Extra-occular movements are within normal limits.   Ears:  Ears appear intact with no abnormalities noted.   Throat: No oral lesions, no thrush, oral mucosa moist.   Neck: Supple, trachea midline, no thyromegaly, no carotid bruit.   Back:   No kyphoscoliosis. No tenderness to palpation.   Lungs:   Chest shape is normal. Breath sounds heard bilaterally equally.  No wheezing.  Audible air exchange noted all lung fields.   Heart:  Normal S1 and S2, no murmur, no gallop, no rub. No JVD.   Abdomen:   Normal bowel sounds, no masses, no organomegaly. Soft, non-tender, non-distended, no guarding.  No CVA tenderness.   Extremities: Moves all extremities, without edema, cyanosis or clubbing.  Frail build.   Poor core strength and stability.   Pulses: Pulses palpable and equal bilaterally.   Skin:  Labial excoriation as per nursing report.  Generalized dry skin noted.  Age-related atrophy of skin.   Neurologic: [x] Normal speech []  Normal mental status    [x] Cranial nerves II through XII intact   [x]  No anosmia [x]  DTR 2+ [x]  Proprioception intact  [x]  Chronic motor/sensory deficits      Psych/Mood:                    [x]  No acute changes, flat affect[]  Depressed      Urinary:      [x]  Continent  [x]  Incontinent, at times[]  Retention  []  F/C     []  UTI w/treatment in progress         ASSESSMENT     Diagnoses and all orders for this visit:    1. Impaired mobility and ADLs (Primary)    2. Late effects of CVA (cerebrovascular accident)    3. Essential  hypertension    4. Type 2 diabetes mellitus with complication, with long-term current use of insulin (HCC)    5. Paranoid schizophrenia (HCC)          PLAN  Continue supportive care for any mobilization/transfer assistance, as well as follow nutritional/hydration status closely.  Fall precautions in place.    No acute behavioral changes, sleeping well.    Vital signs demonstrate hemodynamic stability, blood pressure is at goal.    Continue avoidance of prolonged fasting periods, maintain healthy dietary choices and appropriate hydration status.  Continue blood glucose monitoring, periodic hemoglobin A1c with treatment regimen changes as needed to minimize hypoglycemia and maximize blood glucose control.    Surveillance labs when needed.      [x]  Discussed Patient in detail with nursing/staff, addressed all needs today.     [x]  Plan of Care Reviewed   []  PT/OT Reviewed   []  Order Changes  []  Discharge Plans Reviewed   []  Code Status Changes     I spent 30 minutes caring for Izabella on this date of service. This time includes time spent by me in the following activities:preparing for the visit, performing a medically appropriate examination and/or evaluation , counseling and educating the patient/family/caregiver, ordering medications, tests, or procedures, documenting information in the medical record and care coordination    I have reviewed and updated all copied forward information, as appropriate.  I attest to the accuracy and relevance of any unchanged information.       Fabian Santos DO  1/19/2022

## 2022-02-03 ENCOUNTER — DOCUMENTATION (OUTPATIENT)
Dept: FAMILY MEDICINE CLINIC | Facility: CLINIC | Age: 62
End: 2022-02-03
Payer: MEDICARE

## 2022-02-03 VITALS
HEART RATE: 52 BPM | RESPIRATION RATE: 18 BRPM | TEMPERATURE: 97.7 F | OXYGEN SATURATION: 97 % | DIASTOLIC BLOOD PRESSURE: 85 MMHG | SYSTOLIC BLOOD PRESSURE: 170 MMHG

## 2022-03-01 DIAGNOSIS — E11.40 TYPE 2 DIABETES MELLITUS WITH DIABETIC NEUROPATHY, WITH LONG-TERM CURRENT USE OF INSULIN: ICD-10-CM

## 2022-03-01 DIAGNOSIS — Z79.4 TYPE 2 DIABETES MELLITUS WITH DIABETIC NEUROPATHY, WITH LONG-TERM CURRENT USE OF INSULIN: ICD-10-CM

## 2022-03-01 RX ORDER — GABAPENTIN 300 MG/1
300 CAPSULE ORAL 2 TIMES DAILY
Qty: 60 CAPSULE | Refills: 3 | Status: SHIPPED | OUTPATIENT
Start: 2022-03-01 | End: 2022-06-02 | Stop reason: SDUPTHER

## 2022-03-01 NOTE — TELEPHONE ENCOUNTER
[facility] REQUESTING MED REFILL ON NEURONTIN 300 MG.    DIRECTIONS: NEURONTIN 300 MG 1 CAP PO BID SCHEDULED.

## 2022-03-16 ENCOUNTER — DOCUMENTATION (OUTPATIENT)
Dept: FAMILY MEDICINE CLINIC | Facility: CLINIC | Age: 62
End: 2022-03-16
Payer: MEDICARE

## 2022-03-16 VITALS
OXYGEN SATURATION: 97 % | HEART RATE: 70 BPM | SYSTOLIC BLOOD PRESSURE: 106 MMHG | RESPIRATION RATE: 16 BRPM | WEIGHT: 161.6 LBS | TEMPERATURE: 97.6 F | DIASTOLIC BLOOD PRESSURE: 60 MMHG | BODY MASS INDEX: 24.57 KG/M2

## 2022-04-20 ENCOUNTER — DOCUMENTATION (OUTPATIENT)
Dept: FAMILY MEDICINE CLINIC | Facility: CLINIC | Age: 62
End: 2022-04-20
Payer: MEDICARE

## 2022-04-20 VITALS
TEMPERATURE: 97.9 F | RESPIRATION RATE: 16 BRPM | BODY MASS INDEX: 24.92 KG/M2 | DIASTOLIC BLOOD PRESSURE: 74 MMHG | OXYGEN SATURATION: 98 % | HEART RATE: 72 BPM | WEIGHT: 163.9 LBS | SYSTOLIC BLOOD PRESSURE: 136 MMHG

## 2022-05-03 DIAGNOSIS — E11.40 TYPE 2 DIABETES MELLITUS WITH DIABETIC NEUROPATHY, WITH LONG-TERM CURRENT USE OF INSULIN: ICD-10-CM

## 2022-05-03 DIAGNOSIS — Z79.4 TYPE 2 DIABETES MELLITUS WITH DIABETIC NEUROPATHY, WITH LONG-TERM CURRENT USE OF INSULIN: ICD-10-CM

## 2022-05-03 RX ORDER — GABAPENTIN 300 MG/1
300 CAPSULE ORAL 2 TIMES DAILY
Qty: 60 CAPSULE | Refills: 5 | OUTPATIENT
Start: 2022-05-03

## 2022-05-03 NOTE — TELEPHONE ENCOUNTER
[facility] REQUESTING MED REFILL FOR GABAPENTIN 300 MG.    DIRECTIONS: GABAPENTIN 300 MG 1 PO BID SCHEDULED.

## 2022-05-18 ENCOUNTER — DOCUMENTATION (OUTPATIENT)
Dept: FAMILY MEDICINE CLINIC | Facility: CLINIC | Age: 62
End: 2022-05-18
Payer: MEDICARE

## 2022-05-18 VITALS
OXYGEN SATURATION: 96 % | WEIGHT: 164.9 LBS | TEMPERATURE: 98.3 F | HEART RATE: 79 BPM | RESPIRATION RATE: 20 BRPM | DIASTOLIC BLOOD PRESSURE: 77 MMHG | BODY MASS INDEX: 25.07 KG/M2 | SYSTOLIC BLOOD PRESSURE: 126 MMHG

## 2022-06-02 DIAGNOSIS — Z79.4 TYPE 2 DIABETES MELLITUS WITH DIABETIC NEUROPATHY, WITH LONG-TERM CURRENT USE OF INSULIN: ICD-10-CM

## 2022-06-02 DIAGNOSIS — E11.40 TYPE 2 DIABETES MELLITUS WITH DIABETIC NEUROPATHY, WITH LONG-TERM CURRENT USE OF INSULIN: ICD-10-CM

## 2022-06-02 NOTE — TELEPHONE ENCOUNTER
[facility] FOR GABAPENTIN 300 MG.    DIRECTIONS: GABAPENTIN 300 MG 1 PO BID.

## 2022-06-03 RX ORDER — GABAPENTIN 300 MG/1
300 CAPSULE ORAL 2 TIMES DAILY
Qty: 60 CAPSULE | Refills: 5 | Status: SHIPPED | OUTPATIENT
Start: 2022-06-03 | End: 2022-11-30 | Stop reason: SDUPTHER

## 2022-06-15 ENCOUNTER — NURSING HOME (OUTPATIENT)
Dept: FAMILY MEDICINE CLINIC | Facility: CLINIC | Age: 62
End: 2022-06-15

## 2022-06-15 VITALS
DIASTOLIC BLOOD PRESSURE: 74 MMHG | RESPIRATION RATE: 18 BRPM | BODY MASS INDEX: 25.7 KG/M2 | HEART RATE: 74 BPM | TEMPERATURE: 97.6 F | OXYGEN SATURATION: 97 % | WEIGHT: 169 LBS | SYSTOLIC BLOOD PRESSURE: 128 MMHG

## 2022-06-15 DIAGNOSIS — Z78.9 IMPAIRED MOBILITY AND ADLS: Primary | ICD-10-CM

## 2022-06-15 DIAGNOSIS — Z74.09 IMPAIRED MOBILITY AND ADLS: Primary | ICD-10-CM

## 2022-06-15 DIAGNOSIS — E11.44 CONTROLLED TYPE 2 DIABETES MELLITUS WITH DIABETIC AMYOTROPHY, WITH LONG-TERM CURRENT USE OF INSULIN: ICD-10-CM

## 2022-06-15 DIAGNOSIS — I69.90 LATE EFFECTS OF CVA (CEREBROVASCULAR ACCIDENT): ICD-10-CM

## 2022-06-15 DIAGNOSIS — F20.0 PARANOID SCHIZOPHRENIA: ICD-10-CM

## 2022-06-15 DIAGNOSIS — Z79.4 CONTROLLED TYPE 2 DIABETES MELLITUS WITH DIABETIC AMYOTROPHY, WITH LONG-TERM CURRENT USE OF INSULIN: ICD-10-CM

## 2022-06-15 PROBLEM — E11.8 TYPE 2 DIABETES MELLITUS WITH COMPLICATION, WITH LONG-TERM CURRENT USE OF INSULIN: Status: RESOLVED | Noted: 2019-01-14 | Resolved: 2022-06-15

## 2022-06-15 PROCEDURE — 99309 SBSQ NF CARE MODERATE MDM 30: CPT | Performed by: FAMILY MEDICINE

## 2022-06-15 NOTE — PROGRESS NOTES
Nursing Home Progress Note        Fabian Santos DO [x]  [facility] []  [address] [place] [address]  Phone: [phone]  Fax: [phone] Yohan Cuellar MD []  Chaz Mandel DO []  [address] [place] [address]  Phone: [phone]  Fax: [phone]     PATIENT NAME: Izabella Agudelo                                                                          YOB: 1960           DATE OF SERVICE: 6/15/2022  FACILITY: []  [place]  [] [place]  [x]  [facility]  [] [facility]  []  Other ______________________________________________________________________      CHIEF COMPLAINT:  Impaired mobility and ADL/paranoid schizophrenia/diabetes mellitus treated with insulin/hypertension/dyslipidemia/diabetic amyotrophy      HISTORY OF PRESENT ILLNESS:   [x]  Follow Up visit for coordination of long term care issues and chronic medical management of Diagnoses and all orders for this visit:    1. Impaired mobility and ADLs (Primary)    2. Late effects of CVA (cerebrovascular accident)    3. Paranoid schizophrenia (HCC)    4. 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  No reports of any significant nutritional deficits.    Without acute behavioral changes.    No new neurological deficits.    Vital signs of been stable.    No reports of cyclical/persistent hypoglycemic episodes.          PAST MEDICAL & SURGICAL HISTORY:   Past Medical History:   Diagnosis Date   • Cardiac abnormality    • CHF (congestive heart failure) ([facility])    • COPD (chronic obstructive pulmonary disease) ([facility])    • Diabetes ([facility])    • Glaucoma    • Schizophrenia, chronic condition ([facility])       No past surgical history on file.      MEDICATIONS:  I have reviewed and reconciled the patients medication list in the  patients chart at the skilled nursing facility today.      ALLERGIES:    Allergies   Allergen Reactions   • Penicillins Rash         SOCIAL HISTORY:    Social History     Socioeconomic History   • Marital status: Single   Tobacco Use   • Smoking status: Never Smoker   • Smokeless tobacco: Never Used   Substance and Sexual Activity   • Alcohol use: No   • Drug use: No   • Sexual activity: Defer       FAMILY HISTORY:    Family History   Problem Relation Age of Onset   • Diabetes Other    • Glaucoma Other        REVIEW OF SYSTEMS:    Review of Systems  · Appetite: Fair []   Good [x]   Poor []   Weight Loss []  [x]  Weight Stable   Unavoidable Weight Loss []  Tolerating Tube Feeding []    Supplements Provided []   · Constitutional: Negative for fever, chills, diaphoresis or fatigue and weight change.  Patient is interactive but a poor historian.  · HENT: No dysphagia; no changes to vision/hearing/smell/taste; no epistaxis  · Eyes: Negative for redness and visual disturbance.   · Respiratory: Negative for shortness of breath, chest pain, cough or chest tightness.   · Cardiovascular: Negative for chest pain and palpitations.   · Gastrointestinal: Negative for abdominal distention, abdominal pain and blood in stool.   · Endocrine: Negative for cold intolerance and heat intolerance.   · Genitourinary: Negative for difficulty urinating, dysuria and frequency.Negative for hematuria   · Musculoskeletal: Chronic myalgias and arthralgias.  Worsened lumbago as per above.  · Integumentary: No open wounds, rash or concerning skin lesions  · Neurological: Negative for syncope, weakness and headaches.   · Hematological: Negative for adenopathy. Does not bruise/bleed easily.   · Immunological: Negative for reported allergies or immunological disorders  · Psychological: Chronic behavioral issues, no acute changes.    PHYSICAL EXAMINATION:   VITAL SIGNS:   Vitals:    06/15/22 1355   BP: 128/74   Pulse: 74   Resp: 18   Temp: 97.6 °F  (36.4 °C)   SpO2: 97%       Physical Exam    General Appearance:  [x]  Alert   [x]  Oriented x person  [x]  No acute distress     [x]  Confused, chronically []  Disoriented   []  Comatose   Head:  Atraumatic and normocephalic, without obvious abnormality.   Eyes:         PERRLA, conjunctivae and sclerae normal, no Icterus. No pallor. Extra-occular movements are within normal limits.   Ears:  Ears appear intact with no abnormalities noted.   Throat: No oral lesions, no thrush, oral mucosa moist.   Neck: Supple, trachea midline, no thyromegaly, no carotid bruit.   Back:   No kyphoscoliosis. No tenderness to palpation.   Lungs:   Chest shape is normal. Breath sounds heard bilaterally equally.  No wheezing.  Audible air exchange noted all lung fields.   Heart:  Normal S1 and S2, no murmur, no gallop, no rub. No JVD.   Abdomen:   Normal bowel sounds, no masses, no organomegaly. Soft, non-tender, non-distended, no guarding.  No CVA tenderness.   Extremities: Moves all extremities, without edema, cyanosis or clubbing.  Frail build.   Poor core strength and stability.   Pulses: Pulses palpable and equal bilaterally.   Skin:  Labial excoriation as per nursing report.  Generalized dry skin noted.  Age-related atrophy of skin.   Neurologic: [x] Normal speech []  Normal mental status    [x] Cranial nerves II through XII intact   [x]  No anosmia [x]  DTR 2+ [x]  Proprioception intact  [x]  Chronic motor/sensory deficits      Psych/Mood:                    [x]  No acute changes, flat affect[]  Depressed      Urinary:      [x]  Continent  [x]  Incontinent, at times[]  Retention  []  F/C     []  UTI w/treatment in progress         ASSESSMENT     Diagnoses and all orders for this visit:    1. Impaired mobility and ADLs (Primary)    2. Late effects of CVA (cerebrovascular accident)    3. Paranoid schizophrenia (HCC)    4. 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  Her nutritional/hydration status is stable, no reported deficits.  Continue to follow clinically.    No acute behavioral changes reported.  Continue current mood stabilizer treatment regimen.    Vital signs demonstrate hemodynamic stability.  Demonstrates no findings of unstable angina.    Continue avoidance of prolonged fasting peers, as well as healthy dietary choices as able.  Maintain appropriate hydration status.  Continue blood glucose monitoring.  Further changes to treatment regimen will be made an effort to maximize her blood glucose control.    Surveillance labs when needed.          [x]  Discussed Patient in detail with nursing/staff, addressed all needs today.     [x]  Plan of Care Reviewed   []  PT/OT Reviewed   []  Order Changes  []  Discharge Plans Reviewed   []  Code Status Changes      I spent 30 minutes caring for Izabella on this date of service. This time includes time spent by me in the following activities:preparing for the visit, performing a medically appropriate examination and/or evaluation , counseling and educating the patient/family/caregiver, ordering medications, tests, or procedures, documenting information in the medical record and care coordination    I have reviewed and updated all copied forward information, as appropriate.  I attest to the accuracy and relevance of any unchanged information.       Fabian Santos DO  6/15/2022

## 2022-07-22 ENCOUNTER — NURSING HOME (OUTPATIENT)
Dept: FAMILY MEDICINE CLINIC | Facility: CLINIC | Age: 62
End: 2022-07-22

## 2022-07-22 DIAGNOSIS — Z86.73 HISTORY OF ISCHEMIC STROKE: ICD-10-CM

## 2022-07-22 DIAGNOSIS — R10.84 GENERALIZED ABDOMINAL PAIN: ICD-10-CM

## 2022-07-22 DIAGNOSIS — Z74.09 IMPAIRED MOBILITY AND ADLS: ICD-10-CM

## 2022-07-22 DIAGNOSIS — R19.7 DIARRHEA IN ADULT PATIENT: Primary | ICD-10-CM

## 2022-07-22 DIAGNOSIS — R11.2 NAUSEA AND VOMITING, UNSPECIFIED VOMITING TYPE: ICD-10-CM

## 2022-07-22 DIAGNOSIS — Z78.9 IMPAIRED MOBILITY AND ADLS: ICD-10-CM

## 2022-07-22 PROCEDURE — 99309 SBSQ NF CARE MODERATE MDM 30: CPT | Performed by: NURSE PRACTITIONER

## 2022-07-25 ENCOUNTER — NURSING HOME (OUTPATIENT)
Dept: FAMILY MEDICINE CLINIC | Facility: CLINIC | Age: 62
End: 2022-07-25

## 2022-07-25 VITALS
BODY MASS INDEX: 25.47 KG/M2 | SYSTOLIC BLOOD PRESSURE: 132 MMHG | WEIGHT: 167.5 LBS | RESPIRATION RATE: 18 BRPM | DIASTOLIC BLOOD PRESSURE: 70 MMHG | HEART RATE: 74 BPM | OXYGEN SATURATION: 96 % | TEMPERATURE: 97.6 F

## 2022-07-25 VITALS
WEIGHT: 167.5 LBS | DIASTOLIC BLOOD PRESSURE: 75 MMHG | RESPIRATION RATE: 20 BRPM | BODY MASS INDEX: 25.47 KG/M2 | HEART RATE: 76 BPM | OXYGEN SATURATION: 96 % | TEMPERATURE: 98.4 F | SYSTOLIC BLOOD PRESSURE: 128 MMHG

## 2022-07-25 DIAGNOSIS — Z87.898 HISTORY OF NAUSEA AND VOMITING: ICD-10-CM

## 2022-07-25 DIAGNOSIS — Z86.73 HISTORY OF ISCHEMIC STROKE: ICD-10-CM

## 2022-07-25 DIAGNOSIS — Z78.9 IMPAIRED MOBILITY AND ADLS: ICD-10-CM

## 2022-07-25 DIAGNOSIS — K59.09 CHRONIC CONSTIPATION: ICD-10-CM

## 2022-07-25 DIAGNOSIS — K56.7 ILEUS: ICD-10-CM

## 2022-07-25 DIAGNOSIS — Z74.09 IMPAIRED MOBILITY AND ADLS: ICD-10-CM

## 2022-07-25 PROCEDURE — 99309 SBSQ NF CARE MODERATE MDM 30: CPT | Performed by: NURSE PRACTITIONER

## 2022-07-25 NOTE — PROGRESS NOTES
Nursing Home Follow Up Note      Fbaian Santos DO []   [facility] [x]  [address] [place] [address]  Phone: [phone]  Fax: [phone] Yohan Cuellar MD []    Chaz Mandel DO []   [address] [place] [address]  Phone: [phone]  Fax: [phone]     PATIENT NAME: Izabella Agudelo                                                                          YOB: 1960           DATE OF SERVICE: 07/22/2022  FACILITY:  [][facility]   [][facility]   [x] [facility]   [] [facility]   [] Other ______________________________________________________________________      CHIEF COMPLAINT:     Abdominal pain, diarrhea and vomiting since this morning.     HISTORY OF PRESENT ILLNESS:     Patient with complaints of abdominal pain, nausea, vomiting and diarrhea since this morning.  She is just not feeling well.  She has received Zofran but it has not helped.  She is not vomiting at this time is resting in bed but is still resting in bed and not feeling well.  She does have history of chronic constipation.  She is not very verbal and is a poor historian related to CVA.    PAST MEDICAL & SURGICAL HISTORY:   Past Medical History:   Diagnosis Date   • Cardiac abnormality    • CHF (congestive heart failure) ([facility])    • COPD (chronic obstructive pulmonary disease) ([facility])    • Diabetes ([facility])    • Glaucoma    • Schizophrenia, chronic condition ([facility])       No past surgical history on file.      MEDICATIONS:  I have reviewed and reconciled the patients medication list in the patients chart at the skilled nursing facility today.      ALLERGIES:    Allergies   Allergen Reactions   • Penicillins Rash         SOCIAL HISTORY:    Social History     Socioeconomic History   • Marital status: Single   Tobacco Use   • Smoking status: Never Smoker   • Smokeless tobacco: Never Used   Substance and Sexual Activity   • Alcohol use: No   • Drug use: No   • Sexual activity: Defer       FAMILY  HISTORY:    Family History   Problem Relation Age of Onset   • Diabetes Other    • Glaucoma Other        REVIEW OF SYSTEMS:    Review of Systems   Reason unable to perform ROS: ROS per nursing and patient.   Constitutional: Negative for activity change, appetite change, chills, diaphoresis, fatigue, fever, unexpected weight gain and unexpected weight loss.   HENT: Negative for congestion, ear pain, mouth sores, nosebleeds, postnasal drip, rhinorrhea, sinus pressure, sneezing, sore throat, swollen glands and trouble swallowing.    Eyes: Negative for pain, discharge, redness and itching.   Respiratory: Negative for cough, choking, chest tightness, shortness of breath and stridor.    Cardiovascular: Negative for chest pain.   Gastrointestinal: Positive for abdominal pain, diarrhea, nausea and vomiting. Negative for blood in stool and constipation.   Endocrine: Negative for polydipsia and polyuria.   Genitourinary: Positive for urinary incontinence. Negative for decreased urine volume, difficulty urinating, dysuria, frequency and hematuria.   Musculoskeletal: Positive for arthralgias (chronic). Negative for back pain, gait problem, joint swelling and myalgias.   Skin: Negative for color change, dry skin, rash and skin lesions.   Neurological: Positive for speech difficulty, weakness, memory problem and confusion. Negative for dizziness and seizures.   Psychiatric/Behavioral: Positive for behavioral problems (intermittently). Negative for dysphoric mood, hallucinations, sleep disturbance and depressed mood. The patient is not nervous/anxious.          PHYSICAL EXAMINATION:   VITAL SIGNS:   Vitals:    07/22/22 0936   BP: 132/70   Pulse: 74   Resp: 18   Temp: 97.6 °F (36.4 °C)   SpO2: 96%   Weight: 76 kg (167 lb 8 oz)       Physical Exam  Vitals and nursing note reviewed.   Constitutional:       General: She is not in acute distress.     Appearance: She is well-developed.   HENT:      Head: Normocephalic.      Right Ear:  External ear normal.      Left Ear: External ear normal.      Nose: Nose normal.      Mouth/Throat:      Pharynx: No oropharyngeal exudate.   Eyes:      Conjunctiva/sclera: Conjunctivae normal.   Neck:      Thyroid: No thyromegaly.      Trachea: No tracheal deviation.   Cardiovascular:      Rate and Rhythm: Normal rate and regular rhythm.      Heart sounds: Normal heart sounds. No murmur heard.  Pulmonary:      Effort: Pulmonary effort is normal. No respiratory distress.      Breath sounds: Normal breath sounds. No wheezing or rales.   Chest:      Chest wall: No tenderness.   Abdominal:      General: Bowel sounds are increased. There is no distension.      Palpations: Abdomen is soft.      Tenderness: There is abdominal tenderness.   Musculoskeletal:         General: No tenderness.      Cervical back: Normal range of motion and neck supple.   Lymphadenopathy:      Cervical: No cervical adenopathy.      Right cervical: No superficial, deep or posterior cervical adenopathy.     Left cervical: No superficial, deep or posterior cervical adenopathy.   Skin:     General: Skin is warm and dry.      Findings: No rash.   Neurological:      Mental Status: She is alert. Mental status is at baseline. She is disoriented.      Motor: Weakness present.      Gait: Gait abnormal.   Psychiatric:         Mood and Affect: Mood normal.         Behavior: Behavior normal.         Cognition and Memory: Cognition is impaired. Memory is impaired.         RECORDS REVIEW:   I have reviewed and interpreted the discharge summary and medications    ASSESSMENT     Diagnoses and all orders for this visit:    1. Diarrhea in adult patient (Primary)    2. Nausea and vomiting, unspecified vomiting type    3. Generalized abdominal pain    4. History of ischemic stroke    5. Impaired mobility and ADLs        PLAN    Diarrhea/nausea/ vomiting/abd pain  - Will obtain KUB.  Patient has history of chronic constipation. Will follow up     Nursing encouraged  to keep me informed of any acute changes, lack of improvement, or any new concerning symptoms.    Staff to continue supportive care for all ADLs.    [x]  Discussed Patient in detail with nursing/staff, addressed all needs today.     [x]  Plan of Care Reviewed   [x]  PT/OT Reviewed   [x]  Order Changes  []  Discharge Plans Reviewed  [x]  Advance Directive on file with Nursing Home.   [x]  POA on file with Nursing Home.   [x]  Code Status listed: [x]  Full Code   []  DNR     “I confirm accuracy of unchanged data/findings which have been carried forward from previous visit, as well as I have updated appropriately those that have changed.”                                Fariha Cardona, APRN.

## 2022-07-26 NOTE — PROGRESS NOTES
Nursing Home Follow Up Note      Fabian Santos DO []   [facility] [x]  [address] [place] [address]  Phone: [phone]  Fax: [phone] Yohan Cuellar MD []    Chaz Mandel DO []   [address] [place] [address]  Phone: [phone]  Fax: [phone]     PATIENT NAME: Izabella Agudelo                                                                          YOB: 1960           DATE OF SERVICE: 07/25/2022  FACILITY:  [][place]   [place]   [x] [facility]   [] [facility]   [] Other ______________________________________________________________________      CHIEF COMPLAINT:     Follow up KUB related to abdominal pain, diarrhea and vomiting on 7/22.    HISTORY OF PRESENT ILLNESS:     Patient with complaints of abdominal pain, diarrhea and vomiting on 7/22 so KUB was performed and reported mild colonic ileus on 7/23. Bowel care started on 7/24.   Per nursing, she has had no more reports of abdominal pain and has had no vomiting since. She is having some diarrhea due to receiving bowel care. Resting in bed at this time with no complaints of abdominal pain today. No s/s of any distress. She has history of chronic constipation.    PAST MEDICAL & SURGICAL HISTORY:   Past Medical History:   Diagnosis Date   • Cardiac abnormality    • CHF (congestive heart failure) ([facility])    • COPD (chronic obstructive pulmonary disease) ([facility])    • Diabetes ([facility])    • Glaucoma    • Schizophrenia, chronic condition ([facility])       No past surgical history on file.      MEDICATIONS:  I have reviewed and reconciled the patients medication list in the patients chart at the skilled nursing facility today.      ALLERGIES:    Allergies   Allergen Reactions   • Penicillins Rash         SOCIAL HISTORY:    Social History     Socioeconomic History   • Marital status: Single   Tobacco Use   • Smoking status: Never Smoker   • Smokeless tobacco: Never Used   Substance and Sexual Activity   • Alcohol  use: No   • Drug use: No   • Sexual activity: Defer       FAMILY HISTORY:    Family History   Problem Relation Age of Onset   • Diabetes Other    • Glaucoma Other        REVIEW OF SYSTEMS:    Review of Systems   Reason unable to perform ROS: ROS per nursing and patient.   Constitutional: Negative for activity change, appetite change, chills, diaphoresis, fatigue, fever, unexpected weight gain and unexpected weight loss.   HENT: Negative for congestion, ear pain, mouth sores, nosebleeds, postnasal drip, rhinorrhea, sinus pressure, sneezing, sore throat, swollen glands and trouble swallowing.    Eyes: Negative for pain, discharge, redness and itching.   Respiratory: Negative for cough, choking, chest tightness, shortness of breath and stridor.    Cardiovascular: Negative for chest pain.   Gastrointestinal: Positive for diarrhea. Negative for abdominal pain, blood in stool, constipation, nausea and vomiting.        Vomiting and abdominal pain resolved.   Endocrine: Negative for polydipsia and polyuria.   Genitourinary: Positive for urinary incontinence. Negative for decreased urine volume, difficulty urinating, dysuria, frequency and hematuria.   Musculoskeletal: Positive for arthralgias (chronic). Negative for back pain, gait problem, joint swelling and myalgias.   Skin: Negative for color change, dry skin, rash and skin lesions.   Neurological: Positive for speech difficulty, weakness, memory problem and confusion. Negative for dizziness and seizures.   Psychiatric/Behavioral: Positive for behavioral problems (intermittently). Negative for dysphoric mood, hallucinations, sleep disturbance and depressed mood. The patient is not nervous/anxious.          PHYSICAL EXAMINATION:   VITAL SIGNS:   Vitals:    07/25/22 1624   BP: 128/75   Pulse: 76   Resp: 20   Temp: 98.4 °F (36.9 °C)   SpO2: 96%   Weight: 76 kg (167 lb 8 oz)       Physical Exam  Vitals and nursing note reviewed.   Constitutional:       General: She is not in  acute distress.     Appearance: She is well-developed.   HENT:      Head: Normocephalic.      Right Ear: External ear normal.      Left Ear: External ear normal.      Nose: Nose normal.      Mouth/Throat:      Pharynx: No oropharyngeal exudate.   Eyes:      Conjunctiva/sclera: Conjunctivae normal.   Neck:      Thyroid: No thyromegaly.      Trachea: No tracheal deviation.   Cardiovascular:      Rate and Rhythm: Normal rate and regular rhythm.      Heart sounds: Normal heart sounds. No murmur heard.  Pulmonary:      Effort: Pulmonary effort is normal. No respiratory distress.      Breath sounds: Normal breath sounds. No wheezing or rales.   Chest:      Chest wall: No tenderness.   Abdominal:      General: Bowel sounds are increased. There is no distension.      Palpations: Abdomen is soft.      Tenderness: There is generalized abdominal tenderness.   Musculoskeletal:         General: No tenderness.      Cervical back: Normal range of motion and neck supple.   Skin:     General: Skin is warm and dry.      Findings: No rash.   Neurological:      Mental Status: She is alert. Mental status is at baseline. She is disoriented.      Motor: Weakness present.      Gait: Gait abnormal.   Psychiatric:         Mood and Affect: Mood normal.         Behavior: Behavior normal.         Cognition and Memory: Cognition is impaired. Memory is impaired.         RECORDS REVIEW:   I have reviewed and interpreted the discharge summary and medications    ASSESSMENT     Diagnoses and all orders for this visit:    1. Ileus (HCC)    2. Chronic constipation    3. History of nausea and vomiting    4. History of ischemic stroke    5. Impaired mobility and ADLs        PLAN    Ileus/diarrhea/history abd pain/history n/v  -KUB reported mild colonic ileus.  She has been receiving bowel care. Nausea and vomiting has resolved.  Patient has history of chronic constipation.  Start docusate 100 mg p.o. twice daily and senna 8.6 mg 2 tabs nightly.  She was  also started on Metamucil daily. If n/v returns she will need to be on full liquid diet.  Will follow up and continue to monitor.     Nursing encouraged to keep me informed of any acute changes, lack of improvement, or any new concerning symptoms.    Staff to continue supportive care for all ADLs.    [x]  Discussed Patient in detail with nursing/staff, addressed all needs today.     [x]  Plan of Care Reviewed   [x]  PT/OT Reviewed   [x]  Order Changes  []  Discharge Plans Reviewed  [x]  Advance Directive on file with Nursing Home.   [x]  POA on file with Nursing Home.   [x]  Code Status listed: [x]  Full Code   []  DNR     “I confirm accuracy of unchanged data/findings which have been carried forward from previous visit, as well as I have updated appropriately those that have changed.”                                Fariha Cardona, APRN.

## 2022-08-17 ENCOUNTER — NURSING HOME (OUTPATIENT)
Dept: FAMILY MEDICINE CLINIC | Facility: CLINIC | Age: 62
End: 2022-08-17

## 2022-08-17 VITALS
BODY MASS INDEX: 25.24 KG/M2 | WEIGHT: 166 LBS | HEART RATE: 70 BPM | TEMPERATURE: 98 F | DIASTOLIC BLOOD PRESSURE: 70 MMHG | OXYGEN SATURATION: 97 % | RESPIRATION RATE: 18 BRPM | SYSTOLIC BLOOD PRESSURE: 116 MMHG

## 2022-08-17 DIAGNOSIS — Z78.9 IMPAIRED MOBILITY AND ADLS: Primary | ICD-10-CM

## 2022-08-17 DIAGNOSIS — I69.90 LATE EFFECTS OF CVA (CEREBROVASCULAR ACCIDENT): ICD-10-CM

## 2022-08-17 DIAGNOSIS — Z79.4 CONTROLLED TYPE 2 DIABETES MELLITUS WITH DIABETIC AMYOTROPHY, WITH LONG-TERM CURRENT USE OF INSULIN: ICD-10-CM

## 2022-08-17 DIAGNOSIS — E11.44 CONTROLLED TYPE 2 DIABETES MELLITUS WITH DIABETIC AMYOTROPHY, WITH LONG-TERM CURRENT USE OF INSULIN: ICD-10-CM

## 2022-08-17 DIAGNOSIS — Z74.09 IMPAIRED MOBILITY AND ADLS: Primary | ICD-10-CM

## 2022-08-17 DIAGNOSIS — I10 ESSENTIAL HYPERTENSION: ICD-10-CM

## 2022-08-17 DIAGNOSIS — F20.0 PARANOID SCHIZOPHRENIA: ICD-10-CM

## 2022-08-17 PROCEDURE — 99309 SBSQ NF CARE MODERATE MDM 30: CPT | Performed by: FAMILY MEDICINE

## 2022-08-17 NOTE — PROGRESS NOTES
Nursing Home Progress Note        Fabian Santos DO [x]  [facility] []  [address] [place] [address]  Phone: [phone]  Fax: [phone] Yohan Cuellar MD []  Chaz Mandel DO []  [address] [place] [address]  Phone: [phone]  Fax: [phone]     PATIENT NAME: Izabella Agudelo                                                                          YOB: 1960           DATE OF SERVICE: 8/17/2020  FACILITY: []  [place]  [] [place]  [x]  [facility]  [] [facility]  []  Other ______________________________________________________________________      CHIEF COMPLAINT:  Impaired mobility and ADL/paranoid schizophrenia/diabetes mellitus treated with insulin/hypertension/dyslipidemia/diabetic amyotrophy      HISTORY OF PRESENT ILLNESS:   [x]  Follow Up visit for coordination of long term care issues and chronic medical management of Diagnoses and all orders for this visit:    1. Impaired mobility and ADLs (Primary)    2. Essential hypertension    3. Paranoid schizophrenia (HCC)    4. Late effects of CVA (cerebrovascular accident)    5. 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  No acute behavioral changes.  Patient continues to interact well with staff/nursing, no reports of any nutritional/hydration deficits.    Vital signs have been stable.    No reports of cyclical/persistent hypoglycemia.    Pain is appeared clinically well controlled.          PAST MEDICAL & SURGICAL HISTORY:   Past Medical History:   Diagnosis Date   • Cardiac abnormality    • CHF (congestive heart failure) ([facility])    • COPD (chronic obstructive pulmonary disease) ([facility])    • Diabetes ([facility])    • Glaucoma    • Schizophrenia, chronic condition ([facility])       No past surgical history on file.      MEDICATIONS:  I have reviewed and reconciled the patients medication list in the  patients chart at the skilled nursing facility today.      ALLERGIES:    Allergies   Allergen Reactions   • Penicillins Rash         SOCIAL HISTORY:    Social History     Socioeconomic History   • Marital status: Single   Tobacco Use   • Smoking status: Never Smoker   • Smokeless tobacco: Never Used   Substance and Sexual Activity   • Alcohol use: No   • Drug use: No   • Sexual activity: Defer       FAMILY HISTORY:    Family History   Problem Relation Age of Onset   • Diabetes Other    • Glaucoma Other        REVIEW OF SYSTEMS:    Review of Systems  · Appetite: Fair []   Good [x]   Poor []   Weight Loss []  [x]  Weight Stable   Unavoidable Weight Loss []  Tolerating Tube Feeding []    Supplements Provided []   · Constitutional: Negative for fever, chills, diaphoresis or fatigue and weight change.  Patient is interactive but a poor historian.  · HENT: No dysphagia; no changes to vision/hearing/smell/taste; no epistaxis  · Eyes: Negative for redness and visual disturbance.   · Respiratory: Negative for shortness of breath, chest pain, cough or chest tightness.   · Cardiovascular: Negative for chest pain and palpitations.   · Gastrointestinal: Negative for abdominal distention, abdominal pain and blood in stool.   · Endocrine: Negative for cold intolerance and heat intolerance.   · Genitourinary: Negative for difficulty urinating, dysuria and frequency.Negative for hematuria   · Musculoskeletal: Chronic myalgias and arthralgias.  Worsened lumbago as per above.  · Integumentary: No open wounds, rash or concerning skin lesions  · Neurological: Negative for syncope, weakness and headaches.   · Hematological: Negative for adenopathy. Does not bruise/bleed easily.   · Immunological: Negative for reported allergies or immunological disorders  · Psychological: Chronic behavioral issues, no acute changes.    PHYSICAL EXAMINATION:   VITAL SIGNS:   Vitals:    08/17/22 0946   BP: 116/70   Pulse: 70   Resp: 18   Temp: 98 °F (36.7  °C)   SpO2: 97%       Physical Exam    General Appearance:  [x]  Alert   [x]  Oriented x person  [x]  No acute distress     [x]  Confused, chronically []  Disoriented   []  Comatose   Head:  Atraumatic and normocephalic, without obvious abnormality.   Eyes:         PERRLA, conjunctivae and sclerae normal, no Icterus. No pallor. Extra-occular movements are within normal limits.   Ears:  Ears appear intact with no abnormalities noted.   Throat: No oral lesions, no thrush, oral mucosa moist.   Neck: Supple, trachea midline, no thyromegaly, no carotid bruit.   Back:   No kyphoscoliosis. No tenderness to palpation.   Lungs:   Chest shape is normal. Breath sounds heard bilaterally equally.  No wheezing.  Audible air exchange noted all lung fields.   Heart:  Normal S1 and S2, no murmur, no gallop, no rub. No JVD.   Abdomen:   Normal bowel sounds, no masses, no organomegaly. Soft, non-tender, non-distended, no guarding.  No CVA tenderness.   Extremities: Moves all extremities, without edema, cyanosis or clubbing.  Frail build.   Poor core strength and stability.   Pulses: Pulses palpable and equal bilaterally.   Skin: Generalized dry skin noted.  Age-related atrophy of skin.   Neurologic: [x] Normal speech []  Normal mental status    [x] Cranial nerves II through XII intact   [x]  No anosmia [x]  DTR 2+ [x]  Proprioception intact  [x]  Chronic motor/sensory deficits      Psych/Mood:                    [x]  No acute changes, flat affect[]  Depressed      Urinary:      [x]  Continent  [x]  Incontinent, at times[]  Retention  []  F/C     []  UTI w/treatment in progress         ASSESSMENT     Diagnoses and all orders for this visit:    1. Impaired mobility and ADLs (Primary)    2. Essential hypertension    3. Paranoid schizophrenia (HCC)    4. Late effects of CVA (cerebrovascular accident)    5. 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  Continue to follow her overall nutritional/hydration status.  Fall precautions in place.    No acute behavioral changes, continues to sleep well.    No new neurological deficits.    Vital signs demonstrate hemodynamic stability.  Blood pressure is at goal.    Continue avoidance of prolonged fasting periods as best able, as well as healthy dietary choices.  Continue to monitor overall hydration status.  Continue blood glucose monitoring.    Surveillance labs when needed.    [x]  Discussed Patient in detail with nursing/staff, addressed all needs today.     [x]  Plan of Care Reviewed   []  PT/OT Reviewed   []  Order Changes  []  Discharge Plans Reviewed   []  Code Status Changes      I spent 30 minutes caring for zIabella on this date of service. This time includes time spent by me in the following activities:preparing for the visit, performing a medically appropriate examination and/or evaluation , counseling and educating the patient/family/caregiver, ordering medications, tests, or procedures, documenting information in the medical record and care coordination    I have reviewed and updated all copied forward information, as appropriate.  I attest to the accuracy and relevance of any unchanged information.       Fabian Santos DO  8/17/2022

## 2022-08-18 ENCOUNTER — NURSING HOME (OUTPATIENT)
Dept: FAMILY MEDICINE CLINIC | Facility: CLINIC | Age: 62
End: 2022-08-18

## 2022-08-18 VITALS
BODY MASS INDEX: 25.23 KG/M2 | TEMPERATURE: 98 F | SYSTOLIC BLOOD PRESSURE: 116 MMHG | WEIGHT: 165.9 LBS | OXYGEN SATURATION: 97 % | RESPIRATION RATE: 18 BRPM | HEART RATE: 70 BPM | DIASTOLIC BLOOD PRESSURE: 70 MMHG

## 2022-08-18 DIAGNOSIS — I69.90 LATE EFFECTS OF CVA (CEREBROVASCULAR ACCIDENT): ICD-10-CM

## 2022-08-18 DIAGNOSIS — I73.9 PVD (PERIPHERAL VASCULAR DISEASE): Primary | ICD-10-CM

## 2022-08-18 DIAGNOSIS — Z78.9 IMPAIRED MOBILITY AND ADLS: ICD-10-CM

## 2022-08-18 DIAGNOSIS — I87.8 CHRONIC VENOUS STASIS: ICD-10-CM

## 2022-08-18 DIAGNOSIS — Z74.09 IMPAIRED MOBILITY AND ADLS: ICD-10-CM

## 2022-08-18 PROCEDURE — 99308 SBSQ NF CARE LOW MDM 20: CPT | Performed by: NURSE PRACTITIONER

## 2022-08-21 NOTE — PROGRESS NOTES
Nursing Home Follow Up Note      Fabian Santos DO []   [facility] [x]  [address] [place] [address]  Phone: [phone]  Fax: [phone] Yohan Cuellar MD []    Chaz Mandel DO []   [address] [place] [address]  Phone: [phone]  Fax: [phone]     PATIENT NAME: Izabella Agudelo                                                                          YOB: 1960           DATE OF SERVICE: 08/18/2022  FACILITY:  [][place]   [][place]   [x] [facility]   [] [facility]   [] Other ______________________________________________________________________      CHIEF COMPLAINT:     Family reports LLE redness during visit a couple days ago.    HISTORY OF PRESENT ILLNESS:     Family visited patient couple days ago and reported to nursing staff, redness and warmth to left lower extremity.  During visit today no redness or warmth noted. She is resting in bed today without any complaints or signs and symptoms of any distress. She has not had any fever or other abnormal vital signs.     PAST MEDICAL & SURGICAL HISTORY:   Past Medical History:   Diagnosis Date   • Cardiac abnormality    • CHF (congestive heart failure) ([facility])    • COPD (chronic obstructive pulmonary disease) ([facility])    • Diabetes ([facility])    • Glaucoma    • Schizophrenia, chronic condition ([facility])       History reviewed. No pertinent surgical history.      MEDICATIONS:  I have reviewed and reconciled the patients medication list in the patients chart at the skilled nursing facility today.      ALLERGIES:    Allergies   Allergen Reactions   • Penicillins Rash         SOCIAL HISTORY:    Social History     Socioeconomic History   • Marital status: Single   Tobacco Use   • Smoking status: Never Smoker   • Smokeless tobacco: Never Used   Substance and Sexual Activity   • Alcohol use: No   • Drug use: No   • Sexual activity: Defer       FAMILY HISTORY:    Family History   Problem Relation Age of Onset   • Diabetes  Other    • Glaucoma Other        REVIEW OF SYSTEMS:    Review of Systems   Reason unable to perform ROS: ROS per nursing and patient.   Constitutional: Negative for activity change, appetite change, chills, diaphoresis, fatigue, fever, unexpected weight gain and unexpected weight loss.   HENT: Negative for congestion, ear pain, mouth sores, nosebleeds, postnasal drip, rhinorrhea, sinus pressure, sneezing, sore throat, swollen glands and trouble swallowing.    Eyes: Negative for pain, discharge, redness and itching.   Respiratory: Negative for cough, choking, chest tightness, shortness of breath and stridor.    Cardiovascular: Negative for chest pain.   Gastrointestinal: Negative for abdominal pain, blood in stool, constipation, diarrhea, nausea and vomiting.   Endocrine: Negative for polydipsia and polyuria.   Genitourinary: Positive for urinary incontinence. Negative for decreased urine volume, difficulty urinating, dysuria, frequency and hematuria.   Musculoskeletal: Positive for arthralgias (chronic). Negative for back pain, gait problem, joint swelling and myalgias.   Skin: Positive for color change (redness to LLE reported per family). Negative for dry skin, rash and skin lesions.   Neurological: Positive for speech difficulty, weakness, memory problem and confusion. Negative for dizziness and seizures.   Psychiatric/Behavioral: Positive for behavioral problems (intermittently). Negative for dysphoric mood, hallucinations, sleep disturbance and depressed mood. The patient is not nervous/anxious.          PHYSICAL EXAMINATION:   VITAL SIGNS:   Vitals:    08/18/22 1340   BP: 116/70   Pulse: 70   Resp: 18   Temp: 98 °F (36.7 °C)   SpO2: 97%   Weight: 75.3 kg (165 lb 14.4 oz)       Physical Exam  Vitals and nursing note reviewed.   Constitutional:       General: She is not in acute distress.     Appearance: She is well-developed.   HENT:      Head: Normocephalic.      Right Ear: External ear normal.      Left Ear:  External ear normal.      Nose: Nose normal.      Mouth/Throat:      Pharynx: No oropharyngeal exudate.   Eyes:      Conjunctiva/sclera: Conjunctivae normal.   Cardiovascular:      Rate and Rhythm: Normal rate and regular rhythm.      Heart sounds: Normal heart sounds. No murmur heard.  Pulmonary:      Effort: Pulmonary effort is normal. No respiratory distress.      Breath sounds: Normal breath sounds. No wheezing or rales.   Chest:      Chest wall: No tenderness.   Abdominal:      General: Bowel sounds are increased. There is no distension.      Palpations: Abdomen is soft.      Tenderness: There is generalized abdominal tenderness.   Musculoskeletal:         General: No tenderness.      Cervical back: Normal range of motion and neck supple.   Skin:     General: Skin is warm and dry.      Comments: No redness or warmth noted LLE, discoloration noted related to chronic venous stasis   Neurological:      Mental Status: She is alert. Mental status is at baseline. She is disoriented.      Motor: Weakness present.      Gait: Gait abnormal.   Psychiatric:         Mood and Affect: Mood normal.         Behavior: Behavior normal.         Cognition and Memory: Cognition is impaired. Memory is impaired.         RECORDS REVIEW:   I have reviewed and interpreted the discharge summary and medications    ASSESSMENT     Diagnoses and all orders for this visit:    1. PVD (peripheral vascular disease) (HCC) (Primary)    2. Chronic venous stasis    3. Late effects of CVA (cerebrovascular accident)    4. Impaired mobility and ADLs        PLAN    PVD/chronic venous stasis  -No redness noted LLE, only some discoloration from venous stasis. Will follow up and continue to monitor.     Nursing encouraged to keep me informed of any acute changes, lack of improvement, or any new concerning symptoms.    Staff to continue supportive care for all ADLs.    [x]  Discussed Patient in detail with nursing/staff, addressed all needs today.     [x]   Plan of Care Reviewed   [x]  PT/OT Reviewed   [x]  Order Changes  []  Discharge Plans Reviewed  [x]  Advance Directive on file with Nursing Home.   [x]  POA on file with Nursing Home.   [x]  Code Status listed: [x]  Full Code   []  DNR     “I confirm accuracy of unchanged data/findings which have been carried forward from previous visit, as well as I have updated appropriately those that have changed.”                                Fariha Cardona, APRN.

## 2022-09-06 ENCOUNTER — NURSING HOME (OUTPATIENT)
Dept: FAMILY MEDICINE CLINIC | Facility: CLINIC | Age: 62
End: 2022-09-06

## 2022-09-06 VITALS
TEMPERATURE: 98 F | WEIGHT: 167 LBS | HEART RATE: 64 BPM | DIASTOLIC BLOOD PRESSURE: 80 MMHG | BODY MASS INDEX: 25.39 KG/M2 | RESPIRATION RATE: 20 BRPM | OXYGEN SATURATION: 96 % | SYSTOLIC BLOOD PRESSURE: 163 MMHG

## 2022-09-06 DIAGNOSIS — Z74.09 IMPAIRED MOBILITY AND ADLS: ICD-10-CM

## 2022-09-06 DIAGNOSIS — Z87.898 HISTORY OF VOMITING: Primary | ICD-10-CM

## 2022-09-06 DIAGNOSIS — Z87.898 HISTORY OF DIARRHEA: ICD-10-CM

## 2022-09-06 DIAGNOSIS — D72.829 LEUKOCYTOSIS, UNSPECIFIED TYPE: ICD-10-CM

## 2022-09-06 DIAGNOSIS — Z78.9 IMPAIRED MOBILITY AND ADLS: ICD-10-CM

## 2022-09-06 PROCEDURE — 99309 SBSQ NF CARE MODERATE MDM 30: CPT | Performed by: NURSE PRACTITIONER

## 2022-09-07 NOTE — PROGRESS NOTES
Nursing Home Follow Up Note      Fabian Santos DO []   [facility] [x]  [address] [place] [address]  Phone: [phone]  Fax: [phone] Yohan Cuellar MD []    Chaz Mandel DO []   [address] [place] [address]  Phone: [phone]  Fax: [phone]     PATIENT NAME: Izabella Agudelo                                                                          YOB: 1960           DATE OF SERVICE: 09/6/2022  FACILITY:  [][place]   [][place]   [x] [facility]   [] [facility]   [] Other ______________________________________________________________________      CHIEF COMPLAINT:    Abnormal lab results from yesterday.    HISTORY OF PRESENT ILLNESS:     Patient had routine 3-month labs drawn yesterday and WBC count 14.1.  Nursing reports that she did have some episodes of vomiting and diarrhea over the weekend.  Per nursing she has not had any yesterday or today however.  She is resting in bed today without any complaints or signs and symptoms of any distress.  She has not had any fever or any other abnormal vital signs.    PAST MEDICAL & SURGICAL HISTORY:   Past Medical History:   Diagnosis Date   • Cardiac abnormality    • CHF (congestive heart failure) ([facility])    • COPD (chronic obstructive pulmonary disease) ([facility])    • Diabetes ([facility])    • Glaucoma    • Schizophrenia, chronic condition ([facility])       No past surgical history on file.      MEDICATIONS:  I have reviewed and reconciled the patients medication list in the patients chart at the skilled nursing facility today.      ALLERGIES:    Allergies   Allergen Reactions   • Penicillins Rash         SOCIAL HISTORY:    Social History     Socioeconomic History   • Marital status: Single   Tobacco Use   • Smoking status: Never Smoker   • Smokeless tobacco: Never Used   Substance and Sexual Activity   • Alcohol use: No   • Drug use: No   • Sexual activity: Defer       FAMILY HISTORY:    Family History   Problem Relation  Age of Onset   • Diabetes Other    • Glaucoma Other        REVIEW OF SYSTEMS:    Review of Systems   Reason unable to perform ROS: ROS per nursing and patient.   Constitutional: Negative for activity change, appetite change, chills, diaphoresis, fatigue, fever, unexpected weight gain and unexpected weight loss.   HENT: Negative for mouth sores, nosebleeds, postnasal drip, rhinorrhea, sore throat and trouble swallowing.    Eyes: Negative for pain, discharge, redness and itching.   Respiratory: Negative for cough, choking, chest tightness and shortness of breath.    Cardiovascular: Negative for chest pain.   Gastrointestinal: Negative for abdominal pain, blood in stool, constipation, diarrhea, nausea and vomiting.        Vomiting and diarrhea Sunday   Endocrine: Negative for polydipsia and polyuria.   Genitourinary: Positive for urinary incontinence. Negative for decreased urine volume, difficulty urinating, dysuria, frequency and hematuria.   Musculoskeletal: Positive for arthralgias (chronic). Negative for back pain, gait problem, joint swelling and myalgias.   Skin: Negative for dry skin, rash and skin lesions.   Neurological: Positive for speech difficulty, weakness, memory problem and confusion. Negative for dizziness and seizures.   Psychiatric/Behavioral: Positive for behavioral problems (intermittently). Negative for dysphoric mood, hallucinations, sleep disturbance and depressed mood. The patient is not nervous/anxious.          PHYSICAL EXAMINATION:   VITAL SIGNS:   Vitals:    09/06/22 1522   BP: 163/80   Pulse: 64   Resp: 20   Temp: 98 °F (36.7 °C)   SpO2: 96%   Weight: 75.8 kg (167 lb)       Physical Exam  Vitals and nursing note reviewed.   Constitutional:       General: She is not in acute distress.     Appearance: She is well-developed.   HENT:      Head: Normocephalic.      Right Ear: External ear normal.      Left Ear: External ear normal.      Nose: Nose normal.      Mouth/Throat:      Pharynx: No  oropharyngeal exudate.   Eyes:      Conjunctiva/sclera: Conjunctivae normal.   Cardiovascular:      Rate and Rhythm: Normal rate and regular rhythm.      Heart sounds: Normal heart sounds. No murmur heard.  Pulmonary:      Effort: Pulmonary effort is normal. No respiratory distress.      Breath sounds: Normal breath sounds. No wheezing or rales.   Chest:      Chest wall: No tenderness.   Abdominal:      General: Bowel sounds are increased. There is no distension.      Palpations: Abdomen is soft.      Tenderness: There is no abdominal tenderness.   Skin:     General: Skin is warm and dry.   Neurological:      Mental Status: She is alert. Mental status is at baseline. She is disoriented.      Gait: Gait abnormal.      Comments: Chronic NM deficits related to CVA   Psychiatric:         Mood and Affect: Mood normal.         Behavior: Behavior normal.         Cognition and Memory: Cognition is impaired. Memory is impaired.         RECORDS REVIEW:   I have reviewed and interpreted the discharge summary and medications    ASSESSMENT     Diagnoses and all orders for this visit:    1. History of vomiting (Primary)    2. History of diarrhea    3. Leukocytosis, unspecified type    4. Impaired mobility and ADLs        PLAN    History vomiting and diarrhea/leukocytosis  -No vomiting or diarrhea reported yesterday or today. Will get CXR and UA C&S for further evaluation of leukocytosis. Will start Rocephin 1 gm IM x 3 days while awaiting results, given WBC count of 14.1, increased risk of UTI and aspiration pneumonia. Will follow up and continue to monitor.     Nursing encouraged to keep me informed of any acute changes, lack of improvement, or any new concerning symptoms.    Staff to continue supportive care for all ADLs.    [x]  Discussed Patient in detail with nursing/staff, addressed all needs today.     [x]  Plan of Care Reviewed   [x]  PT/OT Reviewed   [x]  Order Changes  []  Discharge Plans Reviewed  [x]  Advance  Directive on file with Nursing Home.   [x]  POA on file with Nursing Home.   [x]  Code Status listed: [x]  Full Code   []  DNR     “I confirm accuracy of unchanged data/findings which have been carried forward from previous visit, as well as I have updated appropriately those that have changed.”                                    Fariha Cardona, APRN.

## 2022-11-30 DIAGNOSIS — E11.40 TYPE 2 DIABETES MELLITUS WITH DIABETIC NEUROPATHY, WITH LONG-TERM CURRENT USE OF INSULIN: ICD-10-CM

## 2022-11-30 DIAGNOSIS — Z79.4 TYPE 2 DIABETES MELLITUS WITH DIABETIC NEUROPATHY, WITH LONG-TERM CURRENT USE OF INSULIN: ICD-10-CM

## 2022-11-30 RX ORDER — GABAPENTIN 300 MG/1
300 CAPSULE ORAL 2 TIMES DAILY
Qty: 60 CAPSULE | Refills: 5 | Status: SHIPPED | OUTPATIENT
Start: 2022-11-30 | End: 2023-04-05 | Stop reason: SDUPTHER

## 2022-12-21 ENCOUNTER — NURSING HOME (OUTPATIENT)
Dept: FAMILY MEDICINE CLINIC | Facility: CLINIC | Age: 62
End: 2022-12-21

## 2022-12-21 VITALS
OXYGEN SATURATION: 97 % | BODY MASS INDEX: 25.61 KG/M2 | RESPIRATION RATE: 20 BRPM | WEIGHT: 168.44 LBS | DIASTOLIC BLOOD PRESSURE: 78 MMHG | TEMPERATURE: 98.4 F | HEART RATE: 78 BPM | SYSTOLIC BLOOD PRESSURE: 128 MMHG

## 2022-12-21 DIAGNOSIS — Z74.09 IMPAIRED MOBILITY AND ADLS: Primary | ICD-10-CM

## 2022-12-21 DIAGNOSIS — E11.44 CONTROLLED TYPE 2 DIABETES MELLITUS WITH DIABETIC AMYOTROPHY, WITH LONG-TERM CURRENT USE OF INSULIN: ICD-10-CM

## 2022-12-21 DIAGNOSIS — F20.0 PARANOID SCHIZOPHRENIA: ICD-10-CM

## 2022-12-21 DIAGNOSIS — Z78.9 IMPAIRED MOBILITY AND ADLS: Primary | ICD-10-CM

## 2022-12-21 DIAGNOSIS — I69.90 LATE EFFECTS OF CVA (CEREBROVASCULAR ACCIDENT): ICD-10-CM

## 2022-12-21 DIAGNOSIS — I10 ESSENTIAL HYPERTENSION: ICD-10-CM

## 2022-12-21 DIAGNOSIS — Z79.4 CONTROLLED TYPE 2 DIABETES MELLITUS WITH DIABETIC AMYOTROPHY, WITH LONG-TERM CURRENT USE OF INSULIN: ICD-10-CM

## 2022-12-21 PROCEDURE — 99309 SBSQ NF CARE MODERATE MDM 30: CPT | Performed by: FAMILY MEDICINE

## 2022-12-21 NOTE — PROGRESS NOTES
Nursing Home Progress Note        Fabian Santos DO [x]  [facility] []  [address] [place] [address]  Phone: [phone]  Fax: [phone] Yohan Cuellar MD []  Chaz Mandel DO []  [address] [place] [address]  Phone: [phone]  Fax: [phone]     PATIENT NAME: Izabella Agudelo                                                                          YOB: 1960           DATE OF SERVICE: 12/21/2020  FACILITY: []  [place]  [] [place]  [x]  [facility]  [] [facility]  []  Other ______________________________________________________________________      CHIEF COMPLAINT:  Impaired mobility and ADL/paranoid schizophrenia/diabetes mellitus treated with insulin/hypertension/dyslipidemia/diabetic amyotrophy      HISTORY OF PRESENT ILLNESS:   [x]  Follow Up visit for coordination of long term care issues and chronic medical management of Diagnoses and all orders for this visit:    1. Impaired mobility and ADLs (Primary)    2. Essential hypertension    3. 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 Paranoid schizophrenia ([facility])    5. Late effects of CVA (cerebrovascular accident)    Patient continues to be receptive to supportive care for mobilization and transfers, as well as ADLs if needed.  No reports of any new falls or injuries.  No reports of nutritional/hydration deficits.    Acute behavioral changes, she is sleeping well.    No new neurological deficits.    No reports of cyclical/persistent hypoglycemic episodes.    Vital signs have been stable.    PAST MEDICAL & SURGICAL HISTORY:   Past Medical History:   Diagnosis Date   • Cardiac abnormality    • CHF (congestive heart failure) ([facility])    • COPD (chronic obstructive pulmonary disease) ([facility])    • Diabetes ([facility])    • Glaucoma    • Schizophrenia, chronic condition ([facility])       No past surgical history on file.      MEDICATIONS:  I have  reviewed and reconciled the patients medication list in the patients chart at the skilled nursing facility today.      ALLERGIES:    Allergies   Allergen Reactions   • Penicillins Rash         SOCIAL HISTORY:    Social History     Socioeconomic History   • Marital status: Single   Tobacco Use   • Smoking status: Never   • Smokeless tobacco: Never   Substance and Sexual Activity   • Alcohol use: No   • Drug use: No   • Sexual activity: Defer       FAMILY HISTORY:    Family History   Problem Relation Age of Onset   • Diabetes Other    • Glaucoma Other        REVIEW OF SYSTEMS:    Review of Systems  · Appetite: Fair []   Good [x]   Poor []   Weight Loss []  [x]  Weight Stable   Unavoidable Weight Loss []  Tolerating Tube Feeding []    Supplements Provided []   · Constitutional: Negative for fever, chills, diaphoresis or fatigue and weight change.  Patient is interactive but a poor historian.  · HENT: No dysphagia; no changes to vision/hearing/smell/taste; no epistaxis  · Eyes: Negative for redness and visual disturbance.   · Respiratory: Negative for shortness of breath, chest pain, cough or chest tightness.   · Cardiovascular: Negative for chest pain and palpitations.   · Gastrointestinal: Negative for abdominal distention, abdominal pain and blood in stool.   · Endocrine: Negative for cold intolerance and heat intolerance.   · Genitourinary: Negative for difficulty urinating, dysuria and frequency.Negative for hematuria   · Musculoskeletal: Chronic myalgias and arthralgias.  Worsened lumbago as per above.  · Integumentary: No open wounds, rash or concerning skin lesions  · Neurological: Negative for syncope, weakness and headaches.   · Hematological: Negative for adenopathy. Does not bruise/bleed easily.   · Immunological: Negative for reported allergies or immunological disorders  · Psychological: Chronic behavioral issues, no acute changes.    PHYSICAL EXAMINATION:   VITAL SIGNS:   Vitals:    12/21/22 0837   BP:  128/78   Pulse: 78   Resp: 20   Temp: 98.4 °F (36.9 °C)   SpO2: 97%       Physical Exam    General Appearance:  [x]  Alert   [x]  Oriented x person  [x]  No acute distress     [x]  Confused, chronically []  Disoriented   []  Comatose   Head:  Atraumatic and normocephalic, without obvious abnormality.   Eyes:         PERRLA, conjunctivae and sclerae normal, no Icterus. No pallor. Extra-occular movements are within normal limits.   Ears:  Ears appear intact with no abnormalities noted.   Throat: No oral lesions, no thrush, oral mucosa moist.   Neck: Supple, trachea midline, no thyromegaly, no carotid bruit.   Back:   No kyphoscoliosis. No tenderness to palpation.   Lungs:   Chest shape is normal. Breath sounds heard bilaterally equally.  No wheezing.  Audible air exchange noted all lung fields.   Heart:  Normal S1 and S2, no murmur, no gallop, no rub. No JVD.   Abdomen:   Normal bowel sounds, no masses, no organomegaly. Soft, non-tender, non-distended, no guarding.  No CVA tenderness.   Extremities: Moves all extremities, without edema, cyanosis or clubbing.  Frail build.   Poor core strength and stability.   Pulses: Pulses palpable and equal bilaterally.   Skin: Generalized dry skin noted.  Age-related atrophy of skin.   Neurologic: [x] Normal speech []  Normal mental status    [x] Cranial nerves II through XII intact   [x]  No anosmia [x]  DTR 2+ [x]  Proprioception intact  [x]  Chronic motor/sensory deficits      Psych/Mood:                    [x]  No acute changes, flat affect[]  Depressed      Urinary:      [x]  Continent  [x]  Incontinent, at times[]  Retention  []  F/C     []  UTI w/treatment in progress         ASSESSMENT     Diagnoses and all orders for this visit:    1. Impaired mobility and ADLs (Primary)    2. Essential hypertension    3. 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 Paranoid schizophrenia (HCC)    5. Late effects of CVA  (cerebrovascular accident)          PLAN  Plan continuation of current supportive care for mobilization/transfer assistance, as well as any ADLs if needed.  Continue to follow her overall nutritional/hydration status.  I have stressed the importance of avoiding prolonged fasting periods, as well as the need to maintain appropriate hydration given her underlying diabetes.  Continue healthy dietary choices has been stable.    No acute behavioral changes.  Sleeping well.    Vital signs demonstrate hemodynamic stability, blood pressure is at goal.    Surveillance labs when needed.    [x]  Discussed Patient in detail with nursing/staff, addressed all needs today.     [x]  Plan of Care Reviewed   []  PT/OT Reviewed   []  Order Changes  []  Discharge Plans Reviewed   []  Code Status Changes      I spent 30 minutes caring for Izabella on this date of service. This time includes time spent by me in the following activities:preparing for the visit, performing a medically appropriate examination and/or evaluation , counseling and educating the patient/family/caregiver, ordering medications, tests, or procedures, documenting information in the medical record and care coordination    I have reviewed and updated all copied forward information, as appropriate.  I attest to the accuracy and relevance of any unchanged information.       Fabian Santos DO  12/21/2022

## 2023-02-15 ENCOUNTER — NURSING HOME (OUTPATIENT)
Dept: FAMILY MEDICINE CLINIC | Facility: CLINIC | Age: 63
End: 2023-02-15
Payer: MEDICARE

## 2023-02-15 VITALS
SYSTOLIC BLOOD PRESSURE: 126 MMHG | RESPIRATION RATE: 20 BRPM | OXYGEN SATURATION: 96 % | HEART RATE: 70 BPM | DIASTOLIC BLOOD PRESSURE: 74 MMHG | WEIGHT: 168 LBS | TEMPERATURE: 98.2 F | BODY MASS INDEX: 25.54 KG/M2

## 2023-02-15 DIAGNOSIS — F20.0 PARANOID SCHIZOPHRENIA: ICD-10-CM

## 2023-02-15 DIAGNOSIS — I10 ESSENTIAL HYPERTENSION: ICD-10-CM

## 2023-02-15 DIAGNOSIS — E11.44 CONTROLLED TYPE 2 DIABETES MELLITUS WITH DIABETIC AMYOTROPHY, WITH LONG-TERM CURRENT USE OF INSULIN: ICD-10-CM

## 2023-02-15 DIAGNOSIS — Z78.9 IMPAIRED MOBILITY AND ADLS: Primary | ICD-10-CM

## 2023-02-15 DIAGNOSIS — Z74.09 IMPAIRED MOBILITY AND ADLS: Primary | ICD-10-CM

## 2023-02-15 DIAGNOSIS — Z79.4 CONTROLLED TYPE 2 DIABETES MELLITUS WITH DIABETIC AMYOTROPHY, WITH LONG-TERM CURRENT USE OF INSULIN: ICD-10-CM

## 2023-02-15 DIAGNOSIS — I69.90 LATE EFFECTS OF CVA (CEREBROVASCULAR ACCIDENT): ICD-10-CM

## 2023-02-15 NOTE — LETTER
Nursing Home Progress Note        Fabian Santos DO [x]  [facility] []  [address] [place] [address]  Phone: [phone]  Fax: [phone] Yohan Cuellar MD []  Chaz Mandel DO []  [address] [place] [address]  Phone: [phone]  Fax: [phone]     PATIENT NAME: Izabella Agudelo                                                                          YOB: 1960           DATE OF SERVICE: 2/15/2023  FACILITY: []  [facility]  [] [facility]  [x]  [facility]  [] [facility]  []  Other ______________________________________________________________________      CHIEF COMPLAINT:  Impaired mobility and ADL/paranoid schizophrenia/diabetes mellitus treated with insulin/hypertension/dyslipidemia/diabetic amyotrophy      HISTORY OF PRESENT ILLNESS:   [x]  Follow Up visit for coordination of long term care issues and chronic medical management of Diagnoses and all orders for this visit:    1. Impaired mobility and ADLs (Primary)    2. Late effects of CVA (cerebrovascular accident)    3. 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 Essential hypertension    5. Paranoid schizophrenia    Patient remains receptive to supportive care for mobilization/transfer assistance, as relayed by nursing/staff.  No reports of any nutritional/hydration deficits.    No new neurological deficits reported.    Vital signs have been stable.    No reports of cyclical/persistent hypoglycemic episodes.    PAST MEDICAL & SURGICAL HISTORY:   Past Medical History:   Diagnosis Date    Cardiac abnormality     CHF (congestive heart failure)     COPD (chronic obstructive pulmonary disease)     Diabetes     Glaucoma     Schizophrenia, chronic condition       No past surgical history on file.      MEDICATIONS:  I have reviewed and reconciled the patients medication list in the patients chart at the skilled nursing facility today.       ALLERGIES:    Allergies   Allergen Reactions    Penicillins Rash         SOCIAL HISTORY:    Social History     Socioeconomic History    Marital status: Single   Tobacco Use    Smoking status: Never    Smokeless tobacco: Never   Substance and Sexual Activity    Alcohol use: No    Drug use: No    Sexual activity: Defer       FAMILY HISTORY:    Family History   Problem Relation Age of Onset    Diabetes Other     Glaucoma Other        REVIEW OF SYSTEMS:    Review of Systems  Appetite: Fair []   Good [x]   Poor []   Weight Loss []  [x]  Weight Stable   Unavoidable Weight Loss []  Tolerating Tube Feeding []    Supplements Provided []   Constitutional: Negative for fever, chills, diaphoresis or fatigue and weight change.  Patient is interactive but a poor historian.  HENT: No dysphagia; no changes to vision/hearing/smell/taste; no epistaxis  Eyes: Negative for redness and visual disturbance.   Respiratory: Negative for shortness of breath, chest pain, cough or chest tightness.   Cardiovascular: Negative for chest pain and palpitations.   Gastrointestinal: Negative for abdominal distention, abdominal pain and blood in stool.   Endocrine: Negative for cold intolerance and heat intolerance.   Genitourinary: Negative for difficulty urinating, dysuria and frequency.Negative for hematuria   Musculoskeletal: Chronic myalgias and arthralgias.  Worsened lumbago as per above.  Integumentary: No open wounds, rash or concerning skin lesions  Neurological: Negative for syncope, weakness and headaches.   Hematological: Negative for adenopathy. Does not bruise/bleed easily.   Immunological: Negative for reported allergies or immunological disorders  Psychological: Chronic behavioral issues, no acute changes.    PHYSICAL EXAMINATION:   VITAL SIGNS:   Vitals:    02/15/23 1029   BP: 126/74   Pulse: 70   Resp: 20   Temp: 98.2 °F (36.8 °C)   SpO2: 96%       Physical Exam    General Appearance:  [x]  Alert   [x]  Oriented x person  [x]  No  acute distress     [x]  Confused, chronically []  Disoriented   []  Comatose   Head:  Atraumatic and normocephalic, without obvious abnormality.   Eyes:         PERRLA, conjunctivae and sclerae normal, no Icterus. No pallor. Extra-occular movements are within normal limits.   Ears:  Ears appear intact with no abnormalities noted.   Throat: No oral lesions, no thrush, oral mucosa moist.   Neck: Supple, trachea midline, no thyromegaly, no carotid bruit.   Back:   No kyphoscoliosis. No tenderness to palpation.   Lungs:   Chest shape is normal. Breath sounds heard bilaterally equally.  No wheezing.  Audible air exchange noted all lung fields.   Heart:  Normal S1 and S2, no murmur, no gallop, no rub. No JVD.   Abdomen:   Normal bowel sounds, no masses, no organomegaly. Soft, non-tender, non-distended, no guarding.  No CVA tenderness.   Extremities: Moves all extremities, without edema, cyanosis or clubbing.  Frail build.   Poor core strength and stability.   Pulses: Pulses palpable and equal bilaterally.   Skin: Generalized dry skin noted.  Age-related atrophy of skin.   Neurologic: [x] Normal speech []  Normal mental status    [x] Cranial nerves II through XII intact   [x]  No anosmia [x]  DTR 2+ [x]  Proprioception intact  [x]  Chronic motor/sensory deficits      Psych/Mood:                    [x]  No acute changes, flat affect[]  Depressed      Urinary:      [x]  Continent  [x]  Incontinent, at times[]  Retention  []  F/C     []  UTI w/treatment in progress         ASSESSMENT     Diagnoses and all orders for this visit:    1. Impaired mobility and ADLs (Primary)    2. Late effects of CVA (cerebrovascular accident)    3. 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 Essential hypertension    5. Paranoid schizophrenia          PLAN  Plan continuation of supportive care for mobilization/transfer assistance, as well as ADLs of need.  Continue to monitor overall  nutritional/hydration status.    Vital signs demonstrate hemodynamic stability, blood pressure is at goal.    Continue healthy dietary choices as best able, maintain appropriate hydration status.  Avoid prolonged fasting periods as best able.    Surveillance labs when needed.    No acute behavioral changes.    [x]  Discussed Patient in detail with nursing/staff, addressed all needs today.     [x]  Plan of Care Reviewed   []  PT/OT Reviewed   []  Order Changes  []  Discharge Plans Reviewed   []  Code Status Changes      I spent 30 minutes caring for Izabella on this date of service. This time includes time spent by me in the following activities:preparing for the visit, performing a medically appropriate examination and/or evaluation , counseling and educating the patient/family/caregiver, ordering medications, tests, or procedures, documenting information in the medical record and care coordination    I have reviewed and updated all copied forward information, as appropriate.  I attest to the accuracy and relevance of any unchanged information.       Fabian Santos DO  2/15/2023

## 2023-02-15 NOTE — PROGRESS NOTES
Nursing Home Progress Note        Fabian Santos DO [x]  [facility] []  [address] [place] [address]  Phone: [phone]  Fax: [phone] Yohan Cuellar MD []  Chaz Mandel DO []  [address] [place] [address]  Phone: [phone]  Fax: [phone]     PATIENT NAME: Izabella Agudelo                                                                          YOB: 1960           DATE OF SERVICE: 2/15/2023  FACILITY: []  [facility]  [] [facility]  [x]  [facility]  [] [facility]  []  Other ______________________________________________________________________      CHIEF COMPLAINT:  Impaired mobility and ADL/paranoid schizophrenia/diabetes mellitus treated with insulin/hypertension/dyslipidemia/diabetic amyotrophy      HISTORY OF PRESENT ILLNESS:   [x]  Follow Up visit for coordination of long term care issues and chronic medical management of Diagnoses and all orders for this visit:    1. Impaired mobility and ADLs (Primary)    2. Late effects of CVA (cerebrovascular accident)    3. 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 Essential hypertension    5. Paranoid schizophrenia    Patient remains receptive to supportive care for mobilization/transfer assistance, as relayed by nursing/staff.  No reports of any nutritional/hydration deficits.    No new neurological deficits reported.    Vital signs have been stable.    No reports of cyclical/persistent hypoglycemic episodes.    PAST MEDICAL & SURGICAL HISTORY:   Past Medical History:   Diagnosis Date    Cardiac abnormality     CHF (congestive heart failure)     COPD (chronic obstructive pulmonary disease)     Diabetes     Glaucoma     Schizophrenia, chronic condition       No past surgical history on file.      MEDICATIONS:  I have reviewed and reconciled the patients medication list in the patients chart at the skilled nursing facility today.       ALLERGIES:    Allergies   Allergen Reactions    Penicillins Rash         SOCIAL HISTORY:    Social History     Socioeconomic History    Marital status: Single   Tobacco Use    Smoking status: Never    Smokeless tobacco: Never   Substance and Sexual Activity    Alcohol use: No    Drug use: No    Sexual activity: Defer       FAMILY HISTORY:    Family History   Problem Relation Age of Onset    Diabetes Other     Glaucoma Other        REVIEW OF SYSTEMS:    Review of Systems  Appetite: Fair []   Good [x]   Poor []   Weight Loss []  [x]  Weight Stable   Unavoidable Weight Loss []  Tolerating Tube Feeding []    Supplements Provided []   Constitutional: Negative for fever, chills, diaphoresis or fatigue and weight change.  Patient is interactive but a poor historian.  HENT: No dysphagia; no changes to vision/hearing/smell/taste; no epistaxis  Eyes: Negative for redness and visual disturbance.   Respiratory: Negative for shortness of breath, chest pain, cough or chest tightness.   Cardiovascular: Negative for chest pain and palpitations.   Gastrointestinal: Negative for abdominal distention, abdominal pain and blood in stool.   Endocrine: Negative for cold intolerance and heat intolerance.   Genitourinary: Negative for difficulty urinating, dysuria and frequency.Negative for hematuria   Musculoskeletal: Chronic myalgias and arthralgias.  Worsened lumbago as per above.  Integumentary: No open wounds, rash or concerning skin lesions  Neurological: Negative for syncope, weakness and headaches.   Hematological: Negative for adenopathy. Does not bruise/bleed easily.   Immunological: Negative for reported allergies or immunological disorders  Psychological: Chronic behavioral issues, no acute changes.    PHYSICAL EXAMINATION:   VITAL SIGNS:   Vitals:    02/15/23 1029   BP: 126/74   Pulse: 70   Resp: 20   Temp: 98.2 °F (36.8 °C)   SpO2: 96%       Physical Exam    General Appearance:  [x]  Alert   [x]  Oriented x person  [x]  No  acute distress     [x]  Confused, chronically []  Disoriented   []  Comatose   Head:  Atraumatic and normocephalic, without obvious abnormality.   Eyes:         PERRLA, conjunctivae and sclerae normal, no Icterus. No pallor. Extra-occular movements are within normal limits.   Ears:  Ears appear intact with no abnormalities noted.   Throat: No oral lesions, no thrush, oral mucosa moist.   Neck: Supple, trachea midline, no thyromegaly, no carotid bruit.   Back:   No kyphoscoliosis. No tenderness to palpation.   Lungs:   Chest shape is normal. Breath sounds heard bilaterally equally.  No wheezing.  Audible air exchange noted all lung fields.   Heart:  Normal S1 and S2, no murmur, no gallop, no rub. No JVD.   Abdomen:   Normal bowel sounds, no masses, no organomegaly. Soft, non-tender, non-distended, no guarding.  No CVA tenderness.   Extremities: Moves all extremities, without edema, cyanosis or clubbing.  Frail build.   Poor core strength and stability.   Pulses: Pulses palpable and equal bilaterally.   Skin: Generalized dry skin noted.  Age-related atrophy of skin.   Neurologic: [x] Normal speech []  Normal mental status    [x] Cranial nerves II through XII intact   [x]  No anosmia [x]  DTR 2+ [x]  Proprioception intact  [x]  Chronic motor/sensory deficits      Psych/Mood:                    [x]  No acute changes, flat affect[]  Depressed      Urinary:      [x]  Continent  [x]  Incontinent, at times[]  Retention  []  F/C     []  UTI w/treatment in progress         ASSESSMENT     Diagnoses and all orders for this visit:    1. Impaired mobility and ADLs (Primary)    2. Late effects of CVA (cerebrovascular accident)    3. 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 Essential hypertension    5. Paranoid schizophrenia          PLAN  Plan continuation of supportive care for mobilization/transfer assistance, as well as ADLs of need.  Continue to monitor overall  nutritional/hydration status.    Vital signs demonstrate hemodynamic stability, blood pressure is at goal.    Continue healthy dietary choices as best able, maintain appropriate hydration status.  Avoid prolonged fasting periods as best able.    Surveillance labs when needed.    No acute behavioral changes.    [x]  Discussed Patient in detail with nursing/staff, addressed all needs today.     [x]  Plan of Care Reviewed   []  PT/OT Reviewed   []  Order Changes  []  Discharge Plans Reviewed   []  Code Status Changes      I spent 30 minutes caring for Izabella on this date of service. This time includes time spent by me in the following activities:preparing for the visit, performing a medically appropriate examination and/or evaluation , counseling and educating the patient/family/caregiver, ordering medications, tests, or procedures, documenting information in the medical record and care coordination    I have reviewed and updated all copied forward information, as appropriate.  I attest to the accuracy and relevance of any unchanged information.       Fabian Santos DO  2/15/2023

## 2023-03-02 ENCOUNTER — APPOINTMENT (OUTPATIENT)
Dept: CT IMAGING | Facility: HOSPITAL | Age: 63
End: 2023-03-02
Payer: MEDICARE

## 2023-03-02 ENCOUNTER — HOSPITAL ENCOUNTER (EMERGENCY)
Facility: HOSPITAL | Age: 63
Discharge: NURSING, REHAB OR LONG-TERM CARE | End: 2023-03-02
Attending: EMERGENCY MEDICINE | Admitting: EMERGENCY MEDICINE
Payer: MEDICARE

## 2023-03-02 VITALS
OXYGEN SATURATION: 94 % | SYSTOLIC BLOOD PRESSURE: 185 MMHG | TEMPERATURE: 98.8 F | WEIGHT: 169 LBS | RESPIRATION RATE: 18 BRPM | DIASTOLIC BLOOD PRESSURE: 107 MMHG | BODY MASS INDEX: 25.7 KG/M2 | HEART RATE: 86 BPM

## 2023-03-02 DIAGNOSIS — R11.10 VOMITING AND DIARRHEA: Primary | ICD-10-CM

## 2023-03-02 DIAGNOSIS — R19.7 VOMITING AND DIARRHEA: Primary | ICD-10-CM

## 2023-03-02 LAB
HOLD SPECIMEN: NORMAL
HOLD SPECIMEN: NORMAL
WHOLE BLOOD HOLD COAG: NORMAL
WHOLE BLOOD HOLD SPECIMEN: NORMAL

## 2023-03-02 PROCEDURE — 99283 EMERGENCY DEPT VISIT LOW MDM: CPT

## 2023-03-02 PROCEDURE — 74176 CT ABD & PELVIS W/O CONTRAST: CPT

## 2023-03-02 PROCEDURE — 25010000002 ONDANSETRON PER 1 MG: Performed by: EMERGENCY MEDICINE

## 2023-03-02 PROCEDURE — 0 DIATRIZOATE MEGLUMINE & SODIUM PER 1 ML: Performed by: EMERGENCY MEDICINE

## 2023-03-02 PROCEDURE — 96374 THER/PROPH/DIAG INJ IV PUSH: CPT

## 2023-03-02 PROCEDURE — 96361 HYDRATE IV INFUSION ADD-ON: CPT

## 2023-03-02 RX ORDER — SODIUM CHLORIDE 0.9 % (FLUSH) 0.9 %
10 SYRINGE (ML) INJECTION AS NEEDED
Status: DISCONTINUED | OUTPATIENT
Start: 2023-03-02 | End: 2023-03-03 | Stop reason: HOSPADM

## 2023-03-02 RX ORDER — ONDANSETRON 2 MG/ML
4 INJECTION INTRAMUSCULAR; INTRAVENOUS ONCE
Status: COMPLETED | OUTPATIENT
Start: 2023-03-02 | End: 2023-03-02

## 2023-03-02 RX ORDER — SODIUM CHLORIDE 9 MG/ML
125 INJECTION, SOLUTION INTRAVENOUS CONTINUOUS
Status: DISCONTINUED | OUTPATIENT
Start: 2023-03-02 | End: 2023-03-03 | Stop reason: HOSPADM

## 2023-03-02 RX ADMIN — ONDANSETRON 4 MG: 2 INJECTION INTRAMUSCULAR; INTRAVENOUS at 19:41

## 2023-03-02 RX ADMIN — DIATRIZOATE MEGLUMINE AND DIATRIZOATE SODIUM 30 ML: 660; 100 LIQUID ORAL; RECTAL at 21:17

## 2023-03-02 RX ADMIN — SODIUM CHLORIDE 125 ML/HR: 9 INJECTION, SOLUTION INTRAVENOUS at 19:40

## 2023-03-03 NOTE — DISCHARGE INSTRUCTIONS
Ct abdomen and pelvis unremarkable. Chest component suggests bibasilar airspace disease, atelectasis vs. Infiltrate.

## 2023-03-03 NOTE — ED PROVIDER NOTES
Subjective  History of Present Illness:    Chief Complaint: Nausea vomiting diarrhea  History of Present Illness: 63-year-old female nursing home resident history of stroke and aphasia, A-fib chronic anticoagulation, here with reported complaint.  She had outpatient labs today as well as an abdominal plain film revealing gastric ileus versus gastric outlet obstruction.  Onset: 2 days   duration: Persistent  Exacerbating / Alleviating factors: Worse with p.o. intake  Associated symptoms: None      Nurses Notes reviewed and agree, including vitals, allergies, social history and prior medical history.     REVIEW OF SYSTEMS: All systems reviewed and not pertinent unless noted.  Review of Systems      Positive for: Reported nausea vomiting diarrhea, x2 days, plain film outpatient concerning for gastric outlet obstruction versus ileus    Negative for: Fever urinary symptoms GI bleed    Past Medical History:   Diagnosis Date   • Cardiac abnormality    • CHF (congestive heart failure) (HCC)    • COPD (chronic obstructive pulmonary disease) (HCC)    • Diabetes (HCC)    • Glaucoma    • Schizophrenia, chronic condition ([facility])        Allergies:    Penicillins      History reviewed. No pertinent surgical history.      Social History     Socioeconomic History   • Marital status: Single   Tobacco Use   • Smoking status: Never   • Smokeless tobacco: Never   Substance and Sexual Activity   • Alcohol use: No   • Drug use: No   • Sexual activity: Defer         Family History   Problem Relation Age of Onset   • Diabetes Other    • Glaucoma Other        Objective  Physical Exam:  BP (!) 185/107   Pulse 86   Temp 98.8 °F (37.1 °C) (Oral)   Resp 18   Wt 76.7 kg (169 lb)   SpO2 94%   BMI 25.70 kg/m²      Physical Exam    CONSTITUTIONAL: Well developed, nontoxic chronically ill-appearing 63-year-old female,  in no acute distress.  VITAL SIGNS: per nursing, reviewed and noted  SKIN: exposed skin with no rashes, ulcerations or  petechiae  EYES: Grossly EOMI, no icterus  ENT: Some slurring of speech.  Patient maintained wearing a mask throughout patient encounter due to coronavirus pandemic  RESPIRATORY:  No increased work of breathing. No retractions.  Chest clear to auscultation bilaterally  CARDIOVASCULAR:  regular rate and rhythm, no murmurs.  Good Peripheral pulses. Good cap refill to extremities.   GI: Abdomen soft, mild left lower quadrant tenderness to palpation with rebound tenderness guarding, normal bowel sounds. No hernia. No ascites.  MUSCULOSKELETAL: Muscle wasting   NEUROLOGIC: Alert, at neuro baseline.  Difficult to assess speech secondary to baseline speech deficits.   PSYCH: appropriate affect.  : no bladder tenderness or distention, no CVA tenderness    Procedures    ED Course:             Lab Results (last 24 hours)     ** No results found for the last 24 hours. **           No radiology results from the last 24 hrs       MDM    Initial impression of presenting illness: 63-year-old female presents for evaluation regarding possible ileus versus gastric outlet obstruction from plain films obtained outpatient today with report completed at 5:09 PM    DDX: includes but is not limited to: Ileus versus gastric outlet obstruction, gastroenteritis    Patient arrives hypertensive afebrile no tachycardia oxygen saturations 94% with vitals interpreted by myself.     Pertinent features from physical exam: Mild left lower quadrant tenderness without rebound tenderness or guarding,.    Initial diagnostic plan: CT abdomen pelvis with oral contrast, and GI panel C. difficile, IV fluids, antiemetics.    Results from initial plan were reviewed and interpreted by me revealing    IMPRESSION:  1.  Bibasilar airspace infiltration.  2.  Small right pleural  effusion.  3.  No definite evidence of small bowel obstruction.     Authenticated and Electronically Signed by Gregory Mercer MD  on 03/02/2023 09:50:47 PM    Diagnostic information  from other sources: Old record review: Plain film  obtained same day abdomen 1 view with a gastric ileus versus gastric outlet obstruction per radiologist report.  Labs obtained same date prior to arrival, glucose 107 sodium 133 potassium 4.3 chloride 91 CO2 33 BUN 8 creatinine 0.4 normal LFTs, white cell count 14.4 hemoglobin 13 hematocrit 39.3 with labs better in comparison to previous regarding hemoglobin hematocrit from 12/5/2022 and hemoglobin was 11.4 hematocrit 33.7.  I will defer repeat CBC CMP    Interventions / Re-evaluation: Zofran and IV fluid    Results/clinical rationale were discussed with patient    Consultations/Discussion of results with other physicians: None    Disposition plan: Patient was discharged in home stable condition.  Counseled on supportive care, outpatient follow-up. Return precaution discussed.  Patient/family was understanding and agreeable with plan    -----    Final diagnoses:   Vomiting and diarrhea        Cayden Kc,   03/09/23 0346

## 2023-03-06 ENCOUNTER — NURSING HOME (OUTPATIENT)
Dept: FAMILY MEDICINE CLINIC | Facility: CLINIC | Age: 63
End: 2023-03-06
Payer: MEDICARE

## 2023-03-06 VITALS
OXYGEN SATURATION: 93 % | DIASTOLIC BLOOD PRESSURE: 63 MMHG | RESPIRATION RATE: 18 BRPM | BODY MASS INDEX: 25.7 KG/M2 | WEIGHT: 169 LBS | HEART RATE: 66 BPM | TEMPERATURE: 97.7 F | SYSTOLIC BLOOD PRESSURE: 114 MMHG

## 2023-03-06 DIAGNOSIS — Z87.19 HISTORY OF CONSTIPATION: ICD-10-CM

## 2023-03-06 DIAGNOSIS — Z78.9 IMPAIRED MOBILITY AND ADLS: ICD-10-CM

## 2023-03-06 DIAGNOSIS — K21.9 GASTROESOPHAGEAL REFLUX DISEASE WITHOUT ESOPHAGITIS: ICD-10-CM

## 2023-03-06 DIAGNOSIS — Z87.898 HISTORY OF VOMITING: ICD-10-CM

## 2023-03-06 DIAGNOSIS — Z74.09 IMPAIRED MOBILITY AND ADLS: ICD-10-CM

## 2023-03-06 PROCEDURE — 99309 SBSQ NF CARE MODERATE MDM 30: CPT | Performed by: NURSE PRACTITIONER

## 2023-03-07 NOTE — PROGRESS NOTES
Nursing Home Follow Up Note      Fabian Santos DO []   [facility] [x]  [address] [facility], [place] [address]  Phone: [phone]  Fax: [phone] Yohan Cuellar MD []    Chaz Mandel DO []   [address] [place] [address]  Phone: [phone]  Fax: [phone]     PATIENT NAME: Izabella Agudelo                                                                          YOB: 1960           DATE OF SERVICE: 03/6/2023  FACILITY:  [][facility]   [][facility]   [x] [facility] [place]   [] [facility]   [] Other ______________________________________________________________________      CHIEF COMPLAINT:    Follow up abnormal KUB and ED visit.     HISTORY OF PRESENT ILLNESS:     Patient had several episodes of vomiting on Thursday so KUB performed because this was common for her with constipation. She was started on bowel are, liquid diet and given some IM phenergan. Nursing reported large BM. KUB reported small amount of stool and gastric ileus with gastric outlet obstruction so she was sent to [facility] ED for further evaluation. CT abdomen did not report any acute findings. She was returned to facility. She has not had any other nausea and vomiting. Sister her visiting this weekend and reported that she felt some of her issue was GERD because this happened at home and Maalox helped. She requested her have prn Maalox.     PAST MEDICAL & SURGICAL HISTORY:   Past Medical History:   Diagnosis Date   • Cardiac abnormality    • CHF (congestive heart failure) ([facility])    • COPD (chronic obstructive pulmonary disease) ([facility])    • Diabetes ([facility])    • Glaucoma    • Schizophrenia, chronic condition ([facility])       No past surgical history on file.      MEDICATIONS:  I have reviewed and reconciled the patients medication list in the patients chart at the skilled nursing facility today.      ALLERGIES:    Allergies   Allergen Reactions   • Penicillins Rash         SOCIAL HISTORY:    Social History      Socioeconomic History   • Marital status: Single   Tobacco Use   • Smoking status: Never   • Smokeless tobacco: Never   Substance and Sexual Activity   • Alcohol use: No   • Drug use: No   • Sexual activity: Defer       FAMILY HISTORY:    Family History   Problem Relation Age of Onset   • Diabetes Other    • Glaucoma Other        REVIEW OF SYSTEMS:    Review of Systems   Reason unable to perform ROS: ROS per nursing and patient.   Constitutional: Negative for activity change, appetite change, chills, diaphoresis, fatigue, fever, unexpected weight gain and unexpected weight loss.   HENT: Negative for mouth sores, nosebleeds, sore throat and trouble swallowing.    Eyes: Negative for discharge, redness and itching.   Respiratory: Negative for cough, choking, chest tightness and shortness of breath.    Cardiovascular: Negative for chest pain.   Gastrointestinal: Negative for abdominal pain, blood in stool, constipation, diarrhea, nausea and vomiting.   Endocrine: Negative for polydipsia and polyuria.   Genitourinary: Positive for urinary incontinence. Negative for decreased urine volume, difficulty urinating, dysuria, frequency and hematuria.   Musculoskeletal: Positive for arthralgias (chronic). Negative for back pain, gait problem, joint swelling and myalgias.   Skin: Negative for dry skin, rash and skin lesions.   Neurological: Positive for speech difficulty, weakness, memory problem and confusion. Negative for dizziness and seizures.   Psychiatric/Behavioral: Positive for behavioral problems (intermittently). Negative for dysphoric mood, hallucinations, sleep disturbance and depressed mood. The patient is not nervous/anxious.          PHYSICAL EXAMINATION:   VITAL SIGNS:   Vitals:    03/06/23 1456   BP: 114/63   Pulse: 66   Resp: 18   Temp: 97.7 °F (36.5 °C)   SpO2: 93%   Weight: 76.7 kg (169 lb)       Physical Exam  Vitals and nursing note reviewed.   Constitutional:       General: She is not in acute  distress.     Appearance: She is well-developed.   HENT:      Head: Normocephalic.      Right Ear: External ear normal.      Left Ear: External ear normal.      Nose: Nose normal.      Mouth/Throat:      Pharynx: No oropharyngeal exudate.   Eyes:      Conjunctiva/sclera: Conjunctivae normal.   Cardiovascular:      Rate and Rhythm: Normal rate and regular rhythm.      Heart sounds: Normal heart sounds. No murmur heard.  Pulmonary:      Effort: Pulmonary effort is normal. No respiratory distress.      Breath sounds: Normal breath sounds. No wheezing or rales.   Chest:      Chest wall: No tenderness.   Abdominal:      General: Bowel sounds are normal. There is no distension.      Palpations: Abdomen is soft.      Tenderness: There is no abdominal tenderness.   Skin:     General: Skin is warm and dry.   Neurological:      Mental Status: She is alert. Mental status is at baseline. She is disoriented.      Gait: Gait abnormal.      Comments: Chronic NM deficits related to CVA   Psychiatric:         Mood and Affect: Mood normal.         Behavior: Behavior normal.         Cognition and Memory: Cognition is impaired. Memory is impaired.         RECORDS REVIEW:   I have reviewed and interpreted the discharge summary and medications    ASSESSMENT     Diagnoses and all orders for this visit:    1. History of vomiting    2. History of constipation    3. Gastroesophageal reflux disease without esophagitis    4. Impaired mobility and ADLs        PLAN    History vomiting/history constipation  -Negative CT in ED. No n/v/d since return to facility. Will follow up and monitor.     GERD  -Will start Maalox 20 ml tid prn. If GERD continues will start PPI.    Nursing encouraged to keep me informed of any acute changes, lack of improvement, or any new concerning symptoms.    Staff to continue supportive care for all ADLs.    [x]  Discussed Patient in detail with nursing/staff, addressed all needs today.     [x]  Plan of Care Reviewed    [x]  PT/OT Reviewed   [x]  Order Changes  []  Discharge Plans Reviewed  [x]  Advance Directive on file with Nursing Home.   [x]  POA on file with Nursing Home.   [x]  Code Status listed: [x]  Full Code   []  DNR       “I confirm accuracy of unchanged data/findings which have been carried forward from previous visit, as well as I have updated appropriately those that have changed.”                                      Fariha aCrdona, APRN.

## 2023-04-05 DIAGNOSIS — E11.40 TYPE 2 DIABETES MELLITUS WITH DIABETIC NEUROPATHY, WITH LONG-TERM CURRENT USE OF INSULIN: ICD-10-CM

## 2023-04-05 DIAGNOSIS — Z79.4 TYPE 2 DIABETES MELLITUS WITH DIABETIC NEUROPATHY, WITH LONG-TERM CURRENT USE OF INSULIN: ICD-10-CM

## 2023-04-05 RX ORDER — GABAPENTIN 300 MG/1
300 CAPSULE ORAL 2 TIMES DAILY
Qty: 60 CAPSULE | Refills: 5 | Status: SHIPPED | OUTPATIENT
Start: 2023-04-05

## 2023-04-05 RX ORDER — GABAPENTIN 300 MG/1
300 CAPSULE ORAL 2 TIMES DAILY
Qty: 60 CAPSULE | Refills: 5 | Status: CANCELLED | OUTPATIENT
Start: 2023-04-05

## 2023-04-05 NOTE — TELEPHONE ENCOUNTER
[facility] REQUESTING MED REFILL FOR GABAPENTIN 300 MG.    DIRECTIONS: GABAPENTIN 300  MG 1 CAP PO BID.

## 2023-04-19 ENCOUNTER — NURSING HOME (OUTPATIENT)
Dept: FAMILY MEDICINE CLINIC | Facility: CLINIC | Age: 63
End: 2023-04-19
Payer: MEDICARE

## 2023-04-19 VITALS
HEART RATE: 70 BPM | RESPIRATION RATE: 18 BRPM | WEIGHT: 172 LBS | OXYGEN SATURATION: 98 % | DIASTOLIC BLOOD PRESSURE: 68 MMHG | SYSTOLIC BLOOD PRESSURE: 117 MMHG | BODY MASS INDEX: 26.15 KG/M2 | TEMPERATURE: 98.3 F

## 2023-04-19 DIAGNOSIS — I10 ESSENTIAL HYPERTENSION: ICD-10-CM

## 2023-04-19 DIAGNOSIS — E11.44 CONTROLLED TYPE 2 DIABETES MELLITUS WITH DIABETIC AMYOTROPHY, WITH LONG-TERM CURRENT USE OF INSULIN: ICD-10-CM

## 2023-04-19 DIAGNOSIS — Z79.4 CONTROLLED TYPE 2 DIABETES MELLITUS WITH DIABETIC AMYOTROPHY, WITH LONG-TERM CURRENT USE OF INSULIN: ICD-10-CM

## 2023-04-19 DIAGNOSIS — Z78.9 IMPAIRED MOBILITY AND ADLS: Primary | ICD-10-CM

## 2023-04-19 DIAGNOSIS — F20.0 PARANOID SCHIZOPHRENIA: ICD-10-CM

## 2023-04-19 DIAGNOSIS — Z74.09 IMPAIRED MOBILITY AND ADLS: Primary | ICD-10-CM

## 2023-04-19 DIAGNOSIS — I69.90 LATE EFFECTS OF CVA (CEREBROVASCULAR ACCIDENT): ICD-10-CM

## 2023-04-19 NOTE — LETTER
Nursing Home Progress Note        Fabian Santos DO [x]  [facility] []  [address] [place] [address]  Phone: [phone]  Fax: [phone] Yohan Cuellar MD []  Chaz Mandel DO []  [address] [place] [address]  Phone: [phone]  Fax: [phone]     PATIENT NAME: Izabella Agudelo                                                                          YOB: 1960           DATE OF SERVICE: 4/19/2023  FACILITY: []  [facility]  [] [facility]  [x]  [facility]  [] [facility]  []  Other ______________________________________________________________________      CHIEF COMPLAINT:  Impaired mobility and ADL/paranoid schizophrenia/diabetes mellitus treated with insulin/hypertension/dyslipidemia/diabetic amyotrophy      HISTORY OF PRESENT ILLNESS:   [x]  Follow Up visit for coordination of long term care issues and chronic medical management of Diagnoses and all orders for this visit:    1. Impaired mobility and ADLs (Primary)    2. Late effects of CVA (cerebrovascular accident)    3. Paranoid schizophrenia    4. 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 Essential hypertension    Nursing/staff reports the patient has been receptive to supportive care for mobilization/transfer assistance, as well as ADLs of knee.  No reports of any nutritional/hydration deficits.  No reports of new falls or injuries.    No reports of cyclical/persistent hypoglycemic episodes.    Vital signs have been stable.    PAST MEDICAL & SURGICAL HISTORY:   Past Medical History:   Diagnosis Date   • Cardiac abnormality    • CHF (congestive heart failure)    • COPD (chronic obstructive pulmonary disease)    • Diabetes    • Glaucoma    • Schizophrenia, chronic condition       No past surgical history on file.      MEDICATIONS:  I have reviewed and reconciled the patients medication list in the patients chart at the retirement  facility today.      ALLERGIES:    Allergies   Allergen Reactions   • Penicillins Rash         SOCIAL HISTORY:    Social History     Socioeconomic History   • Marital status: Single   Tobacco Use   • Smoking status: Never   • Smokeless tobacco: Never   Substance and Sexual Activity   • Alcohol use: No   • Drug use: No   • Sexual activity: Defer       FAMILY HISTORY:    Family History   Problem Relation Age of Onset   • Diabetes Other    • Glaucoma Other        REVIEW OF SYSTEMS:    Review of Systems  · Appetite: Fair []   Good [x]   Poor []   Weight Loss []  [x]  Weight Stable   Unavoidable Weight Loss []  Tolerating Tube Feeding []    Supplements Provided []   · Constitutional: Negative for fever, chills, diaphoresis or fatigue and weight change.  Patient is interactive but a poor historian.  · HENT: No dysphagia; no changes to vision/hearing/smell/taste; no epistaxis  · Eyes: Negative for redness and visual disturbance.   · Respiratory: Negative for shortness of breath, chest pain, cough or chest tightness.   · Cardiovascular: Negative for chest pain and palpitations.   · Gastrointestinal: Negative for abdominal distention, abdominal pain and blood in stool.   · Endocrine: Negative for cold intolerance and heat intolerance.   · Genitourinary: Negative for difficulty urinating, dysuria and frequency.Negative for hematuria   · Musculoskeletal: Chronic myalgias and arthralgias.  Worsened lumbago as per above.  · Integumentary: No open wounds, rash or concerning skin lesions  · Neurological: Negative for syncope, weakness and headaches.   · Hematological: Negative for adenopathy. Does not bruise/bleed easily.   · Immunological: Negative for reported allergies or immunological disorders  · Psychological: Chronic behavioral issues, no acute changes.    PHYSICAL EXAMINATION:   VITAL SIGNS:   Vitals:    04/19/23 0900   BP: 117/68   Pulse: 70   Resp: 18   Temp: 98.3 °F (36.8 °C)   SpO2: 98%       Physical Exam    General  Appearance:  [x]  Alert   [x]  Oriented x person  [x]  No acute distress     [x]  Confused, chronically []  Disoriented   []  Comatose   Head:  Atraumatic and normocephalic, without obvious abnormality.   Eyes:         PERRLA, conjunctivae and sclerae normal, no Icterus. No pallor. Extra-occular movements are within normal limits.   Ears:  Ears appear intact with no abnormalities noted.   Throat: No oral lesions, no thrush, oral mucosa moist.   Neck: Supple, trachea midline, no thyromegaly, no carotid bruit.   Back:   No kyphoscoliosis. No tenderness to palpation.   Lungs:   Chest shape is normal. Breath sounds heard bilaterally equally.  No wheezing.  Audible air exchange noted all lung fields.   Heart:  Normal S1 and S2, no murmur, no gallop, no rub. No JVD.   Abdomen:   Normal bowel sounds, no masses, no organomegaly. Soft, non-tender, non-distended, no guarding.  No CVA tenderness.   Extremities: Moves all extremities, without edema, cyanosis or clubbing.  Frail build.   Poor core strength and stability.   Pulses: Pulses palpable and equal bilaterally.   Skin: Generalized dry skin noted.  Age-related atrophy of skin.   Neurologic: [x] Normal speech []  Normal mental status    [x] Cranial nerves II through XII intact   [x]  No anosmia [x]  DTR 2+ [x]  Proprioception intact  [x]  Chronic motor/sensory deficits      Psych/Mood:                    [x]  No acute changes, flat affect[]  Depressed      Urinary:      [x]  Continent  [x]  Incontinent, at times[]  Retention  []  F/C     []  UTI w/treatment in progress         ASSESSMENT     Diagnoses and all orders for this visit:    1. Impaired mobility and ADLs (Primary)    2. Late effects of CVA (cerebrovascular accident)    3. Paranoid schizophrenia    4. 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 Essential hypertension          PLAN  Continue current supportive care for mobilization/transfer assistance.   Continue to follow her overall nutritional/hydration status, no deficits reported at this time.  Assist ADLs as needed.    No new neurological deficits.    No acute behavioral changes, sleeping well.    Continue avoidance of prolonged fasting periods.  Maintain blood glucose monitoring, further adjustments to treatment regimen be made in an effort to maximize blood glucose control and minimize hypoglycemic episodes.    Vital signs demonstrate hemodynamic stability.  Blood pressure is at goal.    Surveillance labs as needed.    [x]  Discussed Patient in detail with nursing/staff, addressed all needs today.     [x]  Plan of Care Reviewed   []  PT/OT Reviewed   []  Order Changes  []  Discharge Plans Reviewed   []  Code Status Changes      I spent 30 minutes caring for Izabella on this date of service. This time includes time spent by me in the following activities:preparing for the visit, performing a medically appropriate examination and/or evaluation , counseling and educating the patient/family/caregiver, ordering medications, tests, or procedures, documenting information in the medical record and care coordination    I have reviewed and updated all copied forward information, as appropriate.  I attest to the accuracy and relevance of any unchanged information.       Fabian Santos DO  4/19/2023

## 2023-04-19 NOTE — PROGRESS NOTES
Nursing Home Progress Note        Fabian Santos DO [x]  [facility] []  [address] [place] [address]  Phone: [phone]  Fax: [phone] Yohan Cuellar MD []  Chaz Mandel DO []  [address] [place] [address]  Phone: [phone]  Fax: [phone]     PATIENT NAME: Izabella Agudelo                                                                          YOB: 1960           DATE OF SERVICE: 4/19/2023  FACILITY: []  [facility]  [] [facility]  [x]  [facility] [place]  [] [facility]  []  Other ______________________________________________________________________      CHIEF COMPLAINT:  Impaired mobility and ADL/paranoid schizophrenia/diabetes mellitus treated with insulin/hypertension/dyslipidemia/diabetic amyotrophy      HISTORY OF PRESENT ILLNESS:   [x]  Follow Up visit for coordination of long term care issues and chronic medical management of Diagnoses and all orders for this visit:    1. Impaired mobility and ADLs (Primary)    2. Late effects of CVA (cerebrovascular accident)    3. Paranoid schizophrenia    4. 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 Essential hypertension    Nursing/staff reports the patient has been receptive to supportive care for mobilization/transfer assistance, as well as ADLs of knee.  No reports of any nutritional/hydration deficits.  No reports of new falls or injuries.    No reports of cyclical/persistent hypoglycemic episodes.    Vital signs have been stable.    PAST MEDICAL & SURGICAL HISTORY:   Past Medical History:   Diagnosis Date   • Cardiac abnormality    • CHF (congestive heart failure)    • COPD (chronic obstructive pulmonary disease)    • Diabetes    • Glaucoma    • Schizophrenia, chronic condition       No past surgical history on file.      MEDICATIONS:  I have reviewed and reconciled the patients medication list in the patients chart at the long-term  facility today.      ALLERGIES:    Allergies   Allergen Reactions   • Penicillins Rash         SOCIAL HISTORY:    Social History     Socioeconomic History   • Marital status: Single   Tobacco Use   • Smoking status: Never   • Smokeless tobacco: Never   Substance and Sexual Activity   • Alcohol use: No   • Drug use: No   • Sexual activity: Defer       FAMILY HISTORY:    Family History   Problem Relation Age of Onset   • Diabetes Other    • Glaucoma Other        REVIEW OF SYSTEMS:    Review of Systems  · Appetite: Fair []   Good [x]   Poor []   Weight Loss []  [x]  Weight Stable   Unavoidable Weight Loss []  Tolerating Tube Feeding []    Supplements Provided []   · Constitutional: Negative for fever, chills, diaphoresis or fatigue and weight change.  Patient is interactive but a poor historian.  · HENT: No dysphagia; no changes to vision/hearing/smell/taste; no epistaxis  · Eyes: Negative for redness and visual disturbance.   · Respiratory: Negative for shortness of breath, chest pain, cough or chest tightness.   · Cardiovascular: Negative for chest pain and palpitations.   · Gastrointestinal: Negative for abdominal distention, abdominal pain and blood in stool.   · Endocrine: Negative for cold intolerance and heat intolerance.   · Genitourinary: Negative for difficulty urinating, dysuria and frequency.Negative for hematuria   · Musculoskeletal: Chronic myalgias and arthralgias.  Worsened lumbago as per above.  · Integumentary: No open wounds, rash or concerning skin lesions  · Neurological: Negative for syncope, weakness and headaches.   · Hematological: Negative for adenopathy. Does not bruise/bleed easily.   · Immunological: Negative for reported allergies or immunological disorders  · Psychological: Chronic behavioral issues, no acute changes.    PHYSICAL EXAMINATION:   VITAL SIGNS:   Vitals:    04/19/23 0900   BP: 117/68   Pulse: 70   Resp: 18   Temp: 98.3 °F (36.8 °C)   SpO2: 98%       Physical Exam    General  Appearance:  [x]  Alert   [x]  Oriented x person  [x]  No acute distress     [x]  Confused, chronically []  Disoriented   []  Comatose   Head:  Atraumatic and normocephalic, without obvious abnormality.   Eyes:         PERRLA, conjunctivae and sclerae normal, no Icterus. No pallor. Extra-occular movements are within normal limits.   Ears:  Ears appear intact with no abnormalities noted.   Throat: No oral lesions, no thrush, oral mucosa moist.   Neck: Supple, trachea midline, no thyromegaly, no carotid bruit.   Back:   No kyphoscoliosis. No tenderness to palpation.   Lungs:   Chest shape is normal. Breath sounds heard bilaterally equally.  No wheezing.  Audible air exchange noted all lung fields.   Heart:  Normal S1 and S2, no murmur, no gallop, no rub. No JVD.   Abdomen:   Normal bowel sounds, no masses, no organomegaly. Soft, non-tender, non-distended, no guarding.  No CVA tenderness.   Extremities: Moves all extremities, without edema, cyanosis or clubbing.  Frail build.   Poor core strength and stability.   Pulses: Pulses palpable and equal bilaterally.   Skin: Generalized dry skin noted.  Age-related atrophy of skin.   Neurologic: [x] Normal speech []  Normal mental status    [x] Cranial nerves II through XII intact   [x]  No anosmia [x]  DTR 2+ [x]  Proprioception intact  [x]  Chronic motor/sensory deficits      Psych/Mood:                    [x]  No acute changes, flat affect[]  Depressed      Urinary:      [x]  Continent  [x]  Incontinent, at times[]  Retention  []  F/C     []  UTI w/treatment in progress         ASSESSMENT     Diagnoses and all orders for this visit:    1. Impaired mobility and ADLs (Primary)    2. Late effects of CVA (cerebrovascular accident)    3. Paranoid schizophrenia    4. 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 Essential hypertension          PLAN  Continue current supportive care for mobilization/transfer assistance.   Continue to follow her overall nutritional/hydration status, no deficits reported at this time.  Assist ADLs as needed.    No new neurological deficits.    No acute behavioral changes, sleeping well.    Continue avoidance of prolonged fasting periods.  Maintain blood glucose monitoring, further adjustments to treatment regimen be made in an effort to maximize blood glucose control and minimize hypoglycemic episodes.    Vital signs demonstrate hemodynamic stability.  Blood pressure is at goal.    Surveillance labs as needed.    [x]  Discussed Patient in detail with nursing/staff, addressed all needs today.     [x]  Plan of Care Reviewed   []  PT/OT Reviewed   []  Order Changes  []  Discharge Plans Reviewed   []  Code Status Changes      I spent 30 minutes caring for Izabella on this date of service. This time includes time spent by me in the following activities:preparing for the visit, performing a medically appropriate examination and/or evaluation , counseling and educating the patient/family/caregiver, ordering medications, tests, or procedures, documenting information in the medical record and care coordination    I have reviewed and updated all copied forward information, as appropriate.  I attest to the accuracy and relevance of any unchanged information.       Fabian Santos DO  4/19/2023

## 2023-06-02 ENCOUNTER — NURSING HOME (OUTPATIENT)
Dept: FAMILY MEDICINE CLINIC | Facility: CLINIC | Age: 63
End: 2023-06-02

## 2023-06-02 VITALS
SYSTOLIC BLOOD PRESSURE: 119 MMHG | HEART RATE: 70 BPM | OXYGEN SATURATION: 98 % | BODY MASS INDEX: 25.88 KG/M2 | DIASTOLIC BLOOD PRESSURE: 68 MMHG | RESPIRATION RATE: 18 BRPM | TEMPERATURE: 98 F | WEIGHT: 170.2 LBS

## 2023-06-02 DIAGNOSIS — D72.829 LEUKOCYTOSIS, UNSPECIFIED TYPE: ICD-10-CM

## 2023-06-02 DIAGNOSIS — Z78.9 IMPAIRED MOBILITY AND ADLS: ICD-10-CM

## 2023-06-02 DIAGNOSIS — Z74.09 IMPAIRED MOBILITY AND ADLS: ICD-10-CM

## 2023-06-02 DIAGNOSIS — I69.90 LATE EFFECTS OF CVA (CEREBROVASCULAR ACCIDENT): ICD-10-CM

## 2023-06-02 DIAGNOSIS — R50.9 FEVER, UNSPECIFIED FEVER CAUSE: Primary | ICD-10-CM

## 2023-06-02 DIAGNOSIS — J18.9 PNEUMONIA SYMPTOMS: ICD-10-CM

## 2023-06-02 NOTE — LETTER
Nursing Home Follow Up Note      Fabian Santos DO []   [facility] [x]  [address] [place] [address]  Phone: [phone]  Fax: [phone] Yohan Cuellar MD []    Chaz Mandel DO []   [address] [place] [address]  Phone: [phone]  Fax: [phone]     PATIENT NAME: Izabella Agudelo                                                                          YOB: 1960           DATE OF SERVICE: 06/2/2023  FACILITY:  [][facility]   [][facility]   [x] [facility] [place]   [] [facility]   [] Other ______________________________________________________________________      CHIEF COMPLAINT:    Elevated blood sugars the past couple days.     Fever, fatigue, cough and congestion today.    HISTORY OF PRESENT ILLNESS:     Nursing reports that patient has had increased blood sugars the past couple days. They are never controlled due to non compliance with diet, and eating a lot of snacks, but they are not usually as high as they have been the past couple days. She had not been having any abnormal vital signs but several hours after visit, nursing called to report that she had temperature of 101.8, fatigue, and cough with congestion. She did not have the symptoms several hours prior, during visit. She does have history of aspiration pneumonia and UTI. She has been moving about the facility in her wheelchair and participating in activities prior to today.    PAST MEDICAL & SURGICAL HISTORY:   Past Medical History:   Diagnosis Date    Cardiac abnormality     CHF (congestive heart failure)     COPD (chronic obstructive pulmonary disease)     Diabetes     Glaucoma     Schizophrenia, chronic condition       No past surgical history on file.      MEDICATIONS:  I have reviewed and reconciled the patients medication list in the patients chart at the skilled nursing facility today.      ALLERGIES:    Allergies   Allergen Reactions    Penicillins Rash         SOCIAL HISTORY:    Social  History     Socioeconomic History    Marital status: Single   Tobacco Use    Smoking status: Never    Smokeless tobacco: Never   Substance and Sexual Activity    Alcohol use: No    Drug use: No    Sexual activity: Defer       FAMILY HISTORY:    Family History   Problem Relation Age of Onset    Diabetes Other     Glaucoma Other        REVIEW OF SYSTEMS:    Review of Systems   Reason unable to perform ROS: ROS per nursing and patient.   Constitutional:  Positive for activity change, fatigue and fever. Negative for appetite change, chills, diaphoresis, unexpected weight gain and unexpected weight loss.   HENT:  Positive for congestion. Negative for mouth sores, nosebleeds, sore throat and trouble swallowing.    Respiratory:  Positive for choking. Negative for cough, chest tightness and shortness of breath.    Cardiovascular:  Negative for chest pain.   Gastrointestinal:  Negative for abdominal pain, constipation, diarrhea, nausea and vomiting.   Endocrine: Positive for polyphagia. Negative for polydipsia and polyuria.   Genitourinary:  Positive for urinary incontinence. Negative for decreased urine volume, difficulty urinating, dysuria, frequency and hematuria.   Musculoskeletal:  Positive for arthralgias (chronic). Negative for joint swelling and myalgias.   Skin:  Negative for color change and rash.   Neurological:  Positive for speech difficulty, weakness, memory problem and confusion. Negative for dizziness.   Psychiatric/Behavioral:  Positive for behavioral problems (intermittently). Negative for dysphoric mood, hallucinations, sleep disturbance and depressed mood. The patient is not nervous/anxious.        PHYSICAL EXAMINATION:   VITAL SIGNS:   Vitals:    06/02/23 0813   BP: 119/68   Pulse: 70   Resp: 18   Temp: 98 °F (36.7 °C)   SpO2: 98%   Weight: 77.2 kg (170 lb 3.2 oz)       Physical Exam  Vitals and nursing note reviewed.   Constitutional:       General: She is not in acute distress.     Appearance: She is  well-developed.   HENT:      Head: Normocephalic.      Right Ear: External ear normal.      Left Ear: External ear normal.      Nose: Nose normal.      Mouth/Throat:      Pharynx: No oropharyngeal exudate.   Eyes:      Conjunctiva/sclera: Conjunctivae normal.   Cardiovascular:      Rate and Rhythm: Normal rate and regular rhythm.      Heart sounds: Normal heart sounds.   Pulmonary:      Effort: Pulmonary effort is normal.      Breath sounds: Normal breath sounds. No wheezing or rales.   Abdominal:      General: Bowel sounds are normal. There is no distension.      Palpations: Abdomen is soft.      Tenderness: There is no abdominal tenderness.   Skin:     General: Skin is warm and dry.   Neurological:      Mental Status: She is alert. Mental status is at baseline. She is disoriented.      Gait: Gait abnormal.      Comments: Chronic NM deficits related to CVA   Psychiatric:         Mood and Affect: Mood normal.         Behavior: Behavior normal.         Cognition and Memory: Cognition is impaired. Memory is impaired.       RECORDS REVIEW:   I have reviewed and interpreted the discharge summary and medications    ASSESSMENT     Diagnoses and all orders for this visit:    1. Fever, unspecified fever cause (Primary)    2. Leukocytosis, unspecified type    3. Pneumonia symptoms    4. Late effects of CVA (cerebrovascular accident)    5. Impaired mobility and ADLs        PLAN    Fever/pneumonia symptoms/leukocytosis  -Rocephin 1 gm IM daily x 5 days started. CXR ordered and pending and will be called to myself or MD on call, depending on time results are available.  Labs obtained STAT. BMP insignificant. WBC count 13.2. Pending CXR results, will obtain UA if CXR insignificant. Will follow up and continue to monitor.     Nursing encouraged to keep me informed of any acute changes, lack of improvement, or any new concerning symptoms.    Staff to continue supportive care for all ADLs.    [x]  Discussed Patient in detail with  nursing/staff, addressed all needs today.     [x]  Plan of Care Reviewed   [x]  PT/OT Reviewed   [x]  Order Changes  []  Discharge Plans Reviewed  [x]  Advance Directive on file with Nursing Home.   [x]  POA on file with Nursing Home.   [x]  Code Status listed: [x]  Full Code   []  DNR       “I confirm accuracy of unchanged data/findings which have been carried forward from previous visit, as well as I have updated appropriately those that have changed.”       I spent 30 minutes caring for Izabella on this date of service. This time includes time spent by me in the following activities:preparing for the visit, reviewing tests, obtaining and/or reviewing a separately obtained history, performing a medically appropriate examination and/or evaluation , counseling and educating the patient/family/caregiver, ordering medications, tests, or procedures, referring and communicating with other health care professionals , documenting information in the medical record, independently interpreting results and communicating that information with the patient/family/caregiver, and care coordination                                 FLOWER Arreguin.

## 2023-06-05 NOTE — PROGRESS NOTES
Nursing Home Follow Up Note      Fabian Santos DO []   [facility] [x]  [address] [place] [address]  Phone: [phone]  Fax: [phone] Yohan Cuellar MD []    Chaz Mandel DO []   [address] [place] [address]  Phone: [phone]  Fax: [phone]     PATIENT NAME: Izabella Agudelo                                                                          YOB: 1960           DATE OF SERVICE: 06/2/2023  FACILITY:  [][facility]   [][facility]   [x] [facility]   [] [facility]   [] Other ______________________________________________________________________      CHIEF COMPLAINT:    Elevated blood sugars the past couple days.     Fever, fatigue, cough and congestion today.    HISTORY OF PRESENT ILLNESS:     Nursing reports that patient has had increased blood sugars the past couple days. They are never controlled due to non compliance with diet, and eating a lot of snacks, but they are not usually as high as they have been the past couple days. She had not been having any abnormal vital signs but several hours after visit, nursing called to report that she had temperature of 101.8, fatigue, and cough with congestion. She did not have the symptoms several hours prior, during visit. She does have history of aspiration pneumonia and UTI. She has been moving about the facility in her wheelchair and participating in activities prior to today.    PAST MEDICAL & SURGICAL HISTORY:   Past Medical History:   Diagnosis Date    Cardiac abnormality     CHF (congestive heart failure)     COPD (chronic obstructive pulmonary disease)     Diabetes     Glaucoma     Schizophrenia, chronic condition       No past surgical history on file.      MEDICATIONS:  I have reviewed and reconciled the patients medication list in the patients chart at the skilled nursing facility today.      ALLERGIES:    Allergies   Allergen Reactions    Penicillins Rash         SOCIAL HISTORY:    Social  History     Socioeconomic History    Marital status: Single   Tobacco Use    Smoking status: Never    Smokeless tobacco: Never   Substance and Sexual Activity    Alcohol use: No    Drug use: No    Sexual activity: Defer       FAMILY HISTORY:    Family History   Problem Relation Age of Onset    Diabetes Other     Glaucoma Other        REVIEW OF SYSTEMS:    Review of Systems   Reason unable to perform ROS: ROS per nursing and patient.   Constitutional:  Positive for activity change, fatigue and fever. Negative for appetite change, chills, diaphoresis, unexpected weight gain and unexpected weight loss.   HENT:  Positive for congestion. Negative for mouth sores, nosebleeds, sore throat and trouble swallowing.    Respiratory:  Positive for choking. Negative for cough, chest tightness and shortness of breath.    Cardiovascular:  Negative for chest pain.   Gastrointestinal:  Negative for abdominal pain, constipation, diarrhea, nausea and vomiting.   Endocrine: Positive for polyphagia. Negative for polydipsia and polyuria.   Genitourinary:  Positive for urinary incontinence. Negative for decreased urine volume, difficulty urinating, dysuria, frequency and hematuria.   Musculoskeletal:  Positive for arthralgias (chronic). Negative for joint swelling and myalgias.   Skin:  Negative for color change and rash.   Neurological:  Positive for speech difficulty, weakness, memory problem and confusion. Negative for dizziness.   Psychiatric/Behavioral:  Positive for behavioral problems (intermittently). Negative for dysphoric mood, hallucinations, sleep disturbance and depressed mood. The patient is not nervous/anxious.        PHYSICAL EXAMINATION:   VITAL SIGNS:   Vitals:    06/02/23 0813   BP: 119/68   Pulse: 70   Resp: 18   Temp: 98 °F (36.7 °C)   SpO2: 98%   Weight: 77.2 kg (170 lb 3.2 oz)       Physical Exam  Vitals and nursing note reviewed.   Constitutional:       General: She is not in acute distress.     Appearance: She is  well-developed.   HENT:      Head: Normocephalic.      Right Ear: External ear normal.      Left Ear: External ear normal.      Nose: Nose normal.      Mouth/Throat:      Pharynx: No oropharyngeal exudate.   Eyes:      Conjunctiva/sclera: Conjunctivae normal.   Cardiovascular:      Rate and Rhythm: Normal rate and regular rhythm.      Heart sounds: Normal heart sounds.   Pulmonary:      Effort: Pulmonary effort is normal.      Breath sounds: Normal breath sounds. No wheezing or rales.   Abdominal:      General: Bowel sounds are normal. There is no distension.      Palpations: Abdomen is soft.      Tenderness: There is no abdominal tenderness.   Skin:     General: Skin is warm and dry.   Neurological:      Mental Status: She is alert. Mental status is at baseline. She is disoriented.      Gait: Gait abnormal.      Comments: Chronic NM deficits related to CVA   Psychiatric:         Mood and Affect: Mood normal.         Behavior: Behavior normal.         Cognition and Memory: Cognition is impaired. Memory is impaired.       RECORDS REVIEW:   I have reviewed and interpreted the discharge summary and medications    ASSESSMENT     Diagnoses and all orders for this visit:    1. Fever, unspecified fever cause (Primary)    2. Leukocytosis, unspecified type    3. Pneumonia symptoms    4. Late effects of CVA (cerebrovascular accident)    5. Impaired mobility and ADLs        PLAN    Fever/pneumonia symptoms/leukocytosis  -Rocephin 1 gm IM daily x 5 days started. CXR ordered and pending and will be called to myself or MD on call, depending on time results are available.  Labs obtained STAT. BMP insignificant. WBC count 13.2. Pending CXR results, will obtain UA if CXR insignificant. Will follow up and continue to monitor.     Nursing encouraged to keep me informed of any acute changes, lack of improvement, or any new concerning symptoms.    Staff to continue supportive care for all ADLs.    [x]  Discussed Patient in detail with  nursing/staff, addressed all needs today.     [x]  Plan of Care Reviewed   [x]  PT/OT Reviewed   [x]  Order Changes  []  Discharge Plans Reviewed  [x]  Advance Directive on file with Nursing Home.   [x]  POA on file with Nursing Home.   [x]  Code Status listed: [x]  Full Code   []  DNR       “I confirm accuracy of unchanged data/findings which have been carried forward from previous visit, as well as I have updated appropriately those that have changed.”       I spent 30 minutes caring for Izabella on this date of service. This time includes time spent by me in the following activities:preparing for the visit, reviewing tests, obtaining and/or reviewing a separately obtained history, performing a medically appropriate examination and/or evaluation , counseling and educating the patient/family/caregiver, ordering medications, tests, or procedures, referring and communicating with other health care professionals , documenting information in the medical record, independently interpreting results and communicating that information with the patient/family/caregiver, and care coordination                                 FLOWER Arreguin.

## 2023-06-21 ENCOUNTER — NURSING HOME (OUTPATIENT)
Dept: FAMILY MEDICINE CLINIC | Facility: CLINIC | Age: 63
End: 2023-06-21
Payer: MEDICARE

## 2023-06-21 VITALS
BODY MASS INDEX: 26 KG/M2 | DIASTOLIC BLOOD PRESSURE: 72 MMHG | SYSTOLIC BLOOD PRESSURE: 137 MMHG | OXYGEN SATURATION: 98 % | HEART RATE: 72 BPM | TEMPERATURE: 98 F | RESPIRATION RATE: 18 BRPM | WEIGHT: 171 LBS

## 2023-06-21 DIAGNOSIS — I69.90 LATE EFFECTS OF CVA (CEREBROVASCULAR ACCIDENT): ICD-10-CM

## 2023-06-21 DIAGNOSIS — Z79.4 CONTROLLED TYPE 2 DIABETES MELLITUS WITH DIABETIC AMYOTROPHY, WITH LONG-TERM CURRENT USE OF INSULIN: ICD-10-CM

## 2023-06-21 DIAGNOSIS — F20.9 SCHIZOPHRENIA, CHRONIC CONDITION: ICD-10-CM

## 2023-06-21 DIAGNOSIS — Z78.9 IMPAIRED MOBILITY AND ADLS: Primary | ICD-10-CM

## 2023-06-21 DIAGNOSIS — Z74.09 IMPAIRED MOBILITY AND ADLS: Primary | ICD-10-CM

## 2023-06-21 DIAGNOSIS — I10 ESSENTIAL HYPERTENSION: ICD-10-CM

## 2023-06-21 DIAGNOSIS — E11.44 CONTROLLED TYPE 2 DIABETES MELLITUS WITH DIABETIC AMYOTROPHY, WITH LONG-TERM CURRENT USE OF INSULIN: ICD-10-CM

## 2023-06-21 PROCEDURE — 99309 SBSQ NF CARE MODERATE MDM 30: CPT | Performed by: FAMILY MEDICINE

## 2023-06-21 NOTE — LETTER
Nursing Home Progress Note        Fabian Santos DO [x]  [facility] []  [address] [place] [address]  Phone: [phone]  Fax: [phone] Yohan Cuellar MD []  Chaz Mandel DO []  [address] [place] [address]  Phone: [phone]  Fax: [phone]     PATIENT NAME: Izabella Agudelo                                                                          YOB: 1960           DATE OF SERVICE: 6/21/2023  FACILITY: []  [facility]  [] [facility]  [x]  [facility]  [] [facility]  []  Other ______________________________________________________________________      CHIEF COMPLAINT:  Impaired mobility and ADL/paranoid schizophrenia/diabetes mellitus treated with insulin/hypertension/dyslipidemia/diabetic amyotrophy      HISTORY OF PRESENT ILLNESS:   [x]  Follow Up visit for coordination of long term care issues and chronic medical management of Diagnoses and all orders for this visit:    1. Impaired mobility and ADLs (Primary)    2. Schizophrenia, chronic condition    3. Late effects of CVA (cerebrovascular accident)    4. 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 Essential hypertension    Patient remains receptive to supportive care for mobilization/transfer assistance, no new falls or injuries reported.  Nursing/staff reports no nutritional/hydration deficits.    No new neurological deficits.    No acute behavioral changes.    No reports of cyclical/persistent hypoglycemia.  Vital signs have been stable.    PAST MEDICAL & SURGICAL HISTORY:   Past Medical History:   Diagnosis Date    Acute angle-closure glaucoma, bilateral     Aphasia     Cardiac abnormality     CHF (congestive heart failure)     COPD (chronic obstructive pulmonary disease)     Diabetes     Glaucoma     Hemiparesis     Hemiplegia     Impaired functional mobility, balance, gait, and endurance     Schizophrenia, chronic condition      Vascular dementia       No past surgical history on file.      MEDICATIONS:  I have reviewed and reconciled the patients medication list in the patients chart at the skilled nursing facility today.      ALLERGIES:    Allergies   Allergen Reactions    Penicillins Rash         SOCIAL HISTORY:    Social History     Socioeconomic History    Marital status: Single   Tobacco Use    Smoking status: Never    Smokeless tobacco: Never   Vaping Use    Vaping Use: Never used   Substance and Sexual Activity    Alcohol use: No    Drug use: Never    Sexual activity: Defer       FAMILY HISTORY:    Family History   Problem Relation Age of Onset    Diabetes Other     Glaucoma Other        REVIEW OF SYSTEMS:    Review of Systems  Appetite: Fair []   Good [x]   Poor []   Weight Loss []  [x]  Weight Stable   Unavoidable Weight Loss []  Tolerating Tube Feeding []    Supplements Provided []   Constitutional: Negative for fever, chills, diaphoresis or fatigue and weight change.  Patient is interactive but a poor historian.  HENT: No dysphagia; no changes to vision/hearing/smell/taste; no epistaxis  Eyes: Negative for redness and visual disturbance.   Respiratory: Negative for shortness of breath, chest pain, cough or chest tightness.   Cardiovascular: Negative for chest pain and palpitations.   Gastrointestinal: Negative for abdominal distention, abdominal pain and blood in stool.   Endocrine: Negative for cold intolerance and heat intolerance.   Genitourinary: Negative for difficulty urinating, dysuria and frequency.Negative for hematuria   Musculoskeletal: Chronic myalgias and arthralgias.  Worsened lumbago as per above.  Integumentary: No open wounds, rash or concerning skin lesions  Neurological: Negative for syncope, weakness and headaches.   Hematological: Negative for adenopathy. Does not bruise/bleed easily.   Immunological: Negative for reported allergies or immunological disorders  Psychological: Chronic behavioral issues, no  acute changes.    PHYSICAL EXAMINATION:   VITAL SIGNS:   Vitals:    06/21/23 0956   BP: 137/72   Pulse: 72   Resp: 18   Temp: 98 °F (36.7 °C)   SpO2: 98%       Physical Exam    General Appearance:  [x]  Alert   [x]  Oriented x person  [x]  No acute distress     [x]  Confused, chronically []  Disoriented   []  Comatose   Head:  Atraumatic and normocephalic, without obvious abnormality.   Eyes:         PERRLA, conjunctivae and sclerae normal, no Icterus. No pallor. Extra-occular movements are within normal limits.   Ears:  Ears appear intact with no abnormalities noted.   Throat: No oral lesions, no thrush, oral mucosa moist.   Neck: Supple, trachea midline, no thyromegaly, no carotid bruit.   Back:   No kyphoscoliosis. No tenderness to palpation.   Lungs:   Chest shape is normal. Breath sounds heard bilaterally equally.  No wheezing.  Audible air exchange noted all lung fields.   Heart:  Normal S1 and S2, no murmur, no gallop, no rub. No JVD.   Abdomen:   Normal bowel sounds, no masses, no organomegaly. Soft, non-tender, non-distended, no guarding.  No CVA tenderness.   Extremities: Moves all extremities, without edema, cyanosis or clubbing.  Frail build.   Poor core strength and stability.   Pulses: Pulses palpable and equal bilaterally.   Skin: Generalized dry skin noted.  Age-related atrophy of skin.   Neurologic: [x] Normal speech []  Normal mental status    [x] Cranial nerves II through XII intact   [x]  No anosmia [x]  DTR 2+ [x]  Proprioception intact  [x]  Chronic motor/sensory deficits      Psych/Mood:                    [x]  No acute changes, flat affect[]  Depressed      Urinary:      [x]  Continent  [x]  Incontinent, at times[]  Retention  []  F/C     []  UTI w/treatment in progress         ASSESSMENT     Diagnoses and all orders for this visit:    1. Impaired mobility and ADLs (Primary)    2. Schizophrenia, chronic condition    3. Late effects of CVA (cerebrovascular accident)    4. Controlled type 2  diabetes mellitus with diabetic amyotrophy, with long-term current use of insulin    5. Essential hypertension          PLAN  Assist ADLs as needed, as well as supportive care for mobilization/transfer assistance.  Continue to follow nutritional/hydration status, no deficits reported at this time.    Continue current mood stabilizer treatment regimen.    No new neurological deficits.    Avoid prolonged fasting periods as best able.  Continue blood glucose monitoring.  Further changes to treatment regimen made in an effort to maximize blood glucose control and minimize hypoglycemic episodes.    Vital signs demonstrate hemodynamic stability.  Blood pressure is at goal.    Surveillance labs when needed.    [x]  Discussed Patient in detail with nursing/staff, addressed all needs today.     [x]  Plan of Care Reviewed   []  PT/OT Reviewed   []  Order Changes  []  Discharge Plans Reviewed   []  Code Status Changes      I spent 30 minutes caring for Izabella on this date of service. This time includes time spent by me in the following activities:preparing for the visit, performing a medically appropriate examination and/or evaluation , counseling and educating the patient/family/caregiver, ordering medications, tests, or procedures, documenting information in the medical record, and care coordination    I have reviewed and updated all copied forward information, as appropriate.  I attest to the accuracy and relevance of any unchanged information.       Fabian Santos DO  6/21/2023

## 2023-08-04 DIAGNOSIS — Z79.4 TYPE 2 DIABETES MELLITUS WITH DIABETIC NEUROPATHY, WITH LONG-TERM CURRENT USE OF INSULIN: ICD-10-CM

## 2023-08-04 DIAGNOSIS — E11.40 TYPE 2 DIABETES MELLITUS WITH DIABETIC NEUROPATHY, WITH LONG-TERM CURRENT USE OF INSULIN: ICD-10-CM

## 2023-08-04 RX ORDER — GABAPENTIN 300 MG/1
300 CAPSULE ORAL 2 TIMES DAILY
Qty: 60 CAPSULE | Refills: 5 | Status: SHIPPED | OUTPATIENT
Start: 2023-08-04

## 2023-08-16 ENCOUNTER — NURSING HOME (OUTPATIENT)
Dept: FAMILY MEDICINE CLINIC | Facility: CLINIC | Age: 63
End: 2023-08-16
Payer: MEDICARE

## 2023-08-16 VITALS
RESPIRATION RATE: 18 BRPM | TEMPERATURE: 97.9 F | BODY MASS INDEX: 25.97 KG/M2 | DIASTOLIC BLOOD PRESSURE: 66 MMHG | WEIGHT: 170.8 LBS | OXYGEN SATURATION: 95 % | SYSTOLIC BLOOD PRESSURE: 132 MMHG | HEART RATE: 71 BPM

## 2023-08-16 DIAGNOSIS — Z86.73 HISTORY OF ISCHEMIC STROKE: ICD-10-CM

## 2023-08-16 DIAGNOSIS — F20.0 PARANOID SCHIZOPHRENIA: ICD-10-CM

## 2023-08-16 DIAGNOSIS — I10 ESSENTIAL HYPERTENSION: ICD-10-CM

## 2023-08-16 DIAGNOSIS — Z79.4 CONTROLLED TYPE 2 DIABETES MELLITUS WITH DIABETIC AMYOTROPHY, WITH LONG-TERM CURRENT USE OF INSULIN: ICD-10-CM

## 2023-08-16 DIAGNOSIS — I69.90 LATE EFFECTS OF CVA (CEREBROVASCULAR ACCIDENT): ICD-10-CM

## 2023-08-16 DIAGNOSIS — Z78.9 IMPAIRED MOBILITY AND ADLS: Primary | ICD-10-CM

## 2023-08-16 DIAGNOSIS — E11.44 CONTROLLED TYPE 2 DIABETES MELLITUS WITH DIABETIC AMYOTROPHY, WITH LONG-TERM CURRENT USE OF INSULIN: ICD-10-CM

## 2023-08-16 DIAGNOSIS — Z74.09 IMPAIRED MOBILITY AND ADLS: Primary | ICD-10-CM

## 2023-08-16 PROBLEM — I63.9 ACUTE ISCHEMIC STROKE: Status: RESOLVED | Noted: 2021-04-14 | Resolved: 2023-08-16

## 2023-08-16 NOTE — LETTER
Nursing Home Progress Note        Fabian Santos DO [x]  [facility] []  [address] [place] [address]  Phone: [phone]  Fax: [phone] Yohan Cuellar MD []  Chaz Mandel DO []  [address] [place] [address]  Phone: [phone]  Fax: [phone]     PATIENT NAME: Izabella Agudelo                                                                          YOB: 1960           DATE OF SERVICE: 8/16/2023  FACILITY: []  [place]  [] [place]  [x]  [facility]  [] [facility]  []  Other ______________________________________________________________________      CHIEF COMPLAINT:  Impaired mobility and ADL/paranoid schizophrenia/diabetes mellitus treated with insulin/hypertension/dyslipidemia/diabetic amyotrophy      HISTORY OF PRESENT ILLNESS:   [x]  Follow Up visit for coordination of long term care issues and chronic medical management of Diagnoses and all orders for this visit:    1. Impaired mobility and ADLs (Primary)    2. Late effects of CVA (cerebrovascular accident)    3. Paranoid schizophrenia    4. 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 Essential hypertension    6. History of ischemic stroke    Patient has continued to be receptive to supportive care for mobilization/transfer assistance, as well as ADLs of need; have discussed her case in detail with treating nurse/staff, as well as reviewed records.  No reports of any nutritional/hydration deficits.    No acute behavioral changes, she is sleeping well.    No reports of any cyclical/persistent hypoglycemia.    Vital signs of been stable.    No new neurological deficits.    PAST MEDICAL & SURGICAL HISTORY:   Past Medical History:   Diagnosis Date    Cardiac abnormality     CHF (congestive heart failure)     COPD (chronic obstructive pulmonary disease)     Diabetes     Glaucoma     Schizophrenia, chronic condition       No past surgical  history on file.      MEDICATIONS:  I have reviewed and reconciled the patients medication list in the patients chart at the skilled nursing facility today.      ALLERGIES:    Allergies   Allergen Reactions    Penicillins Rash         SOCIAL HISTORY:    Social History     Socioeconomic History    Marital status: Single   Tobacco Use    Smoking status: Never    Smokeless tobacco: Never   Substance and Sexual Activity    Alcohol use: No    Drug use: No    Sexual activity: Defer       FAMILY HISTORY:    Family History   Problem Relation Age of Onset    Diabetes Other     Glaucoma Other        REVIEW OF SYSTEMS:    Review of Systems  Appetite: Fair []   Good [x]   Poor []   Weight Loss []  [x]  Weight Stable   Unavoidable Weight Loss []  Tolerating Tube Feeding []    Supplements Provided []   Constitutional: Negative for fever, chills, diaphoresis or fatigue and weight change.  Patient is interactive but a poor historian.  HENT: No dysphagia; no changes to vision/hearing/smell/taste; no epistaxis  Eyes: Negative for redness and visual disturbance.   Respiratory: Negative for shortness of breath, chest pain, cough or chest tightness.   Cardiovascular: Negative for chest pain and palpitations.   Gastrointestinal: Negative for abdominal distention, abdominal pain and blood in stool.   Endocrine: Negative for cold intolerance and heat intolerance.   Genitourinary: Negative for difficulty urinating, dysuria and frequency.Negative for hematuria   Musculoskeletal: Chronic myalgias and arthralgias.  Worsened lumbago as per above.  Integumentary: No open wounds, rash or concerning skin lesions  Neurological: Negative for syncope, weakness and headaches.   Hematological: Negative for adenopathy. Does not bruise/bleed easily.   Immunological: Negative for reported allergies or immunological disorders  Psychological: Chronic behavioral issues, no acute changes.    PHYSICAL EXAMINATION:   VITAL SIGNS:   Vitals:    08/16/23 1315    BP: 132/66   Pulse: 71   Resp: 18   Temp: 97.9 øF (36.6 øC)   SpO2: 95%         Physical Exam    General Appearance:  [x]  Alert   [x]  Oriented x person  [x]  No acute distress     [x]  Confused, chronically []  Disoriented   []  Comatose   Head:  Atraumatic and normocephalic, without obvious abnormality.   Eyes:         PERRLA, conjunctivae and sclerae normal, no Icterus. No pallor. Extra-occular movements are within normal limits.   Ears:  Ears appear intact with no abnormalities noted.   Throat: No oral lesions, no thrush, oral mucosa moist.   Neck: Supple, trachea midline, no thyromegaly, no carotid bruit.   Back:   No kyphoscoliosis. No tenderness to palpation.   Lungs:   Chest shape is normal. Breath sounds heard bilaterally equally.  No wheezing.  Audible air exchange noted all lung fields.   Heart:  Normal S1 and S2, no murmur, no gallop, no rub. No JVD.   Abdomen:   Normal bowel sounds, no masses, no organomegaly. Soft, non-tender, non-distended, no guarding.  No CVA tenderness.   Extremities: Moves all extremities, without edema, cyanosis or clubbing.  Frail build.   Poor core strength and stability.   Pulses: Pulses palpable and equal bilaterally.   Skin: Generalized dry skin noted.  Age-related atrophy of skin.   Neurologic: [x] Normal speech []  Normal mental status    [x] Cranial nerves II through XII intact   [x]  No anosmia [x]  DTR 2+ [x]  Proprioception intact  [x]  Chronic motor/sensory deficits      Psych/Mood:                    [x]  No acute changes, flat affect[]  Depressed      Urinary:      [x]  Continent  [x]  Incontinent, at times[]  Retention  []  F/C     []  UTI w/treatment in progress         ASSESSMENT     Diagnoses and all orders for this visit:    1. Impaired mobility and ADLs (Primary)    2. Late effects of CVA (cerebrovascular accident)    3. Paranoid schizophrenia    4. 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  Essential hypertension    6. History of ischemic stroke          PLAN  Planned supportive care for mobilization/transfer assistance, as well as ADLs of need.  Continue current monitoring of nutritional/hydration status, no deficits reported.    No acute behavioral changes, continue current mood stabilizer treatment regimen.  Sleeping well.    Vital signs of been stable, demonstrates no findings of unstable angina.  No new neurological deficits.    Continue avoidance of prolonged fasting periods as best able, as well as healthy dietary choices.  Maintain appropriate hydration status.  Further changes to treatment regimen will be made in an effort to maximize blood glucose control and minimize hypoglycemic episodes.    Surveillance labs as needed.    [x]  Discussed Patient in detail with nursing/staff, addressed all needs today.     [x]  Plan of Care Reviewed   []  PT/OT Reviewed   []  Order Changes  []  Discharge Plans Reviewed   []  Code Status Changes      I spent 30 minutes caring for Izabella on this date of service. This time includes time spent by me in the following activities:preparing for the visit, performing a medically appropriate examination and/or evaluation , counseling and educating the patient/family/caregiver, ordering medications, tests, or procedures, documenting information in the medical record, and care coordination    I have reviewed and updated all copied forward information, as appropriate.  I attest to the accuracy and relevance of any unchanged information.       Fabian Santos DO  8/16/2023

## 2023-08-16 NOTE — PROGRESS NOTES
Nursing Home Progress Note        Fabian Santos DO [x]  [facility] []  [address] [place] [address]  Phone: [phone]  Fax: [phone] Yohan Cuellar MD []  Chaz Mandel DO []  [address] [place] [address]  Phone: [phone]  Fax: [phone]     PATIENT NAME: Izabella Agudelo                                                                          YOB: 1960           DATE OF SERVICE: 8/16/2023  FACILITY: []  [place]  [] [place]  [x]  [facility]  [] [facility]  []  Other ______________________________________________________________________      CHIEF COMPLAINT:  Impaired mobility and ADL/paranoid schizophrenia/diabetes mellitus treated with insulin/hypertension/dyslipidemia/diabetic amyotrophy      HISTORY OF PRESENT ILLNESS:   [x]  Follow Up visit for coordination of long term care issues and chronic medical management of Diagnoses and all orders for this visit:    1. Impaired mobility and ADLs (Primary)    2. Late effects of CVA (cerebrovascular accident)    3. Paranoid schizophrenia    4. 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 Essential hypertension    6. History of ischemic stroke    Patient has continued to be receptive to supportive care for mobilization/transfer assistance, as well as ADLs of need; have discussed her case in detail with treating nurse/staff, as well as reviewed records.  No reports of any nutritional/hydration deficits.    No acute behavioral changes, she is sleeping well.    No reports of any cyclical/persistent hypoglycemia.    Vital signs of been stable.    No new neurological deficits.    PAST MEDICAL & SURGICAL HISTORY:   Past Medical History:   Diagnosis Date    Cardiac abnormality     CHF (congestive heart failure)     COPD (chronic obstructive pulmonary disease)     Diabetes     Glaucoma     Schizophrenia, chronic condition       No past surgical  history on file.      MEDICATIONS:  I have reviewed and reconciled the patients medication list in the patients chart at the skilled nursing facility today.      ALLERGIES:    Allergies   Allergen Reactions    Penicillins Rash         SOCIAL HISTORY:    Social History     Socioeconomic History    Marital status: Single   Tobacco Use    Smoking status: Never    Smokeless tobacco: Never   Substance and Sexual Activity    Alcohol use: No    Drug use: No    Sexual activity: Defer       FAMILY HISTORY:    Family History   Problem Relation Age of Onset    Diabetes Other     Glaucoma Other        REVIEW OF SYSTEMS:    Review of Systems  Appetite: Fair []   Good [x]   Poor []   Weight Loss []  [x]  Weight Stable   Unavoidable Weight Loss []  Tolerating Tube Feeding []    Supplements Provided []   Constitutional: Negative for fever, chills, diaphoresis or fatigue and weight change.  Patient is interactive but a poor historian.  HENT: No dysphagia; no changes to vision/hearing/smell/taste; no epistaxis  Eyes: Negative for redness and visual disturbance.   Respiratory: Negative for shortness of breath, chest pain, cough or chest tightness.   Cardiovascular: Negative for chest pain and palpitations.   Gastrointestinal: Negative for abdominal distention, abdominal pain and blood in stool.   Endocrine: Negative for cold intolerance and heat intolerance.   Genitourinary: Negative for difficulty urinating, dysuria and frequency.Negative for hematuria   Musculoskeletal: Chronic myalgias and arthralgias.  Worsened lumbago as per above.  Integumentary: No open wounds, rash or concerning skin lesions  Neurological: Negative for syncope, weakness and headaches.   Hematological: Negative for adenopathy. Does not bruise/bleed easily.   Immunological: Negative for reported allergies or immunological disorders  Psychological: Chronic behavioral issues, no acute changes.    PHYSICAL EXAMINATION:   VITAL SIGNS:   Vitals:    08/16/23 1315    BP: 132/66   Pulse: 71   Resp: 18   Temp: 97.9 øF (36.6 øC)   SpO2: 95%         Physical Exam    General Appearance:  [x]  Alert   [x]  Oriented x person  [x]  No acute distress     [x]  Confused, chronically []  Disoriented   []  Comatose   Head:  Atraumatic and normocephalic, without obvious abnormality.   Eyes:         PERRLA, conjunctivae and sclerae normal, no Icterus. No pallor. Extra-occular movements are within normal limits.   Ears:  Ears appear intact with no abnormalities noted.   Throat: No oral lesions, no thrush, oral mucosa moist.   Neck: Supple, trachea midline, no thyromegaly, no carotid bruit.   Back:   No kyphoscoliosis. No tenderness to palpation.   Lungs:   Chest shape is normal. Breath sounds heard bilaterally equally.  No wheezing.  Audible air exchange noted all lung fields.   Heart:  Normal S1 and S2, no murmur, no gallop, no rub. No JVD.   Abdomen:   Normal bowel sounds, no masses, no organomegaly. Soft, non-tender, non-distended, no guarding.  No CVA tenderness.   Extremities: Moves all extremities, without edema, cyanosis or clubbing.  Frail build.   Poor core strength and stability.   Pulses: Pulses palpable and equal bilaterally.   Skin: Generalized dry skin noted.  Age-related atrophy of skin.   Neurologic: [x] Normal speech []  Normal mental status    [x] Cranial nerves II through XII intact   [x]  No anosmia [x]  DTR 2+ [x]  Proprioception intact  [x]  Chronic motor/sensory deficits      Psych/Mood:                    [x]  No acute changes, flat affect[]  Depressed      Urinary:      [x]  Continent  [x]  Incontinent, at times[]  Retention  []  F/C     []  UTI w/treatment in progress         ASSESSMENT     Diagnoses and all orders for this visit:    1. Impaired mobility and ADLs (Primary)    2. Late effects of CVA (cerebrovascular accident)    3. Paranoid schizophrenia    4. 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  Essential hypertension    6. History of ischemic stroke          PLAN  Planned supportive care for mobilization/transfer assistance, as well as ADLs of need.  Continue current monitoring of nutritional/hydration status, no deficits reported.    No acute behavioral changes, continue current mood stabilizer treatment regimen.  Sleeping well.    Vital signs of been stable, demonstrates no findings of unstable angina.  No new neurological deficits.    Continue avoidance of prolonged fasting periods as best able, as well as healthy dietary choices.  Maintain appropriate hydration status.  Further changes to treatment regimen will be made in an effort to maximize blood glucose control and minimize hypoglycemic episodes.    Surveillance labs as needed.    [x]  Discussed Patient in detail with nursing/staff, addressed all needs today.     [x]  Plan of Care Reviewed   []  PT/OT Reviewed   []  Order Changes  []  Discharge Plans Reviewed   []  Code Status Changes      I spent 30 minutes caring for Izabella on this date of service. This time includes time spent by me in the following activities:preparing for the visit, performing a medically appropriate examination and/or evaluation , counseling and educating the patient/family/caregiver, ordering medications, tests, or procedures, documenting information in the medical record, and care coordination    I have reviewed and updated all copied forward information, as appropriate.  I attest to the accuracy and relevance of any unchanged information.       Fabian Santos DO  8/16/2023

## 2023-08-18 ENCOUNTER — NURSING HOME (OUTPATIENT)
Dept: FAMILY MEDICINE CLINIC | Facility: CLINIC | Age: 63
End: 2023-08-18
Payer: MEDICARE

## 2023-08-18 VITALS
WEIGHT: 170.8 LBS | OXYGEN SATURATION: 95 % | DIASTOLIC BLOOD PRESSURE: 66 MMHG | TEMPERATURE: 97.9 F | SYSTOLIC BLOOD PRESSURE: 132 MMHG | HEART RATE: 71 BPM | BODY MASS INDEX: 25.97 KG/M2 | RESPIRATION RATE: 18 BRPM

## 2023-08-18 DIAGNOSIS — I69.90 LATE EFFECTS OF CVA (CEREBROVASCULAR ACCIDENT): ICD-10-CM

## 2023-08-18 DIAGNOSIS — E11.65 UNCONTROLLED DIABETES MELLITUS WITH HYPERGLYCEMIA, WITH LONG-TERM CURRENT USE OF INSULIN: Primary | ICD-10-CM

## 2023-08-18 DIAGNOSIS — Z78.9 IMPAIRED MOBILITY AND ADLS: ICD-10-CM

## 2023-08-18 DIAGNOSIS — Z74.09 IMPAIRED MOBILITY AND ADLS: ICD-10-CM

## 2023-08-18 DIAGNOSIS — Z79.4 UNCONTROLLED DIABETES MELLITUS WITH HYPERGLYCEMIA, WITH LONG-TERM CURRENT USE OF INSULIN: Primary | ICD-10-CM

## 2023-08-18 NOTE — LETTER
Nursing Home Follow Up Note      Fabian Santos DO []   [facility] [x]  [address] [place] [address]  Phone: [phone]  Fax: [phone] Yohan Cuellar MD []    Chaz Mandel DO []   [address] [place] [address]  Phone: [phone]  Fax: [phone]     PATIENT NAME: Izabella Agudelo                                                                          YOB: 1960           DATE OF SERVICE: 08/18/2023  FACILITY:  [][place]   [][place]   [x] [facility]   [] [facility]   [] Other ______________________________________________________________________      CHIEF COMPLAINT:    Uncontrolled Diabetes with worsening blood sugars the past couple days.     HISTORY OF PRESENT ILLNESS:     Nursing reports that patient has had increased blood sugars the past couple days. They are never controlled due to non compliance with diet, and eating a lot of snacks. Her family brings her a lot of snacks and sodas that are not on a Diabetic diet. They are not usually as high as they have been the past couple days however. She had not and fever,  abnormalities, respiratory abnormalities or other signs of infection. She has no complaints today and no signs and symptoms of distress. Nursing has no other concerns today. She is on long acting and short acting insulin for treatment.    PAST MEDICAL & SURGICAL HISTORY:   Past Medical History:   Diagnosis Date    Cardiac abnormality     CHF (congestive heart failure)     COPD (chronic obstructive pulmonary disease)     Diabetes     Glaucoma     Schizophrenia, chronic condition       History reviewed. No pertinent surgical history.      MEDICATIONS:  I have reviewed and reconciled the patients medication list in the patients chart at the skilled nursing facility today.      ALLERGIES:    Allergies   Allergen Reactions    Penicillins Rash         SOCIAL HISTORY:    Social History     Socioeconomic History    Marital status: Single    Tobacco Use    Smoking status: Never    Smokeless tobacco: Never   Substance and Sexual Activity    Alcohol use: No    Drug use: No    Sexual activity: Defer       FAMILY HISTORY:    Family History   Problem Relation Age of Onset    Diabetes Other     Glaucoma Other        REVIEW OF SYSTEMS:    Review of Systems   Reason unable to perform ROS: ROS per nursing and patient.   Constitutional:  Negative for activity change, appetite change, chills, diaphoresis, fatigue, fever, unexpected weight gain and unexpected weight loss.   HENT:  Negative for congestion, mouth sores, nosebleeds, sore throat and trouble swallowing.    Respiratory:  Negative for cough, choking, chest tightness and shortness of breath.    Cardiovascular:  Negative for chest pain.   Gastrointestinal:  Negative for abdominal pain, constipation, diarrhea, nausea and vomiting.   Endocrine: Positive for polyphagia. Negative for polydipsia and polyuria.   Genitourinary:  Positive for urinary incontinence. Negative for decreased urine volume, difficulty urinating, dysuria, frequency and hematuria.   Musculoskeletal:  Positive for arthralgias (chronic). Negative for joint swelling and myalgias.   Skin:  Negative for color change and rash.   Neurological:  Positive for speech difficulty, weakness, memory problem and confusion. Negative for dizziness.   Psychiatric/Behavioral:  Positive for behavioral problems (intermittently). Negative for dysphoric mood, hallucinations, sleep disturbance and depressed mood. The patient is not nervous/anxious.        PHYSICAL EXAMINATION:   VITAL SIGNS:   Vitals:    08/18/23 1506   BP: 132/66   Pulse: 71   Resp: 18   Temp: 97.9 øF (36.6 øC)   SpO2: 95%   Weight: 77.5 kg (170 lb 12.8 oz)       Physical Exam  Vitals and nursing note reviewed.   Constitutional:       General: She is not in acute distress.     Appearance: She is well-developed.   HENT:      Head: Normocephalic.      Right Ear: External ear normal.      Left  Ear: External ear normal.      Nose: Nose normal.      Mouth/Throat:      Pharynx: No oropharyngeal exudate.   Eyes:      Conjunctiva/sclera: Conjunctivae normal.   Cardiovascular:      Rate and Rhythm: Normal rate and regular rhythm.      Heart sounds: Normal heart sounds.   Pulmonary:      Effort: Pulmonary effort is normal.      Breath sounds: Normal breath sounds. No wheezing or rales.   Abdominal:      General: Bowel sounds are normal. There is no distension.      Palpations: Abdomen is soft.      Tenderness: There is no abdominal tenderness.   Skin:     General: Skin is warm and dry.   Neurological:      Mental Status: She is alert. Mental status is at baseline. She is disoriented.      Gait: Gait abnormal.      Comments: Chronic NM deficits related to CVA   Psychiatric:         Mood and Affect: Mood normal.         Behavior: Behavior normal.         Cognition and Memory: Cognition is impaired. Memory is impaired.       RECORDS REVIEW:   I have reviewed and interpreted the discharge summary and medications    ASSESSMENT     Diagnoses and all orders for this visit:    1. Uncontrolled diabetes mellitus with hyperglycemia, with long-term current use of insulin (Primary)    2. Late effects of CVA (cerebrovascular accident)    3. Impaired mobility and ADLs        PLAN    Uncontrolled DM with hyperglycemia  -Will increase Levemir to 15 units bid. Will continue to adjust accordingly. Continue sliding scale. Will follow up and continue to monitor.     Nursing encouraged to keep me informed of any acute changes, lack of improvement, or any new concerning symptoms.    Staff to continue supportive care for all ADLs.    [x]  Discussed Patient in detail with nursing/staff, addressed all needs today.     [x]  Plan of Care Reviewed   [x]  PT/OT Reviewed   [x]  Order Changes  []  Discharge Plans Reviewed  [x]  Advance Directive on file with Nursing Home.   [x]  POA on file with Nursing Home.   [x]  Code Status listed: [x]   "Full Code   []  DNR       "I confirm accuracy of unchanged data/findings which have been carried forward from previous visit, as well as I have updated appropriately those that have changed."                                     Fariha Cardona, APRN.     "

## 2023-08-20 NOTE — PROGRESS NOTES
Nursing Home Follow Up Note      Fabian Santos DO []   [facility] [x]  [address] [place] [address]  Phone: [phone]  Fax: [phone] Yohan Cuellar MD []    Chaz Mandel DO []   [address] [place] [address]  Phone: [phone]  Fax: [phone]     PATIENT NAME: Izabella Agudelo                                                                          YOB: 1960           DATE OF SERVICE: 08/18/2023  FACILITY:  [][facility]   [][facility]   [x] [facility]   [] [facility]   [] Other ______________________________________________________________________      CHIEF COMPLAINT:    Uncontrolled Diabetes with worsening blood sugars the past couple days.     HISTORY OF PRESENT ILLNESS:     Nursing reports that patient has had increased blood sugars the past couple days. They are never controlled due to non compliance with diet, and eating a lot of snacks. Her family brings her a lot of snacks and sodas that are not on a Diabetic diet. They are not usually as high as they have been the past couple days however. She had not and fever,  abnormalities, respiratory abnormalities or other signs of infection. She has no complaints today and no signs and symptoms of distress. Nursing has no other concerns today. She is on long acting and short acting insulin for treatment.    PAST MEDICAL & SURGICAL HISTORY:   Past Medical History:   Diagnosis Date    Cardiac abnormality     CHF (congestive heart failure)     COPD (chronic obstructive pulmonary disease)     Diabetes     Glaucoma     Schizophrenia, chronic condition       History reviewed. No pertinent surgical history.      MEDICATIONS:  I have reviewed and reconciled the patients medication list in the patients chart at the skilled nursing facility today.      ALLERGIES:    Allergies   Allergen Reactions    Penicillins Rash         SOCIAL HISTORY:    Social History     Socioeconomic History    Marital status: Single    Tobacco Use    Smoking status: Never    Smokeless tobacco: Never   Substance and Sexual Activity    Alcohol use: No    Drug use: No    Sexual activity: Defer       FAMILY HISTORY:    Family History   Problem Relation Age of Onset    Diabetes Other     Glaucoma Other        REVIEW OF SYSTEMS:    Review of Systems   Reason unable to perform ROS: ROS per nursing and patient.   Constitutional:  Negative for activity change, appetite change, chills, diaphoresis, fatigue, fever, unexpected weight gain and unexpected weight loss.   HENT:  Negative for congestion, mouth sores, nosebleeds, sore throat and trouble swallowing.    Respiratory:  Negative for cough, choking, chest tightness and shortness of breath.    Cardiovascular:  Negative for chest pain.   Gastrointestinal:  Negative for abdominal pain, constipation, diarrhea, nausea and vomiting.   Endocrine: Positive for polyphagia. Negative for polydipsia and polyuria.   Genitourinary:  Positive for urinary incontinence. Negative for decreased urine volume, difficulty urinating, dysuria, frequency and hematuria.   Musculoskeletal:  Positive for arthralgias (chronic). Negative for joint swelling and myalgias.   Skin:  Negative for color change and rash.   Neurological:  Positive for speech difficulty, weakness, memory problem and confusion. Negative for dizziness.   Psychiatric/Behavioral:  Positive for behavioral problems (intermittently). Negative for dysphoric mood, hallucinations, sleep disturbance and depressed mood. The patient is not nervous/anxious.        PHYSICAL EXAMINATION:   VITAL SIGNS:   Vitals:    08/18/23 1506   BP: 132/66   Pulse: 71   Resp: 18   Temp: 97.9 øF (36.6 øC)   SpO2: 95%   Weight: 77.5 kg (170 lb 12.8 oz)       Physical Exam  Vitals and nursing note reviewed.   Constitutional:       General: She is not in acute distress.     Appearance: She is well-developed.   HENT:      Head: Normocephalic.      Right Ear: External ear normal.      Left  Ear: External ear normal.      Nose: Nose normal.      Mouth/Throat:      Pharynx: No oropharyngeal exudate.   Eyes:      Conjunctiva/sclera: Conjunctivae normal.   Cardiovascular:      Rate and Rhythm: Normal rate and regular rhythm.      Heart sounds: Normal heart sounds.   Pulmonary:      Effort: Pulmonary effort is normal.      Breath sounds: Normal breath sounds. No wheezing or rales.   Abdominal:      General: Bowel sounds are normal. There is no distension.      Palpations: Abdomen is soft.      Tenderness: There is no abdominal tenderness.   Skin:     General: Skin is warm and dry.   Neurological:      Mental Status: She is alert. Mental status is at baseline. She is disoriented.      Gait: Gait abnormal.      Comments: Chronic NM deficits related to CVA   Psychiatric:         Mood and Affect: Mood normal.         Behavior: Behavior normal.         Cognition and Memory: Cognition is impaired. Memory is impaired.       RECORDS REVIEW:   I have reviewed and interpreted the discharge summary and medications    ASSESSMENT     Diagnoses and all orders for this visit:    1. Uncontrolled diabetes mellitus with hyperglycemia, with long-term current use of insulin (Primary)    2. Late effects of CVA (cerebrovascular accident)    3. Impaired mobility and ADLs        PLAN    Uncontrolled DM with hyperglycemia  -Will increase Levemir to 15 units bid. Will continue to adjust accordingly. Continue sliding scale. Will follow up and continue to monitor.     Nursing encouraged to keep me informed of any acute changes, lack of improvement, or any new concerning symptoms.    Staff to continue supportive care for all ADLs.    [x]  Discussed Patient in detail with nursing/staff, addressed all needs today.     [x]  Plan of Care Reviewed   [x]  PT/OT Reviewed   [x]  Order Changes  []  Discharge Plans Reviewed  [x]  Advance Directive on file with Nursing Home.   [x]  POA on file with Nursing Home.   [x]  Code Status listed: [x]   "Full Code   []  DNR       "I confirm accuracy of unchanged data/findings which have been carried forward from previous visit, as well as I have updated appropriately those that have changed."                                     Fariha Cardona, APRN.     "

## 2023-10-03 ENCOUNTER — NURSING HOME (OUTPATIENT)
Dept: FAMILY MEDICINE CLINIC | Facility: CLINIC | Age: 63
End: 2023-10-03
Payer: MEDICARE

## 2023-10-03 VITALS
TEMPERATURE: 98.1 F | DIASTOLIC BLOOD PRESSURE: 74 MMHG | RESPIRATION RATE: 16 BRPM | HEART RATE: 80 BPM | OXYGEN SATURATION: 94 % | BODY MASS INDEX: 27.83 KG/M2 | WEIGHT: 183 LBS | SYSTOLIC BLOOD PRESSURE: 126 MMHG

## 2023-10-03 DIAGNOSIS — Z78.9 IMPAIRED MOBILITY AND ADLS: ICD-10-CM

## 2023-10-03 DIAGNOSIS — E11.65 UNCONTROLLED TYPE 2 DIABETES MELLITUS WITH HYPERGLYCEMIA: Primary | ICD-10-CM

## 2023-10-03 DIAGNOSIS — I69.90 LATE EFFECTS OF CVA (CEREBROVASCULAR ACCIDENT): ICD-10-CM

## 2023-10-03 DIAGNOSIS — Z51.89 ENCOUNTER FOR MEDICATION ADJUSTMENT: ICD-10-CM

## 2023-10-03 DIAGNOSIS — Z74.09 IMPAIRED MOBILITY AND ADLS: ICD-10-CM

## 2023-10-03 DIAGNOSIS — K64.9 HEMORRHOIDS, UNSPECIFIED HEMORRHOID TYPE: ICD-10-CM

## 2023-10-03 NOTE — LETTER
Nursing Home Follow Up Note      Fabian Santos DO []   [facility] [x]  [address] [place] [address]  Phone: [phone]  Fax: [phone] Yohan Cuellar MD []    Chaz Mandel DO []   [address] [place] [address]  Phone: [phone]  Fax: [phone]     PATIENT NAME: Izabella Agudelo                                                                          YOB: 1960           DATE OF SERVICE: 10/3/2023  FACILITY:  [][facility]   [][facility]   [x] [facility]   [] [facility]   [] Other ______________________________________________________________________      CHIEF COMPLAINT:    Uncontrolled Diabetes with continued hyperglycemia.     Concerns about hemorrhoids.    HISTORY OF PRESENT ILLNESS:     Nursing reports that patient continues to have elevated blood sugars. She had insulin increased a couple weeks ago.  They are never controlled due to non compliance with diet, and eating a lot of snacks. Her family brings her a lot of foods and snacks and sodas that are not on a Diabetic diet.  She had not and fever,  abnormalities, respiratory abnormalities or other signs of infection.  Nursing has no other concerns today. She is on long acting and short acting insulin for treatment.    Patient reports today that she feels she has hemorrhoids because it hurts and burns when she has a bowel movement. Has had them in the past.     PAST MEDICAL & SURGICAL HISTORY:   Past Medical History:   Diagnosis Date    Cardiac abnormality     CHF (congestive heart failure)     COPD (chronic obstructive pulmonary disease)     Diabetes     Glaucoma     Schizophrenia, chronic condition       No past surgical history on file.      MEDICATIONS:  I have reviewed and reconciled the patients medication list in the patients chart at the skilled nursing facility today.      ALLERGIES:    Allergies   Allergen Reactions    Penicillins Rash         SOCIAL HISTORY:    Social History      Socioeconomic History    Marital status: Single   Tobacco Use    Smoking status: Never    Smokeless tobacco: Never   Substance and Sexual Activity    Alcohol use: No    Drug use: No    Sexual activity: Defer       FAMILY HISTORY:    Family History   Problem Relation Age of Onset    Diabetes Other     Glaucoma Other        REVIEW OF SYSTEMS:    Review of Systems   Reason unable to perform ROS: ROS per nursing and patient.   Constitutional:  Negative for activity change, appetite change, chills, diaphoresis, fatigue, fever, unexpected weight gain and unexpected weight loss.   HENT:  Negative for congestion, mouth sores, nosebleeds, sore throat and trouble swallowing.    Respiratory:  Negative for cough, choking, chest tightness and shortness of breath.    Cardiovascular:  Negative for chest pain.   Gastrointestinal:  Positive for rectal pain. Negative for abdominal pain, constipation, diarrhea, nausea and vomiting.   Endocrine: Positive for polyphagia. Negative for polydipsia and polyuria.   Genitourinary:  Positive for urinary incontinence. Negative for decreased urine volume, difficulty urinating, dysuria, frequency and hematuria.   Musculoskeletal:  Positive for arthralgias (chronic). Negative for joint swelling and myalgias.   Skin:  Negative for color change and rash.   Neurological:  Positive for speech difficulty, weakness, memory problem and confusion. Negative for dizziness.   Psychiatric/Behavioral:  Positive for behavioral problems (intermittently). Negative for dysphoric mood, hallucinations, sleep disturbance and depressed mood. The patient is not nervous/anxious.        PHYSICAL EXAMINATION:   VITAL SIGNS:   Vitals:    10/03/23 1541   BP: 126/74   Pulse: 80   Resp: 16   Temp: 98.1 °F (36.7 °C)   SpO2: 94%   Weight: 83 kg (183 lb)       Physical Exam  Vitals and nursing note reviewed.   Constitutional:       General: She is not in acute distress.     Appearance: She is well-developed.   Eyes:       Conjunctiva/sclera: Conjunctivae normal.   Cardiovascular:      Rate and Rhythm: Normal rate and regular rhythm.      Heart sounds: Normal heart sounds.   Pulmonary:      Effort: Pulmonary effort is normal.      Breath sounds: Normal breath sounds. No wheezing or rales.   Abdominal:      General: Bowel sounds are normal. There is no distension.      Palpations: Abdomen is soft.      Tenderness: There is no abdominal tenderness.   Skin:     General: Skin is warm and dry.   Neurological:      Mental Status: She is alert. Mental status is at baseline. She is disoriented.      Gait: Gait abnormal.      Comments: Chronic NM deficits related to CVA   Psychiatric:         Mood and Affect: Mood normal. Affect is flat.         Behavior: Behavior normal.         Cognition and Memory: Cognition is impaired. Memory is impaired.       RECORDS REVIEW:   I have reviewed and interpreted the discharge summary and medications    ASSESSMENT     Diagnoses and all orders for this visit:    1. Uncontrolled type 2 diabetes mellitus with hyperglycemia (Primary)    2. Encounter for medication adjustment    3. Hemorrhoids, unspecified hemorrhoid type    4. Late effects of CVA (cerebrovascular accident)    5. Impaired mobility and ADLs        PLAN    Uncontrolled DM with hyperglycemia  -Will increase Levemir to 30 units bid. Will increase meal time insulin to 12 units with meals.  Will continue to adjust accordingly. Will follow up and continue to monitor.     Hemorrhoids  -Apply Anusol cream bid and after BM x 14 days. Will follow up and monitor.     Nursing encouraged to keep me informed of any acute changes, lack of improvement, or any new concerning symptoms.    Staff to continue supportive care for all ADLs.    [x]  Discussed Patient in detail with nursing/staff, addressed all needs today.     [x]  Plan of Care Reviewed   [x]  PT/OT Reviewed   [x]  Order Changes  []  Discharge Plans Reviewed  [x]  Advance Directive on file with Nursing  Home.   [x]  POA on file with Nursing Home.   [x]  Code Status listed: [x]  Full Code   []  DNR       “I confirm accuracy of unchanged data/findings which have been carried forward from previous visit, as well as I have updated appropriately those that have changed.”     I spent 25 minutes caring for Izabella on this date of service. This time includes time spent by me in the following activities:preparing for the visit, obtaining and/or reviewing a separately obtained history, performing a medically appropriate examination and/or evaluation , counseling and educating the patient/family/caregiver, ordering medications, tests, or procedures, referring and communicating with other health care professionals , documenting information in the medical record, independently interpreting results and communicating that information with the patient/family/caregiver, and care coordination                                   FLOWER Arreguin.

## 2023-10-04 NOTE — PROGRESS NOTES
Nursing Home Follow Up Note      Fabian Santos DO []   [facility] [x]  [address] [place] [address]  Phone: [phone]  Fax: [phone] Yohan Cuellar MD []    Chaz Mandel DO []   [address] [place] [address]  Phone: [phone]  Fax: [phone]     PATIENT NAME: Izabella Agudleo                                                                          YOB: 1960           DATE OF SERVICE: 10/3/2023  FACILITY:  [][facility]   [][facility]   [x] [facility]   [] [facility]   [] Other ______________________________________________________________________      CHIEF COMPLAINT:    Uncontrolled Diabetes with continued hyperglycemia.     Concerns about hemorrhoids.    HISTORY OF PRESENT ILLNESS:     Nursing reports that patient continues to have elevated blood sugars. She had insulin increased a couple weeks ago.  They are never controlled due to non compliance with diet, and eating a lot of snacks. Her family brings her a lot of foods and snacks and sodas that are not on a Diabetic diet.  She had not and fever,  abnormalities, respiratory abnormalities or other signs of infection.  Nursing has no other concerns today. She is on long acting and short acting insulin for treatment.    Patient reports today that she feels she has hemorrhoids because it hurts and burns when she has a bowel movement. Has had them in the past.     PAST MEDICAL & SURGICAL HISTORY:   Past Medical History:   Diagnosis Date    Cardiac abnormality     CHF (congestive heart failure)     COPD (chronic obstructive pulmonary disease)     Diabetes     Glaucoma     Schizophrenia, chronic condition       No past surgical history on file.      MEDICATIONS:  I have reviewed and reconciled the patients medication list in the patients chart at the skilled nursing facility today.      ALLERGIES:    Allergies   Allergen Reactions    Penicillins Rash         SOCIAL HISTORY:    Social History      Socioeconomic History    Marital status: Single   Tobacco Use    Smoking status: Never    Smokeless tobacco: Never   Substance and Sexual Activity    Alcohol use: No    Drug use: No    Sexual activity: Defer       FAMILY HISTORY:    Family History   Problem Relation Age of Onset    Diabetes Other     Glaucoma Other        REVIEW OF SYSTEMS:    Review of Systems   Reason unable to perform ROS: ROS per nursing and patient.   Constitutional:  Negative for activity change, appetite change, chills, diaphoresis, fatigue, fever, unexpected weight gain and unexpected weight loss.   HENT:  Negative for congestion, mouth sores, nosebleeds, sore throat and trouble swallowing.    Respiratory:  Negative for cough, choking, chest tightness and shortness of breath.    Cardiovascular:  Negative for chest pain.   Gastrointestinal:  Positive for rectal pain. Negative for abdominal pain, constipation, diarrhea, nausea and vomiting.   Endocrine: Positive for polyphagia. Negative for polydipsia and polyuria.   Genitourinary:  Positive for urinary incontinence. Negative for decreased urine volume, difficulty urinating, dysuria, frequency and hematuria.   Musculoskeletal:  Positive for arthralgias (chronic). Negative for joint swelling and myalgias.   Skin:  Negative for color change and rash.   Neurological:  Positive for speech difficulty, weakness, memory problem and confusion. Negative for dizziness.   Psychiatric/Behavioral:  Positive for behavioral problems (intermittently). Negative for dysphoric mood, hallucinations, sleep disturbance and depressed mood. The patient is not nervous/anxious.        PHYSICAL EXAMINATION:   VITAL SIGNS:   Vitals:    10/03/23 1541   BP: 126/74   Pulse: 80   Resp: 16   Temp: 98.1 °F (36.7 °C)   SpO2: 94%   Weight: 83 kg (183 lb)       Physical Exam  Vitals and nursing note reviewed.   Constitutional:       General: She is not in acute distress.     Appearance: She is well-developed.   Eyes:       Conjunctiva/sclera: Conjunctivae normal.   Cardiovascular:      Rate and Rhythm: Normal rate and regular rhythm.      Heart sounds: Normal heart sounds.   Pulmonary:      Effort: Pulmonary effort is normal.      Breath sounds: Normal breath sounds. No wheezing or rales.   Abdominal:      General: Bowel sounds are normal. There is no distension.      Palpations: Abdomen is soft.      Tenderness: There is no abdominal tenderness.   Skin:     General: Skin is warm and dry.   Neurological:      Mental Status: She is alert. Mental status is at baseline. She is disoriented.      Gait: Gait abnormal.      Comments: Chronic NM deficits related to CVA   Psychiatric:         Mood and Affect: Mood normal. Affect is flat.         Behavior: Behavior normal.         Cognition and Memory: Cognition is impaired. Memory is impaired.       RECORDS REVIEW:   I have reviewed and interpreted the discharge summary and medications    ASSESSMENT     Diagnoses and all orders for this visit:    1. Uncontrolled type 2 diabetes mellitus with hyperglycemia (Primary)    2. Encounter for medication adjustment    3. Hemorrhoids, unspecified hemorrhoid type    4. Late effects of CVA (cerebrovascular accident)    5. Impaired mobility and ADLs        PLAN    Uncontrolled DM with hyperglycemia  -Will increase Levemir to 30 units bid. Will increase meal time insulin to 12 units with meals.  Will continue to adjust accordingly. Will follow up and continue to monitor.     Hemorrhoids  -Apply Anusol cream bid and after BM x 14 days. Will follow up and monitor.     Nursing encouraged to keep me informed of any acute changes, lack of improvement, or any new concerning symptoms.    Staff to continue supportive care for all ADLs.    [x]  Discussed Patient in detail with nursing/staff, addressed all needs today.     [x]  Plan of Care Reviewed   [x]  PT/OT Reviewed   [x]  Order Changes  []  Discharge Plans Reviewed  [x]  Advance Directive on file with Nursing  Home.   [x]  POA on file with Nursing Home.   [x]  Code Status listed: [x]  Full Code   []  DNR       “I confirm accuracy of unchanged data/findings which have been carried forward from previous visit, as well as I have updated appropriately those that have changed.”     I spent 25 minutes caring for Izabella on this date of service. This time includes time spent by me in the following activities:preparing for the visit, obtaining and/or reviewing a separately obtained history, performing a medically appropriate examination and/or evaluation , counseling and educating the patient/family/caregiver, ordering medications, tests, or procedures, referring and communicating with other health care professionals , documenting information in the medical record, independently interpreting results and communicating that information with the patient/family/caregiver, and care coordination                                   FLOWER Arreguin.

## 2023-10-11 ENCOUNTER — APPOINTMENT (OUTPATIENT)
Dept: GENERAL RADIOLOGY | Facility: HOSPITAL | Age: 63
End: 2023-10-11
Payer: MEDICARE

## 2023-10-11 ENCOUNTER — APPOINTMENT (OUTPATIENT)
Dept: CT IMAGING | Facility: HOSPITAL | Age: 63
End: 2023-10-11
Payer: MEDICARE

## 2023-10-11 ENCOUNTER — HOSPITAL ENCOUNTER (OUTPATIENT)
Facility: HOSPITAL | Age: 63
Setting detail: OBSERVATION
Discharge: NURSING, REHAB OR LONG-TERM CARE | End: 2023-10-14
Attending: EMERGENCY MEDICINE | Admitting: FAMILY MEDICINE
Payer: MEDICARE

## 2023-10-11 DIAGNOSIS — E11.40 TYPE 2 DIABETES MELLITUS WITH DIABETIC NEUROPATHY, WITH LONG-TERM CURRENT USE OF INSULIN: ICD-10-CM

## 2023-10-11 DIAGNOSIS — E87.1 HYPONATREMIA: ICD-10-CM

## 2023-10-11 DIAGNOSIS — J18.9 PNEUMONIA OF BOTH LUNGS DUE TO INFECTIOUS ORGANISM, UNSPECIFIED PART OF LUNG: ICD-10-CM

## 2023-10-11 DIAGNOSIS — Z79.4 TYPE 2 DIABETES MELLITUS WITH DIABETIC NEUROPATHY, WITH LONG-TERM CURRENT USE OF INSULIN: ICD-10-CM

## 2023-10-11 DIAGNOSIS — E72.20 HYPERAMMONEMIA: ICD-10-CM

## 2023-10-11 DIAGNOSIS — R41.82 ALTERED MENTAL STATUS, UNSPECIFIED ALTERED MENTAL STATUS TYPE: Primary | ICD-10-CM

## 2023-10-11 LAB
ALBUMIN SERPL-MCNC: 3.9 G/DL (ref 3.5–5.2)
ALBUMIN/GLOB SERPL: 1.3 G/DL
ALP SERPL-CCNC: 82 U/L (ref 39–117)
ALT SERPL W P-5'-P-CCNC: 20 U/L (ref 1–33)
AMMONIA BLD-SCNC: 63 UMOL/L (ref 11–51)
AMORPH URATE CRY URNS QL MICRO: ABNORMAL /HPF
ANION GAP SERPL CALCULATED.3IONS-SCNC: 14.4 MMOL/L (ref 5–15)
AST SERPL-CCNC: 23 U/L (ref 1–32)
BACTERIA UR QL AUTO: ABNORMAL /HPF
BASOPHILS # BLD AUTO: 0.04 10*3/MM3 (ref 0–0.2)
BASOPHILS NFR BLD AUTO: 0.3 % (ref 0–1.5)
BILIRUB SERPL-MCNC: 0.5 MG/DL (ref 0–1.2)
BILIRUB UR QL STRIP: NEGATIVE
BUN SERPL-MCNC: 17 MG/DL (ref 8–23)
BUN/CREAT SERPL: 37 (ref 7–25)
CALCIUM SPEC-SCNC: 9.5 MG/DL (ref 8.6–10.5)
CHLORIDE SERPL-SCNC: 89 MMOL/L (ref 98–107)
CLARITY UR: ABNORMAL
CO2 SERPL-SCNC: 25.6 MMOL/L (ref 22–29)
COLOR UR: YELLOW
CREAT SERPL-MCNC: 0.46 MG/DL (ref 0.57–1)
D-LACTATE SERPL-SCNC: 2.4 MMOL/L (ref 0.5–2)
D-LACTATE SERPL-SCNC: 3 MMOL/L (ref 0.5–2)
DEPRECATED RDW RBC AUTO: 40.9 FL (ref 37–54)
EGFRCR SERPLBLD CKD-EPI 2021: 108.4 ML/MIN/1.73
EOSINOPHIL # BLD AUTO: 0.03 10*3/MM3 (ref 0–0.4)
EOSINOPHIL NFR BLD AUTO: 0.2 % (ref 0.3–6.2)
ERYTHROCYTE [DISTWIDTH] IN BLOOD BY AUTOMATED COUNT: 13.2 % (ref 12.3–15.4)
GLOBULIN UR ELPH-MCNC: 2.9 GM/DL
GLUCOSE BLDC GLUCOMTR-MCNC: 203 MG/DL (ref 70–130)
GLUCOSE BLDC GLUCOMTR-MCNC: 489 MG/DL (ref 70–130)
GLUCOSE SERPL-MCNC: 235 MG/DL (ref 65–99)
GLUCOSE UR STRIP-MCNC: ABNORMAL MG/DL
HBA1C MFR BLD: 11.1 % (ref 4.8–5.6)
HCT VFR BLD AUTO: 41.5 % (ref 34–46.6)
HGB BLD-MCNC: 13.9 G/DL (ref 12–15.9)
HGB UR QL STRIP.AUTO: ABNORMAL
HOLD SPECIMEN: NORMAL
HOLD SPECIMEN: NORMAL
HYALINE CASTS UR QL AUTO: ABNORMAL /LPF
IMM GRANULOCYTES # BLD AUTO: 0.13 10*3/MM3 (ref 0–0.05)
IMM GRANULOCYTES NFR BLD AUTO: 1.1 % (ref 0–0.5)
KETONES UR QL STRIP: ABNORMAL
LEUKOCYTE ESTERASE UR QL STRIP.AUTO: ABNORMAL
LYMPHOCYTES # BLD AUTO: 1.29 10*3/MM3 (ref 0.7–3.1)
LYMPHOCYTES NFR BLD AUTO: 10.6 % (ref 19.6–45.3)
MAGNESIUM SERPL-MCNC: 1.5 MG/DL (ref 1.6–2.4)
MCH RBC QN AUTO: 28.4 PG (ref 26.6–33)
MCHC RBC AUTO-ENTMCNC: 33.5 G/DL (ref 31.5–35.7)
MCV RBC AUTO: 84.9 FL (ref 79–97)
MONOCYTES # BLD AUTO: 1.03 10*3/MM3 (ref 0.1–0.9)
MONOCYTES NFR BLD AUTO: 8.5 % (ref 5–12)
NEUTROPHILS NFR BLD AUTO: 79.3 % (ref 42.7–76)
NEUTROPHILS NFR BLD AUTO: 9.62 10*3/MM3 (ref 1.7–7)
NITRITE UR QL STRIP: NEGATIVE
NRBC BLD AUTO-RTO: 0 /100 WBC (ref 0–0.2)
NT-PROBNP SERPL-MCNC: 1531 PG/ML (ref 0–900)
PH UR STRIP.AUTO: 7 [PH] (ref 5–8)
PLATELET # BLD AUTO: 303 10*3/MM3 (ref 140–450)
PMV BLD AUTO: 8.6 FL (ref 6–12)
POTASSIUM SERPL-SCNC: 4.4 MMOL/L (ref 3.5–5.2)
PROCALCITONIN SERPL-MCNC: 0.09 NG/ML (ref 0–0.25)
PROT SERPL-MCNC: 6.8 G/DL (ref 6–8.5)
PROT UR QL STRIP: ABNORMAL
RBC # BLD AUTO: 4.89 10*6/MM3 (ref 3.77–5.28)
RBC # UR STRIP: ABNORMAL /HPF
REF LAB TEST METHOD: ABNORMAL
SODIUM SERPL-SCNC: 129 MMOL/L (ref 136–145)
SP GR UR STRIP: 1.03 (ref 1–1.03)
SQUAMOUS #/AREA URNS HPF: ABNORMAL /HPF
TROPONIN T SERPL HS-MCNC: 21 NG/L
UROBILINOGEN UR QL STRIP: ABNORMAL
WBC # UR STRIP: ABNORMAL /HPF
WBC NRBC COR # BLD: 12.14 10*3/MM3 (ref 3.4–10.8)
WHOLE BLOOD HOLD COAG: NORMAL
WHOLE BLOOD HOLD SPECIMEN: NORMAL

## 2023-10-11 PROCEDURE — 74176 CT ABD & PELVIS W/O CONTRAST: CPT

## 2023-10-11 PROCEDURE — 83880 ASSAY OF NATRIURETIC PEPTIDE: CPT | Performed by: NURSE PRACTITIONER

## 2023-10-11 PROCEDURE — 83605 ASSAY OF LACTIC ACID: CPT | Performed by: NURSE PRACTITIONER

## 2023-10-11 PROCEDURE — 25010000002 CEFTRIAXONE SODIUM-DEXTROSE 1-3.74 GM-%(50ML) RECONSTITUTED SOLUTION: Performed by: NURSE PRACTITIONER

## 2023-10-11 PROCEDURE — 84145 PROCALCITONIN (PCT): CPT | Performed by: NURSE PRACTITIONER

## 2023-10-11 PROCEDURE — 96365 THER/PROPH/DIAG IV INF INIT: CPT

## 2023-10-11 PROCEDURE — 81001 URINALYSIS AUTO W/SCOPE: CPT

## 2023-10-11 PROCEDURE — 80053 COMPREHEN METABOLIC PANEL: CPT

## 2023-10-11 PROCEDURE — 87086 URINE CULTURE/COLONY COUNT: CPT | Performed by: FAMILY MEDICINE

## 2023-10-11 PROCEDURE — 96372 THER/PROPH/DIAG INJ SC/IM: CPT

## 2023-10-11 PROCEDURE — 87081 CULTURE SCREEN ONLY: CPT | Performed by: FAMILY MEDICINE

## 2023-10-11 PROCEDURE — 96361 HYDRATE IV INFUSION ADD-ON: CPT

## 2023-10-11 PROCEDURE — 99284 EMERGENCY DEPT VISIT MOD MDM: CPT

## 2023-10-11 PROCEDURE — 25010000002 ONDANSETRON PER 1 MG: Performed by: NURSE PRACTITIONER

## 2023-10-11 PROCEDURE — 82948 REAGENT STRIP/BLOOD GLUCOSE: CPT

## 2023-10-11 PROCEDURE — 83735 ASSAY OF MAGNESIUM: CPT

## 2023-10-11 PROCEDURE — G0378 HOSPITAL OBSERVATION PER HR: HCPCS

## 2023-10-11 PROCEDURE — 25810000003 SODIUM CHLORIDE 0.9 % SOLUTION: Performed by: NURSE PRACTITIONER

## 2023-10-11 PROCEDURE — 71045 X-RAY EXAM CHEST 1 VIEW: CPT

## 2023-10-11 PROCEDURE — 84484 ASSAY OF TROPONIN QUANT: CPT

## 2023-10-11 PROCEDURE — 0202U NFCT DS 22 TRGT SARS-COV-2: CPT | Performed by: FAMILY MEDICINE

## 2023-10-11 PROCEDURE — 87641 MR-STAPH DNA AMP PROBE: CPT | Performed by: FAMILY MEDICINE

## 2023-10-11 PROCEDURE — 25810000003 SODIUM CHLORIDE 0.9 % SOLUTION: Performed by: FAMILY MEDICINE

## 2023-10-11 PROCEDURE — 25010000002 ENOXAPARIN PER 10 MG: Performed by: FAMILY MEDICINE

## 2023-10-11 PROCEDURE — 70450 CT HEAD/BRAIN W/O DYE: CPT

## 2023-10-11 PROCEDURE — 96375 TX/PRO/DX INJ NEW DRUG ADDON: CPT

## 2023-10-11 PROCEDURE — 85025 COMPLETE CBC W/AUTO DIFF WBC: CPT

## 2023-10-11 PROCEDURE — 83036 HEMOGLOBIN GLYCOSYLATED A1C: CPT | Performed by: FAMILY MEDICINE

## 2023-10-11 PROCEDURE — 36415 COLL VENOUS BLD VENIPUNCTURE: CPT

## 2023-10-11 PROCEDURE — 93005 ELECTROCARDIOGRAM TRACING: CPT

## 2023-10-11 PROCEDURE — 82140 ASSAY OF AMMONIA: CPT | Performed by: NURSE PRACTITIONER

## 2023-10-11 RX ORDER — DIVALPROEX SODIUM 500 MG/1
500 TABLET, DELAYED RELEASE ORAL 3 TIMES DAILY
Status: DISCONTINUED | OUTPATIENT
Start: 2023-10-11 | End: 2023-10-14

## 2023-10-11 RX ORDER — CARVEDILOL 12.5 MG/1
12.5 TABLET ORAL EVERY 12 HOURS SCHEDULED
Status: DISCONTINUED | OUTPATIENT
Start: 2023-10-11 | End: 2023-10-14 | Stop reason: HOSPADM

## 2023-10-11 RX ORDER — POLYETHYLENE GLYCOL 3350 17 G/17G
17 POWDER, FOR SOLUTION ORAL DAILY PRN
Status: DISCONTINUED | OUTPATIENT
Start: 2023-10-11 | End: 2023-10-14 | Stop reason: HOSPADM

## 2023-10-11 RX ORDER — AMOXICILLIN 250 MG
2 CAPSULE ORAL 2 TIMES DAILY
Status: DISCONTINUED | OUTPATIENT
Start: 2023-10-11 | End: 2023-10-14 | Stop reason: HOSPADM

## 2023-10-11 RX ORDER — BISACODYL 5 MG/1
5 TABLET, DELAYED RELEASE ORAL DAILY PRN
Status: DISCONTINUED | OUTPATIENT
Start: 2023-10-11 | End: 2023-10-14 | Stop reason: HOSPADM

## 2023-10-11 RX ORDER — SODIUM CHLORIDE 9 MG/ML
50 INJECTION, SOLUTION INTRAVENOUS CONTINUOUS
Status: ACTIVE | OUTPATIENT
Start: 2023-10-12 | End: 2023-10-13

## 2023-10-11 RX ORDER — LISINOPRIL 10 MG/1
5 TABLET ORAL DAILY
Status: DISCONTINUED | OUTPATIENT
Start: 2023-10-12 | End: 2023-10-14 | Stop reason: HOSPADM

## 2023-10-11 RX ORDER — CHOLECALCIFEROL (VITAMIN D3) 125 MCG
5 CAPSULE ORAL NIGHTLY PRN
Status: DISCONTINUED | OUTPATIENT
Start: 2023-10-11 | End: 2023-10-14 | Stop reason: HOSPADM

## 2023-10-11 RX ORDER — ACETAMINOPHEN 325 MG/1
650 TABLET ORAL EVERY 4 HOURS PRN
Status: DISCONTINUED | OUTPATIENT
Start: 2023-10-11 | End: 2023-10-14 | Stop reason: HOSPADM

## 2023-10-11 RX ORDER — GABAPENTIN 300 MG/1
300 CAPSULE ORAL 2 TIMES DAILY
Status: DISCONTINUED | OUTPATIENT
Start: 2023-10-11 | End: 2023-10-14 | Stop reason: HOSPADM

## 2023-10-11 RX ORDER — SODIUM CHLORIDE 9 MG/ML
100 INJECTION, SOLUTION INTRAVENOUS CONTINUOUS
Status: CANCELLED | OUTPATIENT
Start: 2023-10-11

## 2023-10-11 RX ORDER — ONDANSETRON 2 MG/ML
4 INJECTION INTRAMUSCULAR; INTRAVENOUS EVERY 6 HOURS PRN
Status: DISCONTINUED | OUTPATIENT
Start: 2023-10-11 | End: 2023-10-14 | Stop reason: HOSPADM

## 2023-10-11 RX ORDER — ACETAMINOPHEN 650 MG/1
650 SUPPOSITORY RECTAL EVERY 4 HOURS PRN
Status: DISCONTINUED | OUTPATIENT
Start: 2023-10-11 | End: 2023-10-14 | Stop reason: HOSPADM

## 2023-10-11 RX ORDER — SODIUM CHLORIDE 0.9 % (FLUSH) 0.9 %
10 SYRINGE (ML) INJECTION AS NEEDED
Status: DISCONTINUED | OUTPATIENT
Start: 2023-10-11 | End: 2023-10-14 | Stop reason: HOSPADM

## 2023-10-11 RX ORDER — LISINOPRIL 5 MG/1
5 TABLET ORAL DAILY
COMMUNITY

## 2023-10-11 RX ORDER — ACETAMINOPHEN 160 MG/5ML
650 SOLUTION ORAL EVERY 4 HOURS PRN
Status: DISCONTINUED | OUTPATIENT
Start: 2023-10-11 | End: 2023-10-14 | Stop reason: HOSPADM

## 2023-10-11 RX ORDER — SODIUM CHLORIDE 9 MG/ML
40 INJECTION, SOLUTION INTRAVENOUS AS NEEDED
Status: DISCONTINUED | OUTPATIENT
Start: 2023-10-11 | End: 2023-10-14 | Stop reason: HOSPADM

## 2023-10-11 RX ORDER — ENOXAPARIN SODIUM 100 MG/ML
40 INJECTION SUBCUTANEOUS EVERY 24 HOURS
Status: DISCONTINUED | OUTPATIENT
Start: 2023-10-11 | End: 2023-10-14 | Stop reason: HOSPADM

## 2023-10-11 RX ORDER — NITROGLYCERIN 0.4 MG/1
0.4 TABLET SUBLINGUAL
Status: DISCONTINUED | OUTPATIENT
Start: 2023-10-11 | End: 2023-10-14 | Stop reason: HOSPADM

## 2023-10-11 RX ORDER — ATORVASTATIN CALCIUM 20 MG/1
20 TABLET, FILM COATED ORAL DAILY
Status: DISCONTINUED | OUTPATIENT
Start: 2023-10-12 | End: 2023-10-14 | Stop reason: HOSPADM

## 2023-10-11 RX ORDER — IBUPROFEN 600 MG/1
1 TABLET ORAL
Status: DISCONTINUED | OUTPATIENT
Start: 2023-10-11 | End: 2023-10-14 | Stop reason: HOSPADM

## 2023-10-11 RX ORDER — ARIPIPRAZOLE 5 MG/1
30 TABLET ORAL DAILY
Status: DISCONTINUED | OUTPATIENT
Start: 2023-10-12 | End: 2023-10-14 | Stop reason: HOSPADM

## 2023-10-11 RX ORDER — DEXTROSE MONOHYDRATE 25 G/50ML
10-50 INJECTION, SOLUTION INTRAVENOUS
Status: DISCONTINUED | OUTPATIENT
Start: 2023-10-11 | End: 2023-10-14 | Stop reason: HOSPADM

## 2023-10-11 RX ORDER — CEFTRIAXONE 1 G/50ML
1000 INJECTION, SOLUTION INTRAVENOUS EVERY 24 HOURS
Status: DISCONTINUED | OUTPATIENT
Start: 2023-10-12 | End: 2023-10-14 | Stop reason: HOSPADM

## 2023-10-11 RX ORDER — SODIUM CHLORIDE 9 MG/ML
125 INJECTION, SOLUTION INTRAVENOUS CONTINUOUS
Status: DISCONTINUED | OUTPATIENT
Start: 2023-10-11 | End: 2023-10-11

## 2023-10-11 RX ORDER — BISACODYL 10 MG
10 SUPPOSITORY, RECTAL RECTAL DAILY PRN
Status: DISCONTINUED | OUTPATIENT
Start: 2023-10-11 | End: 2023-10-14 | Stop reason: HOSPADM

## 2023-10-11 RX ORDER — ONDANSETRON 2 MG/ML
4 INJECTION INTRAMUSCULAR; INTRAVENOUS ONCE
Status: COMPLETED | OUTPATIENT
Start: 2023-10-11 | End: 2023-10-11

## 2023-10-11 RX ORDER — CEFTRIAXONE 1 G/50ML
1000 INJECTION, SOLUTION INTRAVENOUS ONCE
Status: COMPLETED | OUTPATIENT
Start: 2023-10-11 | End: 2023-10-11

## 2023-10-11 RX ORDER — INSULIN ASPART 100 [IU]/ML
1-200 INJECTION, SOLUTION INTRAVENOUS; SUBCUTANEOUS
Status: DISCONTINUED | OUTPATIENT
Start: 2023-10-12 | End: 2023-10-14 | Stop reason: HOSPADM

## 2023-10-11 RX ORDER — NICOTINE POLACRILEX 4 MG
15 LOZENGE BUCCAL
Status: DISCONTINUED | OUTPATIENT
Start: 2023-10-11 | End: 2023-10-14 | Stop reason: HOSPADM

## 2023-10-11 RX ORDER — INSULIN ASPART 100 [IU]/ML
1-200 INJECTION, SOLUTION INTRAVENOUS; SUBCUTANEOUS AS NEEDED
Status: DISCONTINUED | OUTPATIENT
Start: 2023-10-11 | End: 2023-10-14 | Stop reason: HOSPADM

## 2023-10-11 RX ORDER — SODIUM CHLORIDE 0.9 % (FLUSH) 0.9 %
10 SYRINGE (ML) INJECTION EVERY 12 HOURS SCHEDULED
Status: DISCONTINUED | OUTPATIENT
Start: 2023-10-11 | End: 2023-10-14 | Stop reason: HOSPADM

## 2023-10-11 RX ADMIN — CEFTRIAXONE 1000 MG: 1 INJECTION, SOLUTION INTRAVENOUS at 16:06

## 2023-10-11 RX ADMIN — DIVALPROEX SODIUM 500 MG: 500 TABLET, DELAYED RELEASE ORAL at 23:19

## 2023-10-11 RX ADMIN — CARVEDILOL 12.5 MG: 12.5 TABLET, FILM COATED ORAL at 23:19

## 2023-10-11 RX ADMIN — ONDANSETRON 4 MG: 2 INJECTION INTRAMUSCULAR; INTRAVENOUS at 15:12

## 2023-10-11 RX ADMIN — GABAPENTIN 300 MG: 300 CAPSULE ORAL at 23:19

## 2023-10-11 RX ADMIN — ENOXAPARIN SODIUM 40 MG: 100 INJECTION SUBCUTANEOUS at 23:20

## 2023-10-11 RX ADMIN — SODIUM CHLORIDE 125 ML/HR: 9 INJECTION, SOLUTION INTRAVENOUS at 20:17

## 2023-10-11 RX ADMIN — SODIUM CHLORIDE 500 ML: 9 INJECTION, SOLUTION INTRAVENOUS at 23:20

## 2023-10-11 RX ADMIN — SODIUM CHLORIDE 100 ML/HR: 9 INJECTION, SOLUTION INTRAVENOUS at 23:20

## 2023-10-11 RX ADMIN — Medication 10 ML: at 23:20

## 2023-10-11 NOTE — ED PROVIDER NOTES
Subjective  History of Present Illness:    Chief Complaint: Altered mental status  History of Present Illness: This is a 63-year-old female patient comes into the ED via EMS as a transfer from skilled nursing facility for worsening altered mental status.  Patient is a type II diabetic with her blood glucose levels running around 400 was tested at outside facility and her blood glucose was 250.  Patient was transferred from skilled nursing facility ER for evaluation for worsening altered mental status, and decreased blood glucose.      Nurses Notes reviewed and agree, including vitals, allergies, social history and prior medical history.       Allergies:    Penicillins      No past surgical history on file.      Social History     Socioeconomic History    Marital status: Single   Tobacco Use    Smoking status: Never    Smokeless tobacco: Never   Substance and Sexual Activity    Alcohol use: No    Drug use: No    Sexual activity: Defer         Family History   Problem Relation Age of Onset    Diabetes Other     Glaucoma Other        REVIEW OF SYSTEMS: All systems reviewed and not pertinent unless noted.    Review of Systems   Unable to perform ROS: Dementia       Objective    Physical Exam  Vitals and nursing note reviewed.   Constitutional:       Appearance: Normal appearance.   HENT:      Head: Normocephalic and atraumatic.   Eyes:      Extraocular Movements: Extraocular movements intact.      Pupils: Pupils are equal, round, and reactive to light.   Cardiovascular:      Rate and Rhythm: Normal rate and regular rhythm.      Pulses: Normal pulses.      Heart sounds: Normal heart sounds.   Pulmonary:      Effort: Pulmonary effort is normal.      Breath sounds: Wheezing present.      Comments: Patient decreased in the bases bilaterally scattered wheezing throughout  Abdominal:      General: Abdomen is flat. Bowel sounds are normal.      Palpations: Abdomen is soft.   Musculoskeletal:      Cervical back: Normal range of  motion and neck supple.   Skin:     General: Skin is warm and dry.      Capillary Refill: Capillary refill takes less than 2 seconds.   Neurological:      General: No focal deficit present.      Mental Status: She is easily aroused. Mental status is at baseline. She is confused.      GCS: GCS eye subscore is 4. GCS verbal subscore is 4. GCS motor subscore is 6.      Sensory: Sensation is intact.      Motor: Motor function is intact.      Gait: Gait is intact.      Comments: Mild confusion verbally   Psychiatric:         Attention and Perception: Attention and perception normal.         Mood and Affect: Mood and affect normal.         Speech: Speech normal.         Behavior: Behavior normal. Behavior is cooperative.           Procedures    ED Course:    ED Course as of 10/11/23 2158   Wed Oct 11, 2023   1438 EKG interpreted by me reveals atrial fibrillation with a rate of 64 bpm.  There are some nonspecific ST-T wave changes.  This is an abnormal appearing EKG. [TB]   1553 XR Chest 1 View [KH]      ED Course User Index  [KH] Sherman Briggs APRN  [TB] Luh Sampson MD       Lab Results (last 24 hours)       Procedure Component Value Units Date/Time    CBC & Differential [[id number]]  (Abnormal) Collected: 10/11/23 1417    Specimen: Blood Updated: 10/11/23 1426    Narrative:      The following orders were created for panel order CBC & Differential.  Procedure                               Abnormality         Status                     ---------                               -----------         ------                     CBC Auto Differential[[id number]]        Abnormal            Final result                 Please view results for these tests on the individual orders.    Comprehensive Metabolic Panel [[id number]]  (Abnormal) Collected: 10/11/23 1417    Specimen: Blood Updated: 10/11/23 1440     Glucose 235 mg/dL      Comment: Glucose >180, Hemoglobin A1C recommended.        BUN 17 mg/dL      Creatinine 0.46  mg/dL      Sodium 129 mmol/L      Potassium 4.4 mmol/L      Comment: Slight hemolysis detected by analyzer. Results may be affected.        Chloride 89 mmol/L      CO2 25.6 mmol/L      Calcium 9.5 mg/dL      Total Protein 6.8 g/dL      Albumin 3.9 g/dL      ALT (SGPT) 20 U/L      AST (SGOT) 23 U/L      Comment: Slight hemolysis detected by analyzer. Results may be affected.        Alkaline Phosphatase 82 U/L      Total Bilirubin 0.5 mg/dL      Globulin 2.9 gm/dL      A/G Ratio 1.3 g/dL      BUN/Creatinine Ratio 37.0     Anion Gap 14.4 mmol/L      eGFR 108.4 mL/min/1.73     Narrative:      GFR Normal >60  Chronic Kidney Disease <60  Kidney Failure <15      Single High Sensitivity Troponin T [[id number]]  (Abnormal) Collected: 10/11/23 1417    Specimen: Blood Updated: 10/11/23 1443     HS Troponin T 21 ng/L     Narrative:      High Sensitive Troponin T Reference Range:  <10.0 ng/L- Negative Female for AMI  <15.0 ng/L- Negative Male for AMI  >=10 - Abnormal Female indicating possible myocardial injury.  >=15 - Abnormal Male indicating possible myocardial injury.   Clinicians would have to utilize clinical acumen, EKG, Troponin, and serial changes to determine if it is an Acute Myocardial Infarction or myocardial injury due to an underlying chronic condition.         Magnesium [[id number]]  (Abnormal) Collected: 10/11/23 1417    Specimen: Blood Updated: 10/11/23 1440     Magnesium 1.5 mg/dL     CBC Auto Differential [[id number]]  (Abnormal) Collected: 10/11/23 1417    Specimen: Blood Updated: 10/11/23 1426     WBC 12.14 10*3/mm3      RBC 4.89 10*6/mm3      Hemoglobin 13.9 g/dL      Hematocrit 41.5 %      MCV 84.9 fL      MCH 28.4 pg      MCHC 33.5 g/dL      RDW 13.2 %      RDW-SD 40.9 fl      MPV 8.6 fL      Platelets 303 10*3/mm3      Neutrophil % 79.3 %      Lymphocyte % 10.6 %      Monocyte % 8.5 %      Eosinophil % 0.2 %      Basophil % 0.3 %      Immature Grans % 1.1 %      Neutrophils, Absolute 9.62 10*3/mm3       Lymphocytes, Absolute 1.29 10*3/mm3      Monocytes, Absolute 1.03 10*3/mm3      Eosinophils, Absolute 0.03 10*3/mm3      Basophils, Absolute 0.04 10*3/mm3      Immature Grans, Absolute 0.13 10*3/mm3      nRBC 0.0 /100 WBC     BNP [[id number]]  (Abnormal) Collected: 10/11/23 1417    Specimen: Blood Updated: 10/11/23 1513     proBNP 1,531.0 pg/mL     Narrative:      This assay is used as an aid in the diagnosis of individuals suspected of having heart failure. It can be used as an aid in the diagnosis of acute decompensated heart failure (ADHF) in patients presenting with signs and symptoms of ADHF to the emergency department (ED). In addition, NT-proBNP of <300 pg/mL indicates ADHF is not likely.    Age Range Result Interpretation  NT-proBNP Concentration (pg/mL:      <50             Positive            >450                   Gray                 300-450                    Negative             <300    50-75           Positive            >900                  Gray                300-900                  Negative            <300      >75             Positive            >1800                  Gray                300-1800                  Negative            <300    Lactic Acid, Plasma [[id number]]  (Abnormal) Collected: 10/11/23 1417    Specimen: Blood Updated: 10/11/23 1713     Lactate 3.0 mmol/L     Procalcitonin [[id number]]  (Normal) Collected: 10/11/23 1417    Specimen: Blood Updated: 10/11/23 1705     Procalcitonin 0.09 ng/mL     Narrative:      As a Marker for Sepsis (Non-Neonates):    1. <0.5 ng/mL represents a low risk of severe sepsis and/or septic shock.  2. >2 ng/mL represents a high risk of severe sepsis and/or septic shock.    As a Marker for Lower Respiratory Tract Infections that require antibiotic therapy:    PCT on Admission    Antibiotic Therapy       6-12 Hrs later    >0.5                Strongly Recommended  >0.25 - <0.5        Recommended   0.1 - 0.25          Discouraged               "Remeasure/reassess PCT  <0.1                Strongly Discouraged     Remeasure/reassess PCT    As 28 day mortality risk marker: \"Change in Procalcitonin Result\" (>80% or <=80%) if Day 0 (or Day 1) and Day 4 values are available. Refer to http://www.[email or web address]-pct-calculator.com    Change in PCT <=80%  A decrease of PCT levels below or equal to 80% defines a positive change in PCT test result representing a higher risk for 28-day all-cause mortality of patients diagnosed with severe sepsis for septic shock.    Change in PCT >80%  A decrease of PCT levels of more than 80% defines a negative change in PCT result representing a lower risk for 28-day all-cause mortality of patients diagnosed with severe sepsis or septic shock.       POC Glucose Once [[id number]]  (Abnormal) Collected: 10/11/23 1428    Specimen: Blood Updated: 10/11/23 1431     Glucose 489 mg/dL      Comment: Serial Number: [id number]:  [id number]       Urinalysis With Microscopic If Indicated (No Culture) - Urine, Clean Catch [[id number]]  (Abnormal) Collected: 10/11/23 1500    Specimen: Urine, Clean Catch Updated: 10/11/23 1525     Color, UA Yellow     Appearance, UA Turbid     pH, UA 7.0     Specific Gravity, UA 1.028     Glucose,  mg/dL (2+)     Ketones, UA 15 mg/dL (1+)     Bilirubin, UA Negative     Blood, UA Moderate (2+)     Protein, UA 30 mg/dL (1+)     Leuk Esterase, UA Trace     Nitrite, UA Negative     Urobilinogen, UA 1.0 E.U./dL    Urinalysis, Microscopic Only - Urine, Clean Catch [[id number]]  (Abnormal) Collected: 10/11/23 1500    Specimen: Urine, Clean Catch Updated: 10/11/23 1544     RBC, UA 6-12 /HPF      WBC, UA 6-12 /HPF      Bacteria, UA 2+ /HPF      Squamous Epithelial Cells, UA 3-6 /HPF      Hyaline Casts, UA None Seen /LPF      Amorphous Crystals, UA Small/1+ /HPF      Methodology Manual Light Microscopy    Ammonia [[id number]]  (Abnormal) Collected: 10/11/23 1512    Specimen: Blood Updated: 10/11/23 1546     Ammonia 63 umol/L  "    STAT Lactic Acid, Reflex [[id number]]  (Abnormal) Collected: 10/11/23 1856    Specimen: Blood Updated: 10/11/23 1935     Lactate 2.4 mmol/L              XR Chest 1 View    Result Date: 10/11/2023  PROCEDURE: XR CHEST 1 VW-    HISTORY: Weak/Dizzy/AMS triage protocol  COMPARISON: March 2021.  FINDINGS: The heart is mildly enlarged. The mediastinum is unremarkable. There is mild interstitial disease bilaterally, similar to the prior exam. New patchy left airspace disease may represent atelectasis versus infiltrate. There is no pneumothorax. There are no acute osseous abnormalities.      Impression: New patchy left airspace disease may represent atelectasis versus infiltrate. Continued follow-up is recommended.        Images were reviewed, interpreted, and dictated by Dr. Arcelia Hall MD Transcribed by Kayleigh Macedo PA-C.  This report was signed and finalized on 10/11/2023 3:11 PM by Arcelia Hall MD.        Medical Decision Making  63-year-old female patient comes into the ED via EMS transfer from skilled nursing facility for altered mental status, and decreased blood glucose level    Differential diagnosis: Altered mental status, hyponatremia, hyper ammonia, dementia, pneumonia, acute respiratory distress, others    Problems Addressed:  Altered mental status, unspecified altered mental status type: complicated acute illness or injury  Hyperammonemia: complicated acute illness or injury  Hyponatremia: complicated acute illness or injury  Pneumonia of both lungs due to infectious organism, unspecified part of lung: complicated acute illness or injury    Amount and/or Complexity of Data Reviewed  Labs: ordered. Decision-making details documented in ED Course.     Details: I have personally reviewed and documented all results  Radiology: ordered. Decision-making details documented in ED Course.     Details: I have personally reviewed and documented all results  Discussion of management or test interpretation with  external provider(s): I have discussed assessment, plan and diagnoses with ER attending.  Will discuss possible admission with hospitalist for worsening altered mental status, hyponatremia, hyperammonia level.  Patient has been accepted for observation and admit to MedSurg telemetry by hospitalist    Risk  Prescription drug management.  Decision regarding hospitalization.  Risk Details: Patient will be admitted to observation status MedSurg telemetry for altered mental status, pneumonia, hyponatremia, hyper ammonia.                  Final diagnoses:   Altered mental status, unspecified altered mental status type   Pneumonia of both lungs due to infectious organism, unspecified part of lung   Hyponatremia   Hyperammonemia          Sherman Briggs, APRN  10/11/23 8011

## 2023-10-12 LAB
ANION GAP SERPL CALCULATED.3IONS-SCNC: 12.2 MMOL/L (ref 5–15)
B PARAPERT DNA SPEC QL NAA+PROBE: NOT DETECTED
B PERT DNA SPEC QL NAA+PROBE: NOT DETECTED
BUN SERPL-MCNC: 17 MG/DL (ref 8–23)
BUN/CREAT SERPL: 42.5 (ref 7–25)
C PNEUM DNA NPH QL NAA+NON-PROBE: NOT DETECTED
CALCIUM SPEC-SCNC: 8.2 MG/DL (ref 8.6–10.5)
CHLORIDE SERPL-SCNC: 94 MMOL/L (ref 98–107)
CO2 SERPL-SCNC: 21.8 MMOL/L (ref 22–29)
CREAT SERPL-MCNC: 0.4 MG/DL (ref 0.57–1)
D-LACTATE SERPL-SCNC: 1.9 MMOL/L (ref 0.5–2)
D-LACTATE SERPL-SCNC: 2.1 MMOL/L (ref 0.5–2)
D-LACTATE SERPL-SCNC: 2.6 MMOL/L (ref 0.5–2)
DEPRECATED RDW RBC AUTO: 42 FL (ref 37–54)
EGFRCR SERPLBLD CKD-EPI 2021: 112.1 ML/MIN/1.73
ERYTHROCYTE [DISTWIDTH] IN BLOOD BY AUTOMATED COUNT: 13.2 % (ref 12.3–15.4)
FLUAV SUBTYP SPEC NAA+PROBE: NOT DETECTED
FLUBV RNA ISLT QL NAA+PROBE: NOT DETECTED
GLUCOSE BLDC GLUCOMTR-MCNC: 157 MG/DL (ref 70–130)
GLUCOSE BLDC GLUCOMTR-MCNC: 163 MG/DL (ref 70–130)
GLUCOSE BLDC GLUCOMTR-MCNC: 198 MG/DL (ref 70–130)
GLUCOSE BLDC GLUCOMTR-MCNC: 220 MG/DL (ref 70–130)
GLUCOSE SERPL-MCNC: 235 MG/DL (ref 65–99)
HADV DNA SPEC NAA+PROBE: NOT DETECTED
HCOV 229E RNA SPEC QL NAA+PROBE: NOT DETECTED
HCOV HKU1 RNA SPEC QL NAA+PROBE: NOT DETECTED
HCOV NL63 RNA SPEC QL NAA+PROBE: NOT DETECTED
HCOV OC43 RNA SPEC QL NAA+PROBE: NOT DETECTED
HCT VFR BLD AUTO: 39.2 % (ref 34–46.6)
HGB BLD-MCNC: 12.7 G/DL (ref 12–15.9)
HMPV RNA NPH QL NAA+NON-PROBE: NOT DETECTED
HPIV1 RNA ISLT QL NAA+PROBE: NOT DETECTED
HPIV2 RNA SPEC QL NAA+PROBE: NOT DETECTED
HPIV3 RNA NPH QL NAA+PROBE: NOT DETECTED
HPIV4 P GENE NPH QL NAA+PROBE: NOT DETECTED
M PNEUMO IGG SER IA-ACNC: NOT DETECTED
MAGNESIUM SERPL-MCNC: 1.4 MG/DL (ref 1.6–2.4)
MCH RBC QN AUTO: 28.4 PG (ref 26.6–33)
MCHC RBC AUTO-ENTMCNC: 32.4 G/DL (ref 31.5–35.7)
MCV RBC AUTO: 87.7 FL (ref 79–97)
MRSA DNA SPEC QL NAA+PROBE: NORMAL
PLATELET # BLD AUTO: 272 10*3/MM3 (ref 140–450)
PMV BLD AUTO: 8.9 FL (ref 6–12)
POTASSIUM SERPL-SCNC: 4.4 MMOL/L (ref 3.5–5.2)
RBC # BLD AUTO: 4.47 10*6/MM3 (ref 3.77–5.28)
RHINOVIRUS RNA SPEC NAA+PROBE: NOT DETECTED
RSV RNA NPH QL NAA+NON-PROBE: NOT DETECTED
SARS-COV-2 RNA NPH QL NAA+NON-PROBE: NOT DETECTED
SODIUM SERPL-SCNC: 128 MMOL/L (ref 136–145)
WBC NRBC COR # BLD: 9.88 10*3/MM3 (ref 3.4–10.8)

## 2023-10-12 PROCEDURE — 82948 REAGENT STRIP/BLOOD GLUCOSE: CPT

## 2023-10-12 PROCEDURE — 25010000002 MAGNESIUM SULFATE 2 GM/50ML SOLUTION: Performed by: FAMILY MEDICINE

## 2023-10-12 PROCEDURE — 83735 ASSAY OF MAGNESIUM: CPT | Performed by: FAMILY MEDICINE

## 2023-10-12 PROCEDURE — 63710000001 INSULIN ASPART PER 5 UNITS: Performed by: FAMILY MEDICINE

## 2023-10-12 PROCEDURE — 85027 COMPLETE CBC AUTOMATED: CPT | Performed by: FAMILY MEDICINE

## 2023-10-12 PROCEDURE — 97161 PT EVAL LOW COMPLEX 20 MIN: CPT

## 2023-10-12 PROCEDURE — 25010000002 CEFTRIAXONE SODIUM-DEXTROSE 1-3.74 GM-%(50ML) RECONSTITUTED SOLUTION: Performed by: FAMILY MEDICINE

## 2023-10-12 PROCEDURE — 96366 THER/PROPH/DIAG IV INF ADDON: CPT

## 2023-10-12 PROCEDURE — 80048 BASIC METABOLIC PNL TOTAL CA: CPT | Performed by: FAMILY MEDICINE

## 2023-10-12 PROCEDURE — 96367 TX/PROPH/DG ADDL SEQ IV INF: CPT

## 2023-10-12 PROCEDURE — G0378 HOSPITAL OBSERVATION PER HR: HCPCS

## 2023-10-12 PROCEDURE — 96376 TX/PRO/DX INJ SAME DRUG ADON: CPT

## 2023-10-12 PROCEDURE — 25010000002 ONDANSETRON PER 1 MG: Performed by: FAMILY MEDICINE

## 2023-10-12 PROCEDURE — 83605 ASSAY OF LACTIC ACID: CPT | Performed by: NURSE PRACTITIONER

## 2023-10-12 PROCEDURE — 97166 OT EVAL MOD COMPLEX 45 MIN: CPT

## 2023-10-12 PROCEDURE — 92610 EVALUATE SWALLOWING FUNCTION: CPT

## 2023-10-12 PROCEDURE — 63710000001 INSULIN DETEMIR PER 5 UNITS: Performed by: FAMILY MEDICINE

## 2023-10-12 RX ORDER — SENNOSIDES A AND B 8.6 MG/1
1 TABLET, FILM COATED ORAL 2 TIMES DAILY PRN
COMMUNITY

## 2023-10-12 RX ORDER — MIRTAZAPINE 15 MG/1
15 TABLET, FILM COATED ORAL NIGHTLY
COMMUNITY

## 2023-10-12 RX ORDER — ONDANSETRON 4 MG/1
4 TABLET, ORALLY DISINTEGRATING ORAL EVERY 6 HOURS PRN
COMMUNITY

## 2023-10-12 RX ORDER — ATORVASTATIN CALCIUM 40 MG/1
40 TABLET, FILM COATED ORAL NIGHTLY
COMMUNITY

## 2023-10-12 RX ORDER — ERGOCALCIFEROL 1.25 MG/1
50000 CAPSULE ORAL WEEKLY
COMMUNITY

## 2023-10-12 RX ORDER — SENNOSIDES 8.6 MG
650 CAPSULE ORAL 4 TIMES DAILY PRN
COMMUNITY

## 2023-10-12 RX ORDER — PROMETHAZINE HYDROCHLORIDE 25 MG/ML
25 INJECTION, SOLUTION INTRAMUSCULAR; INTRAVENOUS EVERY 6 HOURS PRN
COMMUNITY
End: 2023-10-14 | Stop reason: HOSPADM

## 2023-10-12 RX ORDER — INSULIN ASPART 100 [IU]/ML
12 INJECTION, SOLUTION INTRAVENOUS; SUBCUTANEOUS
COMMUNITY

## 2023-10-12 RX ORDER — TRAVOPROST OPHTHALMIC SOLUTION 0.04 MG/ML
1 SOLUTION OPHTHALMIC EVERY EVENING
COMMUNITY

## 2023-10-12 RX ORDER — MAGNESIUM SULFATE HEPTAHYDRATE 40 MG/ML
2 INJECTION, SOLUTION INTRAVENOUS
Status: COMPLETED | OUTPATIENT
Start: 2023-10-12 | End: 2023-10-12

## 2023-10-12 RX ORDER — MAGNESIUM HYDROXIDE/ALUMINUM HYDROXICE/SIMETHICONE 120; 1200; 1200 MG/30ML; MG/30ML; MG/30ML
20 SUSPENSION ORAL EVERY 8 HOURS PRN
COMMUNITY

## 2023-10-12 RX ORDER — BISACODYL 10 MG
10 SUPPOSITORY, RECTAL RECTAL DAILY PRN
COMMUNITY

## 2023-10-12 RX ORDER — ACETAMINOPHEN 650 MG/1
650 SUPPOSITORY RECTAL EVERY 4 HOURS PRN
COMMUNITY

## 2023-10-12 RX ADMIN — INSULIN ASPART 1 UNITS: 100 INJECTION, SOLUTION INTRAVENOUS; SUBCUTANEOUS at 08:07

## 2023-10-12 RX ADMIN — MAGNESIUM SULFATE HEPTAHYDRATE 2 G: 40 INJECTION, SOLUTION INTRAVENOUS at 05:29

## 2023-10-12 RX ADMIN — Medication 10 ML: at 08:07

## 2023-10-12 RX ADMIN — DIVALPROEX SODIUM 500 MG: 500 TABLET, DELAYED RELEASE ORAL at 22:27

## 2023-10-12 RX ADMIN — CEFTRIAXONE 1000 MG: 1 INJECTION, SOLUTION INTRAVENOUS at 16:31

## 2023-10-12 RX ADMIN — ONDANSETRON 4 MG: 2 INJECTION INTRAMUSCULAR; INTRAVENOUS at 08:15

## 2023-10-12 RX ADMIN — CARVEDILOL 12.5 MG: 12.5 TABLET, FILM COATED ORAL at 22:27

## 2023-10-12 RX ADMIN — INSULIN DETEMIR 12 UNITS: 100 INJECTION, SOLUTION SUBCUTANEOUS at 22:31

## 2023-10-12 RX ADMIN — GABAPENTIN 300 MG: 300 CAPSULE ORAL at 22:27

## 2023-10-12 RX ADMIN — MAGNESIUM SULFATE HEPTAHYDRATE 2 G: 40 INJECTION, SOLUTION INTRAVENOUS at 10:21

## 2023-10-12 RX ADMIN — MAGNESIUM SULFATE HEPTAHYDRATE 2 G: 40 INJECTION, SOLUTION INTRAVENOUS at 08:07

## 2023-10-12 NOTE — CASE MANAGEMENT/SOCIAL WORK
Discharge Planning Assessment  [facility]     Patient Name: Izabella Agudelo  MRN: [id number]  Today's Date: 10/12/2023    Admit Date: 10/11/2023    Plan: return to [facility] care   Discharge Needs Assessment       Row Name 10/12/23 1108       Living Environment    People in Home facility resident    Current Living Arrangements residential facility    Primary Care Provided by other (see comments)    Provides Primary Care For no one, unable/limited ability to care for self    Able to Return to Prior Arrangements yes       Resource/Environmental Concerns    Transportation Concerns none       Transition Planning    Patient/Family Anticipates Transition to long-term care facility    Transportation Anticipated health plan transportation       Discharge Needs Assessment    Readmission Within the Last 30 Days no previous admission in last 30 days    Concerns to be Addressed discharge planning      Row Name 10/12/23 1106       Living Environment    People in Home facility resident                   Discharge Plan       Row Name 10/12/23 1110       Plan    Plan return to [facility]    Plan Comments Spoke to pt regarding discharge plans .She is a resident of [facility] and would like return there Called Debbie at [facility] and she may return there when discharged form the hospital  Spoke to pt jaqui Horvath she is wanting pt to return to [facility]                    Continued Care and Services - Admitted Since 10/11/2023    Coordination has not been started for this encounter.          Demographic Summary       Row Name 10/12/23 1103       General Information    Admission Type inpatient    Referral Source admission list                   Functional Status       Row Name 10/12/23 110       Functional Status    Usual Activity Tolerance poor       Functional Status, IADL    Medications completely dependent    Meal Preparation completely  dependent    Housekeeping completely dependent    Laundry completely dependent    Shopping completely dependent       Mental Status    General Appearance WDL WDL                   Psychosocial    No documentation.                  Abuse/Neglect    No documentation.                  Legal    No documentation.                  Substance Abuse    No documentation.                  Patient Forms    No documentation.                     Emi Pena RN

## 2023-10-12 NOTE — PLAN OF CARE
Goal Outcome Evaluation:  Plan of Care Reviewed With: patient        Progress: no change  Outcome Evaluation: Bedside eval of swallow compeleted with pt. seated upright in bed with RN present for eval. Pt. was cooperative but had some difficulty following simple commands and poor intelligibility due to hx of CVA with resulting aphasia, and also dx of dementia and schizophrenia which may additionally affect communication effectiveness. Oral mech was remarkable for edentulous and tongue thrust. Pt. was given trials of puree only. Oral phase was adequate with this single texture over multiple trials, but suspect oral phase may be impaired with other more complex consistencies. Suspect pharyngeal phase dysphagia due to delayed initiation of pharyngeal swallow with each trial, decreased and delayed larygneal elevation per palpation, coughing, wet vocal quality, and eye watering as signs of aspiration. Pt. was unsafe for continued trials with puree or with other consistencies that would be more challenging and increase her risk of aspiration. She also voiced c/o heartburn. Pt. may benefit from medical management of possible relux. Recommend continue NPO with meds via alternative method as appropriate. Will f/u tomorrow to reassess as pt. able.

## 2023-10-12 NOTE — THERAPY EVALUATION
Acute Care - Speech Language Pathology   Swallow Initial Evaluation  [facility]     Patient Name: Izabella Agudelo  : 1960  MRN: [id number]  Today's Date: 10/12/2023               Admit Date: 10/11/2023    Visit Dx:     ICD-10-CM ICD-9-CM   1. Altered mental status, unspecified altered mental status type  R41.82 780.97   2. Pneumonia of both lungs due to infectious organism, unspecified part of lung  J18.9 483.8   3. Hyponatremia  E87.1 276.1   4. 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 No pertinent surgical history.    SLP Recommendation and Plan  SLP Swallowing Diagnosis: suspected pharyngeal dysphagia, suspected esophageal dysphagia (10/12/23 104)  SLP Diet Recommendation: NPO (10/12/23 1048)  Recommended Precautions and Strategies: general aspiration precautions, reflux precautions (10/12/23 104)  SLP Rec. for Method of Medication Administration: meds via alternate route (10/12/23 104)        Recommended Diagnostics: reassess via clinical swallow evaluation (10/12/23 104)  Swallow Criteria for Skilled Therapeutic Interventions Met: other (see comments) (pending f/u reassessment) (10/12/23 104)  Anticipated Discharge Disposition (SLP): unknown (10/12/23  1048)     Therapy Frequency (Swallow): PRN (10/12/23 1048)     Oral Care Recommendations: Oral Care BID/PRN, Swab (10/12/23 1048)     Swallowing Considerations per Physician Discretion: medical management of suspected esophageal dysphagia, as indicated (10/12/23 1048)                                Oral Care Recommendations: Oral Care BID/PRN, Swab (10/12/23 1048)    Plan of Care Reviewed With: patient  Progress: no change  Outcome Evaluation: Bedside eval of swallow compeleted with pt. seated upright in bed with RN present for eval. Pt. was cooperative but had some difficulty following simple commands and poor intelligibility due to hx of CVA with resulting aphasia, and also dx of dementia and schizophrenia which may additionally affect communication effectiveness. Oral mech was remarkable for edentulous and tongue thrust. Pt. was given trials of puree only. Oral phase was adequate with this single texture over multiple trials, but suspect oral phase may be impaired with other more complex consistencies. Suspect pharyngeal phase dysphagia due to delayed initiation of pharyngeal swallow with each trial, decreased and delayed larygneal elevation per palpation, coughing, wet vocal quality, and eye watering as signs of aspiration. Pt. was unsafe for continued trials with puree or with other consistencies that would be more challenging and increase her risk of aspiration. She also voiced c/o heartburn. Pt. may benefit from medical management of possible relux. Recommend continue NPO with meds via alternative method as appropriate. Will f/u tomorrow to reassess as pt. able.      SWALLOW EVALUATION (last 72 hours)       SLP Adult Swallow Evaluation       Row Name 10/12/23 1048                   Rehab Evaluation    Document Type evaluation  -TM        Subjective Information no complaints  -        Patient Observations alert;cooperative  -        Patient/Family/Caregiver Comments/Observations no family present  -TM         Patient Effort good  -TM           General Information    Patient Profile Reviewed yes  -TM        Pertinent History Of Current Problem schizophrenia, DMII, hx CVA, COPD, dementia  -TM        Current Method of Nutrition NPO  -TM        Precautions/Limitations, Vision WFL;for purposes of eval  -TM        Precautions/Limitations, Hearing WFL;for purposes of eval  -TM        Prior Level of Function-Communication other (see comments);receptive language impairment;expressive language impairment;motor speech impairment  hx CVA w/aphasia  -TM        Prior Level of Function-Swallowing unknown  -TM        Plans/Goals Discussed with other (see comments);patient  RN  -TM        Barriers to Rehab medically complex  -TM        Patient's Goals for Discharge patient could not state  -TM           Pain    Additional Documentation Pain Scale: Numbers Pre/Post-Treatment (Group)  -TM           Pain Scale: Numbers Pre/Post-Treatment    Pretreatment Pain Rating 0/10 - no pain  -TM        Posttreatment Pain Rating 0/10 - no pain  -TM           Oral Motor Structure and Function    Oral Lesions or Structural Abnormalities and/or variants none identified  -TM        Dentition Assessment edentulous  -TM        Secretion Management WNL/WFL  -TM        Mucosal Quality moist, healthy  -TM        Volitional Cough unable to elicit  -TM           Oral Musculature and Cranial Nerve Assessment    Oral Motor General Assessment generalized oral motor weakness;other (see comments)  tongue thrust  -TM           General Eating/Swallowing Observations    Respiratory Support Currently in Use room air  -TM        Eating/Swallowing Skills fed by SLP  -TM        Positioning During Eating upright in bed  -TM        Utensils Used spoon  -TM        Consistencies Trialed pureed  -TM        Pre SpO2 (%) 94  -TM        Post SpO2 (%) 93  -TM           Respiratory    Respiratory Status WFL  -TM           Clinical Swallow Eval    Oral Prep Phase --  adequate for  single texture presented  -TM        Oral Transit WFL  -TM        Oral Residue WFL  -TM        Pharyngeal Phase suspected pharyngeal impairment  -TM        Esophageal Phase suspected esophageal impairment  -TM        Clinical Swallow Evaluation Summary Bedside eval of swallow compeleted with pt. seated upright in bed with RN present for eval.  Pt. was cooperative but had some difficulty following simple commands and poor intelligibility due to hx of CVA with resulting aphasia, and also dx of dementia and schizophrenia which may additionally affect communication effectiveness.  Oral mech was remarkable for edentulous and tongue thrust.  Pt. was given trials of puree only.  Oral phase was adequate with this single texture over multiple trials, but suspect oral phase may be impaired with other more complex consistencies.  Suspect pharyngeal phase dysphagia due to delayed initiation of pharyngeal swallow with each trial, decreased and delayed larygneal elevation per palpation, coughing, wet vocal quality, and eye watering as signs of aspiration.  Pt. was unsafe for continued trials with puree or with other consistencies that would be more challenging and increase her risk of aspiration.  She also voiced c/o heartburn.  Pt. may benefit from medical management of possible relux.  Recommend continue NPO with meds via alternative method as appropriate.  Will f/u tomorrow to reassess as pt. able.  -TM           Pharyngeal Phase Concerns    Pharyngeal Phase Concerns wet vocal quality;cough;other (see comments)  eye watering  -TM           Esophageal Phase Concerns    Esophageal Phase Concerns other (see comments)  pt. voiced c/o heartburn  -TM           SLP Evaluation Clinical Impression    SLP Swallowing Diagnosis suspected pharyngeal dysphagia;suspected esophageal dysphagia  -TM        Functional Impact risk of aspiration/pneumonia;risk of malnutrition;risk of dehydration  -TM        Swallow Criteria for Skilled Therapeutic  Interventions Met other (see comments)  pending f/u reassessment  -TM           Recommendations    Therapy Frequency (Swallow) PRN  -TM        SLP Diet Recommendation NPO  -TM        Recommended Diagnostics reassess via clinical swallow evaluation  -        Recommended Precautions and Strategies general aspiration precautions;reflux precautions  -TM        Oral Care Recommendations Oral Care BID/PRN;Swab  -TM        SLP Rec. for Method of Medication Administration meds via alternate route  -        Anticipated Discharge Disposition (SLP) unknown  -        Swallowing Considerations per Physician Discretion medical management of suspected esophageal dysphagia, as indicated  -                  User Key  (r) = Recorded By, (t) = Taken By, (c) = Cosigned By      Initials Name Effective Dates    TM Jyotsna Aguayo 06/16/21 -                     EDUCATION  The patient has been educated in the following areas:   Dysphagia (Swallowing Impairment) Oral Care/Hydration NPO rationale.              Time Calculation:    Time Calculation- SLP       Row Name 10/12/23 1226             Time Calculation- SLP    SLP Start Time 1048  -TM      SLP Received On 10/12/23  -         Untimed Charges    SLP Eval/Re-eval  ST Eval Oral Pharyng Swallow - 75157  -                User Key  (r) = Recorded By, (t) = Taken By, (c) = Cosigned By      Initials Name Provider Type     Jyotsna Aguayo Speech and Language Pathologist                    Therapy Charges for Today       Code Description Service Date Service Provider Modifiers Qty    [id number] HC ST EVAL ORAL PHARYNG SWALLOW 4 10/12/2023 Jyotsna Aguayo GN 1                 Jyotsna Aguayo  10/12/2023

## 2023-10-12 NOTE — PROGRESS NOTES
[facility]    Name:  Izabella Agudelo   Age:  63 y.o.  Sex:  female  :  1960  MRN:  [id number]   Visit Number:  [id number]  Admission Date:  10/11/2023  Date Of Service:  10/12/23  Primary Care Physician:  Fabian Santos DO     LOS: 0 days :    Chief Complaint:      AMS    Subjective:    Patient seen and examined this morning.  She is resting in bed and answers to her name.  Able to tell me her birthday and the year.  Her speech is thick and hard to understand which appears to be her baseline with previous CVA.  She points to lower central chest when asked if she is having pain. Vital signs are stable as well as labs.    Hospital Course:    Patient is a 63 year old female local nursing home resident with a past medical history of dementia, CVA with residual right sided hemiparesis, essential hypertension, aphasia/dysphagia secondary to CVA, diabetes mellitus type 2, COPD who was brought in to this facility by EMS 10/11/2023 from the nursing home with a chief complaint of altered mental status.  Patient with baseline of dementia and unable to provide detailed history, she was awake and alert and oriented to her name and year of birth only.  No family at bedside.  History was obtained through ER report.  Patient has been altered over the past couple days and her mental status has been worsening.  Patient glucose level was running around 400 when tested at the nursing home. Patient had 2 episodes of vomiting while being worked up in the ER.     On ER evaluation, patient was hypertensive with a blood pressure of 166/103, was otherwise afebrile, nontachypneic nontachycardic and on room air. Her work-up was significant for HS Troponin of 21 (likely demand ischemia) proBNP of 1531, sodium 129, glucose 235, magnesium 1.5, lactate 3.0, WBC 12.1.  Urinalysis negative for nitrates, positive for leukocytes with WBC 6-12 and bacteria 2+.  Chest x-ray with new patchy  left airspace disease may represent atelectasis versus infiltrates.  CT head without contrast showed atrophy and changes most consistent with chronic microvascular ischemia. CT abdomen pelvis without contrast showed moderate distal esophageal wall thickening, likely consistent with esophagitis. Consider follow up EGD on an outpatient basis. Moderate gas and fluid distention stomach without evidence of obstructing lesion.  Consider further evaluation for gastroparesis with nuclear medicine gastric emptying study. Patient received Zofran and Rocephin while in the ER.  Hospitalist consulted for admission, further evaluation and treatment.     Admitted to the hospital for further care.    Review of Systems:     All systems were reviewed and negative except as mentioned in subjective, assessment and plan.    Vital Signs:    Temp:  [97.4 øF (36.3 øC)-98.4 øF (36.9 øC)] 98.4 øF (36.9 øC)  Heart Rate:  [55-85] 55  Resp:  [18-20] 20  BP: (124-197)/() 131/79    Intake and output:    I/O last 3 completed shifts:  In: -   Out: 100 [Urine:100]  No intake/output data recorded.    Physical Examination:    General Appearance:  Alert and cooperative, pleasant middle aged female, no acute distress on exam, appears chronically ill   Head:  Atraumatic and normocephalic.   Eyes: Conjunctivae and sclerae normal, no icterus. No pallor.   Throat: No oral lesions, no thrush, oral mucosa moist.   Neck: Supple, trachea midline, no thyromegaly.   Lungs:   Breath sounds heard bilaterally equally but diminished.  Faint wheezing and rhonchi noted.  Unlabored on room air   Heart:  Normal S1 and S2, no murmur, no gallop, no rub. No JVD.   Abdomen:   Normal bowel sounds, no masses, no organomegaly. Soft, nontender, nondistended, no rebound tenderness.   Extremities: Supple, no edema, no cyanosis, no clubbing.   Skin: No bleeding or rash.   Neurologic: Alert and oriented x 3 on exam. Prior CVA with right sided hemiparesis noted and thick  "speech.     Laboratory results:    Results from last 7 days   Lab Units 10/12/23  0319 10/11/23  1417   SODIUM mmol/L 128* 129*   POTASSIUM mmol/L 4.4 4.4   CHLORIDE mmol/L 94* 89*   CO2 mmol/L 21.8* 25.6   BUN mg/dL 17 17   CREATININE mg/dL 0.40* 0.46*   CALCIUM mg/dL 8.2* 9.5   BILIRUBIN mg/dL  --  0.5   ALK PHOS U/L  --  82   ALT (SGPT) U/L  --  20   AST (SGOT) U/L  --  23   GLUCOSE mg/dL 235* 235*     Results from last 7 days   Lab Units 10/12/23  0319 10/11/23  1417   WBC 10*3/mm3 9.88 12.14*   HEMOGLOBIN g/dL 12.7 13.9   HEMATOCRIT % 39.2 41.5   PLATELETS 10*3/mm3 272 303         Results from last 7 days   Lab Units 10/11/23  1417   HSTROP T ng/L 21*         No results for input(s): \"PHART\", \"OSR6UMS\", \"PO2ART\", \"RFC5OTA\", \"BASEEXCESS\" in the last 8760 hours.   I have reviewed the patient's laboratory results.    Radiology results:    CT Abdomen Pelvis Without Contrast    Result Date: 10/11/2023  FINAL REPORT TECHNIQUE: Axial images through the abdomen and pelvis were performed without contrast. This study was performed with techniques to keep radiation doses as low as reasonably achievable, (ALARA). Individualized dose reduction techniques using automated exposure control or adjustment of mA and/or kV according to the patient's size were employed. CLINICAL HISTORY: Nausea and vomiting FINDINGS: ABDOMEN:  The heart size is normal.  The lung bases are clear.  There is distal esophageal wall thickening with moderate fluid and gaseous distention of stomach.  No obstructing lesion is evident in the duodenum or distal stomach.  There are minimal diverticula in the sigmoid colon with no diverticulitis.   Liver appears unremarkable. The gallbladder is normal. The spleen is normal. There is no biliary ductal dilatation. No adrenal mass is identified. The pancreas is normal.  There is no nephrolithiasis.  There is no hydronephrosis.No non-contrast evidence of a solid renal mass. The aorta is normal in caliber.  There " is no significant free fluid or adenopathy. No dilated loops of bowel. No free intraperitoneal air. No significant stranding seen in the mesentery. PELVIS: The appendix is normal.  The urinary bladder is unremarkable. No pelvic mass. There is no significant free fluid or adenopathy. BONES: No acute fracture. No aggressive appearing lytic or blastic lesion is seen.     Impression: Moderate distal esophageal wall thickening, likely consistent with esophagitis. Consider follow up EGD on an outpatient basis. Moderate gas and fluid distention stomach without evidence of obstructing lesion.  Consider further evaluation for gastroparesis with nuclear medicine gastric emptying study. Authenticated and Electronically Signed by MD Cosby Richard on 10/11/2023 10:40:26 PM    CT Head Without Contrast    Result Date: 10/11/2023  FINAL REPORT TECHNIQUE: Axial images were performed through the brain.This study was performed with techniques to keep radiation doses as low as reasonably achievable, (ALARA). Individualized dose reduction techniques using automated exposure control or adjustment of mA and/or kV according to the patient's size were employed. CLINICAL HISTORY: Altered mental status FINDINGS: There is moderate global atrophy proportionate to the degree of hypoattenuation seen within the periventricular and subcortical white matter, which is nonspecific, but most likely consistent with chronic microvascular ischemia.  Consider MR as a clinical  outpatient.  The ventricles are normal in size for the degree of atrophy.  There is no extra-axial fluid or midline shift.  There is no evidence of acute hemorrhage or mass. Mastoid air cells and paranasal sinuses are clear.     Impression: Atrophy and changes most consistent with chronic microvascular ischemia. No acute intracranial process. Authenticated and Electronically Signed by MD Cosby Richard on 10/11/2023 10:37:39 PM    XR Chest 1 View    Result Date:  10/11/2023  PROCEDURE: XR CHEST 1 VW-    HISTORY: Weak/Dizzy/AMS triage protocol  COMPARISON: March 2021.  FINDINGS: The heart is mildly enlarged. The mediastinum is unremarkable. There is mild interstitial disease bilaterally, similar to the prior exam. New patchy left airspace disease may represent atelectasis versus infiltrate. There is no pneumothorax. There are no acute osseous abnormalities.      Impression: New patchy left airspace disease may represent atelectasis versus infiltrate. Continued follow-up is recommended.        Images were reviewed, interpreted, and dictated by Dr. Arcelia Hall MD Transcribed by Kayleigh Macedo PA-C.  This report was signed and finalized on 10/11/2023 3:11 PM by Arcelia Hall MD.     I have reviewed the patient's radiology reports.    Medication Review:     I have reviewed the patient's active and prn medications.     Problem List:      Altered mental status, unspecified      Assessment:    Altered mental status, POA, resolved  Hypomagnesemia, POA  Nausea/vomiting, POA  Possible developing pneumonia, unable to specify further, POA  Vascular dementia  History of CVA with residual hemiplegia/ hemiparesis  Chronic aphasia/ dysphagia  Essential hypertension  Diabetes mellitus type 2  COPD without exacerbation    Plan:    AMS/ Early PNA/ Vascular dementia/History of CVA  -AMS likely metabolic and secondary to developing pneumonia in the settings of baseline dementia/previous CVA.  -CT head with no acute abnormality  -Mildly elevated ammonia  -PT/OT consulted.  -NPO until speech eval  -Rocephin IV  -No blood cultures obtained in the ER  -Follow urine culture  -Neurochecks, fall and swallow precautions  -Patient alert and oriented x3 on exam today     Nausea/vomiting/ Esophagitis  -CT abdomen pelvis showed moderate gas and fluid distention stomach without evidence of obstructing lesion.  Questionable for gastroparesis versus viral illness.   -Zofran  -N.p.o. pending speech eval--plan  to advance diet slowly  -Continue with maintenance IVF  -Will consider Reglan with any continued N/V  -Will start Protonix 40mg IV daily for esophagitis     Diabetes mellitus type 2 insulin-dependent  -Cover with Glucomander per protocol  -Hemoglobin A1c-11     Essential hypertension  -Uncontrolled on arrival  -Continuing home meds  -Will continue to monitor and add as needed meds as indicated.  -Normotensive today     Lactic acidosis, resolved  -Continue IV fluids     Hypomagnesemia  -Replace per protocol    DVT Prophylaxis: Lovenox  Code Status: Full Code  Diet: Pending speech eval  Discharge Plan: 1-2 days back to LTC    Fariha Villarreal, APRN  10/12/23  11:26 EDT    Dictated utilizing Dragon dictation.

## 2023-10-12 NOTE — PLAN OF CARE
Goal Outcome Evaluation:         Patient was an admission from ED. She came to ED via EMS from her SNF for altered mental status. Upon this RN's assessment upon arrival to floor she had a large liquid bowel movement incontinent episode, was confused and excessive drooling. After patient was settled and cleaned she was oriented to self, place and time. Speech improved. She has had a stroke and has history of speech issues. VSS on RA. Afib on monitor. CT head negative for acute issues. Patient has rested comfortably since arrival. No complaints at this time.

## 2023-10-12 NOTE — H&P
[facility]      Name:  Izabella Agudelo   Age:  63 y.o.  Sex:  female  :  1960  MRN:  [id number]   Visit Number:  [id number]  Admission Date:  10/11/2023  Date Of Service:  10/11/23  Primary Care Physician:  Fabian Santos DO    Chief Complaint:     Altered mental status    History Of Presenting Illness:      Patient is a 63 years old female nursing home resident with a past medical history of dementia, CVA, essential hypertension, aphasia/dysphagia, diabetes mellitus type 2, hemiplegia/hemiparesis, COPD who was brought in to the ER by EMS from the nursing home with a chief complaint of altered mental status.  Patient with baseline of dementia and unable to provide detailed history, she was awake and alert and oriented to her name and year of birth only.  No family at bedside.  History was obtained through ER report.  Patient has been altered over the past couple days and her mental status has been worsening.  Patient glucose level was running around 400 when tested at the nursing home.  Patient had 2 episodes of vomiting while being worked up in the ER.    On ER evaluation, patient was hypertensive with a blood pressure of 166/103, was otherwise afebrile, nontachypneic nontachycardic and on room air. Her work-up was significant for HS Troponin of 21 (likely demand ischemia) proBNP of 1531, sodium 129, glucose 235, magnesium 1.5, lactate 3.0, WBC 12.1.  Urinalysis negative for nitrates, positive for leukocytes with WBC 6-12 and bacteria 2+.  Chest x-ray with new patchy left airspace disease may represent atelectasis versus infiltrates.  CT head without contrast showed atrophy and changes most consistent with chronic microvascular ischemia.  CT abdomen pelvis without contrast showed moderate distal esophageal wall thickening, likely consistent with esophagitis. Consider follow up EGD on an outpatient basis. Moderate gas and fluid distention stomach  without evidence of obstructing lesion.  Consider further evaluation for gastroparesis with nuclear medicine gastric emptying study. Patient received Zofran and Rocephin while in the ER.  Hospitalist consulted for admission, further evaluation and treatment.     Review Of Systems:    All systems were reviewed and negative except as mentioned in history of presenting illness, assessment and plan.    Past Medical History: Patient  has a past medical history of Acute angle-closure glaucoma, bilateral, Aphasia, Cardiac abnormality, CHF (congestive heart failure), COPD (chronic obstructive pulmonary disease), Diabetes, Glaucoma, Hemiparesis, Hemiplegia, Schizophrenia, chronic condition, and Vascular dementia.    Past Surgical History: Patient  has no past surgical history on file.    Social History: Patient  reports that she has never smoked. She has never used smokeless tobacco. She reports that she does not drink alcohol and does not use drugs.    Family History:  Patient's family history has been reviewed and found to be noncontributory.     Allergies:      Penicillins    Home Medications:    Prior to Admission Medications       Prescriptions Last Dose Informant Patient Reported? Taking?    acarbose (PRECOSE) 25 MG tablet   Yes No    TAKE 1 TABLET BY MOUTH 3 TIMES A DAY AT THE START OF EACH MAIN MEAL.    ARIPiprazole (ABILIFY) 20 MG tablet   No No    Take 1.5 tablets by mouth Daily.    B-D UF III MINI PEN NEEDLES 31G X 5 MM misc   Yes No    USE 3 TIMES A DAY    carvedilol (COREG) 12.5 MG tablet   Yes No    TAKE 1 TABLET BY ORAL ROUTE 2 TIMES EVERY DAY WITH FOOD    divalproex (DEPAKOTE) 500 MG 24 hr tablet   Yes No    TAKE 2 TABLETS BY MOUTH TWICE A DAY    divalproex (DEPAKOTE) 500 MG DR tablet   Yes No    Take 500 mg by mouth 3 (Three) Times a Day.    furosemide (LASIX) 20 MG tablet   Yes No    Take 20 mg by mouth Daily As Needed.    gabapentin (NEURONTIN) 300 MG capsule   No No    Take 1 capsule by mouth 2 (Two)  "Times a Day.    LANTUS SOLOSTAR 100 UNIT/ML injection pen   No No    Inject 40 Units under the skin into the appropriate area as directed Every Night.    lisinopril (PRINIVIL,ZESTRIL) 5 MG tablet   Yes No    Take 1 tablet by mouth Daily.    metFORMIN (GLUCOPHAGE) 500 MG tablet   Yes No    TAKE 2 TABLETS TWICE A DAY WITH MORNING AND EVENING MEALS    ONE TOUCH ULTRA TEST test strip   Yes No    TEST 2 TIMES A DAY    ONETOUCH DELICA LANCETS 33G misc   Yes No    TEST TWICE A DAY    simvastatin (ZOCOR) 40 MG tablet   Yes No    Take 40 mg by mouth Every Night.          ED Medications:    Medications   sodium chloride 0.9 % flush 10 mL (has no administration in time range)   sodium chloride 0.9 % infusion (125 mL/hr Intravenous New Bag 10/11/23 2017)   ondansetron (ZOFRAN) injection 4 mg (4 mg Intravenous Given 10/11/23 1512)   cefTRIAXone (ROCEPHIN) IVPB 1000 mg/50ml dextrose (premix) (0 mg Intravenous Stopped 10/11/23 1654)     Vital Signs:  Temp:  [98 øF (36.7 øC)] 98 øF (36.7 øC)  Heart Rate:  [70-85] 77  Resp:  [18] 18  BP: (142-197)/() 142/97        10/11/23  1407 10/11/23  1920   Weight: 83 kg (183 lb) 83 kg (183 lb)     Body mass index is 27.83 kg/mý.    Physical Exam:     Most recent vital Signs: /97   Pulse 77   Temp 98 øF (36.7 øC) (Oral)   Resp 18   Ht 172.7 cm (68\")   Wt 83 kg (183 lb)   SpO2 91%   BMI 27.83 kg/mý     Physical Exam  Vitals and nursing note reviewed.   Constitutional:       General: She is not in acute distress.     Appearance: She is obese. She is ill-appearing.   HENT:      Head: Normocephalic and atraumatic.      Right Ear: External ear normal.      Left Ear: External ear normal.      Nose: Nose normal.      Mouth/Throat:      Mouth: Mucous membranes are dry.   Eyes:      Extraocular Movements: Extraocular movements intact.      Conjunctiva/sclera: Conjunctivae normal.      Pupils: Pupils are equal, round, and reactive to light.   Cardiovascular:      Rate and Rhythm: " Normal rate and regular rhythm.      Pulses: Normal pulses.      Heart sounds: Normal heart sounds.   Pulmonary:      Effort: Pulmonary effort is normal. No respiratory distress.      Breath sounds: Normal breath sounds. Decreased air movement present. No wheezing.      Comments: Faint bilateral crackles heard.  Abdominal:      General: Bowel sounds are normal. There is distension.      Palpations: Abdomen is soft.      Tenderness: There is no abdominal tenderness. There is no guarding or rebound.   Musculoskeletal:         General: Normal range of motion.      Cervical back: Normal range of motion and neck supple.      Right lower leg: No edema.      Left lower leg: No edema.   Skin:     General: Skin is warm and dry.      Findings: No rash.   Neurological:      General: No focal deficit present.      Mental Status: She is alert. Mental status is at baseline.      Comments: Demented at baseline. AAO x2.         Laboratory data:    I have reviewed the labs done in the emergency room.    Results from last 7 days   Lab Units 10/11/23  1417   SODIUM mmol/L 129*   POTASSIUM mmol/L 4.4   CHLORIDE mmol/L 89*   CO2 mmol/L 25.6   BUN mg/dL 17   CREATININE mg/dL 0.46*   CALCIUM mg/dL 9.5   BILIRUBIN mg/dL 0.5   ALK PHOS U/L 82   ALT (SGPT) U/L 20   AST (SGOT) U/L 23   GLUCOSE mg/dL 235*     Results from last 7 days   Lab Units 10/11/23  1417   WBC 10*3/mm3 12.14*   HEMOGLOBIN g/dL 13.9   HEMATOCRIT % 41.5   PLATELETS 10*3/mm3 303         Results from last 7 days   Lab Units 10/11/23  1417   HSTROP T ng/L 21*     Results from last 7 days   Lab Units 10/11/23  1417   PROBNP pg/mL 1,531.0*                 Results from last 7 days   Lab Units 10/11/23  1500   COLOR UA  Yellow   GLUCOSE UA  500 mg/dL (2+)*   KETONES UA  15 mg/dL (1+)*   BLOOD UA  Moderate (2+)*   LEUKOCYTES UA  Trace*   PH, URINE  7.0   BILIRUBIN UA  Negative   UROBILINOGEN UA  1.0 E.U./dL   RBC UA /HPF 6-12*   WBC UA /HPF 6-12*       Pain Management Panel            No data to display                EKG:      Sinus rhythm, heart rate 64, low voltage QRS, nonspecific ST/T wave changes.    Radiology:    XR Chest 1 View    Result Date: 10/11/2023  PROCEDURE: XR CHEST 1 VW-    HISTORY: Weak/Dizzy/AMS triage protocol  COMPARISON: March 2021.  FINDINGS: The heart is mildly enlarged. The mediastinum is unremarkable. There is mild interstitial disease bilaterally, similar to the prior exam. New patchy left airspace disease may represent atelectasis versus infiltrate. There is no pneumothorax. There are no acute osseous abnormalities.      New patchy left airspace disease may represent atelectasis versus infiltrate. Continued follow-up is recommended.        Images were reviewed, interpreted, and dictated by Dr. Arcelia Hall MD Transcribed by Kayleigh Macedo PA-C.  This report was signed and finalized on 10/11/2023 3:11 PM by Arcelia Hall MD.       Assessment:    Altered mental status, POA  Hypomagnesemia, POA  Nausea/vomiting, POA  Possible developing pneumonia, unable to specify further, POA  Vascular dementia  History of CVA   Chronic aphasia/ dysphagia  Hemiplegia/hemiparesis  Essential hypertension  Diabetes mellitus type 2  COPD without exacerbation    Plan:    Patient is admitted for further evaluation and management.    Altered mental status  Developing pneumonia  Vascular dementia/History of CVA  -AMS could be metabolic and secondary to developing pneumonia in the settings of baseline dementia/previous CVA.  -CT head with no acute process but showed atrophy and chronic microvascular ischemia changes.  -Mildly elevated ammonia  -PT/OT consulted.  -N.p.o. until speech eval  -Continue patient on Rocephin  -No blood cultures obtained in the ER  -Will send urine for cultures  -Neurochecks, fall and swallow precautions    Nausea/vomiting  -CT abdomen pelvis showed moderate gas and fluid distention stomach without evidence of obstructing lesion.  Questionable for gastroparesis  versus viral illness.   -Zofran  -N.p.o. for now  -Given a bolus of half liter normal saline, will continue with maintenance IVF  -Will consider Reglan    Diabetes mellitus type 2 insulin-dependent  -Cover with Glucomander per protocol  -Check hemoglobin A1c    Essential hypertension  -Uncontrolled on arrival  -Continuing home meds  -Will continue to monitor and add as needed meds as indicated.    Lactic acidosis  -Patient on metformin, will hold  -Continue IV fluids    Hypomagnesemia  -Replace per protocol    Further orders as indicated per clinical course.    Risk Assessment: Moderate to high  DVT Prophylaxis: Lovenox prophylaxis (benefit> risk)  Code Status: Full  Diet: N.p.o. until speech evaluation and nausea/vomiting improved.  Advance Care Planning   ACP discussion was held with the patient during this visit. Patient does not have an advance directive, information provided.       Radames Roberto MD  10/11/23  21:55 EDT    Dictated utilizing Dragon dictation.

## 2023-10-12 NOTE — THERAPY EVALUATION
Patient Name: Izabella Agudelo  : 1960    MRN: [id number]                              Today's Date: 10/12/2023       Admit Date: 10/11/2023    Visit Dx:     ICD-10-CM ICD-9-CM   1. Altered mental status, unspecified altered mental status type  R41.82 780.97   2. Pneumonia of both lungs due to infectious organism, unspecified part of lung  J18.9 483.8   3. Hyponatremia  E87.1 276.1   4. 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 No pertinent surgical history.   General Information       Row Name 10/12/23 1138          Physical Therapy Time and Intention    Document Type evaluation  -JR     Mode of Treatment physical therapy  -JR       Row Name 10/12/23 1138          General Information    Patient Profile Reviewed yes  -JR     Prior Level of Function --  unable to determine PLOF  -JR     Existing Precautions/Restrictions fall  -JR     Barriers to Rehab medically complex;previous functional deficit  -JR       Row Name 10/12/23 1138          Living Environment    People in Home facility resident  -JR       Row Name 10/12/23 1138          Cognition    Orientation Status (Cognition) oriented to;person;place  -JR        Row Name 10/12/23 1138          Safety Issues, Functional Mobility    Safety Issues Affecting Function (Mobility) safety precautions follow-through/compliance;awareness of need for assistance;insight into deficits/self-awareness  -     Impairments Affecting Function (Mobility) strength;balance;endurance/activity tolerance;coordination  -               User Key  (r) = Recorded By, (t) = Taken By, (c) = Cosigned By      Initials Name Provider Type    Lisa Lockhart, DORENE Physical Therapist                   Mobility       Row Name 10/12/23 1138          Bed Mobility    Bed Mobility bed mobility (all) activities;supine-sit;sit-supine  -JR     All Activities, [place] (Bed Mobility) maximum assist (25% patient effort)  -JR     Supine-Sit [place] (Bed Mobility) maximum assist (25% patient effort)  -JR     Sit-Supine [place] (Bed Mobility) maximum assist (25% patient effort)  -     Assistive Device (Bed Mobility) head of bed elevated;bed rails  -     Comment, (Bed Mobility) Sat EOB for 3 minutes with moderate assistance  -       Row Name 10/12/23 1138          Bed-Chair Transfer    Bed-Chair [place] (Transfers) not tested  -JR       Row Name 10/12/23 1138          Sit-Stand Transfer    Sit-Stand [place] (Transfers) not tested  -[facility] Name 10/12/23 1138          Gait/Stairs (Locomotion)    [place] Level (Gait) not tested  -               User Key  (r) = Recorded By, (t) = Taken By, (c) = Cosigned By      Initials Name Provider Type    Lisa Lockhart, PT Physical Therapist                   Obj/Interventions       Row Name 10/12/23 1138          Range of Motion Comprehensive    General Range of Motion bilateral lower extremity ROM WFL  -[facility] Name 10/12/23 1138          Strength Comprehensive (MMT)    Comment, General Manual Muscle Testing (MMT) Assessment unable to follow commands for MMT  -       Row Name 10/12/23 1138          Balance    Balance Assessment  sitting static balance;sitting dynamic balance  -JR     Static Sitting Balance minimal assist  -JR     Dynamic Sitting Balance minimal assist  -JR     Position, Sitting Balance sitting edge of bed;unsupported  -JR               User Key  (r) = Recorded By, (t) = Taken By, (c) = Cosigned By      Initials Name Provider Type    Lisa Lockhart, PT Physical Therapist                   Goals/Plan       Row Name 10/12/23 1138          Bed Mobility Goal 1 (PT)    Activity/Assistive Device (Bed Mobility Goal 1, PT) bed mobility activities, all  -JR     [place] Level/Cues Needed (Bed Mobility Goal 1, PT) minimum assist (75% or more patient effort)  -JR     Time Frame (Bed Mobility Goal 1, PT) long term goal (LTG)  -JR     Progress/Outcomes (Bed Mobility Goal 1, PT) goal ongoing  -JR       Row Name 10/12/23 1138          Transfer Goal 1 (PT)    Activity/Assistive Device (Transfer Goal 1, PT) sit-to-stand/stand-to-sit;bed-to-chair/chair-to-bed  -JR     [place] Level/Cues Needed (Transfer Goal 1, PT) minimum assist (75% or more patient effort)  -JR     Time Frame (Transfer Goal 1, PT) long term goal (LTG)  -JR     Progress/Outcome (Transfer Goal 1, PT) goal ongoing  -JR       Row Name 10/12/23 1138          Patient Education Goal (PT)    Activity (Patient Education Goal, PT) Perform BLE exercises x 15  -JR     [place]/Cues/Accuracy (Memory Goal 2, PT) demonstrates adequately  -JR     Time Frame (Patient Education Goal, PT) 3 days  -JR     Progress/Outcome (Patient Education Goal, PT) goal ongoing  -JR       Row Name 10/12/23 1138          Therapy Assessment/Plan (PT)    Planned Therapy Interventions (PT) strengthening;balance training;bed mobility training;transfer training;home exercise program;patient/family education  -JR               User Key  (r) = Recorded By, (t) = Taken By, (c) = Cosigned By      Initials Name Provider Type    Lisa Lockhart PT Physical Therapist                   Clinical Impression        Row Name 10/12/23 1138          Pain    Pretreatment Pain Rating 0/10 - no pain  -JR     Posttreatment Pain Rating 0/10 - no pain  -JR     Pain Intervention(s) Repositioned  -JR     Additional Documentation Pain Scale: Numbers Pre/Post-Treatment (Group)  -       Row Name 10/12/23 1138          Plan of Care Review    Plan of Care Reviewed With patient  -JR     Progress no change  -JR     Outcome Evaluation Patient was difficult to rouse, but was able to participate in a limited PT evaluation.  She demonstrates decreased strength, balance and overall mobility.  She reports her primary mobility is performed in a power wheelchair.  She required maximal assistance to perform bed mobility.  She was able to sit on the edge of the bed for about 3 minutes with moderate assistance for balance.  She is expected to benefit from additional PT services while hospitalized and upon discharge back to SNF.  -       Row Name 10/12/23 1138          Therapy Assessment/Plan (PT)    Patient/Family Therapy Goals Statement (PT) Patient does not state a goal  -JR     Rehab Potential (PT) good, to achieve stated therapy goals  -     Criteria for Skilled Interventions Met (PT) yes;skilled treatment is necessary;no problems identified which require skilled intervention  -     Therapy Frequency (PT) 5 times/wk  -JR     Predicted Duration of Therapy Intervention (PT) 2 weeks  -       Row Name 10/12/23 1138          Positioning and Restraints    Pre-Treatment Position in bed  -JR     Post Treatment Position bed  -JR     In Bed supine;call light within reach;encouraged to call for assist;patient within staff view  -JR               User Key  (r) = Recorded By, (t) = Taken By, (c) = Cosigned By      Initials Name Provider Type    Lisa Lockhart, PT Physical Therapist                   Outcome Measures       Row Name 10/12/23 1138          How much help from another person do you currently need...    Turning from your back to your  side while in flat bed without using bedrails? 2  -JR     Moving from lying on back to sitting on the side of a flat bed without bedrails? 1  -JR     Moving to and from a bed to a chair (including a wheelchair)? 1  -JR     Standing up from a chair using your arms (e.g., wheelchair, bedside chair)? 1  -JR     Climbing 3-5 steps with a railing? 1  -JR     To walk in hospital room? 1  -     AM-PAC 6 Clicks Score (PT) 7  -JR     Highest level of mobility 2 --> Bed activities/dependent transfer  -       Row Name 10/12/23 1348 10/12/23 1138       Functional Assessment    Outcome Measure Options AM-PAC 6 Clicks Daily Activity (OT)  - AM-PAC 6 Clicks Basic Mobility (PT)  -              User Key  (r) = Recorded By, (t) = Taken By, (c) = Cosigned By      Initials Name Provider Type     Naima Pascual Occupational Therapist     Lisa López, PT Physical Therapist                                 Physical Therapy Education       Title: PT OT SLP Therapies (In Progress)       Topic: Physical Therapy (In Progress)       Point: Mobility training (In Progress)       Learning Progress Summary             Patient Nonacceptance, E,TB, NR by  at 10/12/2023 1716    Comment: Role of PT and POC                         Point: Home exercise program (Not Started)       Learner Progress:  Not documented in this visit.              Point: Body mechanics (Not Started)       Learner Progress:  Not documented in this visit.              Point: Precautions (Not Started)       Learner Progress:  Not documented in this visit.                              User Key       Initials Effective Dates Name Provider Type Discipline     08/22/23 -  Lisa López, PT Physical Therapist PT                  PT Recommendation and Plan  Planned Therapy Interventions (PT): strengthening, balance training, bed mobility training, transfer training, home exercise program, patient/family education  Plan of Care Reviewed With: patient  Progress: no  change  Outcome Evaluation: Patient was difficult to rouse, but was able to participate in a limited PT evaluation.  She demonstrates decreased strength, balance and overall mobility.  She reports her primary mobility is performed in a power wheelchair.  She required maximal assistance to perform bed mobility.  She was able to sit on the edge of the bed for about 3 minutes with moderate assistance for balance.  She is expected to benefit from additional PT services while hospitalized and upon discharge back to SNF.     Time Calculation:   PT Evaluation Complexity  History, PT Evaluation Complexity: 1-2 personal factors and/or comorbidities  Examination of Body Systems (PT Eval Complexity): 1-2 elements  Clinical Presentation (PT Evaluation Complexity): evolving  Clinical Decision Making (PT Evaluation Complexity): low complexity  Overall Complexity (PT Evaluation Complexity): low complexity     PT Charges       Row Name 10/12/23 1138             Time Calculation    Start Time 1138  -JR      PT Received On 10/12/23  -JR      PT Goal Re-Cert Due Date 10/22/23  -JR         Untimed Charges    PT Eval/Re-eval Minutes 42  -JR         Total Minutes    Untimed Charges Total Minutes 42  -JR       Total Minutes 42  -JR                User Key  (r) = Recorded By, (t) = Taken By, (c) = Cosigned By      Initials Name Provider Type    Lisa Lockhart, PT Physical Therapist                  Therapy Charges for Today       Code Description Service Date Service Provider Modifiers Qty    [id number]  PT EVAL LOW COMPLEXITY 3 10/12/2023 Lisa López, PT GP 1            PT G-Codes  Outcome Measure Options: AM-PAC 6 Clicks Daily Activity (OT)  AM-PAC 6 Clicks Score (PT): 7  AM-PAC 6 Clicks Score (OT): 10  PT Discharge Summary  Anticipated Discharge Disposition (PT): skilled nursing facility    Lisa López PT  10/12/2023

## 2023-10-12 NOTE — PLAN OF CARE
Goal Outcome Evaluation:  Plan of Care Reviewed With: patient        Progress: no change  Outcome Evaluation: Patient was difficult to rouse, but was able to participate in a limited PT evaluation.  She demonstrates decreased strength, balance and overall mobility.  She reports her primary mobility is performed in a power wheelchair.  She required maximal assistance to perform bed mobility.  She was able to sit on the edge of the bed for about 3 minutes with moderate assistance for balance.  She is expected to benefit from additional PT services while hospitalized and upon discharge back to SNF.      Anticipated Discharge Disposition (PT): skilled nursing facility

## 2023-10-12 NOTE — THERAPY EVALUATION
Patient Name: Izabella Agudelo  : 1960    MRN: [id number]                              Today's Date: 10/12/2023       Admit Date: 10/11/2023    Visit Dx:     ICD-10-CM ICD-9-CM   1. Altered mental status, unspecified altered mental status type  R41.82 780.97   2. Pneumonia of both lungs due to infectious organism, unspecified part of lung  J18.9 483.8   3. Hyponatremia  E87.1 276.1   4. 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 No pertinent surgical history.   General Information       Row Name 10/12/23 1334          OT Time and Intention    Document Type evaluation  -     Mode of Treatment occupational therapy  -       Row Name 10/12/23 1336          General Information    Patient Profile Reviewed yes  -     Prior Level of Function --  unsure PLOF  -     Existing Precautions/Restrictions fall  -     Barriers to Rehab medically complex;previous functional deficit;cognitive status  -       Row Name 10/12/23 1335          Occupational Profile    Reason for Services/Referral (Occupational Profile) ADL decline  -       Row Name 10/12/23 9173          Living Environment    People in Home facility resident  -       Row Name 10/12/23 1333          Cognition     Orientation Status (Cognition) oriented to;person;place  -[facility] Name 10/12/23 1334          Safety Issues, Functional Mobility    Safety Issues Affecting Function (Mobility) safety precaution awareness;safety precautions follow-through/compliance  -     Impairments Affecting Function (Mobility) endurance/activity tolerance;balance;cognition;strength  -     Cognitive Impairments, Mobility Safety/Performance insight into deficits/self-awareness;safety precaution awareness;safety precaution follow-through;problem-solving/reasoning  -               User Key  (r) = Recorded By, (t) = Taken By, (c) = Cosigned By      Initials Name Provider Type     Naima Pascual Occupational Therapist                     Mobility/ADL's       [facility] Name 10/12/23 1336          Bed Mobility    Bed Mobility bed mobility (all) activities;supine-sit;sit-supine  -     All Activities, [place] (Bed Mobility) maximum assist (25% patient effort)  -     Supine-Sit [place] (Bed Mobility) maximum assist (25% patient effort)  -     Sit-Supine [place] (Bed Mobility) maximum assist (25% patient effort)  -     Bed Mobility, Safety Issues decreased use of arms for pushing/pulling;decreased use of legs for bridging/pushing;impaired trunk control for bed mobility  -     Assistive Device (Bed Mobility) head of bed elevated;bed rails  -     Comment, (Bed Mobility) sat eob ~ 3 min with mod assist for balance  -[facility] Name 10/12/23 1336          Transfers    Comment, (Transfers) not assessed today  -[facility] Name 10/12/23 1336          Functional Mobility    Functional Mobility- Comment not assessed today  [facility] Name 10/12/23 1336          Activities of Daily Living    BADL Assessment/Intervention bathing;upper body dressing;lower body dressing;grooming;feeding;toileting  -[facility] Name 10/12/23 1336          Bathing Assessment/Intervention    [place] Level (Bathing) maximum assist (25% patient  effort)  -[facility] Name 10/12/23 1336          Upper Body Dressing Assessment/Training    [place] Level (Upper Body Dressing) maximum assist (25% patient effort)  -[facility] Name 10/12/23 1336          Lower Body Dressing Assessment/Training    [place] Level (Lower Body Dressing) dependent (less than 25% patient effort)  -AH       Row Name 10/12/23 1336          Grooming Assessment/Training    [place] Level (Grooming) maximum assist (25% patient effort)  -AH       Row Name 10/12/23 1336          Self-Feeding Assessment/Training    [place] Level (Feeding) maximum assist (25% patient effort)  -AH       Row Name 10/12/23 1336          Toileting Assessment/Training    [place] Level (Toileting) dependent (less than 25% patient effort)  -               User Key  (r) = Recorded By, (t) = Taken By, (c) = Cosigned By      Initials Name Provider Type    Naima Maki Occupational Therapist                   Obj/Interventions       [facility] Name 10/12/23 1338          Range of Motion Comprehensive    General Range of Motion bilateral upper extremity ROM WFL  -AH       Row Name 10/12/23 1338          Strength Comprehensive (MMT)    Comment, General Manual Muscle Testing (MMT) Assessment unable to follow command for MMT  -               User Key  (r) = Recorded By, (t) = Taken By, (c) = Cosigned By      Initials Name Provider Type    Naima Maki Occupational Therapist                   Goals/Plan       Row Name 10/12/23 1347          Bed Mobility Goal 1 (OT)    Activity/Assistive Device (Bed Mobility Goal 1, OT) bed mobility activities, all  -     [place] Level/Cues Needed (Bed Mobility Goal 1, OT) moderate assist (50-74% patient effort)  -     Time Frame (Bed Mobility Goal 1, OT) by discharge  -     Progress/Outcomes (Bed Mobility Goal 1, OT) goal ongoing  -AH       Row Name 10/12/23 1347          Transfer Goal 1 (OT)    Activity/Assistive Device (Transfer Goal 1, OT)  bed-to-chair/chair-to-bed  -     [place] Level/Cues Needed (Transfer Goal 1, OT) moderate assist (50-74% patient effort)  -     Time Frame (Transfer Goal 1, OT) long term goal (LTG);1 week  -     Progress/Outcome (Transfer Goal 1, OT) goal ongoing  -       Row Name 10/12/23 4427          Bathing Goal 1 (OT)    Activity/Device (Bathing Goal 1, OT) upper body bathing  -     [place] Level/Cues Needed (Bathing Goal 1, OT) minimum assist (75% or more patient effort)  -     Time Frame (Bathing Goal 1, OT) by discharge  -     Progress/Outcomes (Bathing Goal 1, OT) goal ongoing  -       Row Name 10/12/23 9243          Grooming Goal 1 (OT)    Activity/Device (Grooming Goal 1, OT) hair care;wash face, hands;oral care  -     [place] (Grooming Goal 1, OT) minimum assist (75% or more patient effort)  -     Time Frame (Grooming Goal 1, OT) long term goal (LTG);1 week  -     Progress/Outcome (Grooming Goal 1, OT) goal ongoing  -       Row Name 10/12/23 5780          Therapy Assessment/Plan (OT)    Planned Therapy Interventions (OT) activity tolerance training;BADL retraining;patient/caregiver education/training;transfer/mobility retraining;strengthening exercise  -               User Key  (r) = Recorded By, (t) = Taken By, (c) = Cosigned By      Initials Name Provider Type     Naima Pascual Occupational Therapist                   Clinical Impression       Row Name 10/12/23 7388          Pain Assessment    Pretreatment Pain Rating 0/10 - no pain  -     Posttreatment Pain Rating 0/10 - no pain  -     Pain Intervention(s) Repositioned  -       Row Name 10/12/23 7892          Plan of Care Review    Plan of Care Reviewed With patient  -     Progress no change  -     Outcome Evaluation Pt seen for OT evaluation today.  Pt presents with weakness and decreased independence with self care and functional mobility tasks.  Pt difficult to wake up today during evaluation.  Pt unable to  provide much information about her PLOF other than she gets assistance with bathing and she uses a power w/c.  Pt max assist to sit up eob, tolerated sitting eob ~3 min with mod assist for balance.  Pt max assist to lay back down.  Pt is expected to benefit from skilled OT to improve her strength and independence with ADL tasks.  -       Row Name 10/12/23 1338          Therapy Assessment/Plan (OT)    Patient/Family Therapy Goal Statement (OT) d/c back to facility  -     Rehab Potential (OT) good, to achieve stated therapy goals  -     Criteria for Skilled Therapeutic Interventions Met (OT) yes;skilled treatment is necessary  -     Therapy Frequency (OT) 3 times/wk  -       Row Name 10/12/23 1338          Therapy Plan Review/Discharge Plan (OT)    Anticipated Discharge Disposition (OT) [facility] nursing [facility]  -       Row Name 10/12/23 1338          Positioning and Restraints    Pre-Treatment Position in bed  -     Post Treatment Position bed  -     In Bed supine;call light within reach;encouraged to call for assist;patient within staff view  -               User Key  (r) = Recorded By, (t) = Taken By, (c) = Cosigned By      Initials Name Provider Type    Naima Maki Occupational Therapist                   Outcome Measures       Row Name 10/12/23 1348          How much help from another is currently needed...    Putting on and taking off regular lower body clothing? 1  -AH     Bathing (including washing, rinsing, and drying) 2  -AH     Toileting (which includes using toilet bed pan or urinal) 1  -AH     Putting on and taking off regular upper body clothing 2  -AH     Taking care of personal grooming (such as brushing teeth) 2  -AH     Eating meals 2  -     AM-PAC 6 Clicks Score (OT) 10  [facility]       Row Name 10/12/23 1348          Functional Assessment    Outcome Measure Options AM-PAC 6 Clicks Daily Activity (OT)  -               User Key  (r) = Recorded By, (t) = Taken By, (c) = Cosigned  By      Initials Name Provider Type    Naima Maki Occupational Therapist                    Occupational Therapy Education       Title: PT OT SLP Therapies (In Progress)       Topic: Occupational Therapy (In Progress)       Point: ADL training (Not Started)       Description:   Instruct learner(s) on proper safety adaptation and remediation techniques during self care or transfers.   Instruct in proper use of assistive devices.                  Learner Progress:  Not documented in this visit.              Point: Home exercise program (Not Started)       Description:   Instruct learner(s) on appropriate technique for monitoring, assisting and/or progressing therapeutic exercises/activities.                  Learner Progress:  Not documented in this visit.              Point: Precautions (Done)       Description:   Instruct learner(s) on prescribed precautions during self-care and functional transfers.                  Learning Progress Summary             Patient Acceptance, E,TB, VU by  at 10/12/2023 6850    Comment: Role of OT/POC                         Point: Body mechanics (Not Started)       Description:   Instruct learner(s) on proper positioning and spine alignment during self-care, functional mobility activities and/or exercises.                  Learner Progress:  Not documented in this visit.                              User Key       Initials Effective Dates Name Provider Type Discipline     06/16/21 -  Naima Pascual Occupational Therapist OT                  OT Recommendation and Plan  Planned Therapy Interventions (OT): activity tolerance training, BADL retraining, patient/caregiver education/training, transfer/mobility retraining, strengthening exercise  Therapy Frequency (OT): 3 times/wk  Plan of Care Review  Plan of Care Reviewed With: patient  Progress: no change  Outcome Evaluation: Pt seen for OT evaluation today.  Pt presents with weakness and decreased independence with self care  and functional mobility tasks.  Pt difficult to wake up today during evaluation.  Pt unable to provide much information about her PLOF other than she gets assistance with bathing and she uses a power w/c.  Pt max assist to sit up eob, tolerated sitting eob ~3 min with mod assist for balance.  Pt max assist to lay back down.  Pt is expected to benefit from skilled OT to improve her strength and independence with ADL tasks.     Time Calculation:   Evaluation Complexity (OT)  Review Occupational Profile/Medical/Therapy History Complexity: expanded/moderate complexity  Assessment, Occupational Performance/Identification of Deficit Complexity: 3-5 performance deficits  Clinical Decision Making Complexity (OT): detailed assessment/moderate complexity  Overall Complexity of Evaluation (OT): moderate complexity     Time Calculation- OT       Row Name 10/12/23 1349             Time Calculation- OT    OT Start Time 1137  -AH      OT Received On 10/12/23  -      OT Goal Re-Cert Due Date 10/22/23  -         Untimed Charges    OT Eval/Re-eval Minutes 40  -AH         Total Minutes    Untimed Charges Total Minutes 40  -AH       Total Minutes 40  -AH                User Key  (r) = Recorded By, (t) = Taken By, (c) = Cosigned By      Initials Name Provider Type    Naima Maki Occupational Therapist                  Therapy Charges for Today       Code Description Service Date Service Provider Modifiers Qty    [id number] HC OT EVAL MOD COMPLEXITY 3 10/12/2023 Naima Pascual GO 1                 Naima Pascual  10/12/2023

## 2023-10-12 NOTE — PLAN OF CARE
Goal Outcome Evaluation:  Plan of Care Reviewed With: patient        Progress: no change  Outcome Evaluation: Pt seen for OT evaluation today.  Pt presents with weakness and decreased independence with self care and functional mobility tasks.  Pt difficult to wake up today during evaluation.  Pt unable to provide much information about her PLOF other than she gets assistance with bathing and she uses a power w/c.  Pt max assist to sit up eob, tolerated sitting eob ~3 min with mod assist for balance.  Pt max assist to lay back down.  Pt is expected to benefit from skilled OT to improve her strength and independence with ADL tasks.      Anticipated Discharge Disposition (OT): skilled nursing facility

## 2023-10-12 NOTE — PROGRESS NOTES
"Adult Nutrition  Assessment/PES    Patient Name:  Izabella Agudelo  YOB: 1960  MRN: [id number]  Admit Date:  10/11/2023    Assessment Date:  10/12/2023    Comments:    Pt with PMHx significant for dementia, CVA, essential hypertension, aphasia/dysphagia, diabetes mellitus type 2, COPD presented to [facility] with c/o AMS.  lbs, overweight for age per BMI 26.6. MST 2, pt unsure of weight loss. RD to complete MSA once AMS resolves. Currently NPO. Will monitor and F/U. Available PRN.      Reason for Assessment       Row Name 10/12/23 0911          Reason for Assessment    Reason For Assessment identified at risk by screening criteria     Identified At Risk by Screening Criteria MST SCORE 2+                      Labs/Tests/Procedures/Meds       Row Name 10/12/23 0911          Labs/Procedures/Meds    Lab Results Reviewed reviewed, pertinent     Lab Results Comments Low: Na+, Cl-, Creat, Mg++; High: A1c, glucose        Medications    Pertinent Medications Reviewed reviewed, pertinent     Pertinent Medications Comments insulin                    Physical Findings       Row Name 10/12/23 0912          Physical Findings    Overall Physical Appearance overweight                    Estimated/Assessed Needs - Anthropometrics       Row Name 10/12/23 0913 10/12/23 0300       Anthropometrics    Weight -- 79.4 kg (175 lb 0.7 oz)    Height for Calculation 1.727 m (5' 8\") --    Weight for Calculation 79.4 kg (175 lb)  CBW --       Estimated/Assessed Needs    Additional Documentation Fluid Requirements (Group);KCAL/KG (Group);Estimated Calorie Needs (Group);Protein Requirements (Group) --       Estimated Calorie Needs    Estimated Calorie Requirement (kcal/day) 1,984 --    Estimated Calorie Need Method kcal/kg --       KCAL/KG    KCAL/KG 25 Kcal/Kg (kcal) --    25 Kcal/Kg (kcal) 1984.475 --       Protein Requirements    Weight Used For Protein Calculations 79.4 kg (175 lb)  CBW --    Est Protein Requirement " Amount (gms/kg) 1.0 gm protein --    Estimated Protein Requirements (gms/day) 79.38 --       Fluid Requirements    Fluid Requirements (mL/day) 1984  1 mL/kcal --    RDA Method (mL) 1984 --                   Nutrition Prescription Ordered       Row Name 10/12/23 0914          Nutrition Prescription PO    Current PO Diet NPO                    Evaluation of Received Nutrient/Fluid Intake       Row Name 10/12/23 0914          PO Evaluation    Number of Days PO Intake Evaluated Other (comment)  NPO                       Problem/Interventions:   Problem 1       Row Name 10/12/23 0914          Nutrition Diagnoses Problem 1    Problem 1 Altered Nutrition Related to Labs     Etiology (related to) Medical Diagnosis     Endocrine DM     Signs/Symptoms (evidenced by) Biochemical     Specific Labs Noted HgbA1C;Glucose                          Intervention Goal       Row Name 10/12/23 0915          Intervention Goal    General Maintain nutrition;Meet nutritional needs for age/condition;Disease management/therapy;Improved nutrition related lab(s)     PO Initiate feeding     Weight Maintain weight                    Nutrition Intervention       Row Name 10/12/23 0915          Nutrition Intervention    RD/Tech Action Follow Tx progress;Await begin PO                    Nutrition Prescription       Row Name 10/12/23 0915          Other Orders    Obtain Weight Daily     Obtain Weight Ordered? No, recommended     Supplement Vitamin mineral supplement     Supplement Ordered? No, recommended     Labs Mg++;Na+;K+;Phos     Labs Ordered? No, recommended                    Education/Evaluation       Row Name 10/12/23 0915          Education    Education Education not appropriate at this time     Please explain Patient confusion        Monitor/Evaluation    Monitor Per protocol;Skin status;Weight;Symptoms;Pertinent labs                     Electronically signed by:  Kim Jensen RD  10/12/23 09:16 EDT

## 2023-10-13 LAB
ANION GAP SERPL CALCULATED.3IONS-SCNC: 8.6 MMOL/L (ref 5–15)
BACTERIA SPEC AEROBE CULT: NORMAL
BASOPHILS # BLD AUTO: 0.05 10*3/MM3 (ref 0–0.2)
BASOPHILS NFR BLD AUTO: 0.6 % (ref 0–1.5)
BUN SERPL-MCNC: 12 MG/DL (ref 8–23)
BUN/CREAT SERPL: 31.6 (ref 7–25)
CALCIUM SPEC-SCNC: 8.2 MG/DL (ref 8.6–10.5)
CHLORIDE SERPL-SCNC: 96 MMOL/L (ref 98–107)
CO2 SERPL-SCNC: 23.4 MMOL/L (ref 22–29)
CREAT SERPL-MCNC: 0.38 MG/DL (ref 0.57–1)
DEPRECATED RDW RBC AUTO: 42.1 FL (ref 37–54)
EGFRCR SERPLBLD CKD-EPI 2021: 113.5 ML/MIN/1.73
EOSINOPHIL # BLD AUTO: 0.31 10*3/MM3 (ref 0–0.4)
EOSINOPHIL NFR BLD AUTO: 3.4 % (ref 0.3–6.2)
ERYTHROCYTE [DISTWIDTH] IN BLOOD BY AUTOMATED COUNT: 13.2 % (ref 12.3–15.4)
GLUCOSE BLDC GLUCOMTR-MCNC: 150 MG/DL (ref 70–130)
GLUCOSE BLDC GLUCOMTR-MCNC: 172 MG/DL (ref 70–130)
GLUCOSE BLDC GLUCOMTR-MCNC: 252 MG/DL (ref 70–130)
GLUCOSE BLDC GLUCOMTR-MCNC: 257 MG/DL (ref 70–130)
GLUCOSE SERPL-MCNC: 172 MG/DL (ref 65–99)
HCT VFR BLD AUTO: 37 % (ref 34–46.6)
HGB BLD-MCNC: 12.2 G/DL (ref 12–15.9)
IMM GRANULOCYTES # BLD AUTO: 0.09 10*3/MM3 (ref 0–0.05)
IMM GRANULOCYTES NFR BLD AUTO: 1 % (ref 0–0.5)
LYMPHOCYTES # BLD AUTO: 1.68 10*3/MM3 (ref 0.7–3.1)
LYMPHOCYTES NFR BLD AUTO: 18.6 % (ref 19.6–45.3)
MAGNESIUM SERPL-MCNC: 1.9 MG/DL (ref 1.6–2.4)
MCH RBC QN AUTO: 28.8 PG (ref 26.6–33)
MCHC RBC AUTO-ENTMCNC: 33 G/DL (ref 31.5–35.7)
MCV RBC AUTO: 87.5 FL (ref 79–97)
MONOCYTES # BLD AUTO: 0.71 10*3/MM3 (ref 0.1–0.9)
MONOCYTES NFR BLD AUTO: 7.9 % (ref 5–12)
NEUTROPHILS NFR BLD AUTO: 6.19 10*3/MM3 (ref 1.7–7)
NEUTROPHILS NFR BLD AUTO: 68.5 % (ref 42.7–76)
NRBC BLD AUTO-RTO: 0 /100 WBC (ref 0–0.2)
PLATELET # BLD AUTO: 243 10*3/MM3 (ref 140–450)
PMV BLD AUTO: 8.8 FL (ref 6–12)
POTASSIUM SERPL-SCNC: 3.7 MMOL/L (ref 3.5–5.2)
RBC # BLD AUTO: 4.23 10*6/MM3 (ref 3.77–5.28)
SODIUM SERPL-SCNC: 128 MMOL/L (ref 136–145)
WBC NRBC COR # BLD: 9.03 10*3/MM3 (ref 3.4–10.8)

## 2023-10-13 PROCEDURE — G0378 HOSPITAL OBSERVATION PER HR: HCPCS

## 2023-10-13 PROCEDURE — 85025 COMPLETE CBC W/AUTO DIFF WBC: CPT | Performed by: NURSE PRACTITIONER

## 2023-10-13 PROCEDURE — 25010000002 CEFTRIAXONE SODIUM-DEXTROSE 1-3.74 GM-%(50ML) RECONSTITUTED SOLUTION: Performed by: FAMILY MEDICINE

## 2023-10-13 PROCEDURE — 63710000001 INSULIN DETEMIR PER 5 UNITS: Performed by: FAMILY MEDICINE

## 2023-10-13 PROCEDURE — 92526 ORAL FUNCTION THERAPY: CPT

## 2023-10-13 PROCEDURE — 83735 ASSAY OF MAGNESIUM: CPT | Performed by: FAMILY MEDICINE

## 2023-10-13 PROCEDURE — 96372 THER/PROPH/DIAG INJ SC/IM: CPT

## 2023-10-13 PROCEDURE — 80048 BASIC METABOLIC PNL TOTAL CA: CPT | Performed by: NURSE PRACTITIONER

## 2023-10-13 PROCEDURE — 25010000002 ENOXAPARIN PER 10 MG: Performed by: FAMILY MEDICINE

## 2023-10-13 PROCEDURE — 63710000001 INSULIN ASPART PER 5 UNITS: Performed by: FAMILY MEDICINE

## 2023-10-13 PROCEDURE — 82948 REAGENT STRIP/BLOOD GLUCOSE: CPT

## 2023-10-13 PROCEDURE — 96375 TX/PRO/DX INJ NEW DRUG ADDON: CPT

## 2023-10-13 RX ORDER — PANTOPRAZOLE SODIUM 40 MG/10ML
40 INJECTION, POWDER, LYOPHILIZED, FOR SOLUTION INTRAVENOUS
Status: DISCONTINUED | OUTPATIENT
Start: 2023-10-13 | End: 2023-10-14 | Stop reason: HOSPADM

## 2023-10-13 RX ADMIN — ENOXAPARIN SODIUM 40 MG: 100 INJECTION SUBCUTANEOUS at 01:05

## 2023-10-13 RX ADMIN — PANTOPRAZOLE SODIUM 40 MG: 40 INJECTION, POWDER, FOR SOLUTION INTRAVENOUS at 07:50

## 2023-10-13 RX ADMIN — DIVALPROEX SODIUM 500 MG: 500 TABLET, DELAYED RELEASE ORAL at 16:03

## 2023-10-13 RX ADMIN — INSULIN ASPART 2 UNITS: 100 INJECTION, SOLUTION INTRAVENOUS; SUBCUTANEOUS at 20:41

## 2023-10-13 RX ADMIN — GABAPENTIN 300 MG: 300 CAPSULE ORAL at 12:28

## 2023-10-13 RX ADMIN — CEFTRIAXONE 1000 MG: 1 INJECTION, SOLUTION INTRAVENOUS at 16:03

## 2023-10-13 RX ADMIN — GABAPENTIN 300 MG: 300 CAPSULE ORAL at 20:28

## 2023-10-13 RX ADMIN — INSULIN ASPART 5 UNITS: 100 INJECTION, SOLUTION INTRAVENOUS; SUBCUTANEOUS at 16:06

## 2023-10-13 RX ADMIN — Medication 10 ML: at 20:28

## 2023-10-13 RX ADMIN — INSULIN DETEMIR 12 UNITS: 100 INJECTION, SOLUTION SUBCUTANEOUS at 20:41

## 2023-10-13 RX ADMIN — DIVALPROEX SODIUM 500 MG: 500 TABLET, DELAYED RELEASE ORAL at 12:28

## 2023-10-13 RX ADMIN — ENOXAPARIN SODIUM 40 MG: 100 INJECTION SUBCUTANEOUS at 23:00

## 2023-10-13 RX ADMIN — DIVALPROEX SODIUM 500 MG: 500 TABLET, DELAYED RELEASE ORAL at 20:28

## 2023-10-13 RX ADMIN — ARIPIPRAZOLE 30 MG: 5 TABLET ORAL at 12:28

## 2023-10-13 RX ADMIN — CARVEDILOL 12.5 MG: 12.5 TABLET, FILM COATED ORAL at 20:28

## 2023-10-13 RX ADMIN — ATORVASTATIN CALCIUM 20 MG: 20 TABLET, FILM COATED ORAL at 12:28

## 2023-10-13 RX ADMIN — LISINOPRIL 5 MG: 10 TABLET ORAL at 12:28

## 2023-10-13 NOTE — PROGRESS NOTES
[facility]    Name:  Izabella Agudelo   Age:  63 y.o.  Sex:  female  :  1960  MRN:  [id number]   Visit Number:  [id number]  Admission Date:  10/11/2023  Date Of Service:  10/13/23  Primary Care Physician:  Fabina Santos DO     LOS: 0 days :    Chief Complaint:      AMS    Subjective:    Patient seen and examined this morning.  Patient is awake and alert at time of exam.  She is difficult to understand at baseline with aphasia.  She has been interacting with staff well today.  Speech to re-evaluate for diet today.  She agrees that her reflux pain is better today.  Labs and vital signs have been stable.    Hospital Course:    Patient is a 63 year old female local nursing home resident with a past medical history of dementia, CVA with residual right sided hemiparesis, essential hypertension, aphasia/dysphagia secondary to CVA, diabetes mellitus type 2, COPD who was brought in to this facility by EMS 10/11/2023 from the nursing home with a chief complaint of altered mental status.  Patient with baseline of dementia and unable to provide detailed history, she was awake and alert and oriented to her name and year of birth only.  No family at bedside.  History was obtained through ER report.  Patient has been altered over the past couple days and her mental status has been worsening.  Patient glucose level was running around 400 when tested at the nursing home. Patient had 2 episodes of vomiting while being worked up in the ER.     On ER evaluation, patient was hypertensive with a blood pressure of 166/103, was otherwise afebrile, nontachypneic nontachycardic and on room air. Her work-up was significant for HS Troponin of 21 (likely demand ischemia) proBNP of 1531, sodium 129, glucose 235, magnesium 1.5, lactate 3.0, WBC 12.1.  Urinalysis negative for nitrates, positive for leukocytes with WBC 6-12 and bacteria 2+.  Chest x-ray with new patchy left airspace  disease may represent atelectasis versus infiltrates.  CT head without contrast showed atrophy and changes most consistent with chronic microvascular ischemia. CT abdomen pelvis without contrast showed moderate distal esophageal wall thickening, likely consistent with esophagitis. Consider follow up EGD on an outpatient basis. Moderate gas and fluid distention stomach without evidence of obstructing lesion.  Consider further evaluation for gastroparesis with nuclear medicine gastric emptying study. Patient received Zofran and Rocephin while in the ER.  Hospitalist consulted for admission, further evaluation and treatment.     Admitted to the hospital for further care.  Evaluated by speech therapy and recommended 1. pureed diet with honey-thick liq as rani, 2. meds in pudding/applesauce, as rani, 3. aspiration precautions, 4. pt. will need feeding assistance and monitoring for diet rani., 5. reflux precuations.  Patient more interactive with staff and appears to be at baseline.      Review of Systems:     All systems were reviewed and negative except as mentioned in subjective, assessment and plan.    Vital Signs:    Temp:  [97 øF (36.1 øC)-98.7 øF (37.1 øC)] 98.6 øF (37 øC)  Heart Rate:  [57-64] 57  Resp:  [18-22] 18  BP: (112-155)/(59-77) 132/76    Intake and output:    I/O last 3 completed shifts:  In: 1035.4 [I.V.:1035.4]  Out: 100 [Urine:100]  No intake/output data recorded.    Physical Examination:  Examined again 10/13/2023    General Appearance:  Alert and cooperative, pleasant middle aged female, no acute distress on exam, appears chronically ill   Head:  Atraumatic and normocephalic.   Eyes: Conjunctivae and sclerae normal, no icterus. No pallor.   Throat: No oral lesions, no thrush, oral mucosa moist.   Neck: Supple, trachea midline   Lungs:   Breath sounds heard bilaterally equally but diminished.  Faint wheezing and rhonchi noted.  Unlabored on room air   Heart:  Normal S1 and S2, no murmur, no gallop, no  "rub. No JVD.   Abdomen:   Normal bowel sounds, no masses, no organomegaly. Soft, nontender, nondistended, no rebound tenderness.   Extremities: Supple, no edema, hemiparesis of right side   Skin: No bleeding or rash.   Neurologic: Alert and oriented x 3 on exam. Prior CVA with right sided hemiparesis noted and thick speech at baseline.     Laboratory results:    Results from last 7 days   Lab Units 10/13/23  0011 10/12/23  0319 10/11/23  1417   SODIUM mmol/L 128* 128* 129*   POTASSIUM mmol/L 3.7 4.4 4.4   CHLORIDE mmol/L 96* 94* 89*   CO2 mmol/L 23.4 21.8* 25.6   BUN mg/dL 12 17 17   CREATININE mg/dL 0.38* 0.40* 0.46*   CALCIUM mg/dL 8.2* 8.2* 9.5   BILIRUBIN mg/dL  --   --  0.5   ALK PHOS U/L  --   --  82   ALT (SGPT) U/L  --   --  20   AST (SGOT) U/L  --   --  23   GLUCOSE mg/dL 172* 235* 235*     Results from last 7 days   Lab Units 10/13/23  0514 10/12/23  0319 10/11/23  1417   WBC 10*3/mm3 9.03 9.88 12.14*   HEMOGLOBIN g/dL 12.2 12.7 13.9   HEMATOCRIT % 37.0 39.2 41.5   PLATELETS 10*3/mm3 243 272 303         Results from last 7 days   Lab Units 10/11/23  1417   HSTROP T ng/L 21*     Results from last 7 days   Lab Units 10/11/23  1500   URINECX  >100,000 CFU/mL Mixed Ryanne Isolated     No results for input(s): \"PHART\", \"VSR4RBR\", \"PO2ART\", \"TIU1LEU\", \"BASEEXCESS\" in the last 8760 hours.   I have reviewed the patient's laboratory results.    Radiology results:    CT Abdomen Pelvis Without Contrast    Result Date: 10/11/2023  FINAL REPORT TECHNIQUE: Axial images through the abdomen and pelvis were performed without contrast. This study was performed with techniques to keep radiation doses as low as reasonably achievable, (ALARA). Individualized dose reduction techniques using automated exposure control or adjustment of mA and/or kV according to the patient's size were employed. CLINICAL HISTORY: Nausea and vomiting FINDINGS: ABDOMEN:  The heart size is normal.  The lung bases are clear.  There is distal esophageal " wall thickening with moderate fluid and gaseous distention of stomach.  No obstructing lesion is evident in the duodenum or distal stomach.  There are minimal diverticula in the sigmoid colon with no diverticulitis.   Liver appears unremarkable. The gallbladder is normal. The spleen is normal. There is no biliary ductal dilatation. No adrenal mass is identified. The pancreas is normal.  There is no nephrolithiasis.  There is no hydronephrosis.No non-contrast evidence of a solid renal mass. The aorta is normal in caliber.  There is no significant free fluid or adenopathy. No dilated loops of bowel. No free intraperitoneal air. No significant stranding seen in the mesentery. PELVIS: The appendix is normal.  The urinary bladder is unremarkable. No pelvic mass. There is no significant free fluid or adenopathy. BONES: No acute fracture. No aggressive appearing lytic or blastic lesion is seen.     Impression: Moderate distal esophageal wall thickening, likely consistent with esophagitis. Consider follow up EGD on an outpatient basis. Moderate gas and fluid distention stomach without evidence of obstructing lesion.  Consider further evaluation for gastroparesis with nuclear medicine gastric emptying study. Authenticated and Electronically Signed by MD Cosby Richard on 10/11/2023 10:40:26 PM    CT Head Without Contrast    Result Date: 10/11/2023  FINAL REPORT TECHNIQUE: Axial images were performed through the brain.This study was performed with techniques to keep radiation doses as low as reasonably achievable, (ALARA). Individualized dose reduction techniques using automated exposure control or adjustment of mA and/or kV according to the patient's size were employed. CLINICAL HISTORY: Altered mental status FINDINGS: There is moderate global atrophy proportionate to the degree of hypoattenuation seen within the periventricular and subcortical white matter, which is nonspecific, but most likely consistent with chronic  microvascular ischemia.  Consider MR as a clinical  outpatient.  The ventricles are normal in size for the degree of atrophy.  There is no extra-axial fluid or midline shift.  There is no evidence of acute hemorrhage or mass. Mastoid air cells and paranasal sinuses are clear.     Impression: Atrophy and changes most consistent with chronic microvascular ischemia. No acute intracranial process. Authenticated and Electronically Signed by MD Harjeet, Sanjay on 10/11/2023 10:37:39 PM    XR Chest 1 View    Result Date: 10/11/2023  PROCEDURE: XR CHEST 1 VW-    HISTORY: Weak/Dizzy/AMS triage protocol  COMPARISON: March 2021.  FINDINGS: The heart is mildly enlarged. The mediastinum is unremarkable. There is mild interstitial disease bilaterally, similar to the prior exam. New patchy left airspace disease may represent atelectasis versus infiltrate. There is no pneumothorax. There are no acute osseous abnormalities.      Impression: New patchy left airspace disease may represent atelectasis versus infiltrate. Continued follow-up is recommended.        Images were reviewed, interpreted, and dictated by Dr. Arcelia Hall MD Transcribed by Kayleigh Macedo PA-C.  This report was signed and finalized on 10/11/2023 3:11 PM by Arcelia Hall MD.     I have reviewed the patient's radiology reports.    Medication Review:     I have reviewed the patient's active and prn medications.     Problem List:      Altered mental status, unspecified      Assessment:    Altered mental status, POA, resolved  Hypomagnesemia, POA  Nausea/vomiting, POA  Possible developing pneumonia, unable to specify further, POA  Vascular dementia  History of CVA with residual hemiplegia/ hemiparesis  Chronic aphasia/ dysphagia  Essential hypertension  Diabetes mellitus type 2  COPD without exacerbation    Plan:    AMS/ Early PNA/ Vascular dementia/History of CVA  -AMS likely metabolic and secondary to developing pneumonia in the settings of baseline  dementia/previous CVA.  -CT head with no acute abnormality  -Mildly elevated ammonia  -PT/OT consulted.  -Rocephin IV for pneumonia treatment  -No blood cultures obtained in the ER  -Follow urine culture--growing mixed anshu  -Neurochecks, fall and swallow precautions  -Patient alert and oriented currently     Nausea/vomiting/ Esophagitis  -CT abdomen pelvis showed moderate gas and fluid distention stomach without evidence of obstructing lesion.  Questionable for gastroparesis versus viral illness.   -Zofran PRN  -Speech evaluated and recommended 1. pureed diet with honey-thick liq as rani, 2. meds in pudding/applesauce, as rani, 3. aspiration precautions, 4. pt. will need feeding assistance and monitoring for diet rani., 5. reflux precuations.   -Eating and drinking well today  -Consider Reglan with any continued N/V--has not had any since admission  -Protonix started for esophagitis     Diabetes mellitus type 2 insulin-dependent  -Cover with Glucomander per protocol  -Hemoglobin A1c-11  -Per NH notes patient very noncompliant and family brings snacks in often     Essential hypertension  -Uncontrolled on arrival  -Continuing home meds  -Will continue to monitor and add as needed meds as indicated.  -Normotensive today     Lactic acidosis, resolved  -Stopping IVF today     Hypomagnesemia  -Replace per protocol    Patient appears to be at baseline mentation.  Advancing diet with recommendations per speech.  Per nursing patient tolerating well so far.  If no further N/V patient should likely be able to return to NH tomorrow and transition to oral abx.    DVT Prophylaxis: Lovenox  Code Status: Full Code  Diet: Pending speech eval  Discharge Plan: Should be able to return to LTC tomorrow    Fariha Villarreal, APRN  10/13/23  12:54 EDT    Dictated utilizing Dragon dictation.

## 2023-10-13 NOTE — CASE MANAGEMENT/SOCIAL WORK
Continued Stay Note   [facility]     Patient Name: Izabella Agudelo  MRN: [id number]  Today's Date: 10/13/2023    Admit Date: 10/11/2023    Plan: talked with [facility], stated can accept back over weekend if ready; call report to [phone] and fax d/c summary to [phone]; will let us know if changes from coming back ltc vs str; but at the moment coming back ltc   Discharge Plan       Row Name 10/13/23 1535       Plan    Plan talked with [facility], stated can accept back over weekend if ready; call report to [phone] and fax d/c summary to [phone]; will let us know if changes from coming back ltc vs str; but at the moment coming back ltc  16:03 EDT  Per [facility] pt can come back skilled; stated she has her days                   Discharge Codes    No documentation.                       Sharron Tracy RN

## 2023-10-13 NOTE — PLAN OF CARE
Goal Outcome Evaluation:  Plan of Care Reviewed With: patient        Progress: improving  Outcome Evaluation: Re-eval of swallow completed with pt. seated upright in bed with RN present. Pt. voiced interest in eating. She was given ice chips, puree, and honey-thick liq. Oral phase was adequate for trials presented, but impaired with extended bolus prep time due to oral and generalized weakness. Suspect pharyngeal phase dysphagia but some improvement since her initial eval yesterday with pt. demonstrating more prompt initiation of pharyngeal swallow consistently with all trials. She exhibited a light, single cough after multiple trials with puree, but no overt s/s aspiration with honey-thick or ice. No wet vocal quality or eye watering with any trial as she had done with yesterday's trials. Her overall strength seems to be improving. Pt. is recommended the followin. pureed diet with honey-thick liq as rani, 2. meds in pudding/applesauce, as rani, 3. aspiration precautions, 4. pt. will need feeding assistance and monitoring for diet rani., 5. reflux precuations. D/W pt. and RN.

## 2023-10-13 NOTE — THERAPY TREATMENT NOTE
"OT attempted to see pt for treatment. Pt has had frequent BM's today and reports she is \"too tired\". OT will f/u at a later time.   "

## 2023-10-13 NOTE — THERAPY RE-EVALUATION
Acute Care - Speech Language Pathology   Swallow Re-Evaluation  Rees     Patient Name: Izabella Agudelo  : 1960  MRN: [id number]  Today's Date: 10/13/2023               Admit Date: 10/11/2023    Visit Dx:     ICD-10-CM ICD-9-CM   1. Altered mental status, unspecified altered mental status type  R41.82 780.97   2. Pneumonia of both lungs due to infectious organism, unspecified part of lung  J18.9 483.8   3. Hyponatremia  E87.1 276.1   4. 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 No pertinent surgical history.    SLP Recommendation and Plan  SLP Swallowing Diagnosis: suspected pharyngeal dysphagia, suspected esophageal dysphagia, oral dysphagia (10/13/23 0940)  SLP Diet Recommendation: puree, honey thick liquids (10/13/23 0940)  Recommended Precautions and Strategies: general aspiration precautions, reflux precautions (10/13/23 0940)  SLP Rec. for Method of Medication Administration: meds crushed, with puree, as tolerated (10/13/23 0940)     Monitor for Signs of Aspiration: yes, notify SLP if any concerns, cough, gurgly voice, throat clearing, pneumonia, right lower lobe infiltrates (10/13/23  940)  Recommended Diagnostics: reassess via clinical swallow evaluation (10/12/23 1048)  Swallow Criteria for Skilled Therapeutic Interventions Met: other (see comments) (pending f/u reassessment) (10/12/23 1048)  Anticipated Discharge Disposition (SLP): unknown (10/13/23 0940)  Rehab Potential/Prognosis, Swallowing: adequate, monitor progress closely (10/13/23 0940)  Therapy Frequency (Swallow): PRN (10/13/23 0940)     Oral Care Recommendations: Oral Care BID/PRN, Swab (10/13/23 0940)     Swallowing Considerations per Physician Discretion: medical management of suspected esophageal dysphagia, as indicated (10/13/23 0940)                                Oral Care Recommendations: Oral Care BID/PRN, Swab (10/13/23 0940)    Plan of Care Reviewed With: patient  Progress: improving  Outcome Evaluation: Re-eval of swallow completed with pt. seated upright in bed with RN present. Pt. voiced interest in eating. She was given ice chips, puree, and honey-thick liq. Oral phase was adequate for trials presented, but impaired with extended bolus prep time due to oral and generalized weakness. Suspect pharyngeal phase dysphagia but some improvement since her initial eval yesterday with pt. demonstrating more prompt initiation of pharyngeal swallow consistently with all trials. She exhibited a light, single cough after multiple trials with puree, but no overt s/s aspiration with honey-thick or ice. No wet vocal quality or eye watering with any trial as she had done with yesterday's trials. Her overall strength seems to be improving. Pt. is recommended the followin. pureed diet with honey-thick liq as rani, 2. meds in pudding/applesauce, as rani, 3. aspiration precautions, 4. pt. will need feeding assistance and monitoring for diet rani., 5. reflux precuations. D/W pt. and RN.      SWALLOW EVALUATION (last 72 hours)       SLP Adult Swallow Evaluation       Row Name 10/13/23 0940 10/12/23 1048                Rehab Evaluation     Document Type re-evaluation  -TM evaluation  -TM       Subjective Information no complaints  -TM no complaints  -TM       Patient Observations alert;cooperative  -TM alert;cooperative  -TM       Patient/Family/Caregiver Comments/Observations no family present  -TM no family present  -TM       Patient Effort good  -TM good  -TM          General Information    Patient Profile Reviewed yes  -TM yes  -TM       Pertinent History Of Current Problem -- schizophrenia, DMII, hx CVA, COPD, dementia  -TM       Current Method of Nutrition NPO  -TM NPO  -TM       Precautions/Limitations, Vision WFL;for purposes of eval  -TM WFL;for purposes of eval  -TM       Precautions/Limitations, Hearing WFL;for purposes of eval  -TM WFL;for purposes of eval  -TM       Prior Level of Function-Communication -- other (see comments);receptive language impairment;expressive language impairment;motor speech impairment  hx CVA w/aphasia  -TM       Prior Level of Function-Swallowing unknown  -TM unknown  -TM       Plans/Goals Discussed with patient;other (see comments)  RN  -TM other (see comments);patient  RN  -TM       Barriers to Rehab medically complex;previous functional deficit  -TM medically complex  -TM       Patient's Goals for Discharge return to PO diet  -TM patient could not state  -TM          Pain    Additional Documentation Pain Scale: Numbers Pre/Post-Treatment (Group)  -TM Pain Scale: Numbers Pre/Post-Treatment (Group)  -TM          Pain Scale: Numbers Pre/Post-Treatment    Pretreatment Pain Rating 0/10 - no pain  -TM 0/10 - no pain  -TM       Posttreatment Pain Rating 0/10 - no pain  -TM 0/10 - no pain  -TM          Oral Motor Structure and Function    Oral Lesions or Structural Abnormalities and/or variants none identified  -TM none identified  -TM       Dentition Assessment edentulous  -TM edentulous  -TM       Secretion Management WNL/WFL  -TM WNL/WFL  -TM       Mucosal Quality moist, healthy  -TM moist, healthy  -TM        Volitional Cough unable to elicit  -TM unable to elicit  -TM          Oral Musculature and Cranial Nerve Assessment    Oral Motor General Assessment generalized oral motor weakness;other (see comments)  -TM generalized oral motor weakness;other (see comments)  tongue thrust  -TM       Lingual Impairment, Detail. Cranial Nerves IX, XII (Glossopharyngeal and Hypoglossal) reduced strength  -TM --          General Eating/Swallowing Observations    Respiratory Support Currently in Use room air  -TM room air  -TM       Eating/Swallowing Skills fed by SLP  -TM fed by SLP  -TM       Positioning During Eating upright in bed  -TM upright in bed  -TM       Utensils Used spoon;cup;straw  -TM spoon  -TM       Consistencies Trialed pureed;ice chips;honey-thick liquids  -TM pureed  -TM       Pre SpO2 (%) 97  -TM 94  -TM       Post SpO2 (%) 96  -TM 93  -TM          Respiratory    Respiratory Status WFL  -TM WFL  -TM          Clinical Swallow Eval    Oral Prep Phase impaired  -TM --  adequate for single texture presented  -TM       Oral Transit WFL  -TM WFL  -TM       Oral Residue WFL  -TM WFL  -TM       Pharyngeal Phase suspected pharyngeal impairment  -TM suspected pharyngeal impairment  -TM       Esophageal Phase -- suspected esophageal impairment  -TM       Clinical Swallow Evaluation Summary Re-eval of swallow completed with pt. seated upright in bed with RN present.  Pt. voiced interest in eating.  She was given ice chips, puree, and honey-thick liq.  Oral phase was adequate for trials presented, but impaired with extended bolus prep time due to oral and generalized weakness.  Suspect pharyngeal phase dysphagia but some improvement since her initial eval yesterday with pt. demonstrating more prompt initiation of pharyngeal swallow consistently with all trials.  She exhibited a light, single cough after multiple trials with puree, but no overt s/s aspiration with honey-thick or ice.  No wet vocal quality or eye watering with  any trial as she had done with yesterday's trials. Her overall strength seems to be improving. Pt. is recommended the followin. pureed diet with honey-thick liq as rani, 2. meds in pudding/applesauce, as rani, 3. aspiration precautions, 4. pt. will need feeding assistance and monitoring for diet rani., 5. reflux precuations.   D/W pt. and RN.  -TM Bedside eval of swallow compeleted with pt. seated upright in bed with RN present for eval.  Pt. was cooperative but had some difficulty following simple commands and poor intelligibility due to hx of CVA with resulting aphasia, and also dx of dementia and schizophrenia which may additionally affect communication effectiveness.  Oral mech was remarkable for edentulous and tongue thrust.  Pt. was given trials of puree only.  Oral phase was adequate with this single texture over multiple trials, but suspect oral phase may be impaired with other more complex consistencies.  Suspect pharyngeal phase dysphagia due to delayed initiation of pharyngeal swallow with each trial, decreased and delayed larygneal elevation per palpation, coughing, wet vocal quality, and eye watering as signs of aspiration.  Pt. was unsafe for continued trials with puree or with other consistencies that would be more challenging and increase her risk of aspiration.  She also voiced c/o heartburn.  Pt. may benefit from medical management of possible relux.  Recommend continue NPO with meds via alternative method as appropriate.  Will f/u tomorrow to reassess as pt. able.  -TM          Oral Prep Concerns    Oral Prep Concerns increased prep time  -TM --       Increased Prep Time all consistencies  -TM --          Pharyngeal Phase Concerns    Pharyngeal Phase Concerns cough  1x only after multiple trials of puree  -TM wet vocal quality;cough;other (see comments)  eye watering  -TM       Cough other (see comments)  1x only after multiple trials of puree  -TM --          Esophageal Phase Concerns     Esophageal Phase Concerns other (see comments)  pt. has had c/o heartburn  -TM other (see comments)  pt. voiced c/o heartburn  -TM          SLP Evaluation Clinical Impression    SLP Swallowing Diagnosis suspected pharyngeal dysphagia;suspected esophageal dysphagia;oral dysphagia  -TM suspected pharyngeal dysphagia;suspected esophageal dysphagia  -TM       Functional Impact risk of aspiration/pneumonia;risk of malnutrition;risk of dehydration  -TM risk of aspiration/pneumonia;risk of malnutrition;risk of dehydration  -TM       Rehab Potential/Prognosis, Swallowing adequate, monitor progress closely  -TM --       Swallow Criteria for Skilled Therapeutic Interventions Met -- other (see comments)  pending f/u reassessment  -TM          SLP Treatment Clinical Impressions    Barriers to Overall Progress (SLP) Medically complex;Baseline deficits  -TM --          Recommendations    Therapy Frequency (Swallow) PRN  -TM PRN  -TM       SLP Diet Recommendation puree;honey thick liquids  -TM NPO  -TM       Recommended Diagnostics -- reassess via clinical swallow evaluation  -TM       Recommended Precautions and Strategies general aspiration precautions;reflux precautions  -TM general aspiration precautions;reflux precautions  -TM       Oral Care Recommendations Oral Care BID/PRN;Swab  -TM Oral Care BID/PRN;Swab  -TM       SLP Rec. for Method of Medication Administration meds crushed;with puree;as tolerated  -TM meds via alternate route  -TM       Monitor for Signs of Aspiration yes;notify SLP if any concerns;cough;gurgly voice;throat clearing;pneumonia;right lower lobe infiltrates  -TM --       Anticipated Discharge Disposition (SLP) unknown  -TM unknown  -TM       Swallowing Considerations per Physician Discretion medical management of suspected esophageal dysphagia, as indicated  -TM medical management of suspected esophageal dysphagia, as indicated  -TM                 User Key  (r) = Recorded By, (t) = Taken By, (c) =  Cosigned By      Initials Name Effective Dates    TM Jyotsna Aguayo 06/16/21 -                     EDUCATION  The patient has been educated in the following areas:   Dysphagia (Swallowing Impairment) Oral Care/Hydration Modified Diet Instruction.              Time Calculation:    Time Calculation- SLP       Row Name 10/13/23 1029             Time Calculation- SLP    SLP Start Time 0940  -TM      SLP Stop Time 0952  -TM      SLP Time Calculation (min) 12 min  -TM      SLP Received On 10/13/23  -TM         Untimed Charges    18744-GI Treatment Swallow Minutes 12  -TM         Total Minutes    Untimed Charges Total Minutes 12  -TM       Total Minutes 12  -TM                User Key  (r) = Recorded By, (t) = Taken By, (c) = Cosigned By      Initials Name Provider Type     Jyotsna Aguayo Speech and Language Pathologist                    Therapy Charges for Today       Code Description Service Date Service Provider Modifiers Qty    05224468222 HC ST EVAL ORAL PHARYNG SWALLOW 4 10/12/2023 Jyotsna Aguayo GN 1    86450453215 HC ST TREATMENT SWALLOW 1 10/13/2023 Jyotsna Aguayo 1                 Jyotsna Aguayo  10/13/2023

## 2023-10-14 VITALS
HEIGHT: 68 IN | SYSTOLIC BLOOD PRESSURE: 129 MMHG | DIASTOLIC BLOOD PRESSURE: 75 MMHG | OXYGEN SATURATION: 96 % | TEMPERATURE: 97.7 F | HEART RATE: 54 BPM | BODY MASS INDEX: 28.2 KG/M2 | RESPIRATION RATE: 18 BRPM | WEIGHT: 186.07 LBS

## 2023-10-14 LAB
ACINETOBACTER SCREEN CX: NORMAL
ANION GAP SERPL CALCULATED.3IONS-SCNC: 11.9 MMOL/L (ref 5–15)
BUN SERPL-MCNC: 9 MG/DL (ref 8–23)
BUN/CREAT SERPL: 23.1 (ref 7–25)
CALCIUM SPEC-SCNC: 8.7 MG/DL (ref 8.6–10.5)
CHLORIDE SERPL-SCNC: 97 MMOL/L (ref 98–107)
CO2 SERPL-SCNC: 19.1 MMOL/L (ref 22–29)
CREAT SERPL-MCNC: 0.39 MG/DL (ref 0.57–1)
DEPRECATED RDW RBC AUTO: 43.1 FL (ref 37–54)
EGFRCR SERPLBLD CKD-EPI 2021: 112.8 ML/MIN/1.73
ERYTHROCYTE [DISTWIDTH] IN BLOOD BY AUTOMATED COUNT: 13.4 % (ref 12.3–15.4)
GLUCOSE BLDC GLUCOMTR-MCNC: 168 MG/DL (ref 70–130)
GLUCOSE SERPL-MCNC: 176 MG/DL (ref 65–99)
HCT VFR BLD AUTO: 37.3 % (ref 34–46.6)
HGB BLD-MCNC: 12.1 G/DL (ref 12–15.9)
MCH RBC QN AUTO: 28.8 PG (ref 26.6–33)
MCHC RBC AUTO-ENTMCNC: 32.4 G/DL (ref 31.5–35.7)
MCV RBC AUTO: 88.8 FL (ref 79–97)
PLATELET # BLD AUTO: 203 10*3/MM3 (ref 140–450)
PMV BLD AUTO: 9.7 FL (ref 6–12)
POTASSIUM SERPL-SCNC: 3.9 MMOL/L (ref 3.5–5.2)
RBC # BLD AUTO: 4.2 10*6/MM3 (ref 3.77–5.28)
SODIUM SERPL-SCNC: 128 MMOL/L (ref 136–145)
VRE SPEC QL CULT: NORMAL
WBC NRBC COR # BLD: 8.23 10*3/MM3 (ref 3.4–10.8)

## 2023-10-14 PROCEDURE — 80048 BASIC METABOLIC PNL TOTAL CA: CPT | Performed by: NURSE PRACTITIONER

## 2023-10-14 PROCEDURE — 63710000001 INSULIN ASPART PER 5 UNITS: Performed by: FAMILY MEDICINE

## 2023-10-14 PROCEDURE — 96376 TX/PRO/DX INJ SAME DRUG ADON: CPT

## 2023-10-14 PROCEDURE — G0378 HOSPITAL OBSERVATION PER HR: HCPCS

## 2023-10-14 PROCEDURE — 85027 COMPLETE CBC AUTOMATED: CPT | Performed by: NURSE PRACTITIONER

## 2023-10-14 PROCEDURE — 82948 REAGENT STRIP/BLOOD GLUCOSE: CPT

## 2023-10-14 RX ORDER — CARVEDILOL 12.5 MG/1
TABLET ORAL
Start: 2023-10-14

## 2023-10-14 RX ORDER — GABAPENTIN 300 MG/1
300 CAPSULE ORAL 2 TIMES DAILY
Qty: 10 CAPSULE | Refills: 0 | Status: SHIPPED | OUTPATIENT
Start: 2023-10-14

## 2023-10-14 RX ORDER — PANTOPRAZOLE SODIUM 40 MG/1
40 TABLET, DELAYED RELEASE ORAL DAILY
Start: 2023-10-14

## 2023-10-14 RX ORDER — DIVALPROEX SODIUM 125 MG/1
500 CAPSULE, COATED PELLETS ORAL 3 TIMES DAILY
Status: DISCONTINUED | OUTPATIENT
Start: 2023-10-14 | End: 2023-10-14 | Stop reason: HOSPADM

## 2023-10-14 RX ADMIN — Medication 10 ML: at 08:45

## 2023-10-14 RX ADMIN — ARIPIPRAZOLE 30 MG: 5 TABLET ORAL at 08:43

## 2023-10-14 RX ADMIN — INSULIN ASPART 5 UNITS: 100 INJECTION, SOLUTION INTRAVENOUS; SUBCUTANEOUS at 08:26

## 2023-10-14 RX ADMIN — GABAPENTIN 300 MG: 300 CAPSULE ORAL at 08:41

## 2023-10-14 RX ADMIN — DIVALPROEX SODIUM 500 MG: 125 CAPSULE, COATED PELLETS ORAL at 09:44

## 2023-10-14 RX ADMIN — LISINOPRIL 5 MG: 10 TABLET ORAL at 08:42

## 2023-10-14 RX ADMIN — PANTOPRAZOLE SODIUM 40 MG: 40 INJECTION, POWDER, FOR SOLUTION INTRAVENOUS at 05:25

## 2023-10-14 RX ADMIN — Medication 10 ML: at 05:25

## 2023-10-14 RX ADMIN — ATORVASTATIN CALCIUM 20 MG: 20 TABLET, FILM COATED ORAL at 08:42

## 2023-10-14 NOTE — PLAN OF CARE
Goal Outcome Evaluation:         Pt appropriate for discharge per Lizeth Alisha, Education complete, belongings gathered. Pt returning to [facility].

## 2023-10-14 NOTE — CASE MANAGEMENT/SOCIAL WORK
Continued Stay Note  RYLEY Rees     Patient Name: Izabella Agudelo  MRN: [id number]  Today's Date: 10/14/2023    Admit Date: 10/11/2023    Plan: talked with [facility], stated can accept back over weekend if ready; call report to [phone] and fax d/c summary to [phone]; will let us know if changes from coming back ltc vs str; but at the moment coming back ltc   Discharge Plan       Row Name 10/14/23 1007       Plan    Final Discharge Disposition Code 03 - skilled nursing facility (SNF)                   Discharge Codes    No documentation.                 Expected Discharge Date and Time       Expected Discharge Date Expected Discharge Time    Oct 14, 2023               Emi Pena RN

## 2023-10-14 NOTE — PLAN OF CARE
Goal Outcome Evaluation:  Plan of Care Reviewed With: patient        Progress: no change  Outcome Evaluation: pleasant patient transfered from ICU to room #306-all patient's belongings brought with her-medications given per hospitalist's orders-Fariha CRENSHAW saw patient today-monitoir blood sugar with coverage per glucommander protocol-monitor labs and continue to monitor patient-bed alarm on at all times-possible discharge back to [facility]- per [facility] [place].

## 2023-10-14 NOTE — NURSING NOTE
2230: Dari RN contacted as receiving nurse. Report given, and all questions answered. Transferring pt to bed room #306 on current bed with plan to transfer to new bed in the room. Pt given instructions and news of moving rooms. Pt agreed and understood.

## 2023-10-14 NOTE — DISCHARGE SUMMARY
[facility]   DISCHARGE SUMMARY      Name:  Izabella Agudelo   Age:  63 y.o.  Sex:  female  :  1960  MRN:  [id number]   Visit Number:  [id number]    Admission Date:  10/11/2023  Date of Discharge:  10/14/2023  Primary Care Physician:  Fabian Santos, DO    Important issues to note:    -Patient admitted with altered mental status.  Upon admission patient appeared to be at baseline mentation per chart review.  -She was initiated on Rocephin for presumed pneumonia given chest x-ray findings.  However, procalcitonin remained negative and patient on room air.  -Questionable aspiration as her diet was transitioned to pur‚e/honey thickened per SLP recommendations during admission.  -Patient was initiated on Protonix for gastritis which was noted on CT which also recommended outpatient EGD.  -Hyponatremia noted at 128 which appears near baseline.  -Continue other medications per nursing home medication reconciliation.  Coreg was held due to bradycardia during admission.  -Return to the emergency department for any worsening symptoms.    Discharge Diagnoses:     Metabolic encephalopathy, POA  Hypomagnesemia, POA  Nausea/vomiting with gastritis, POA  Possible pneumonia, unable to specify further, POA  Vascular dementia  History of CVA with residual hemiplegia/ hemiparesis  Chronic aphasia/ dysphagia  Essential hypertension  Diabetes mellitus type 2  COPD without exacerbation    Problem List:     Active Hospital Problems    Diagnosis  POA    **Altered mental status, unspecified [R41.82]  Yes      Resolved Hospital Problems   No resolved problems to display.     Presenting Problem:    Chief Complaint   Patient presents with    Altered Mental Status      Consults:     Consulting Physician(s)                     None              Procedures Performed:        History of presenting illness/Hospital Course:    Patient is a 63 year old female local nursing home resident with a past medical  history of dementia, CVA with residual right sided hemiparesis, essential hypertension, aphasia/dysphagia secondary to CVA, diabetes mellitus type 2, COPD who was brought in to this facility by EMS 10/11/2023 from the nursing home with a chief complaint of altered mental status.  Patient with baseline of dementia and unable to provide detailed history, she was awake and alert and oriented to her name and year of birth only.  No family at bedside.  History was obtained through ER report.  Patient has been altered over the past couple days and her mental status had been worsening.  Patient glucose level was running around 400 when tested at the nursing home. Patient had 2 episodes of vomiting while being worked up in the ER.     On ER evaluation, patient was hypertensive with a blood pressure of 166/103, was otherwise afebrile and on room air. Her work-up was significant for HS Troponin of 21 (likely demand ischemia) proBNP of 1531, sodium 129, glucose 235, magnesium 1.5, lactate 3.0, WBC 12.1.  Urinalysis negative for nitrates, positive for leukocytes with WBC 6-12 and bacteria 2+.  Chest x-ray with new patchy left airspace disease may represent atelectasis versus infiltrates.  CT head without contrast showed atrophy and changes most consistent with chronic microvascular ischemia. CT abdomen pelvis without contrast showed moderate distal esophageal wall thickening, likely consistent with esophagitis. Consider follow up EGD on an outpatient basis. Moderate gas and fluid distention stomach without evidence of obstructing lesion.  Consider further evaluation for gastroparesis with nuclear medicine gastric emptying study. Patient received Zofran and Rocephin while in the ER.  Hospitalist consulted for admission, further evaluation and treatment.      Admitted to the hospital for further care.  Evaluated by speech therapy and recommended 1. pureed diet with honey-thick liq as rani, 2. meds in pudding/applesauce, as rani, 3.  aspiration precautions, 4. pt. will need feeding assistance and monitoring for diet rani., 5. reflux precuations.  Patient more interactive with staff and appears to be at baseline.  Patient received 4 doses of IV Rocephin for presumed pneumonia.  Procalcitonin negative upon admission with patient on room air.  Blood and urine cultures remain negative.  Hyponatremia near baseline at 128.  Would advise outpatient work-up with EGD to evaluate symptoms further as vomiting and abnormal CT noted with prior presentations as well.  If vomiting recurrent would consider adding Reglan.  Protonix added during admission.  Otherwise patient with reassuring labs and vitals and baseline mentation.  Patient will be discharged back to [facility] and rehab today.    Vital Signs:    Temp:  [97.6 øF (36.4 øC)-98.6 øF (37 øC)] 97.7 øF (36.5 øC)  Heart Rate:  [48-62] 54  Resp:  [18-22] 18  BP: (114-143)/(61-81) 129/75    Physical Exam:    General Appearance:  Alert-aphasic.  Chronically ill middle-aged female.   Head:  Atraumatic and normocephalic.   Eyes: Conjunctivae and sclerae normal, no icterus. No pallor.   Ears:  Ears with no abnormalities noted.   Throat: No oral lesions, no thrush, oral mucosa moist.   Neck: Supple, trachea midline, no thyromegaly.   Back:   No kyphoscoliosis present. No tenderness to palpation.   Lungs:   Breath sounds heard bilaterally equally.  No crackles or wheezing. No Pleural rub or bronchial breathing.  On room air unlabored.   Heart:  Normal S1 and S2, no murmur, no gallop, no rub. No JVD.   Abdomen:   Normal bowel sounds, no masses, no organomegaly. Soft, nontender, nondistended, no rebound tenderness.   Extremities: Supple, no edema, no cyanosis, no clubbing.  Left hemiplegia.   Pulses: Pulses palpable bilaterally.   Skin: No bleeding or rash.   Neurologic: Alert and oriented to self and birth year.     Pertinent Lab Results:     Results from last 7 days   Lab Units 10/14/23  0539 10/13/23  0011  10/12/23  0319 10/11/23  1417   SODIUM mmol/L 128* 128* 128* 129*   POTASSIUM mmol/L 3.9 3.7 4.4 4.4   CHLORIDE mmol/L 97* 96* 94* 89*   CO2 mmol/L 19.1* 23.4 21.8* 25.6   BUN mg/dL 9 12 17 17   CREATININE mg/dL 0.39* 0.38* 0.40* 0.46*   CALCIUM mg/dL 8.7 8.2* 8.2* 9.5   BILIRUBIN mg/dL  --   --   --  0.5   ALK PHOS U/L  --   --   --  82   ALT (SGPT) U/L  --   --   --  20   AST (SGOT) U/L  --   --   --  23   GLUCOSE mg/dL 176* 172* 235* 235*     Results from last 7 days   Lab Units 10/14/23  0539 10/13/23  0514 10/12/23  0319   WBC 10*3/mm3 8.23 9.03 9.88   HEMOGLOBIN g/dL 12.1 12.2 12.7   HEMATOCRIT % 37.3 37.0 39.2   PLATELETS 10*3/mm3 203 243 272         Results from last 7 days   Lab Units 10/11/23  1417   HSTROP T ng/L 21*     Results from last 7 days   Lab Units 10/11/23  1417   PROBNP pg/mL 1,531.0*                 Results from last 7 days   Lab Units 10/11/23  1500   URINECX  >100,000 CFU/mL Mixed Ryanne Isolated       Pertinent Radiology Results:    Imaging Results (All)       Procedure Component Value Units Date/Time    CT Abdomen Pelvis Without Contrast [[id number]] Collected: 10/11/23 2240     Updated: 10/11/23 2241    Narrative:      FINAL REPORT    TECHNIQUE:  Axial images through the abdomen and pelvis were performed  without contrast. This study was performed with techniques to  keep radiation doses as low as reasonably achievable, (ALARA).  Individualized dose reduction techniques using automated  exposure control or adjustment of mA and/or kV according to the  patient's size were employed.    CLINICAL HISTORY:  Nausea and vomiting    FINDINGS:  ABDOMEN:  The heart size is normal.  The lung bases are clear.  There is distal esophageal wall thickening with moderate fluid and gaseous distention of stomach.  No obstructing lesion is evident in the duodenum or distal stomach.  There are minimal   diverticula in the sigmoid colon with no diverticulitis.   Liver appears unremarkable. The gallbladder is  normal. The spleen is normal. There is no biliary ductal dilatation. No adrenal mass is identified. The pancreas is normal.  There is no   nephrolithiasis.  There is no hydronephrosis.No non-contrast evidence of a solid renal mass.    The aorta is normal in caliber.  There is no significant free fluid or adenopathy.    No dilated loops of bowel. No free intraperitoneal air. No significant stranding seen in the mesentery.    PELVIS: The appendix is normal.  The urinary bladder is unremarkable. No pelvic mass. There is no significant free fluid or adenopathy.    BONES: No acute fracture. No aggressive appearing lytic or blastic lesion is seen.      Impression:      Moderate distal esophageal wall thickening, likely consistent with esophagitis. Consider follow up EGD on an outpatient basis.    Moderate gas and fluid distention stomach without evidence of obstructing lesion.  Consider further evaluation for gastroparesis with nuclear medicine gastric emptying study.    Authenticated and Electronically Signed by MD Cosby Richard on 10/11/2023 10:40:26 PM    CT Head Without Contrast [[id number]] Collected: 10/11/23 2237     Updated: 10/11/23 2238    Narrative:      FINAL REPORT    TECHNIQUE:  Axial images were performed through the brain.This study was  performed with techniques to keep radiation doses as low as  reasonably achievable, (ALARA). Individualized dose reduction  techniques using automated exposure control or adjustment of mA  and/or kV according to the patient's size were employed.    CLINICAL HISTORY:  Altered mental status    FINDINGS:  There is moderate global atrophy proportionate to the degree of hypoattenuation seen within the periventricular and subcortical white matter, which is nonspecific, but most likely consistent with chronic microvascular ischemia.  Consider MR as a clinical   outpatient.  The ventricles are normal in size for the degree of atrophy.  There is no extra-axial fluid or  midline shift.  There is no evidence of acute hemorrhage or mass.    Mastoid air cells and paranasal sinuses are clear.      Impression:      Atrophy and changes most consistent with chronic microvascular ischemia.    No acute intracranial process.    Authenticated and Electronically Signed by MD Harjeet,  Sanjay on 10/11/2023 10:37:39 PM    XR Chest 1 View [[id number]] Collected: 10/11/23 1502     Updated: 10/11/23 1513    Narrative:      PROCEDURE: XR CHEST 1 VW-        HISTORY: Weak/Dizzy/AMS triage protocol     COMPARISON: March 2021.     FINDINGS: The heart is mildly enlarged. The mediastinum is unremarkable.  There is mild interstitial disease bilaterally, similar to the prior  exam. New patchy left airspace disease may represent atelectasis versus  infiltrate. There is no pneumothorax. There are no acute osseous  abnormalities.       Impression:      New patchy left airspace disease may represent atelectasis  versus infiltrate. Continued follow-up is recommended.                       Images were reviewed, interpreted, and dictated by Dr. Arcelia Hall MD  Transcribed by Kayleigh Macedo PA-C.     This report was signed and finalized on 10/11/2023 3:11 PM by Arcelia Hall MD.               Echo:    Results for orders placed during the hospital encounter of 01/14/19    Adult Transthoracic Echo Complete W/ Cont if Necessary Per Protocol    Interpretation Summary  ú Left ventricular wall thickness is consistent with mild concentric hypertrophy.    Technically adequate study  1) Mild LVH with normal LV systolic function ( EF is over 55%)  2) Moderate bi atrial enlargement with mild elevation in LVedp  3) Calcified posterior leaflet of the mitral valve with mild MR  4) Sigmoid septum with dynamic narrowing of the aortic outflow in midsystolic - no gradient noted  5) Aortic sclerosis without stenosis  6) Mild TR with PAsp of 40 mm of hg  7) Mild Dilation of the RV with normal function    Condition on Discharge:       Stable.    Code status during the hospital stay:    Code Status and Medical Interventions:   Ordered at: 10/11/23 2246     Code Status (Patient has no pulse and is not breathing):    CPR (Attempt to Resuscitate)     Medical Interventions (Patient has pulse or is breathing):    Full Support     Discharge Disposition:    Skilled Nursing Facility (DC - External)    Discharge Medications:       Discharge Medications        New Medications        Instructions Start Date   pantoprazole 40 MG EC tablet  Commonly known as: Protonix   40 mg, Oral, Daily             Changes to Medications        Instructions Start Date   ARIPiprazole 20 MG tablet  Commonly known as: ABILIFY  What changed: how much to take   30 mg, Oral, Daily      carvedilol 12.5 MG tablet  Commonly known as: COREG  What changed: See the new instructions.   Hold for now.      divalproex 500 MG DR tablet  Commonly known as: DEPAKOTE  What changed: Another medication with the same name was removed. Continue taking this medication, and follow the directions you see here.   500 mg, Oral, 3 Times Daily             Continue These Medications        Instructions Start Date   acetaminophen 650 MG 8 hr tablet  Commonly known as: TYLENOL   650 mg, Oral, 4 Times Daily PRN      acetaminophen 650 MG suppository  Commonly known as: TYLENOL   650 mg, Rectal, Every 4 Hours PRN      aluminum-magnesium hydroxide-simethicone 200-200-20 MG/5ML suspension  Commonly known as: MAALOX/MYLANTA   20 mL, Oral, Every 8 Hours PRN      apixaban 5 MG tablet tablet  Commonly known as: ELIQUIS   5 mg, Oral, 2 Times Daily      atorvastatin 40 MG tablet  Commonly known as: LIPITOR   40 mg, Oral, Nightly      B-D UF III MINI PEN NEEDLES 31G X 5 MM misc  Generic drug: Insulin Pen Needle   USE 3 TIMES A DAY      Dulcolax 10 MG suppository  Generic drug: bisacodyl   10 mg, Rectal, Daily PRN      furosemide 20 MG tablet  Commonly known as: LASIX   20 mg, Oral, Daily      gabapentin 300 MG  capsule  Commonly known as: NEURONTIN   300 mg, Oral, 2 Times Daily      Insulin Aspart 100 UNIT/ML injection  Commonly known as: novoLOG   12 Units, Subcutaneous, 3 Times Daily Before Meals      insulin detemir 100 UNIT/ML injection  Commonly known as: LEVEMIR   30 Units, Subcutaneous, 2 Times Daily      lisinopril 5 MG tablet  Commonly known as: PRINIVIL,ZESTRIL   5 mg, Oral, Daily      mirtazapine 15 MG tablet  Commonly known as: REMERON   15 mg, Oral, Nightly      ondansetron ODT 4 MG disintegrating tablet  Commonly known as: ZOFRAN-ODT   4 mg, Translingual, Every 6 Hours PRN      ONE TOUCH ULTRA TEST test strip  Generic drug: glucose blood   TEST 2 TIMES A DAY      OneTouch Delica Lancets 33G misc   TEST TWICE A DAY      senna 8.6 MG tablet  Commonly known as: SENOKOT   1 tablet, Oral, 2 Times Daily PRN      travoprost (ZENOBIA free) 0.004 % solution ophthalmic solution  Commonly known as: TRAVATAN   1 drop, Both Eyes, Every Evening, in affected eye(s)      vitamin D 1.25 MG (22920 UT) capsule capsule  Commonly known as: ERGOCALCIFEROL   50,000 Units, Oral, Weekly, On Thursdays             Stop These Medications      acarbose 25 MG tablet  Commonly known as: PRECOSE     Lantus SoloStar 100 UNIT/ML injection pen  Generic drug: Insulin Glargine     metFORMIN 500 MG tablet  Commonly known as: GLUCOPHAGE     promethazine 25 MG/ML injection  Commonly known as: PHENERGAN     simvastatin 40 MG tablet  Commonly known as: ZOCOR            Discharge Diet:     Diet Instructions       Diet: Cardiac Diets, Diabetic Diets; Healthy Heart (2-3 Na+); Pureed (NDD 1); Honey Thick; Consistent Carbohydrate; Feeding Assistance - Nursing      Discharge Diet:  Cardiac Diets  Diabetic Diets       Cardiac Diet: Healthy Heart (2-3 Na+)    Texture: Pureed (NDD 1)    Fluid Consistency: Honey Thick    Diabetic Diet: Consistent Carbohydrate    Diet Modifiers / Additional Diets: Feeding Assistance - Nursing          Activity at Discharge:      Activity Instructions       Activity as Tolerated            Follow-up Appointments:     Contact information for follow-up providers       Fabian Santos DO .    Specialty: Family Medicine  Contact information:  [address]  [place] [address] [phone]                       Contact information for after-discharge care       Destination       [facility], [facility] .    Service: Intermediate Care  Contact information:  [address] [place] [facility] [address] [phone]                                 No future appointments.  Test Results Pending at Discharge:    Pending Labs       Order Current Status    VRE Culture - Swab, Per Rectum Preliminary result               Lizeth Brito, APRN  10/14/23  08:59 EDT    Time: I spent 50 minutes on this discharge activity which included: face-to-face encounter with the patient, reviewing the data in the system, coordination of the care with the nursing staff as well as consultants, documentation, and entering orders.     Dictated utilizing Dragon dictation.

## 2023-10-14 NOTE — DISCHARGE INSTRUCTIONS
Patient to be transferred back to [facility] and rehab.  Continue to hold Coreg due to bradycardia.  Continue to monitor sodium levels 128 upon discharge.  Return to emergency department for any worsening symptoms.

## 2023-10-16 NOTE — PROGRESS NOTES
Nursing Home Progress Note        Fabian Santos DO [x]  [facility] []  [address] [place] [address]  Phone: [phone]  Fax: [phone] Yohan Cuellar MD []  Chaz Mandel DO []  [address] [place] [address]  Phone: [phone]  Fax: [phone]     PATIENT NAME: Izabella Agudelo                                                                          YOB: 1960           DATE OF SERVICE: 6/21/2023  FACILITY: []  [facility]  [] [facility]  [x]  [facility]  [] [facility]  []  Other ______________________________________________________________________      CHIEF COMPLAINT:  Impaired mobility and ADL/paranoid schizophrenia/diabetes mellitus treated with insulin/hypertension/dyslipidemia/diabetic amyotrophy      HISTORY OF PRESENT ILLNESS:   [x]  Follow Up visit for coordination of long term care issues and chronic medical management of Diagnoses and all orders for this visit:    1. Impaired mobility and ADLs (Primary)    2. Schizophrenia, chronic condition    3. Late effects of CVA (cerebrovascular accident)    4. 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 Essential hypertension    Patient remains receptive to supportive care for mobilization/transfer assistance, no new falls or injuries reported.  Nursing/staff reports no nutritional/hydration deficits.    No new neurological deficits.    No acute behavioral changes.    No reports of cyclical/persistent hypoglycemia.  Vital signs have been stable.    PAST MEDICAL & SURGICAL HISTORY:   Past Medical History:   Diagnosis Date    Acute angle-closure glaucoma, bilateral     Aphasia     Cardiac abnormality     CHF (congestive heart failure)     COPD (chronic obstructive pulmonary disease)     Diabetes     Glaucoma     Hemiparesis     Hemiplegia     Impaired functional mobility, balance, gait, and endurance     Schizophrenia, chronic condition      Vascular dementia       No past surgical history on file.      MEDICATIONS:  I have reviewed and reconciled the patients medication list in the patients chart at the skilled nursing facility today.      ALLERGIES:    Allergies   Allergen Reactions    Penicillins Rash         SOCIAL HISTORY:    Social History     Socioeconomic History    Marital status: Single   Tobacco Use    Smoking status: Never    Smokeless tobacco: Never   Vaping Use    Vaping Use: Never used   Substance and Sexual Activity    Alcohol use: No    Drug use: Never    Sexual activity: Defer       FAMILY HISTORY:    Family History   Problem Relation Age of Onset    Diabetes Other     Glaucoma Other        REVIEW OF SYSTEMS:    Review of Systems  Appetite: Fair []   Good [x]   Poor []   Weight Loss []  [x]  Weight Stable   Unavoidable Weight Loss []  Tolerating Tube Feeding []    Supplements Provided []   Constitutional: Negative for fever, chills, diaphoresis or fatigue and weight change.  Patient is interactive but a poor historian.  HENT: No dysphagia; no changes to vision/hearing/smell/taste; no epistaxis  Eyes: Negative for redness and visual disturbance.   Respiratory: Negative for shortness of breath, chest pain, cough or chest tightness.   Cardiovascular: Negative for chest pain and palpitations.   Gastrointestinal: Negative for abdominal distention, abdominal pain and blood in stool.   Endocrine: Negative for cold intolerance and heat intolerance.   Genitourinary: Negative for difficulty urinating, dysuria and frequency.Negative for hematuria   Musculoskeletal: Chronic myalgias and arthralgias.  Worsened lumbago as per above.  Integumentary: No open wounds, rash or concerning skin lesions  Neurological: Negative for syncope, weakness and headaches.   Hematological: Negative for adenopathy. Does not bruise/bleed easily.   Immunological: Negative for reported allergies or immunological disorders  Psychological: Chronic behavioral issues, no  acute changes.    PHYSICAL EXAMINATION:   VITAL SIGNS:   Vitals:    06/21/23 0956   BP: 137/72   Pulse: 72   Resp: 18   Temp: 98 °F (36.7 °C)   SpO2: 98%       Physical Exam    General Appearance:  [x]  Alert   [x]  Oriented x person  [x]  No acute distress     [x]  Confused, chronically []  Disoriented   []  Comatose   Head:  Atraumatic and normocephalic, without obvious abnormality.   Eyes:         PERRLA, conjunctivae and sclerae normal, no Icterus. No pallor. Extra-occular movements are within normal limits.   Ears:  Ears appear intact with no abnormalities noted.   Throat: No oral lesions, no thrush, oral mucosa moist.   Neck: Supple, trachea midline, no thyromegaly, no carotid bruit.   Back:   No kyphoscoliosis. No tenderness to palpation.   Lungs:   Chest shape is normal. Breath sounds heard bilaterally equally.  No wheezing.  Audible air exchange noted all lung fields.   Heart:  Normal S1 and S2, no murmur, no gallop, no rub. No JVD.   Abdomen:   Normal bowel sounds, no masses, no organomegaly. Soft, non-tender, non-distended, no guarding.  No CVA tenderness.   Extremities: Moves all extremities, without edema, cyanosis or clubbing.  Frail build.   Poor core strength and stability.   Pulses: Pulses palpable and equal bilaterally.   Skin: Generalized dry skin noted.  Age-related atrophy of skin.   Neurologic: [x] Normal speech []  Normal mental status    [x] Cranial nerves II through XII intact   [x]  No anosmia [x]  DTR 2+ [x]  Proprioception intact  [x]  Chronic motor/sensory deficits      Psych/Mood:                    [x]  No acute changes, flat affect[]  Depressed      Urinary:      [x]  Continent  [x]  Incontinent, at times[]  Retention  []  F/C     []  UTI w/treatment in progress         ASSESSMENT     Diagnoses and all orders for this visit:    1. Impaired mobility and ADLs (Primary)    2. Schizophrenia, chronic condition    3. Late effects of CVA (cerebrovascular accident)    4. Controlled type 2  diabetes mellitus with diabetic amyotrophy, with long-term current use of insulin    5. Essential hypertension          PLAN  Assist ADLs as needed, as well as supportive care for mobilization/transfer assistance.  Continue to follow nutritional/hydration status, no deficits reported at this time.    Continue current mood stabilizer treatment regimen.    No new neurological deficits.    Avoid prolonged fasting periods as best able.  Continue blood glucose monitoring.  Further changes to treatment regimen made in an effort to maximize blood glucose control and minimize hypoglycemic episodes.    Vital signs demonstrate hemodynamic stability.  Blood pressure is at goal.    Surveillance labs when needed.    [x]  Discussed Patient in detail with nursing/staff, addressed all needs today.     [x]  Plan of Care Reviewed   []  PT/OT Reviewed   []  Order Changes  []  Discharge Plans Reviewed   []  Code Status Changes      I spent 30 minutes caring for Izabella on this date of service. This time includes time spent by me in the following activities:preparing for the visit, performing a medically appropriate examination and/or evaluation , counseling and educating the patient/family/caregiver, ordering medications, tests, or procedures, documenting information in the medical record, and care coordination    I have reviewed and updated all copied forward information, as appropriate.  I attest to the accuracy and relevance of any unchanged information.       Fabian Santos DO  6/21/2023

## 2023-10-18 ENCOUNTER — NURSING HOME (OUTPATIENT)
Dept: FAMILY MEDICINE CLINIC | Facility: CLINIC | Age: 63
End: 2023-10-18
Payer: MEDICARE

## 2023-10-18 VITALS
HEART RATE: 70 BPM | BODY MASS INDEX: 27.92 KG/M2 | OXYGEN SATURATION: 97 % | TEMPERATURE: 98 F | DIASTOLIC BLOOD PRESSURE: 75 MMHG | SYSTOLIC BLOOD PRESSURE: 129 MMHG | WEIGHT: 183.6 LBS | RESPIRATION RATE: 18 BRPM

## 2023-10-18 DIAGNOSIS — F01.518 VASCULAR DEMENTIA WITH OTHER BEHAVIORAL DISTURBANCE, UNSPECIFIED DEMENTIA SEVERITY: ICD-10-CM

## 2023-10-18 DIAGNOSIS — Z78.9 IMPAIRED MOBILITY AND ADLS: Primary | ICD-10-CM

## 2023-10-18 DIAGNOSIS — Z74.09 IMPAIRED MOBILITY AND ADLS: Primary | ICD-10-CM

## 2023-10-18 DIAGNOSIS — F20.9 SCHIZOPHRENIA, CHRONIC CONDITION: ICD-10-CM

## 2023-10-18 DIAGNOSIS — R13.12 OROPHARYNGEAL DYSPHAGIA: ICD-10-CM

## 2023-10-18 DIAGNOSIS — K21.9 GERD WITHOUT ESOPHAGITIS: ICD-10-CM

## 2023-10-18 DIAGNOSIS — Z79.4 CONTROLLED TYPE 2 DIABETES MELLITUS WITH DIABETIC AMYOTROPHY, WITH LONG-TERM CURRENT USE OF INSULIN: ICD-10-CM

## 2023-10-18 DIAGNOSIS — I69.90 LATE EFFECTS OF CVA (CEREBROVASCULAR ACCIDENT): ICD-10-CM

## 2023-10-18 DIAGNOSIS — I10 ESSENTIAL HYPERTENSION: ICD-10-CM

## 2023-10-18 DIAGNOSIS — E11.44 CONTROLLED TYPE 2 DIABETES MELLITUS WITH DIABETIC AMYOTROPHY, WITH LONG-TERM CURRENT USE OF INSULIN: ICD-10-CM

## 2023-10-18 NOTE — LETTER
Nursing Home Progress Note        Fabian Santos DO [x]  [facility] []  [address] [facility], [place] [address]  Phone: [phone]  Fax: [phone] Yohan Cuellar MD []  Chaz Mandel DO []  [address] [place] [address]  Phone: [phone]  Fax: [phone]     PATIENT NAME: Izabella Agudelo                                                                          YOB: 1960           DATE OF SERVICE: 10/18/2023  FACILITY: []  [facility]  [] [facility]  [x]  [facility]  [] [facility]  []  Other ______________________________________________________________________      CHIEF COMPLAINT:  Impaired mobility and ADL/paranoid schizophrenia/diabetes mellitus treated with insulin/hypertension/dyslipidemia/diabetic amyotrophy      HISTORY OF PRESENT ILLNESS:   [x]  Follow Up visit for coordination of long term care issues and chronic medical management of Diagnoses and all orders for this visit:    1. Impaired mobility and ADLs (Primary)    2. Late effects of CVA (cerebrovascular accident)    3. Schizophrenia, chronic condition    4. 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 Essential hypertension    6. GERD without esophagitis    7. Vascular dementia with other behavioral disturbance, unspecified dementia severity    8. Oropharyngeal dysphagia    Patient was recently hospitalized secondary to gastritis and altered mental status, felt to be multifactorial.  Has a known history of mild aphasia/oropharyngeal dysphagia from past CVA, as well as vascular dementia.  Known history of diabetes mellitus additionally.    She has done well since readmission from hospital.  Started on PPI therapy as result of her gastritis.  Blood glucose control has been much improved additionally.  Mentation is back to midline.    She has demonstrated no increased work of breathing.  Dietary modifications have been placed due to  dysphagia.  PAST MEDICAL & SURGICAL HISTORY:   Past Medical History:   Diagnosis Date    Acute angle-closure glaucoma, bilateral     Aphasia     Cardiac abnormality     CHF (congestive heart failure)     COPD (chronic obstructive pulmonary disease)     Diabetes     Glaucoma     Hemiparesis     Hemiplegia     Impaired functional mobility, balance, gait, and endurance     Schizophrenia, chronic condition     Vascular dementia       No past surgical history on file.      MEDICATIONS:  I have reviewed and reconciled the patients medication list in the patients chart at the skilled nursing facility today.      ALLERGIES:    Allergies   Allergen Reactions    Penicillins Rash         SOCIAL HISTORY:    Social History     Socioeconomic History    Marital status: Single   Tobacco Use    Smoking status: Never    Smokeless tobacco: Never   Vaping Use    Vaping Use: Never used   Substance and Sexual Activity    Alcohol use: No    Drug use: Never    Sexual activity: Defer       FAMILY HISTORY:    Family History   Problem Relation Age of Onset    Diabetes Other     Glaucoma Other        REVIEW OF SYSTEMS:    Review of Systems  Appetite: Fair []   Good [x]   Poor []   Weight Loss []  [x]  Weight Stable   Unavoidable Weight Loss []  Tolerating Tube Feeding []    Supplements Provided []   Constitutional: Negative for fever, chills, diaphoresis or fatigue and weight change.  Patient is interactive but a poor historian.  HENT: No dysphagia; no changes to vision/hearing/smell/taste; no epistaxis  Eyes: Negative for redness and visual disturbance.   Respiratory: Occasional cough, no hemoptysis.  Cardiovascular: Negative for chest pain and palpitations.   Gastrointestinal: Negative for abdominal distention, abdominal pain and blood in stool.   Endocrine: Negative for cold intolerance and heat intolerance.   Genitourinary: Negative for difficulty urinating, dysuria and frequency.Negative for hematuria   Musculoskeletal: Chronic myalgias  and arthralgias.  Worsened lumbago as per above.  Integumentary: No open wounds, rash or concerning skin lesions  Neurological: Negative for syncope, weakness and headaches.   Hematological: Negative for adenopathy. Does not bruise/bleed easily.   Immunological: Negative for reported allergies or immunological disorders  Psychological: Chronic behavioral issues, no acute changes.    PHYSICAL EXAMINATION:   VITAL SIGNS:   Vitals:    10/18/23 0839   BP: 129/75   Pulse: 70   Resp: 18   Temp: 98 °F (36.7 °C)   SpO2: 97%         Physical Exam    General Appearance:  [x]  Alert   [x]  Oriented x person  [x]  No acute distress     [x]  Confused, chronically []  Disoriented   []  Comatose   Head:  Atraumatic and normocephalic, without obvious abnormality.   Eyes:         PERRLA, conjunctivae and sclerae normal, no Icterus. No pallor. Extra-occular movements are within normal limits.   Ears:  Ears appear intact with no abnormalities noted.   Throat: No oral lesions, no thrush, oral mucosa moist.   Neck: Supple, trachea midline, no thyromegaly, no carotid bruit.   Back:   No kyphoscoliosis. No tenderness to palpation.   Lungs:   Chest shape is normal. Breath sounds heard bilaterally equally.  No wheezing.  Audible air exchange noted all lung fields.   Heart:  Normal S1 and S2, no murmur, no gallop, no rub. No JVD.   Abdomen:   Normal bowel sounds, no masses, no organomegaly. Soft, non-tender, non-distended, no guarding.  No CVA tenderness.   Extremities: Moves all extremities, without edema, cyanosis or clubbing.  Frail build.   Poor core strength and stability.   Pulses: Pulses palpable and equal bilaterally.   Skin: Generalized dry skin noted.  Age-related atrophy of skin.   Neurologic: [x] Normal speech []  Normal mental status    [x] Cranial nerves II through XII intact   [x]  No anosmia [x]  DTR 2+ [x]  Proprioception intact  [x]  Chronic motor/sensory deficits      Psych/Mood:                    [x]  No acute changes,  flat affect[]  Depressed      Urinary:      [x]  Continent  [x]  Incontinent, at times[]  Retention  []  F/C     []  UTI w/treatment in progress         ASSESSMENT     Diagnoses and all orders for this visit:    1. Impaired mobility and ADLs (Primary)    2. Late effects of CVA (cerebrovascular accident)    3. Schizophrenia, chronic condition    4. 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 Essential hypertension    6. GERD without esophagitis    7. Vascular dementia with other behavioral disturbance, unspecified dementia severity    8. Oropharyngeal dysphagia          PLAN  Plan continuation of supportive care for mobilization/transfer assistance, as well as ADLs of need.  Continue aspiration precautions, as well as dietary modifications.  Continue to follow nutritional/hydration status.    Continue PPI therapy.    Vital signs demonstrate hemodynamic stability, blood pressure is at goal.  No increased work of breathing noted.    No acute behavioral changes, back to baseline.    Avoid prolonged fasting periods as best able.  Continue blood glucose monitoring.    Surveillance labs when needed.    [x]  Discussed Patient in detail with nursing/staff, addressed all needs today.     [x]  Plan of Care Reviewed   []  PT/OT Reviewed   []  Order Changes  []  Discharge Plans Reviewed   []  Code Status Changes      I spent 30 minutes caring for Izabella on this date of service. This time includes time spent by me in the following activities:preparing for the visit, performing a medically appropriate examination and/or evaluation , counseling and educating the patient/family/caregiver, ordering medications, tests, or procedures, documenting information in the medical record, and care coordination    I have reviewed and updated all copied forward information, as appropriate.  I attest to the accuracy and relevance of any unchanged information.       Fabian Santos  DO  10/18/2023

## 2023-10-19 ENCOUNTER — NURSING HOME (OUTPATIENT)
Dept: FAMILY MEDICINE CLINIC | Facility: CLINIC | Age: 63
End: 2023-10-19
Payer: MEDICARE

## 2023-10-19 VITALS
WEIGHT: 183.6 LBS | HEART RATE: 74 BPM | DIASTOLIC BLOOD PRESSURE: 74 MMHG | TEMPERATURE: 97.8 F | SYSTOLIC BLOOD PRESSURE: 125 MMHG | OXYGEN SATURATION: 97 % | BODY MASS INDEX: 27.92 KG/M2 | RESPIRATION RATE: 20 BRPM

## 2023-10-19 DIAGNOSIS — R13.12 OROPHARYNGEAL DYSPHAGIA: ICD-10-CM

## 2023-10-19 DIAGNOSIS — Z74.09 IMPAIRED MOBILITY AND ADLS: ICD-10-CM

## 2023-10-19 DIAGNOSIS — Z91.89 AT HIGH RISK FOR ASPIRATION: ICD-10-CM

## 2023-10-19 DIAGNOSIS — Z87.01 HISTORY OF ASPIRATION PNEUMONIA: ICD-10-CM

## 2023-10-19 DIAGNOSIS — Z09 HOSPITAL DISCHARGE FOLLOW-UP: Primary | ICD-10-CM

## 2023-10-19 DIAGNOSIS — Z78.9 IMPAIRED MOBILITY AND ADLS: ICD-10-CM

## 2023-10-19 DIAGNOSIS — I69.90 LATE EFFECTS OF CVA (CEREBROVASCULAR ACCIDENT): ICD-10-CM

## 2023-10-19 NOTE — LETTER
Nursing Home Follow Up Note      Fabian Santos DO []   [facility] [x]  [address] [place] [address]  Phone: [phone]  Fax: [phone] Yohan Cuellar MD []    Chaz Mandel DO []   [address] [place] [address]  Phone: [phone]  Fax: [phone]     PATIENT NAME: Izabella Agudelo                                                                          YOB: 1960           DATE OF SERVICE: 10/19/2023  FACILITY:  [facility]   [facility]   [x] [facility]   [] [facility]   [] Other ______________________________________________________________________      CHIEF COMPLAINT:    Follow-up abnormal swallow study and recent hospitalization.    HISTORY OF PRESENT ILLNESS:     Patient with altered mental status and some respiratory distress on 10/11 and was sent to the hospital for further evaluation.  She was found to have some encephalopathy that had been caused by aspiration pneumonia.  Her diet was changed to purée and honey thick liquids in the hospital and she was treated with antibiotics.  Once stable she was discharged back to the facility here on 10/14.  Yesterday she had a swallow study here in the facility and was continued on on puréed diet and honey thick liquids due to dysphagia.  It was also recommended she be started on a PPI as was recommended in the discharge summary as well.    PAST MEDICAL & SURGICAL HISTORY:   Past Medical History:   Diagnosis Date    Acute angle-closure glaucoma, bilateral     Aphasia     Cardiac abnormality     CHF (congestive heart failure)     COPD (chronic obstructive pulmonary disease)     Diabetes     Glaucoma     Hemiparesis     Hemiplegia     Impaired functional mobility, balance, gait, and endurance     Schizophrenia, chronic condition     Vascular dementia       No past surgical history on file.      MEDICATIONS:  I have reviewed and reconciled the patients medication list in the patients chart at the [facility]  nursing facility today.      ALLERGIES:    Allergies   Allergen Reactions    Penicillins Rash         SOCIAL HISTORY:    Social History     Socioeconomic History    Marital status: Single   Tobacco Use    Smoking status: Never    Smokeless tobacco: Never   Vaping Use    Vaping Use: Never used   Substance and Sexual Activity    Alcohol use: No    Drug use: Never    Sexual activity: Defer       FAMILY HISTORY:    Family History   Problem Relation Age of Onset    Diabetes Other     Glaucoma Other        REVIEW OF SYSTEMS:    Review of Systems   Reason unable to perform ROS: ROS per nursing and patient.   Constitutional:  Negative for activity change, appetite change, chills, diaphoresis, fatigue, fever, unexpected weight gain and unexpected weight loss.   HENT:  Positive for trouble swallowing. Negative for congestion, mouth sores, nosebleeds and sore throat.    Respiratory:  Negative for cough, choking, chest tightness and shortness of breath.    Cardiovascular:  Negative for chest pain.   Gastrointestinal:  Negative for abdominal pain, constipation, diarrhea, nausea, rectal pain and vomiting.   Endocrine: Positive for polyphagia. Negative for polydipsia and polyuria.   Genitourinary:  Positive for urinary incontinence. Negative for decreased urine volume, difficulty urinating, dysuria, frequency and hematuria.   Musculoskeletal:  Positive for arthralgias (chronic). Negative for joint swelling and myalgias.   Skin:  Negative for color change and rash.   Neurological:  Positive for speech difficulty, weakness, memory problem and confusion. Negative for dizziness.   Psychiatric/Behavioral:  Positive for behavioral problems (intermittently). Negative for dysphoric mood, hallucinations, sleep disturbance and depressed mood. The patient is not nervous/anxious.          PHYSICAL EXAMINATION:   VITAL SIGNS:   Vitals:    10/19/23 1327   BP: 125/74   Pulse: 74   Resp: 20   Temp: 97.8 °F (36.6 °C)   SpO2: 97%   Weight: 83.3 kg  (183 lb 9.6 oz)       Physical Exam  Vitals and nursing note reviewed.   Constitutional:       General: She is not in acute distress.     Appearance: She is well-developed.   Eyes:      Conjunctiva/sclera: Conjunctivae normal.   Cardiovascular:      Rate and Rhythm: Normal rate and regular rhythm.      Heart sounds: Normal heart sounds.   Pulmonary:      Effort: Pulmonary effort is normal.      Breath sounds: Normal breath sounds. No wheezing or rales.   Abdominal:      General: Bowel sounds are normal. There is no distension.      Palpations: Abdomen is soft.      Tenderness: There is no abdominal tenderness.   Skin:     General: Skin is warm and dry.   Neurological:      Mental Status: She is alert. Mental status is at baseline. She is disoriented.      Gait: Gait abnormal.      Comments: Chronic NM deficits related to CVA   Psychiatric:         Mood and Affect: Mood normal. Affect is flat.         Behavior: Behavior normal.         Cognition and Memory: Cognition is impaired. Memory is impaired.         RECORDS REVIEW:   I have reviewed and interpreted the discharge summary and medications    ASSESSMENT     Diagnoses and all orders for this visit:    1. Hospital discharge follow-up (Primary)    2. At high risk for aspiration    3. Oropharyngeal dysphagia    4. History of aspiration pneumonia    5. Late effects of CVA (cerebrovascular accident)    6. Impaired mobility and ADLs          PLAN    Discharge summary reviewed from hospital admission.  Swallow study reviewed.    Dysphagia/history aspiration pneumonia/high risk aspiration/CVA  -Give Protonix granules p.o. daily.  Continue puréed diet and honey thick liquids.  Nursing to notify of any signs and symptoms of aspiration.  Will follow-up continue to monitor    Nursing encouraged to keep me informed of any acute changes, lack of improvement, or any new concerning symptoms.    Staff to continue supportive care for all ADLs.    [x]  Discussed Patient in detail  with nursing/staff, addressed all needs today.     [x]  Plan of Care Reviewed   [x]  PT/OT Reviewed   [x]  Order Changes  []  Discharge Plans Reviewed  [x]  Advance Directive on file with Nursing Home.   [x]  POA on file with Nursing Home.   [x]  Code Status listed: [x]  Full Code   []  DNR       “I confirm accuracy of unchanged data/findings which have been carried forward from previous visit, as well as I have updated appropriately those that have changed.”     I spent 35 minutes caring for Izabella on this date of service. This time includes time spent by me in the following activities:preparing for the visit, reviewing tests, obtaining and/or reviewing a separately obtained history, performing a medically appropriate examination and/or evaluation , counseling and educating the patient/family/caregiver, ordering medications, tests, or procedures, referring and communicating with other health care professionals , documenting information in the medical record, independently interpreting results and communicating that information with the patient/family/caregiver, and care coordination                                   [facility] Arreguin.

## 2023-10-20 NOTE — PROGRESS NOTES
Nursing Home Follow Up Note      Fabian Santos DO []   [facility] [x]  [address] [place] [address]  Phone: [phone]  Fax: [phone] Yohan Cuellar MD []    Chaz Mandel DO []   [address] [place] [address]  Phone: [phone]  Fax: [phone]     PATIENT NAME: Izabella Agudelo                                                                          YOB: 1960           DATE OF SERVICE: 10/19/2023  FACILITY:  [][facility]   [][facility]   [x] [facility]   [] [facility]   [] Other ______________________________________________________________________      CHIEF COMPLAINT:    Follow-up abnormal swallow study and recent hospitalization.    HISTORY OF PRESENT ILLNESS:     Patient with altered mental status and some respiratory distress on 10/11 and was sent to the hospital for further evaluation.  She was found to have some encephalopathy that had been caused by aspiration pneumonia.  Her diet was changed to purée and honey thick liquids in the hospital and she was treated with antibiotics.  Once stable she was discharged back to the facility here on 10/14.  Yesterday she had a swallow study here in the facility and was continued on on puréed diet and honey thick liquids due to dysphagia.  It was also recommended she be started on a PPI as was recommended in the discharge summary as well.    PAST MEDICAL & SURGICAL HISTORY:   Past Medical History:   Diagnosis Date    Acute angle-closure glaucoma, bilateral     Aphasia     Cardiac abnormality     CHF (congestive heart failure)     COPD (chronic obstructive pulmonary disease)     Diabetes     Glaucoma     Hemiparesis     Hemiplegia     Impaired functional mobility, balance, gait, and endurance     Schizophrenia, chronic condition     Vascular dementia       No past surgical history on file.      MEDICATIONS:  I have reviewed and reconciled the patients medication list in the patients chart at the [facility]  nursing facility today.      ALLERGIES:    Allergies   Allergen Reactions    Penicillins Rash         SOCIAL HISTORY:    Social History     Socioeconomic History    Marital status: Single   Tobacco Use    Smoking status: Never    Smokeless tobacco: Never   Vaping Use    Vaping Use: Never used   Substance and Sexual Activity    Alcohol use: No    Drug use: Never    Sexual activity: Defer       FAMILY HISTORY:    Family History   Problem Relation Age of Onset    Diabetes Other     Glaucoma Other        REVIEW OF SYSTEMS:    Review of Systems   Reason unable to perform ROS: ROS per nursing and patient.   Constitutional:  Negative for activity change, appetite change, chills, diaphoresis, fatigue, fever, unexpected weight gain and unexpected weight loss.   HENT:  Positive for trouble swallowing. Negative for congestion, mouth sores, nosebleeds and sore throat.    Respiratory:  Negative for cough, choking, chest tightness and shortness of breath.    Cardiovascular:  Negative for chest pain.   Gastrointestinal:  Negative for abdominal pain, constipation, diarrhea, nausea, rectal pain and vomiting.   Endocrine: Positive for polyphagia. Negative for polydipsia and polyuria.   Genitourinary:  Positive for urinary incontinence. Negative for decreased urine volume, difficulty urinating, dysuria, frequency and hematuria.   Musculoskeletal:  Positive for arthralgias (chronic). Negative for joint swelling and myalgias.   Skin:  Negative for color change and rash.   Neurological:  Positive for speech difficulty, weakness, memory problem and confusion. Negative for dizziness.   Psychiatric/Behavioral:  Positive for behavioral problems (intermittently). Negative for dysphoric mood, hallucinations, sleep disturbance and depressed mood. The patient is not nervous/anxious.          PHYSICAL EXAMINATION:   VITAL SIGNS:   Vitals:    10/19/23 1327   BP: 125/74   Pulse: 74   Resp: 20   Temp: 97.8 °F (36.6 °C)   SpO2: 97%   Weight: 83.3 kg  (183 lb 9.6 oz)       Physical Exam  Vitals and nursing note reviewed.   Constitutional:       General: She is not in acute distress.     Appearance: She is well-developed.   Eyes:      Conjunctiva/sclera: Conjunctivae normal.   Cardiovascular:      Rate and Rhythm: Normal rate and regular rhythm.      Heart sounds: Normal heart sounds.   Pulmonary:      Effort: Pulmonary effort is normal.      Breath sounds: Normal breath sounds. No wheezing or rales.   Abdominal:      General: Bowel sounds are normal. There is no distension.      Palpations: Abdomen is soft.      Tenderness: There is no abdominal tenderness.   Skin:     General: Skin is warm and dry.   Neurological:      Mental Status: She is alert. Mental status is at baseline. She is disoriented.      Gait: Gait abnormal.      Comments: Chronic NM deficits related to CVA   Psychiatric:         Mood and Affect: Mood normal. Affect is flat.         Behavior: Behavior normal.         Cognition and Memory: Cognition is impaired. Memory is impaired.         RECORDS REVIEW:   I have reviewed and interpreted the discharge summary and medications    ASSESSMENT     Diagnoses and all orders for this visit:    1. Hospital discharge follow-up (Primary)    2. At high risk for aspiration    3. Oropharyngeal dysphagia    4. History of aspiration pneumonia    5. Late effects of CVA (cerebrovascular accident)    6. Impaired mobility and ADLs          PLAN    Discharge summary reviewed from hospital admission.  Swallow study reviewed.    Dysphagia/history aspiration pneumonia/high risk aspiration/CVA  -Give Protonix granules p.o. daily.  Continue puréed diet and honey thick liquids.  Nursing to notify of any signs and symptoms of aspiration.  Will follow-up continue to monitor    Nursing encouraged to keep me informed of any acute changes, lack of improvement, or any new concerning symptoms.    Staff to continue supportive care for all ADLs.    [x]  Discussed Patient in detail  with nursing/staff, addressed all needs today.     [x]  Plan of Care Reviewed   [x]  PT/OT Reviewed   [x]  Order Changes  []  Discharge Plans Reviewed  [x]  Advance Directive on file with Nursing Home.   [x]  POA on file with Nursing Home.   [x]  Code Status listed: [x]  Full Code   []  DNR       “I confirm accuracy of unchanged data/findings which have been carried forward from previous visit, as well as I have updated appropriately those that have changed.”     I spent 35 minutes caring for Izabella on this date of service. This time includes time spent by me in the following activities:preparing for the visit, reviewing tests, obtaining and/or reviewing a separately obtained history, performing a medically appropriate examination and/or evaluation , counseling and educating the patient/family/caregiver, ordering medications, tests, or procedures, referring and communicating with other health care professionals , documenting information in the medical record, independently interpreting results and communicating that information with the patient/family/caregiver, and care coordination                                   [facility].

## 2023-10-23 PROBLEM — K21.9 GERD WITHOUT ESOPHAGITIS: Status: ACTIVE | Noted: 2023-10-23

## 2023-10-23 PROBLEM — F01.50 VASCULAR DEMENTIA: Status: ACTIVE | Noted: 2023-10-23

## 2023-10-23 PROBLEM — R13.12 OROPHARYNGEAL DYSPHAGIA: Status: ACTIVE | Noted: 2023-10-23

## 2023-10-23 NOTE — PROGRESS NOTES
Nursing Home Progress Note        Fabian Santos DO [x]  [facility] []  [address] [facility], [place] [address]  Phone: [phone]  Fax: [phone] Yohan Cuellar MD []  Chaz Mandel DO []  [address] [place] [address]  Phone: [phone]  Fax: [phone]     PATIENT NAME: Izabella Agudelo                                                                          YOB: 1960           DATE OF SERVICE: 10/18/2023  FACILITY: []  [facility]  [] [facility]  [x]  [facility]  [] [facility]  []  Other ______________________________________________________________________      CHIEF COMPLAINT:  Impaired mobility and ADL/paranoid schizophrenia/diabetes mellitus treated with insulin/hypertension/dyslipidemia/diabetic amyotrophy      HISTORY OF PRESENT ILLNESS:   [x]  Follow Up visit for coordination of long term care issues and chronic medical management of Diagnoses and all orders for this visit:    1. Impaired mobility and ADLs (Primary)    2. Late effects of CVA (cerebrovascular accident)    3. Schizophrenia, chronic condition    4. 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 Essential hypertension    6. GERD without esophagitis    7. Vascular dementia with other behavioral disturbance, unspecified dementia severity    8. Oropharyngeal dysphagia    Patient was recently hospitalized secondary to gastritis and altered mental status, felt to be multifactorial.  Has a known history of mild aphasia/oropharyngeal dysphagia from past CVA, as well as vascular dementia.  Known history of diabetes mellitus additionally.    She has done well since readmission from hospital.  Started on PPI therapy as result of her gastritis.  Blood glucose control has been much improved additionally.  Mentation is back to midline.    She has demonstrated no increased work of breathing.  Dietary modifications have been placed due to  dysphagia.  PAST MEDICAL & SURGICAL HISTORY:   Past Medical History:   Diagnosis Date    Acute angle-closure glaucoma, bilateral     Aphasia     Cardiac abnormality     CHF (congestive heart failure)     COPD (chronic obstructive pulmonary disease)     Diabetes     Glaucoma     Hemiparesis     Hemiplegia     Impaired functional mobility, balance, gait, and endurance     Schizophrenia, chronic condition     Vascular dementia       No past surgical history on file.      MEDICATIONS:  I have reviewed and reconciled the patients medication list in the patients chart at the skilled nursing facility today.      ALLERGIES:    Allergies   Allergen Reactions    Penicillins Rash         SOCIAL HISTORY:    Social History     Socioeconomic History    Marital status: Single   Tobacco Use    Smoking status: Never    Smokeless tobacco: Never   Vaping Use    Vaping Use: Never used   Substance and Sexual Activity    Alcohol use: No    Drug use: Never    Sexual activity: Defer       FAMILY HISTORY:    Family History   Problem Relation Age of Onset    Diabetes Other     Glaucoma Other        REVIEW OF SYSTEMS:    Review of Systems  Appetite: Fair []   Good [x]   Poor []   Weight Loss []  [x]  Weight Stable   Unavoidable Weight Loss []  Tolerating Tube Feeding []    Supplements Provided []   Constitutional: Negative for fever, chills, diaphoresis or fatigue and weight change.  Patient is interactive but a poor historian.  HENT: No dysphagia; no changes to vision/hearing/smell/taste; no epistaxis  Eyes: Negative for redness and visual disturbance.   Respiratory: Occasional cough, no hemoptysis.  Cardiovascular: Negative for chest pain and palpitations.   Gastrointestinal: Negative for abdominal distention, abdominal pain and blood in stool.   Endocrine: Negative for cold intolerance and heat intolerance.   Genitourinary: Negative for difficulty urinating, dysuria and frequency.Negative for hematuria   Musculoskeletal: Chronic myalgias  and arthralgias.  Worsened lumbago as per above.  Integumentary: No open wounds, rash or concerning skin lesions  Neurological: Negative for syncope, weakness and headaches.   Hematological: Negative for adenopathy. Does not bruise/bleed easily.   Immunological: Negative for reported allergies or immunological disorders  Psychological: Chronic behavioral issues, no acute changes.    PHYSICAL EXAMINATION:   VITAL SIGNS:   Vitals:    10/18/23 0839   BP: 129/75   Pulse: 70   Resp: 18   Temp: 98 °F (36.7 °C)   SpO2: 97%         Physical Exam    General Appearance:  [x]  Alert   [x]  Oriented x person  [x]  No acute distress     [x]  Confused, chronically []  Disoriented   []  Comatose   Head:  Atraumatic and normocephalic, without obvious abnormality.   Eyes:         PERRLA, conjunctivae and sclerae normal, no Icterus. No pallor. Extra-occular movements are within normal limits.   Ears:  Ears appear intact with no abnormalities noted.   Throat: No oral lesions, no thrush, oral mucosa moist.   Neck: Supple, trachea midline, no thyromegaly, no carotid bruit.   Back:   No kyphoscoliosis. No tenderness to palpation.   Lungs:   Chest shape is normal. Breath sounds heard bilaterally equally.  No wheezing.  Audible air exchange noted all lung fields.   Heart:  Normal S1 and S2, no murmur, no gallop, no rub. No JVD.   Abdomen:   Normal bowel sounds, no masses, no organomegaly. Soft, non-tender, non-distended, no guarding.  No CVA tenderness.   Extremities: Moves all extremities, without edema, cyanosis or clubbing.  Frail build.   Poor core strength and stability.   Pulses: Pulses palpable and equal bilaterally.   Skin: Generalized dry skin noted.  Age-related atrophy of skin.   Neurologic: [x] Normal speech []  Normal mental status    [x] Cranial nerves II through XII intact   [x]  No anosmia [x]  DTR 2+ [x]  Proprioception intact  [x]  Chronic motor/sensory deficits      Psych/Mood:                    [x]  No acute changes,  flat affect[]  Depressed      Urinary:      [x]  Continent  [x]  Incontinent, at times[]  Retention  []  F/C     []  UTI w/treatment in progress         ASSESSMENT     Diagnoses and all orders for this visit:    1. Impaired mobility and ADLs (Primary)    2. Late effects of CVA (cerebrovascular accident)    3. Schizophrenia, chronic condition    4. 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 Essential hypertension    6. GERD without esophagitis    7. Vascular dementia with other behavioral disturbance, unspecified dementia severity    8. Oropharyngeal dysphagia          PLAN  Plan continuation of supportive care for mobilization/transfer assistance, as well as ADLs of need.  Continue aspiration precautions, as well as dietary modifications.  Continue to follow nutritional/hydration status.    Continue PPI therapy.    Vital signs demonstrate hemodynamic stability, blood pressure is at goal.  No increased work of breathing noted.    No acute behavioral changes, back to baseline.    Avoid prolonged fasting periods as best able.  Continue blood glucose monitoring.    Surveillance labs when needed.    [x]  Discussed Patient in detail with nursing/staff, addressed all needs today.     [x]  Plan of Care Reviewed   []  PT/OT Reviewed   []  Order Changes  []  Discharge Plans Reviewed   []  Code Status Changes      I spent 30 minutes caring for Izabella on this date of service. This time includes time spent by me in the following activities:preparing for the visit, performing a medically appropriate examination and/or evaluation , counseling and educating the patient/family/caregiver, ordering medications, tests, or procedures, documenting information in the medical record, and care coordination    I have reviewed and updated all copied forward information, as appropriate.  I attest to the accuracy and relevance of any unchanged information.       Fabian Santos  DO  10/18/2023

## 2023-12-18 ENCOUNTER — HOSPITAL ENCOUNTER (INPATIENT)
Facility: HOSPITAL | Age: 63
LOS: 4 days | Discharge: NURSING, REHAB OR LONG-TERM CARE | DRG: 871 | End: 2023-12-22
Attending: EMERGENCY MEDICINE | Admitting: FAMILY MEDICINE
Payer: MEDICARE

## 2023-12-18 ENCOUNTER — APPOINTMENT (OUTPATIENT)
Dept: GENERAL RADIOLOGY | Facility: HOSPITAL | Age: 63
DRG: 871 | End: 2023-12-18
Payer: MEDICARE

## 2023-12-18 ENCOUNTER — APPOINTMENT (OUTPATIENT)
Dept: CT IMAGING | Facility: HOSPITAL | Age: 63
DRG: 871 | End: 2023-12-18
Payer: MEDICARE

## 2023-12-18 DIAGNOSIS — R41.82 ALTERED MENTAL STATUS, UNSPECIFIED ALTERED MENTAL STATUS TYPE: ICD-10-CM

## 2023-12-18 DIAGNOSIS — A41.9 SEPSIS, DUE TO UNSPECIFIED ORGANISM, UNSPECIFIED WHETHER ACUTE ORGAN DYSFUNCTION PRESENT: Primary | ICD-10-CM

## 2023-12-18 DIAGNOSIS — N39.0 URINARY TRACT INFECTION WITHOUT HEMATURIA, SITE UNSPECIFIED: ICD-10-CM

## 2023-12-18 DIAGNOSIS — R79.89 ELEVATED TROPONIN: ICD-10-CM

## 2023-12-18 DIAGNOSIS — N17.9 ACUTE RENAL FAILURE, UNSPECIFIED ACUTE RENAL FAILURE TYPE: ICD-10-CM

## 2023-12-18 LAB
A-A DO2: 30.7 MMHG
ALBUMIN SERPL-MCNC: 3.7 G/DL (ref 3.5–5.2)
ALBUMIN/GLOB SERPL: 1 G/DL
ALP SERPL-CCNC: 89 U/L (ref 39–117)
ALT SERPL W P-5'-P-CCNC: 63 U/L (ref 1–33)
ANION GAP SERPL CALCULATED.3IONS-SCNC: 16.3 MMOL/L (ref 5–15)
ARTERIAL PATENCY WRIST A: ABNORMAL
AST SERPL-CCNC: 43 U/L (ref 1–32)
ATMOSPHERIC PRESS: 734 MMHG
B PARAPERT DNA SPEC QL NAA+PROBE: NOT DETECTED
B PERT DNA SPEC QL NAA+PROBE: NOT DETECTED
BACTERIA UR QL AUTO: ABNORMAL /HPF
BASE EXCESS BLDA CALC-SCNC: 3.1 MMOL/L (ref 0–2)
BASOPHILS # BLD AUTO: 0.06 10*3/MM3 (ref 0–0.2)
BASOPHILS NFR BLD AUTO: 0.3 % (ref 0–1.5)
BDY SITE: ABNORMAL
BILIRUB SERPL-MCNC: 0.2 MG/DL (ref 0–1.2)
BILIRUB UR QL STRIP: NEGATIVE
BUN SERPL-MCNC: 49 MG/DL (ref 8–23)
BUN/CREAT SERPL: 23.6 (ref 7–25)
C PNEUM DNA NPH QL NAA+NON-PROBE: NOT DETECTED
CALCIUM SPEC-SCNC: 9.5 MG/DL (ref 8.6–10.5)
CHLORIDE SERPL-SCNC: 104 MMOL/L (ref 98–107)
CLARITY UR: ABNORMAL
CO2 SERPL-SCNC: 25.7 MMOL/L (ref 22–29)
COHGB MFR BLD: 1.3 % (ref 0–2)
COLOR UR: ABNORMAL
CREAT SERPL-MCNC: 2.08 MG/DL (ref 0.57–1)
D-LACTATE SERPL-SCNC: 4.5 MMOL/L (ref 0.5–2)
D-LACTATE SERPL-SCNC: 5.3 MMOL/L (ref 0.5–2)
DEPRECATED RDW RBC AUTO: 47.3 FL (ref 37–54)
EGFRCR SERPLBLD CKD-EPI 2021: 26.5 ML/MIN/1.73
EOSINOPHIL # BLD AUTO: 0 10*3/MM3 (ref 0–0.4)
EOSINOPHIL NFR BLD AUTO: 0 % (ref 0.3–6.2)
ERYTHROCYTE [DISTWIDTH] IN BLOOD BY AUTOMATED COUNT: 14.4 % (ref 12.3–15.4)
FLUAV SUBTYP SPEC NAA+PROBE: NOT DETECTED
FLUBV RNA ISLT QL NAA+PROBE: NOT DETECTED
GEN 5 2HR TROPONIN T REFLEX: 57 NG/L
GLOBULIN UR ELPH-MCNC: 3.7 GM/DL
GLUCOSE SERPL-MCNC: 300 MG/DL (ref 65–99)
GLUCOSE UR STRIP-MCNC: ABNORMAL MG/DL
HADV DNA SPEC NAA+PROBE: NOT DETECTED
HCO3 BLDA-SCNC: 27.8 MMOL/L (ref 22–28)
HCOV 229E RNA SPEC QL NAA+PROBE: NOT DETECTED
HCOV HKU1 RNA SPEC QL NAA+PROBE: NOT DETECTED
HCOV NL63 RNA SPEC QL NAA+PROBE: NOT DETECTED
HCOV OC43 RNA SPEC QL NAA+PROBE: NOT DETECTED
HCT VFR BLD AUTO: 47.3 % (ref 34–46.6)
HCT VFR BLD CALC: 46.4 %
HGB BLD-MCNC: 14.6 G/DL (ref 12–15.9)
HGB UR QL STRIP.AUTO: ABNORMAL
HMPV RNA NPH QL NAA+NON-PROBE: NOT DETECTED
HOLD SPECIMEN: NORMAL
HOLD SPECIMEN: NORMAL
HPIV1 RNA ISLT QL NAA+PROBE: NOT DETECTED
HPIV2 RNA SPEC QL NAA+PROBE: NOT DETECTED
HPIV3 RNA NPH QL NAA+PROBE: NOT DETECTED
HPIV4 P GENE NPH QL NAA+PROBE: NOT DETECTED
HYALINE CASTS UR QL AUTO: ABNORMAL /LPF
IMM GRANULOCYTES # BLD AUTO: 0.13 10*3/MM3 (ref 0–0.05)
IMM GRANULOCYTES NFR BLD AUTO: 0.7 % (ref 0–0.5)
INHALED O2 CONCENTRATION: 21 %
KETONES UR QL STRIP: NEGATIVE
LEUKOCYTE ESTERASE UR QL STRIP.AUTO: ABNORMAL
LYMPHOCYTES # BLD AUTO: 1.67 10*3/MM3 (ref 0.7–3.1)
LYMPHOCYTES NFR BLD AUTO: 8.4 % (ref 19.6–45.3)
Lab: ABNORMAL
M PNEUMO IGG SER IA-ACNC: NOT DETECTED
MAGNESIUM SERPL-MCNC: 2.5 MG/DL (ref 1.6–2.4)
MCH RBC QN AUTO: 27.9 PG (ref 26.6–33)
MCHC RBC AUTO-ENTMCNC: 30.9 G/DL (ref 31.5–35.7)
MCV RBC AUTO: 90.3 FL (ref 79–97)
METHGB BLD QL: 0 % (ref 0–1.5)
MODALITY: ABNORMAL
MONOCYTES # BLD AUTO: 1.46 10*3/MM3 (ref 0.1–0.9)
MONOCYTES NFR BLD AUTO: 7.4 % (ref 5–12)
NEUTROPHILS NFR BLD AUTO: 16.48 10*3/MM3 (ref 1.7–7)
NEUTROPHILS NFR BLD AUTO: 83.2 % (ref 42.7–76)
NITRITE UR QL STRIP: POSITIVE
NRBC BLD AUTO-RTO: 0 /100 WBC (ref 0–0.2)
OXYHGB MFR BLDV: 92.6 % (ref 94–99)
PCO2 BLDA: 41.7 MM HG (ref 35–45)
PCO2 TEMP ADJ BLD: ABNORMAL MM[HG]
PH BLDA: 7.43 PH UNITS (ref 7.35–7.45)
PH UR STRIP.AUTO: 5.5 [PH] (ref 5–8)
PH, TEMP CORRECTED: ABNORMAL
PLATELET # BLD AUTO: 337 10*3/MM3 (ref 140–450)
PMV BLD AUTO: 9.4 FL (ref 6–12)
PO2 BLDA: 66.4 MM HG (ref 75–100)
PO2 TEMP ADJ BLD: ABNORMAL MM[HG]
POTASSIUM SERPL-SCNC: 4.6 MMOL/L (ref 3.5–5.2)
PROCALCITONIN SERPL-MCNC: 0.77 NG/ML (ref 0–0.25)
PROT SERPL-MCNC: 7.4 G/DL (ref 6–8.5)
PROT UR QL STRIP: ABNORMAL
RBC # BLD AUTO: 5.24 10*6/MM3 (ref 3.77–5.28)
RBC # UR STRIP: ABNORMAL /HPF
REF LAB TEST METHOD: ABNORMAL
RHINOVIRUS RNA SPEC NAA+PROBE: NOT DETECTED
RSV RNA NPH QL NAA+NON-PROBE: NOT DETECTED
SAO2 % BLDCOA: 93.8 % (ref 94–100)
SARS-COV-2 RNA NPH QL NAA+NON-PROBE: NOT DETECTED
SODIUM SERPL-SCNC: 146 MMOL/L (ref 136–145)
SP GR UR STRIP: 1.02 (ref 1–1.03)
SQUAMOUS #/AREA URNS HPF: ABNORMAL /HPF
TROPONIN T DELTA: -57 NG/L
TROPONIN T SERPL HS-MCNC: 114 NG/L
UROBILINOGEN UR QL STRIP: ABNORMAL
VALPROATE SERPL-MCNC: 50 MCG/ML (ref 50–125)
VENTILATOR MODE: ABNORMAL
WBC # UR STRIP: ABNORMAL /HPF
WBC NRBC COR # BLD AUTO: 19.8 10*3/MM3 (ref 3.4–10.8)
WHOLE BLOOD HOLD COAG: NORMAL
WHOLE BLOOD HOLD SPECIMEN: NORMAL

## 2023-12-18 PROCEDURE — 0T9B70Z DRAINAGE OF BLADDER WITH DRAINAGE DEVICE, VIA NATURAL OR ARTIFICIAL OPENING: ICD-10-PCS | Performed by: EMERGENCY MEDICINE

## 2023-12-18 PROCEDURE — 93005 ELECTROCARDIOGRAM TRACING: CPT | Performed by: EMERGENCY MEDICINE

## 2023-12-18 PROCEDURE — 80053 COMPREHEN METABOLIC PANEL: CPT | Performed by: EMERGENCY MEDICINE

## 2023-12-18 PROCEDURE — 70450 CT HEAD/BRAIN W/O DYE: CPT

## 2023-12-18 PROCEDURE — 25010000002 VANCOMYCIN 5 G RECONSTITUTED SOLUTION

## 2023-12-18 PROCEDURE — 83735 ASSAY OF MAGNESIUM: CPT | Performed by: EMERGENCY MEDICINE

## 2023-12-18 PROCEDURE — 71045 X-RAY EXAM CHEST 1 VIEW: CPT

## 2023-12-18 PROCEDURE — 25010000002 CEFEPIME-DEXTROSE 2-5 GM-%(50ML) RECONSTITUTED SOLUTION

## 2023-12-18 PROCEDURE — 87081 CULTURE SCREEN ONLY: CPT | Performed by: FAMILY MEDICINE

## 2023-12-18 PROCEDURE — 36415 COLL VENOUS BLD VENIPUNCTURE: CPT

## 2023-12-18 PROCEDURE — 87641 MR-STAPH DNA AMP PROBE: CPT | Performed by: FAMILY MEDICINE

## 2023-12-18 PROCEDURE — 87077 CULTURE AEROBIC IDENTIFY: CPT

## 2023-12-18 PROCEDURE — 81001 URINALYSIS AUTO W/SCOPE: CPT

## 2023-12-18 PROCEDURE — 84484 ASSAY OF TROPONIN QUANT: CPT | Performed by: EMERGENCY MEDICINE

## 2023-12-18 PROCEDURE — 25010000002 EPINEPHRINE (ANAPHYLAXIS) 1 MG/ML SOLUTION: Performed by: EMERGENCY MEDICINE

## 2023-12-18 PROCEDURE — 82375 ASSAY CARBOXYHB QUANT: CPT

## 2023-12-18 PROCEDURE — 83605 ASSAY OF LACTIC ACID: CPT

## 2023-12-18 PROCEDURE — 87040 BLOOD CULTURE FOR BACTERIA: CPT

## 2023-12-18 PROCEDURE — 0202U NFCT DS 22 TRGT SARS-COV-2: CPT

## 2023-12-18 PROCEDURE — 36600 WITHDRAWAL OF ARTERIAL BLOOD: CPT

## 2023-12-18 PROCEDURE — 80164 ASSAY DIPROPYLACETIC ACD TOT: CPT | Performed by: EMERGENCY MEDICINE

## 2023-12-18 PROCEDURE — 84145 PROCALCITONIN (PCT): CPT

## 2023-12-18 PROCEDURE — 99285 EMERGENCY DEPT VISIT HI MDM: CPT

## 2023-12-18 PROCEDURE — 25810000003 SODIUM CHLORIDE 0.9 % SOLUTION: Performed by: EMERGENCY MEDICINE

## 2023-12-18 PROCEDURE — 83050 HGB METHEMOGLOBIN QUAN: CPT

## 2023-12-18 PROCEDURE — 25010000002 HYDROCORTISONE SOD SUC (PF) 250 MG RECONSTITUTED SOLUTION: Performed by: NURSE PRACTITIONER

## 2023-12-18 PROCEDURE — 87086 URINE CULTURE/COLONY COUNT: CPT

## 2023-12-18 PROCEDURE — 74176 CT ABD & PELVIS W/O CONTRAST: CPT

## 2023-12-18 PROCEDURE — 87186 SC STD MICRODIL/AGAR DIL: CPT

## 2023-12-18 PROCEDURE — P9612 CATHETERIZE FOR URINE SPEC: HCPCS

## 2023-12-18 PROCEDURE — 82805 BLOOD GASES W/O2 SATURATION: CPT

## 2023-12-18 PROCEDURE — 85025 COMPLETE CBC W/AUTO DIFF WBC: CPT | Performed by: EMERGENCY MEDICINE

## 2023-12-18 PROCEDURE — 71250 CT THORAX DX C-: CPT

## 2023-12-18 PROCEDURE — 25810000003 SODIUM CHLORIDE 0.9 % SOLUTION

## 2023-12-18 PROCEDURE — 06HY33Z INSERTION OF INFUSION DEVICE INTO LOWER VEIN, PERCUTANEOUS APPROACH: ICD-10-PCS | Performed by: EMERGENCY MEDICINE

## 2023-12-18 RX ORDER — NOREPINEPHRINE BITARTRATE/D5W 8 MG/250ML
.02-.3 PLASTIC BAG, INJECTION (ML) INTRAVENOUS
Status: DISCONTINUED | OUTPATIENT
Start: 2023-12-18 | End: 2023-12-20

## 2023-12-18 RX ORDER — ACETAMINOPHEN 650 MG/1
650 SUPPOSITORY RECTAL ONCE
Status: COMPLETED | OUTPATIENT
Start: 2023-12-18 | End: 2023-12-18

## 2023-12-18 RX ORDER — SODIUM CHLORIDE 0.9 % (FLUSH) 0.9 %
10 SYRINGE (ML) INJECTION AS NEEDED
Status: DISCONTINUED | OUTPATIENT
Start: 2023-12-18 | End: 2023-12-22 | Stop reason: HOSPADM

## 2023-12-18 RX ORDER — VANCOMYCIN 2 GRAM/500 ML IN 0.9 % SODIUM CHLORIDE INTRAVENOUS
2000 ONCE
Status: COMPLETED | OUTPATIENT
Start: 2023-12-18 | End: 2023-12-18

## 2023-12-18 RX ORDER — CEFEPIME HYDROCHLORIDE 2 G/50ML
2000 INJECTION, SOLUTION INTRAVENOUS ONCE
Status: COMPLETED | OUTPATIENT
Start: 2023-12-18 | End: 2023-12-18

## 2023-12-18 RX ADMIN — NOREPINEPHRINE BITARTRATE 0.06 MCG/KG/MIN: 8 INJECTION, SOLUTION INTRAVENOUS at 19:22

## 2023-12-18 RX ADMIN — ACETAMINOPHEN 650 MG: 650 SUPPOSITORY RECTAL at 23:26

## 2023-12-18 RX ADMIN — EPINEPHRINE 0.02 MCG/KG/MIN: 1 INJECTION INTRAMUSCULAR; INTRAVENOUS; SUBCUTANEOUS at 19:22

## 2023-12-18 RX ADMIN — NOREPINEPHRINE BITARTRATE 0.06 MCG/KG/MIN: 8 INJECTION, SOLUTION INTRAVENOUS at 19:07

## 2023-12-18 RX ADMIN — HYDROCORTISONE SODIUM SUCCINATE 250 MG: 250 INJECTION, POWDER, FOR SOLUTION INTRAMUSCULAR; INTRAVENOUS at 19:48

## 2023-12-18 RX ADMIN — SODIUM CHLORIDE 500 ML: 9 INJECTION, SOLUTION INTRAVENOUS at 19:21

## 2023-12-18 RX ADMIN — NOREPINEPHRINE BITARTRATE 0.02 MCG/KG/MIN: 8 INJECTION, SOLUTION INTRAVENOUS at 19:06

## 2023-12-18 RX ADMIN — EPINEPHRINE 0.1 MCG/KG/MIN: 1 INJECTION INTRAMUSCULAR; INTRAVENOUS; SUBCUTANEOUS at 20:57

## 2023-12-18 RX ADMIN — SODIUM CHLORIDE 1000 ML: 9 INJECTION, SOLUTION INTRAVENOUS at 19:23

## 2023-12-18 RX ADMIN — VANCOMYCIN HYDROCHLORIDE 2000 MG: 500 INJECTION, POWDER, LYOPHILIZED, FOR SOLUTION INTRAVENOUS at 19:52

## 2023-12-18 RX ADMIN — CEFEPIME HYDROCHLORIDE 2000 MG: 2 INJECTION, SOLUTION INTRAVENOUS at 19:21

## 2023-12-18 NOTE — ED PROVIDER NOTES
Subjective  History of Present Illness:    This is a 63-year-old female chronically ill nursing home patient with a history of CHF COPD diabetes hemiparesis hemiplegia schizophrenia vascular dementia presenting the emergency room today for evaluation of altered mental status.  Report from EMS is that patient was found slumped over in chair with snoring respirations.  On arrival here patient will open her eyes to her name and pain response.  She followed 1 command when I awoke her and asked her to hold her hands out in front of her as she briefly held both of her hands out in front of her and drop them and fell back asleep.  She is extremely somnolent and difficult to arouse.  She does not answer any questions.  History extremely limited secondary to patient's mentation.  Fingerstick blood glucose by EMS was 422.  She is on oxygen therapy on my arrival satting 95%.  She is afebrile now with a blood pressure of 152/78 although reportedly extremely hypotensive on EMS arrival and difficulty in palpating a radial pulse, however her blood pressure did improve with 1 L of normal saline fluid resuscitation.  She was additionally reportedly febrile but has a temperature of 97 degrees here on arrival.      Nurses Notes reviewed and agree, including vitals, allergies, social history and prior medical history.     REVIEW OF SYSTEMS: All systems reviewed and not pertinent unless noted.  Review of Systems   Reason unable to perform ROS: Review of systems unable to be performed secondary to patient's clinical condition.       Past Medical History:   Diagnosis Date    Acute angle-closure glaucoma, bilateral     Aphasia     Cardiac abnormality     CHF (congestive heart failure)     COPD (chronic obstructive pulmonary disease)     Diabetes     Glaucoma     Hemiparesis     Hemiplegia     Impaired functional mobility, balance, gait, and endurance     Schizophrenia, chronic condition     Vascular dementia   "      Allergies:    Penicillins      History reviewed. No pertinent surgical history.      Social History     Socioeconomic History    Marital status: Single   Tobacco Use    Smoking status: Never    Smokeless tobacco: Never   Vaping Use    Vaping Use: Never used   Substance and Sexual Activity    Alcohol use: No    Drug use: Never    Sexual activity: Defer         Family History   Problem Relation Age of Onset    Diabetes Other     Glaucoma Other        Objective  Physical Exam:  /62   Pulse 93   Temp 100.2 °F (37.9 °C) (Axillary)   Resp 16   Ht 172.7 cm (68\")   Wt 90.7 kg (200 lb)   SpO2 93%   BMI 30.41 kg/m²      Physical Exam  Vitals and nursing note reviewed.   Constitutional:       General: She is in acute distress.      Appearance: She is obese. She is ill-appearing and toxic-appearing. She is not diaphoretic.      Comments: Somnolent   HENT:      Head: Normocephalic and atraumatic.      Mouth/Throat:      Pharynx: Oropharynx is clear.      Comments: Dry membranes  Eyes:      Pupils: Pupils are equal, round, and reactive to light. Pupils are equal.   Cardiovascular:      Rate and Rhythm: Normal rate and regular rhythm.      Heart sounds: Normal heart sounds.   Pulmonary:      Effort: Pulmonary effort is normal. No respiratory distress.      Breath sounds: No wheezing.      Comments: Coarse lung sounds bilaterally  Abdominal:      General: There is no distension.      Palpations: Abdomen is soft.      Tenderness: There is no abdominal tenderness. There is no guarding.   Musculoskeletal:      Cervical back: Normal range of motion and neck supple.   Skin:     General: Skin is warm.   Neurological:      Mental Status: She is lethargic.      GCS: GCS eye subscore is 2. GCS verbal subscore is 3. GCS motor subscore is 5.      Cranial Nerves: No facial asymmetry.      Comments: Unable to assess neurologic status secondary to patient's mental status   Psychiatric:      Comments: Unable to assess "               Procedures    ED Course:    ED Course as of 12/18/23 2300   Mon Dec 18, 2023   1824 EKG interpreted by me reveals sinus rhythm with rate of 93 bpm.  There are some nonspecific ST-T wave changes.  This is an abnormal appearing EKG. [TB]   1953 I did speak with the patient's power of  and her next of kin given that the patient's power of  is currently in the hospital and all medications, they want medical management only and no CPR or intubation. [JR]   2157 Does not appear to be in DKA. [JR]      ED Course User Index  [JR] Anderson Mcmahon PA-C  [TB] Luh Sampson MD       Lab Results (last 24 hours)       Procedure Component Value Units Date/Time    CBC & Differential [[id number]]  (Abnormal) Collected: 12/18/23 1744    Specimen: Blood Updated: 12/18/23 1753    Narrative:      The following orders were created for panel order CBC & Differential.  Procedure                               Abnormality         Status                     ---------                               -----------         ------                     CBC Auto Differential[[id number]]        Abnormal            Final result                 Please view results for these tests on the individual orders.    Comprehensive Metabolic Panel [[id number]]  (Abnormal) Collected: 12/18/23 1744    Specimen: Blood Updated: 12/18/23 1812     Glucose 300 mg/dL      Comment: Glucose >180, Hemoglobin A1C recommended.        BUN 49 mg/dL      Creatinine 2.08 mg/dL      Sodium 146 mmol/L      Potassium 4.6 mmol/L      Comment: Specimen hemolyzed.  Result may be falsely elevated.        Chloride 104 mmol/L      CO2 25.7 mmol/L      Calcium 9.5 mg/dL      Total Protein 7.4 g/dL      Albumin 3.7 g/dL      ALT (SGPT) 63 U/L      Comment: Specimen hemolyzed.  Result may  be falsely elevated.        AST (SGOT) 43 U/L      Comment: Specimen hemolyzed.  Result may be falsely elevated.        Alkaline Phosphatase 89 U/L      Total Bilirubin  0.2 mg/dL      Globulin 3.7 gm/dL      A/G Ratio 1.0 g/dL      BUN/Creatinine Ratio 23.6     Anion Gap 16.3 mmol/L      eGFR 26.5 mL/min/1.73     Narrative:      GFR Normal >60  Chronic Kidney Disease <60  Kidney Failure <15      Single High Sensitivity Troponin T [[id number]]  (Abnormal) Collected: 12/18/23 1744    Specimen: Blood Updated: 12/18/23 1827     HS Troponin T 114 ng/L     Narrative:      High Sensitive Troponin T Reference Range:  <14.0 ng/L- Negative Female for AMI  <22.0 ng/L- Negative Male for AMI  >=14 - Abnormal Female indicating possible myocardial injury.  >=22 - Abnormal Male indicating possible myocardial injury.   Clinicians would have to utilize clinical acumen, EKG, Troponin, and serial changes to determine if it is an Acute Myocardial Infarction or myocardial injury due to an underlying chronic condition.         Magnesium [[id number]]  (Abnormal) Collected: 12/18/23 1744    Specimen: Blood Updated: 12/18/23 1812     Magnesium 2.5 mg/dL     Valproic Acid Level, Total [[id number]]  (Normal) Collected: 12/18/23 1744    Specimen: Blood Updated: 12/18/23 1810     Valproic Acid 50.0 mcg/mL     Narrative:      Therapeutic Ranges for Valproic Acid    Epilepsy:       mcg/ml  Bipolar/Alessia  up to 125 mcg/ml      CBC Auto Differential [[id number]]  (Abnormal) Collected: 12/18/23 1744    Specimen: Blood Updated: 12/18/23 1753     WBC 19.80 10*3/mm3      RBC 5.24 10*6/mm3      Hemoglobin 14.6 g/dL      Hematocrit 47.3 %      MCV 90.3 fL      MCH 27.9 pg      MCHC 30.9 g/dL      RDW 14.4 %      RDW-SD 47.3 fl      MPV 9.4 fL      Platelets 337 10*3/mm3      Neutrophil % 83.2 %      Lymphocyte % 8.4 %      Monocyte % 7.4 %      Eosinophil % 0.0 %      Basophil % 0.3 %      Immature Grans % 0.7 %      Neutrophils, Absolute 16.48 10*3/mm3      Lymphocytes, Absolute 1.67 10*3/mm3      Monocytes, Absolute 1.46 10*3/mm3      Eosinophils, Absolute 0.00 10*3/mm3      Basophils, Absolute 0.06 10*3/mm3       Immature Grans, Absolute 0.13 10*3/mm3      nRBC 0.0 /100 WBC     Lactic Acid, Plasma [[id number]]  (Abnormal) Collected: 12/18/23 1744    Specimen: Blood Updated: 12/18/23 1819     Lactate 4.5 mmol/L     Respiratory Panel PCR w/COVID-19(SARS-CoV-2) [facility]/ANNABELLE/[facility] In-House, NP Swab in UTM/VTM, 2 HR TAT - Swab, Nasopharynx [[id number]]  (Normal) Collected: 12/18/23 1745    Specimen: Swab from Nasopharynx Updated: 12/18/23 1837     ADENOVIRUS, PCR Not Detected     Coronavirus 229E Not Detected     Coronavirus HKU1 Not Detected     Coronavirus NL63 Not Detected     Coronavirus OC43 Not Detected     COVID19 Not Detected     Human Metapneumovirus Not Detected     Human Rhinovirus/Enterovirus Not Detected     Influenza A PCR Not Detected     Influenza B PCR Not Detected     Parainfluenza Virus 1 Not Detected     Parainfluenza Virus 2 Not Detected     Parainfluenza Virus 3 Not Detected     Parainfluenza Virus 4 Not Detected     RSV, PCR Not Detected     Bordetella pertussis pcr Not Detected     Bordetella parapertussis PCR Not Detected     Chlamydophila pneumoniae PCR Not Detected     Mycoplasma pneumo by PCR Not Detected    Narrative:      In the setting of a positive respiratory panel with a viral infection PLUS a negative procalcitonin without other underlying concern for bacterial infection, consider observing off antibiotics or discontinuation of antibiotics and continue supportive care. If the respiratory panel is positive for atypical bacterial infection (Bordetella pertussis, Chlamydophila pneumoniae, or Mycoplasma pneumoniae), consider antibiotic de-escalation to target atypical bacterial infection.    Urinalysis With Culture If Indicated - Straight Cath [[id number]]  (Abnormal) Collected: 12/18/23 1757    Specimen: Urine from Straight Cath Updated: 12/18/23 1809     Color, UA Red     Appearance, UA Turbid     pH, UA 5.5     Specific Gravity, UA 1.022     Glucose, UA >=1000 mg/dL (3+)     Ketones, UA  "Negative     Bilirubin, UA Negative     Blood, UA Large (3+)     Protein, UA >=300 mg/dL (3+)     Leuk Esterase, UA Large (3+)     Nitrite, UA Positive     Urobilinogen, UA 0.2 E.U./dL    Narrative:      In absence of clinical symptoms, the presence of pyuria, bacteria, and/or nitrites on the urinalysis result does not correlate with infection.    Urinalysis, Microscopic Only - Straight Cath [[id number]]  (Abnormal) Collected: 12/18/23 1757    Specimen: Urine from Straight Cath Updated: 12/18/23 1839     RBC, UA Too Numerous to Count /HPF      WBC, UA 21-50 /HPF      Bacteria, UA 2+ /HPF      Squamous Epithelial Cells, UA 3-6 /HPF      Hyaline Casts, UA None Seen /LPF      Methodology Manual Light Microscopy    Urine Culture - Urine, Straight Cath [[id number]] Collected: 12/18/23 1757    Specimen: Urine from Straight Cath Updated: 12/18/23 1839    Blood Culture - Blood, Arm, Left [[id number]] Collected: 12/18/23 1831    Specimen: Blood from Arm, Left Updated: 12/18/23 1845    Procalcitonin [[id number]]  (Abnormal) Collected: 12/18/23 1839    Specimen: Blood Updated: 12/18/23 1931     Procalcitonin 0.77 ng/mL     Narrative:      As a Marker for Sepsis (Non-Neonates):    1. <0.5 ng/mL represents a low risk of severe sepsis and/or septic shock.  2. >2 ng/mL represents a high risk of severe sepsis and/or septic shock.    As a Marker for Lower Respiratory Tract Infections that require antibiotic therapy:    PCT on Admission    Antibiotic Therapy       6-12 Hrs later    >0.5                Strongly Recommended  >0.25 - <0.5        Recommended   0.1 - 0.25          Discouraged              Remeasure/reassess PCT  <0.1                Strongly Discouraged     Remeasure/reassess PCT    As 28 day mortality risk marker: \"Change in Procalcitonin Result\" (>80% or <=80%) if Day 0 (or Day 1) and Day 4 values are available. Refer to http://www.[facility]-pct-calculator.com    Change in PCT <=80%  A decrease of PCT levels below or equal to " 80% defines a positive change in PCT test result representing a higher risk for 28-day all-cause mortality of patients diagnosed with severe sepsis for septic shock.    Change in PCT >80%  A decrease of PCT levels of more than 80% defines a negative change in PCT result representing a lower risk for 28-day all-cause mortality of patients diagnosed with severe sepsis or septic shock.       Blood Culture - Blood, Arm, Right [[id number]] Collected: 12/18/23 1839    Specimen: Blood from Arm, Right Updated: 12/18/23 1845    Blood Gas, Arterial With Co-Ox [[id number]]  (Abnormal) Collected: 12/18/23 1852    Specimen: Arterial Blood Updated: 12/18/23 1852     Site Right Radial     Usama's Test N/A     pH, Arterial 7.433 pH units      pCO2, Arterial 41.7 mm Hg      pO2, Arterial 66.4 mm Hg      Comment: 84 Value below reference range        HCO3, Arterial 27.8 mmol/L      Comment: 83 Value above reference range        Base Excess, Arterial 3.1 mmol/L      Comment: 83 Value above reference range        O2 Saturation, Arterial 93.8 %      Comment: 84 Value below reference range        Hematocrit, Blood Gas 46.4 %      Oxyhemoglobin 92.6 %      Comment: 84 Value below reference range        Methemoglobin 0.00 %      Carboxyhemoglobin 1.3 %      A-a DO2 30.7 mmHg      Barometric Pressure for Blood Gas 734 mmHg      Modality N/A     FIO2 21 %      Ventilator Mode NA     Collected by REBEKA     Comment: Meter: [id number]     :  [id number]        pH, Temp Corrected --     pCO2, Temperature Corrected --     pO2, Temperature Corrected --    High Sensitivity Troponin T 2Hr [[id number]]  (Abnormal) Collected: 12/18/23 1957    Specimen: Blood Updated: 12/18/23 2040     HS Troponin T 57 ng/L      Troponin T Delta -57 ng/L     Narrative:      High Sensitive Troponin T Reference Range:  <14.0 ng/L- Negative Female for AMI  <22.0 ng/L- Negative Male for AMI  >=14 - Abnormal Female indicating possible myocardial injury.  >=22 -  Abnormal Male indicating possible myocardial injury.   Clinicians would have to utilize clinical acumen, EKG, Troponin, and serial changes to determine if it is an Acute Myocardial Infarction or myocardial injury due to an underlying chronic condition.         STAT Lactic Acid, Reflex [[id number]]  (Abnormal) Collected: 12/18/23 2101    Specimen: Blood Updated: 12/18/23 2133     Lactate 5.3 mmol/L              CT Chest Without Contrast Diagnostic    Result Date: 12/18/2023  FINAL REPORT TECHNIQUE: null CLINICAL HISTORY: Altered mental status COMPARISON: null FINDINGS: CT chest without contrast. Comparison: None Findings: Hypodense nodule within the left thyroid measures 1.5 cm. Recommend nonemergent thyroid ultrasound for further characterization. 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 Normal caliber of the thoracic aorta. Cardiomegaly. Coronary atherosclerosis. Dense calcifications of the mitral valve. Small nodes within the mediastinum. Subsegmental atelectasis greatest within the bilateral lung bases. Linear atelectasis within the left lung base. Bronchial thickening within the lower lobe bronchi may indicate a component of reactive airways disease. The bones are intact. Degenerative changes of the thoracic spine. Hepatomegaly and hepatic steatosis. Calcification within the inferior right lobe of the liver. Nodularity of the left adrenal gland.     Impression: IMPRESSION: 1. Subsegmental of the bilateral lung bases. Linear atelectasis within left lung base. Minor bronchial thickening within the lung bases could indicate reactive airways disease including bronchitis. No consolidation. 2. Cardiomegaly with coronary atherosclerosis. 3. Hypodense nodule within the left thyroid measures 1.5 cm. Recommend nonemergent thyroid ultrasound for further characterization. Authenticated and Electronically Signed by Warner Yarbrough DO on 12/18/2023 10:48:36 PM    CT Head Without Contrast    Result Date:  12/18/2023  FINAL REPORT TECHNIQUE: null CLINICAL HISTORY: altered mental status COMPARISON: null FINDINGS: CT head without contrast Comparison: None Findings: No intracranial mass, midline shift, hydrocephalus, or acute hemorrhage. Subcortical/periventricular white matter hypoattenuation statistically representing changes of chronic microvascular ischemia. Global parenchymal volume loss which is commensurate with reported age. Old lacunar infarct involving the left basal ganglia. The visualized paranasal sinuses and mastoid air cells are normal. The orbits are unremarkable. No skull fracture.     Impression: IMPRESSION: 1. No acute intracranial process. 2. Changes of chronic microvascular ischemia with age-related global parenchymal volume loss. Old small lacunar infarct of the left basal ganglia. Authenticated and Electronically Signed by Warner Yarbrough DO on 12/18/2023 10:15:57 PM    CT Abdomen Pelvis Without Contrast    Result Date: 12/18/2023  FINAL REPORT TECHNIQUE: null CLINICAL HISTORY: altered mental status COMPARISON: null FINDINGS: Noncontrast CT of the abdomen and pelvis Technique: Noncontrast CT images from the lung bases through the ischial tuberosities. Comparison: None Findings: Low lung volumes. Normal noncontrast liver. No hepatic masses. The gallbladder is unremarkable by CT. Normal appearing adrenal glands. Normal noncontrast spleen. The spleen is normal size. Normal kidneys, no hydronephrosis or nephrolithiasis. No suspicious lesions. No stones in the course of either ureter. Moderate distension of the rectum with stool. No bowel obstruction. No pneumoperitoneum or abscess. Normal appendix. Normal retroperitoneum. No adenopathy. Normal caliber abdominal aorta. Normal pancreas. No evidence for pancreatitis. No pancreatic masses. Mild thickening of the urinary bladder. This could represent mild cystitis. Normal bones.     Impression: Impression: Moderate distension of the rectum with stool. Mild  thickening of the urinary bladder. This could represent cystitis. Authenticated and Electronically Signed by Warner Dioxn MD on 12/18/2023 07:23:40 PM        [facility]     Amount and/or Complexity of Data Reviewed  Decide to obtain previous medical records or to obtain history from someone other than the patient: yes        Initial impression of presenting illness: Is a 63-year-old chronically ill female presenting from the nursing home via EMS for altered mental status    DDX: includes but is not limited to: Stroke, intracranial abnormality, sepsis, pneumonia, urinary tract infection, intra-abdominal infection, electrolyte abnormality, dehydration, intracranial abnormality, others    Patient arrives hemodynamically stable afebrile nonhypoxic with vitals interpreted by myself.     Pertinent features from physical exam: Chronically ill-appearing 63-year-old female.  Extremely somnolent minimally responsive opens eyes to pain.  Followed 1 command when asking her to hold her hands in front of her and she held both hands out briefly in front of her for 2 seconds and then became somnolent again and responsive to pain.  She does have sonorous respirations.  She is ill-appearing.  Unable to assess neurologic function secondary to patient's clinical condition.  No facial droop noted.    Initial diagnostic plan: CBC CMP magnesium respiratory panel urinalysis cath procalcitonin lactic acid valproic acid EKG CT head chest and abdomen pelvis without contrast chest x-ray blood cultures x 2, ABG    Results from initial plan were reviewed and interpreted by me revealing CBC revealed significant leukocytosis at 19.8 with a left shift.  Urinalysis with pattern of infection nitrite positive large leukocytes large blood red turbid appearance.  Given this in combination with her lactic acidosis at 4.5, significant leukocytosis there is concern for urosepsis.  Initial troponin of 114, EKG revealed sinus rhythm rate of 93 with nonspecific  ST-T wave changes.  Magnesium of 2.5.  Valproic acid of 50.  CMP glucose 300 creatinine of 2.08 sodium of 146 and a BUN of 49.  This is a significant elevation in her creatinine compared to just 2 months ago concerning for an acute kidney injury.  Mild ALT and AST elevation but specimen was hemolyzed.  Suspect her elevated troponin is likely secondary to acute kidney injury and profound hypotension when the patient was picked up from EMS.  Her troponin did downtrend after fluid resuscitation and pressure control.  Her respiratory panel was negative.  CT abdomen pelvis per radiology IMPRESSION:  Impression: Moderate distension of the rectum with stool.Mild thickening of the urinary bladder. This could represent cystitis.    CT head per radiology interpretation revealed no acute intracranial process but changes of chronic microvascular ischemia with age-related global parenchymal volume loss and old small lacunar infarct of the left basal ganglia.        Diagnostic information from other sources: Old records reviewed    Interventions / Re-evaluation: Patient was given 1 L fluid resuscitation prior to arrival.  Her blood pressure is stable at this time.  Cover with broad-spectrum antibiotics with vancomycin and cefepime given the patient does have an allergy to penicillins will avoid Zosyn.  Received 1 L prior to arrival and given another 500 cc gentle fluid hydration bolus secondary to her history of congestive heart failure. Leigh catheter inserted by nursing staff due to clinical status.     On my reassessment of the patient, patient had significant hypotension with a systolic pressure in the 30s even after 1 and half liter fluid bolus with 1 L being given by EMS and 500 cc on arrival here when she had a normal pressure in the 120s.  Subsequently moved the blood pressure to the left arm where repeat pressure was obtained in the 50s systolic.  Norepinephrine immediately initiated through peripheral line until central  line was placed in the right femoral vein.  Patient continued to remain hypotensive despite maxing out norepinephrine drip, was then initiated on epi drip and given another 1 L of fluids.  Her pressure continued to improve and she was then arousable again to painful stimuli.  She was additionally given Solu-Cortef for better pressure control.    Results/clinical rationale were discussed with patient's family.  I did talk to the power of  who is currently in the hospital, however she spoke to next of kin and they discussed the send she is not able to make decisions for herself and she has a medical power of  (carie beck) that they want medical management only and do not want CPR or intubation.  I did discuss with the family possible outcomes and that her prognosis was guarded at this time due to her clinical condition.    Consultations/Discussion of results with other physicians: Discussed with hospitalist, Dr. Cook graciously agreeable to accept the patient for admission.    Disposition plan: Admit to ICU critical care.    60 minutes of critical care provided. This time excludes other billable procedures. Time does include preparation of documents, medical consultations, review of old records, and direct bedside care. Patient is at high risk for life-threatening deterioration due to sepsis requiring fluid resuscitation and broad-spectrum antibiotics..    -----    Final diagnoses:   Sepsis, due to unspecified organism, unspecified whether acute organ dysfunction present   Altered mental status, unspecified altered mental status type   Urinary tract infection without hematuria, site unspecified   Elevated troponin   Acute renal failure, unspecified acute renal failure type          Anderson Mcmahon PA-C  12/18/23 0190

## 2023-12-19 LAB
ANION GAP SERPL CALCULATED.3IONS-SCNC: 17.4 MMOL/L (ref 5–15)
ANION GAP SERPL CALCULATED.3IONS-SCNC: 2.5 MMOL/L (ref 5–15)
BUN SERPL-MCNC: 37 MG/DL (ref 8–23)
BUN SERPL-MCNC: 41 MG/DL (ref 8–23)
BUN/CREAT SERPL: 29.7 (ref 7–25)
BUN/CREAT SERPL: 34.9 (ref 7–25)
CALCIUM SPEC-SCNC: 9.1 MG/DL (ref 8.6–10.5)
CALCIUM SPEC-SCNC: 9.2 MG/DL (ref 8.6–10.5)
CHLORIDE SERPL-SCNC: 110 MMOL/L (ref 98–107)
CHLORIDE SERPL-SCNC: 119 MMOL/L (ref 98–107)
CO2 SERPL-SCNC: 22.6 MMOL/L (ref 22–29)
CO2 SERPL-SCNC: 27.5 MMOL/L (ref 22–29)
CREAT SERPL-MCNC: 1.06 MG/DL (ref 0.57–1)
CREAT SERPL-MCNC: 1.38 MG/DL (ref 0.57–1)
D-LACTATE SERPL-SCNC: 1.8 MMOL/L (ref 0.5–2)
D-LACTATE SERPL-SCNC: 5 MMOL/L (ref 0.5–2)
D-LACTATE SERPL-SCNC: 6.4 MMOL/L (ref 0.5–2)
DEPRECATED RDW RBC AUTO: 48.9 FL (ref 37–54)
EGFRCR SERPLBLD CKD-EPI 2021: 43.4 ML/MIN/1.73
EGFRCR SERPLBLD CKD-EPI 2021: 59.5 ML/MIN/1.73
ERYTHROCYTE [DISTWIDTH] IN BLOOD BY AUTOMATED COUNT: 14.7 % (ref 12.3–15.4)
GLUCOSE BLDC GLUCOMTR-MCNC: 108 MG/DL (ref 70–130)
GLUCOSE BLDC GLUCOMTR-MCNC: 117 MG/DL (ref 70–130)
GLUCOSE BLDC GLUCOMTR-MCNC: 124 MG/DL (ref 70–130)
GLUCOSE BLDC GLUCOMTR-MCNC: 152 MG/DL (ref 70–130)
GLUCOSE BLDC GLUCOMTR-MCNC: 158 MG/DL (ref 70–130)
GLUCOSE BLDC GLUCOMTR-MCNC: 160 MG/DL (ref 70–130)
GLUCOSE BLDC GLUCOMTR-MCNC: 168 MG/DL (ref 70–130)
GLUCOSE BLDC GLUCOMTR-MCNC: 169 MG/DL (ref 70–130)
GLUCOSE BLDC GLUCOMTR-MCNC: 190 MG/DL (ref 70–130)
GLUCOSE BLDC GLUCOMTR-MCNC: 246 MG/DL (ref 70–130)
GLUCOSE BLDC GLUCOMTR-MCNC: 392 MG/DL (ref 70–130)
GLUCOSE SERPL-MCNC: 242 MG/DL (ref 65–99)
GLUCOSE SERPL-MCNC: 471 MG/DL (ref 65–99)
HCT VFR BLD AUTO: 46.4 % (ref 34–46.6)
HGB BLD-MCNC: 14.3 G/DL (ref 12–15.9)
MAGNESIUM SERPL-MCNC: 2.4 MG/DL (ref 1.6–2.4)
MCH RBC QN AUTO: 27.9 PG (ref 26.6–33)
MCHC RBC AUTO-ENTMCNC: 30.8 G/DL (ref 31.5–35.7)
MCV RBC AUTO: 90.6 FL (ref 79–97)
MRSA DNA SPEC QL NAA+PROBE: NORMAL
PHOSPHATE SERPL-MCNC: 1.9 MG/DL (ref 2.5–4.5)
PLATELET # BLD AUTO: 397 10*3/MM3 (ref 140–450)
PMV BLD AUTO: 9.2 FL (ref 6–12)
POTASSIUM SERPL-SCNC: 3.6 MMOL/L (ref 3.5–5.2)
POTASSIUM SERPL-SCNC: 3.7 MMOL/L (ref 3.5–5.2)
POTASSIUM SERPL-SCNC: 4.5 MMOL/L (ref 3.5–5.2)
RBC # BLD AUTO: 5.12 10*6/MM3 (ref 3.77–5.28)
SODIUM SERPL-SCNC: 149 MMOL/L (ref 136–145)
SODIUM SERPL-SCNC: 150 MMOL/L (ref 136–145)
VANCOMYCIN SERPL-MCNC: 26.7 MCG/ML (ref 5–40)
WBC NRBC COR # BLD AUTO: 23.81 10*3/MM3 (ref 3.4–10.8)

## 2023-12-19 PROCEDURE — 84100 ASSAY OF PHOSPHORUS: CPT | Performed by: INTERNAL MEDICINE

## 2023-12-19 PROCEDURE — 99223 1ST HOSP IP/OBS HIGH 75: CPT | Performed by: FAMILY MEDICINE

## 2023-12-19 PROCEDURE — 25810000003 SODIUM CHLORIDE 0.9 % SOLUTION: Performed by: FAMILY MEDICINE

## 2023-12-19 PROCEDURE — 25010000002 POTASSIUM CHLORIDE 10 MEQ/100ML SOLUTION: Performed by: INTERNAL MEDICINE

## 2023-12-19 PROCEDURE — 83605 ASSAY OF LACTIC ACID: CPT

## 2023-12-19 PROCEDURE — 83735 ASSAY OF MAGNESIUM: CPT | Performed by: INTERNAL MEDICINE

## 2023-12-19 PROCEDURE — 80048 BASIC METABOLIC PNL TOTAL CA: CPT | Performed by: INTERNAL MEDICINE

## 2023-12-19 PROCEDURE — 82948 REAGENT STRIP/BLOOD GLUCOSE: CPT

## 2023-12-19 PROCEDURE — 80048 BASIC METABOLIC PNL TOTAL CA: CPT | Performed by: FAMILY MEDICINE

## 2023-12-19 PROCEDURE — 25010000002 CEFEPIME-DEXTROSE 2-5 GM-%(50ML) RECONSTITUTED SOLUTION: Performed by: FAMILY MEDICINE

## 2023-12-19 PROCEDURE — 85027 COMPLETE CBC AUTOMATED: CPT | Performed by: FAMILY MEDICINE

## 2023-12-19 PROCEDURE — 99221 1ST HOSP IP/OBS SF/LOW 40: CPT | Performed by: PHYSICIAN ASSISTANT

## 2023-12-19 PROCEDURE — 63710000001 INSULIN DETEMIR PER 5 UNITS: Performed by: INTERNAL MEDICINE

## 2023-12-19 PROCEDURE — 51700 IRRIGATION OF BLADDER: CPT | Performed by: PHYSICIAN ASSISTANT

## 2023-12-19 PROCEDURE — 63710000001 INSULIN ASPART PER 5 UNITS: Performed by: INTERNAL MEDICINE

## 2023-12-19 PROCEDURE — 80202 ASSAY OF VANCOMYCIN: CPT | Performed by: FAMILY MEDICINE

## 2023-12-19 PROCEDURE — 25010000002 VANCOMYCIN 1 G RECONSTITUTED SOLUTION: Performed by: FAMILY MEDICINE

## 2023-12-19 PROCEDURE — 84132 ASSAY OF SERUM POTASSIUM: CPT | Performed by: INTERNAL MEDICINE

## 2023-12-19 RX ORDER — SODIUM CHLORIDE 0.9 % (FLUSH) 0.9 %
10 SYRINGE (ML) INJECTION EVERY 12 HOURS SCHEDULED
Status: DISCONTINUED | OUTPATIENT
Start: 2023-12-19 | End: 2023-12-22 | Stop reason: HOSPADM

## 2023-12-19 RX ORDER — NICOTINE POLACRILEX 4 MG
15 LOZENGE BUCCAL
Status: DISCONTINUED | OUTPATIENT
Start: 2023-12-19 | End: 2023-12-22 | Stop reason: HOSPADM

## 2023-12-19 RX ORDER — SODIUM CHLORIDE 0.9 % (FLUSH) 0.9 %
10 SYRINGE (ML) INJECTION AS NEEDED
Status: DISCONTINUED | OUTPATIENT
Start: 2023-12-19 | End: 2023-12-22 | Stop reason: HOSPADM

## 2023-12-19 RX ORDER — DEXTROSE MONOHYDRATE 25 G/50ML
25 INJECTION, SOLUTION INTRAVENOUS
Status: DISCONTINUED | OUTPATIENT
Start: 2023-12-19 | End: 2023-12-22 | Stop reason: HOSPADM

## 2023-12-19 RX ORDER — DEXTROSE MONOHYDRATE 25 G/50ML
10-50 INJECTION, SOLUTION INTRAVENOUS
Status: DISCONTINUED | OUTPATIENT
Start: 2023-12-19 | End: 2023-12-20

## 2023-12-19 RX ORDER — ACETAMINOPHEN 325 MG/1
650 TABLET ORAL EVERY 4 HOURS PRN
Status: DISCONTINUED | OUTPATIENT
Start: 2023-12-19 | End: 2023-12-22 | Stop reason: HOSPADM

## 2023-12-19 RX ORDER — NICOTINE POLACRILEX 4 MG
15 LOZENGE BUCCAL
Status: DISCONTINUED | OUTPATIENT
Start: 2023-12-19 | End: 2023-12-19

## 2023-12-19 RX ORDER — SODIUM CHLORIDE 450 MG/100ML
100 INJECTION, SOLUTION INTRAVENOUS CONTINUOUS
Status: DISCONTINUED | OUTPATIENT
Start: 2023-12-19 | End: 2023-12-19

## 2023-12-19 RX ORDER — DEXTROSE AND SODIUM CHLORIDE 5; .45 G/100ML; G/100ML
150 INJECTION, SOLUTION INTRAVENOUS CONTINUOUS
Status: DISCONTINUED | OUTPATIENT
Start: 2023-12-19 | End: 2023-12-20

## 2023-12-19 RX ORDER — SODIUM CHLORIDE 9 MG/ML
100 INJECTION, SOLUTION INTRAVENOUS CONTINUOUS
Status: DISCONTINUED | OUTPATIENT
Start: 2023-12-19 | End: 2023-12-20

## 2023-12-19 RX ORDER — ACETAMINOPHEN 650 MG/1
650 SUPPOSITORY RECTAL EVERY 4 HOURS PRN
Status: DISCONTINUED | OUTPATIENT
Start: 2023-12-19 | End: 2023-12-22 | Stop reason: HOSPADM

## 2023-12-19 RX ORDER — DEXTROSE MONOHYDRATE 25 G/50ML
10-50 INJECTION, SOLUTION INTRAVENOUS
Status: DISCONTINUED | OUTPATIENT
Start: 2023-12-19 | End: 2023-12-19

## 2023-12-19 RX ORDER — INSULIN ASPART 100 [IU]/ML
3-14 INJECTION, SOLUTION INTRAVENOUS; SUBCUTANEOUS
Status: DISCONTINUED | OUTPATIENT
Start: 2023-12-19 | End: 2023-12-22 | Stop reason: HOSPADM

## 2023-12-19 RX ORDER — AMOXICILLIN 250 MG
2 CAPSULE ORAL 2 TIMES DAILY
Status: DISCONTINUED | OUTPATIENT
Start: 2023-12-19 | End: 2023-12-22 | Stop reason: HOSPADM

## 2023-12-19 RX ORDER — INSULIN ASPART 100 [IU]/ML
1-200 INJECTION, SOLUTION INTRAVENOUS; SUBCUTANEOUS AS NEEDED
Status: DISCONTINUED | OUTPATIENT
Start: 2023-12-19 | End: 2023-12-19

## 2023-12-19 RX ORDER — POLYETHYLENE GLYCOL 3350 17 G/17G
17 POWDER, FOR SOLUTION ORAL DAILY PRN
Status: DISCONTINUED | OUTPATIENT
Start: 2023-12-19 | End: 2023-12-22 | Stop reason: HOSPADM

## 2023-12-19 RX ORDER — POTASSIUM CHLORIDE 7.45 MG/ML
10 INJECTION INTRAVENOUS
Status: COMPLETED | OUTPATIENT
Start: 2023-12-19 | End: 2023-12-19

## 2023-12-19 RX ORDER — IBUPROFEN 600 MG/1
1 TABLET ORAL
Status: DISCONTINUED | OUTPATIENT
Start: 2023-12-19 | End: 2023-12-19

## 2023-12-19 RX ORDER — BISACODYL 10 MG
10 SUPPOSITORY, RECTAL RECTAL DAILY PRN
Status: DISCONTINUED | OUTPATIENT
Start: 2023-12-19 | End: 2023-12-22 | Stop reason: HOSPADM

## 2023-12-19 RX ORDER — SODIUM CHLORIDE 9 MG/ML
40 INJECTION, SOLUTION INTRAVENOUS AS NEEDED
Status: DISCONTINUED | OUTPATIENT
Start: 2023-12-19 | End: 2023-12-22 | Stop reason: HOSPADM

## 2023-12-19 RX ORDER — NITROGLYCERIN 0.4 MG/1
0.4 TABLET SUBLINGUAL
Status: DISCONTINUED | OUTPATIENT
Start: 2023-12-19 | End: 2023-12-22 | Stop reason: HOSPADM

## 2023-12-19 RX ORDER — BISACODYL 5 MG/1
5 TABLET, DELAYED RELEASE ORAL DAILY PRN
Status: DISCONTINUED | OUTPATIENT
Start: 2023-12-19 | End: 2023-12-22 | Stop reason: HOSPADM

## 2023-12-19 RX ORDER — OMEPRAZOLE 20 MG/1
20 CAPSULE, DELAYED RELEASE ORAL DAILY
COMMUNITY

## 2023-12-19 RX ORDER — CEFEPIME HYDROCHLORIDE 2 G/50ML
2000 INJECTION, SOLUTION INTRAVENOUS EVERY 12 HOURS
Status: DISCONTINUED | OUTPATIENT
Start: 2023-12-19 | End: 2023-12-20

## 2023-12-19 RX ORDER — INSULIN ASPART 100 [IU]/ML
1-200 INJECTION, SOLUTION INTRAVENOUS; SUBCUTANEOUS
Status: DISCONTINUED | OUTPATIENT
Start: 2023-12-19 | End: 2023-12-19

## 2023-12-19 RX ADMIN — POTASSIUM CHLORIDE 10 MEQ: 7.46 INJECTION, SOLUTION INTRAVENOUS at 15:45

## 2023-12-19 RX ADMIN — Medication 10 ML: at 00:09

## 2023-12-19 RX ADMIN — SODIUM CHLORIDE 100 ML/HR: 9 INJECTION, SOLUTION INTRAVENOUS at 15:47

## 2023-12-19 RX ADMIN — POTASSIUM CHLORIDE 10 MEQ: 7.46 INJECTION, SOLUTION INTRAVENOUS at 14:40

## 2023-12-19 RX ADMIN — INSULIN ASPART 3 UNITS: 100 INJECTION, SOLUTION INTRAVENOUS; SUBCUTANEOUS at 20:53

## 2023-12-19 RX ADMIN — DEXTROSE AND SODIUM CHLORIDE 150 ML/HR: 5; 450 INJECTION, SOLUTION INTRAVENOUS at 07:29

## 2023-12-19 RX ADMIN — ACETAMINOPHEN 650 MG: 325 TABLET, FILM COATED ORAL at 05:16

## 2023-12-19 RX ADMIN — INSULIN HUMAN 8.2 UNITS/HR: 1 INJECTION, SOLUTION INTRAVENOUS at 05:01

## 2023-12-19 RX ADMIN — CEFEPIME HYDROCHLORIDE 2000 MG: 2 INJECTION, SOLUTION INTRAVENOUS at 10:06

## 2023-12-19 RX ADMIN — SODIUM CHLORIDE 100 ML/HR: 4.5 INJECTION, SOLUTION INTRAVENOUS at 02:25

## 2023-12-19 RX ADMIN — INSULIN HUMAN 1.8 UNITS/HR: 1 INJECTION, SOLUTION INTRAVENOUS at 11:24

## 2023-12-19 RX ADMIN — Medication 10 ML: at 00:08

## 2023-12-19 RX ADMIN — ACETAMINOPHEN 650 MG: 325 TABLET, FILM COATED ORAL at 20:52

## 2023-12-19 RX ADMIN — INSULIN DETEMIR 15 UNITS: 100 INJECTION, SOLUTION SUBCUTANEOUS at 12:00

## 2023-12-19 RX ADMIN — POTASSIUM CHLORIDE 10 MEQ: 7.46 INJECTION, SOLUTION INTRAVENOUS at 13:31

## 2023-12-19 RX ADMIN — SENNOSIDES AND DOCUSATE SODIUM 2 TABLET: 50; 8.6 TABLET ORAL at 20:53

## 2023-12-19 RX ADMIN — Medication 10 ML: at 10:07

## 2023-12-19 RX ADMIN — CEFEPIME HYDROCHLORIDE 2000 MG: 2 INJECTION, SOLUTION INTRAVENOUS at 23:00

## 2023-12-19 RX ADMIN — SODIUM CHLORIDE 100 ML/HR: 9 INJECTION, SOLUTION INTRAVENOUS at 05:05

## 2023-12-19 RX ADMIN — INSULIN DETEMIR 15 UNITS: 100 INJECTION, SOLUTION SUBCUTANEOUS at 20:53

## 2023-12-19 RX ADMIN — SODIUM CHLORIDE 1000 MG: 900 INJECTION, SOLUTION INTRAVENOUS at 14:40

## 2023-12-19 RX ADMIN — POTASSIUM CHLORIDE 10 MEQ: 7.46 INJECTION, SOLUTION INTRAVENOUS at 12:15

## 2023-12-19 RX ADMIN — Medication 10 ML: at 02:25

## 2023-12-19 RX ADMIN — Medication 10 ML: at 20:54

## 2023-12-19 NOTE — H&P
[facility]   HISTORY AND PHYSICAL      Name:  Izabella Agudelo   Age:  63 y.o.  Sex:  female  :  1960  MRN:  [id number]   Visit Number:  [id number]  Admission Date:  2023  Date Of Service:  23  Primary Care Physician:  Fabian Santos DO    Chief Complaint:     Altered mental status, hypotension    History Of Presenting Illness:      The patient is a chronically ill 63-year-old resident of local nursing home facility with a medical history of hemiplegia, COPD, diastolic CHF, schizophrenia, vascular dementia, aphasia, diabetes, who had presented with altered mental status.  History obtained from ER record and provider.  Per report, she had apparently been found at nursing facility slumped over in chair minimally responsive.  Glucose at the time was 422.  Oxygen saturations of 95% on nasal cannula.  Noted to be significantly hypotensive upon arrival.  Nursing home also noted fever.  History otherwise limited due to condition at this time.    In the ER, patient epinephrine norepinephrine with blood pressures in the 110/50 range.  9 9% on 2 L of oxygen.  Tmax 100.4 and heart rate in the 90s.  She had a creatinine 2.08 with a glucose of 300 and anion gap of 16.  She had white count of 19 hemoglobin 14 and platelets of 337.  Urinalysis with large blood, nitrite positive, TNTC white blood cells and red blood cells.  Procalcitonin 0.77.  ABG with mild hypoxia.  Downtrending troponin elevation.  Lactic acid of nearly 5.  Blood cultures obtained.  CT scan of the head with chronic changes no acute abnormality.  CT scan of the chest with atelectasis bilateral lung zones, bronchial wall thickening, cardiomegaly and a left thyroid nodule.  CT scan pelvis with moderate distention of the rectum with stool and thickening of the urinary bladder.  Patient received initial fluid resuscitation per sepsis, Solu-Cortef, cefepime and vancomycin in addition to being started on pressure  support.  A Leigh catheter and central venous line was placed.  She will be admitted to the ICU.    Of note, patient's sister and brother were at bedside in the ER and updated on plan of care.  Patient's POA, Emi, was contacted by ER provider and updated.  She did note patient is not a full code, and requested CODE STATUS to be DNI/DNI, but were okay with all other treatments.    Review Of Systems:    All systems were reviewed and negative except as mentioned in history of presenting illness, assessment and plan.    Past Medical History: Patient  has a past medical history of Acute angle-closure glaucoma, bilateral, Aphasia, Cardiac abnormality, CHF (congestive heart failure), COPD (chronic obstructive pulmonary disease), Diabetes, Glaucoma, Hemiparesis, Hemiplegia, Impaired functional mobility, balance, gait, and endurance, Schizophrenia, chronic condition, and Vascular dementia.    Past Surgical History: Patient  has no past surgical history on file.    Social History: Patient  reports that she has never smoked. She has never used smokeless tobacco. She reports that she does not drink alcohol and does not use drugs.    Family History:  Patient's family history has been reviewed and found to be noncontributory.     Allergies:      Penicillins    Home Medications:    Prior to Admission Medications       Prescriptions Last Dose Informant Patient Reported? Taking?    acetaminophen (TYLENOL) 650 MG 8 hr tablet   Yes No    Take 1 tablet by mouth 4 (Four) Times a Day As Needed for Mild Pain.    acetaminophen (TYLENOL) 650 MG suppository   Yes No    Insert 1 suppository into the rectum Every 4 (Four) Hours As Needed for Mild Pain.    aluminum-magnesium hydroxide-simethicone (MAALOX/MYLANTA) 200-200-20 MG/5ML suspension   Yes No    Take 20 mL by mouth Every 8 (Eight) Hours As Needed for Indigestion or Heartburn.    apixaban (ELIQUIS) 5 MG tablet tablet   Yes No    Take 1 tablet by mouth 2 (Two) Times a Day.     ARIPiprazole (ABILIFY) 20 MG tablet   No No    Take 1.5 tablets by mouth Daily.    Patient taking differently:  Take 1 tablet by mouth Daily.    atorvastatin (LIPITOR) 40 MG tablet   Yes No    Take 1 tablet by mouth Every Night.    B-D UF III MINI PEN NEEDLES 31G X 5 MM misc   Yes No    USE 3 TIMES A DAY    bisacodyl (Dulcolax) 10 MG suppository   Yes No    Insert 1 suppository into the rectum Daily As Needed for Constipation.    carvedilol (COREG) 12.5 MG tablet   No No    Hold for now.    divalproex (DEPAKOTE) 500 MG DR tablet   Yes No    Take 1 tablet by mouth 3 (Three) Times a Day.    furosemide (LASIX) 20 MG tablet   Yes No    Take 1 tablet by mouth Daily.    gabapentin (NEURONTIN) 300 MG capsule   No No    Take 1 capsule by mouth 2 (Two) Times a Day.    Insulin Aspart (novoLOG) 100 UNIT/ML injection   Yes No    Inject 12 Units under the skin into the appropriate area as directed 3 (Three) Times a Day Before Meals.    insulin detemir (LEVEMIR) 100 UNIT/ML injection   Yes No    Inject 30 Units under the skin into the appropriate area as directed 2 (Two) Times a Day.    lisinopril (PRINIVIL,ZESTRIL) 5 MG tablet   Yes No    Take 1 tablet by mouth Daily.    mirtazapine (REMERON) 15 MG tablet   Yes No    Take 1 tablet by mouth Every Night.    ondansetron ODT (ZOFRAN-ODT) 4 MG disintegrating tablet   Yes No    Place 1 tablet on the tongue Every 6 (Six) Hours As Needed for Nausea or Vomiting.    ONE TOUCH ULTRA TEST test strip   Yes No    TEST 2 TIMES A DAY    ONETOUCH DELICA LANCETS 33G misc   Yes No    TEST TWICE A DAY    pantoprazole (Protonix) 40 MG EC tablet   No No    Take 1 tablet by mouth Daily.    senna 8.6 MG tablet   Yes No    Take 1 tablet by mouth 2 (Two) Times a Day As Needed for Constipation.    travoprost, BAK free, (TRAVATAN) 0.004 % solution ophthalmic solution   Yes No    Administer 1 drop to both eyes Every Evening. in affected eye(s)    vitamin D (ERGOCALCIFEROL) 1.25 MG (00531 UT) capsule capsule  "  Yes No    Take 1 capsule by mouth 1 (One) Time Per Week. On Thursdays          ED Medications:    Medications   sodium chloride 0.9 % flush 10 mL (10 mL Intravenous Given 12/19/23 0009)   norepinephrine (LEVOPHED) 8 mg in 250mL D5W infusion (0.06 mcg/kg/min × 90.7 kg Intravenous Currently Infusing 12/19/23 0007)   EPINEPHrine 5 mg in 250 mL NS infusion (0.08 mcg/kg/min × 90.7 kg Intravenous Currently Infusing 12/18/23 2356)   cefepime (MAXIPIME) IVPB 2000 mg/50ml D5W (premix) (0 mg Intravenous Stopped 12/18/23 1951)   sodium chloride 0.9 % bolus 500 mL (0 mL Intravenous Stopped 12/18/23 2025)   vancomycin IVPB 2000 mg in 0.9% Sodium Chloride 500 mL (0 mg Intravenous Stopped 12/18/23 2233)   sodium chloride 0.9 % bolus 1,000 mL (0 mL Intravenous Stopped 12/18/23 2057)   Hydrocortisone Sod Suc (PF) (Solu-CORTEF) injection 250 mg (250 mg Intravenous Given 12/18/23 1948)   acetaminophen (TYLENOL) suppository 650 mg (650 mg Rectal Given 12/18/23 2326)     Vital Signs:  Temp:  [97 °F (36.1 °C)-100.9 °F (38.3 °C)] 98.8 °F (37.1 °C)  Heart Rate:  [75-98] 93  Resp:  [16-20] 16  BP: ()/(31-78) 115/65        12/18/23  1730   Weight: 90.7 kg (200 lb)     Body mass index is 30.41 kg/m².    Physical Exam:     Most recent vital Signs: /65   Pulse 93   Temp 98.8 °F (37.1 °C) (Temporal)   Resp 16   Ht 172.7 cm (68\")   Wt 90.7 kg (200 lb)   SpO2 99%   BMI 30.41 kg/m²     Physical Exam  Constitutional:       Appearance: She is ill-appearing and toxic-appearing.      Comments: Lethargic, minimally arousable   HENT:      Mouth/Throat:      Mouth: Mucous membranes are dry.   Eyes:      Pupils: Pupils are equal, round, and reactive to light.   Cardiovascular:      Rate and Rhythm: Regular rhythm. Tachycardia present.      Pulses: Normal pulses.      Heart sounds: No murmur heard.     No gallop.   Pulmonary:      Breath sounds: No stridor. Rhonchi present.   Abdominal:      General: Bowel sounds are normal. There is no " distension.      Tenderness: There is no abdominal tenderness. There is no rebound.      Comments: Leigh catheter with bloody drainage   Musculoskeletal:      Right lower leg: No edema.      Left lower leg: No edema.   Skin:     General: Skin is warm.      Capillary Refill: Capillary refill takes less than 2 seconds.      Findings: No bruising or lesion.   Neurological:      Mental Status: She is disoriented.      Motor: Weakness present.         Laboratory data:    I have reviewed the labs done in the emergency room.    Results from last 7 days   Lab Units 12/18/23  1744   SODIUM mmol/L 146*   POTASSIUM mmol/L 4.6   CHLORIDE mmol/L 104   CO2 mmol/L 25.7   BUN mg/dL 49*   CREATININE mg/dL 2.08*   CALCIUM mg/dL 9.5   BILIRUBIN mg/dL 0.2   ALK PHOS U/L 89   ALT (SGPT) U/L 63*   AST (SGOT) U/L 43*   GLUCOSE mg/dL 300*     Results from last 7 days   Lab Units 12/18/23  1744   WBC 10*3/mm3 19.80*   HEMOGLOBIN g/dL 14.6   HEMATOCRIT % 47.3*   PLATELETS 10*3/mm3 337         Results from last 7 days   Lab Units 12/18/23  1957 12/18/23  1744   HSTROP T ng/L 57* 114*                 Results from last 7 days   Lab Units 12/18/23  1852   PH, ARTERIAL pH units 7.433   PO2 ART mm Hg 66.4*   PCO2, ARTERIAL mm Hg 41.7   HCO3 ART mmol/L 27.8     Results from last 7 days   Lab Units 12/18/23  1757   COLOR UA  Red*   GLUCOSE UA  >=1000 mg/dL (3+)*   KETONES UA  Negative   BLOOD UA  Large (3+)*   LEUKOCYTES UA  Large (3+)*   PH, URINE  5.5   BILIRUBIN UA  Negative   UROBILINOGEN UA  0.2 E.U./dL   RBC UA /HPF Too Numerous to Count*   WBC UA /HPF 21-50*       Pain Management Panel           No data to display                EKG:      Sinus rhythm, rate of 93, no acute ST elevations or T wave abnormalities.    Radiology:    CT Chest Without Contrast Diagnostic    Result Date: 12/18/2023  FINAL REPORT TECHNIQUE: null CLINICAL HISTORY: Altered mental status COMPARISON: null FINDINGS: CT chest without contrast. Comparison: None Findings:  Hypodense nodule within the left thyroid measures 1.5 cm. Recommend nonemergent thyroid ultrasound for further characterization. 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 Normal caliber of the thoracic aorta. Cardiomegaly. Coronary atherosclerosis. Dense calcifications of the mitral valve. Small nodes within the mediastinum. Subsegmental atelectasis greatest within the bilateral lung bases. Linear atelectasis within the left lung base. Bronchial thickening within the lower lobe bronchi may indicate a component of reactive airways disease. The bones are intact. Degenerative changes of the thoracic spine. Hepatomegaly and hepatic steatosis. Calcification within the inferior right lobe of the liver. Nodularity of the left adrenal gland.     IMPRESSION: 1. Subsegmental of the bilateral lung bases. Linear atelectasis within left lung base. Minor bronchial thickening within the lung bases could indicate reactive airways disease including bronchitis. No consolidation. 2. Cardiomegaly with coronary atherosclerosis. 3. Hypodense nodule within the left thyroid measures 1.5 cm. Recommend nonemergent thyroid ultrasound for further characterization. Authenticated and Electronically Signed by Warner Yarbrough DO on 12/18/2023 10:48:36 PM    CT Head Without Contrast    Result Date: 12/18/2023  FINAL REPORT TECHNIQUE: null CLINICAL HISTORY: altered mental status COMPARISON: null FINDINGS: CT head without contrast Comparison: None Findings: No intracranial mass, midline shift, hydrocephalus, or acute hemorrhage. Subcortical/periventricular white matter hypoattenuation statistically representing changes of chronic microvascular ischemia. Global parenchymal volume loss which is commensurate with reported age. Old lacunar infarct involving the left basal ganglia. The visualized paranasal sinuses and mastoid air cells are normal. The orbits are unremarkable. No skull fracture.     IMPRESSION: 1. No acute intracranial process. 2.  Changes of chronic microvascular ischemia with age-related global parenchymal volume loss. Old small lacunar infarct of the left basal ganglia. Authenticated and Electronically Signed by Warner Yarbrough DO on 12/18/2023 10:15:57 PM    CT Abdomen Pelvis Without Contrast    Result Date: 12/18/2023  FINAL REPORT TECHNIQUE: null CLINICAL HISTORY: altered mental status COMPARISON: null FINDINGS: Noncontrast CT of the abdomen and pelvis Technique: Noncontrast CT images from the lung bases through the ischial tuberosities. Comparison: None Findings: Low lung volumes. Normal noncontrast liver. No hepatic masses. The gallbladder is unremarkable by CT. Normal appearing adrenal glands. Normal noncontrast spleen. The spleen is normal size. Normal kidneys, no hydronephrosis or nephrolithiasis. No suspicious lesions. No stones in the course of either ureter. Moderate distension of the rectum with stool. No bowel obstruction. No pneumoperitoneum or abscess. Normal appendix. Normal retroperitoneum. No adenopathy. Normal caliber abdominal aorta. Normal pancreas. No evidence for pancreatitis. No pancreatic masses. Mild thickening of the urinary bladder. This could represent mild cystitis. Normal bones.     Impression: Moderate distension of the rectum with stool. Mild thickening of the urinary bladder. This could represent cystitis. Authenticated and Electronically Signed by Warner Dixon MD on 12/18/2023 07:23:40 PM     Assessment:    Sepsis with shock due to unknown organism, POA  Complicated urinary tract infection, POA  Bronchitis with concern for pneumonia, POA  Acute metabolic encephalopathy  Acute urinary retention  Hyperglycemia in setting of insulin-dependent diabetes  Acute kidney injury  Elevated troponin suspect type II demand ischemia  Vascular dementia  History of oropharyngeal dysphagia  Paranoid schizophrenia    Plan:    Admit to ICU    Sepsis/shock:  Patient status post initial fluid resuscitation and initiated on  pressor support.  Per ICU RN, blood pressure improving currently titrating down off 1 pressor.  Patient's mental status slowly improving as well.  Will continue with broad antibiotic coverage for now with vancomycin and cefepime due to long-term care facility exposure risk.  Cultures have been obtained.    PRIYA:  Likely due to sepsis and shock.  Leigh catheter placed in the emergency room had some retention.  She did have hematuria after placement.    Hyperglycemia:  Not currently in DKA.  Glucomander ordered.    Elevated troponin:  EKG does not show any obvious ischemic changes, downtrending troponin.  Suspect demand related to shock and sepsis.    Schizophrenia:  Complicates all aspects of care.  Reported vascular dementia as well.    Patient otherwise meets inpatient level.  Anticipate stay greater than 2 midnights.  Further recommendations will depend upon improvement clinical condition.  CODE STATUS updated after discussion with patient's sister who is her POA.  Prognosis is fairly poor.    Risk Assessment: High  DVT Prophylaxis: SCDs due to hematuria for now  Code Status: DNR  Diet: Bedside swallow, likely will need puréed    Advance Care Planning   ACP discussion was held with the patient during this visit. Patient has an advance directive in EMR which is still valid.            Shruthi Cook DO  12/19/23  01:13 EST    Dictated utilizing Dragon dictation.

## 2023-12-19 NOTE — CONSULTS
In Patient Consult      Patient Name: Izabella Agudelo  : 1960   MRN: [id number]   Admission Date: 2023  5:19 PM     Admission Diagnosis: Sepsis, UTI    Care Team: Patient Care Team:  Fabian Santos DO as PCP - General ([facility])    History of Present Illness: Izabella Agudelo is a 63 y.o. female who Urology was consulted to see for hematuria and urinary retention.     Due to the patient's history of vascular dementia, she is unable to provide meaningful answers to any questions related to HPI.  History is therefore obtained from review of the medical record and previous documentation.    Patient arrived to the ER yesterday evening with somnolence and hypotension.  During her initial workup and resuscitation she was noted to have hematuria and a Leigh catheter was inserted while she was receiving escalating life-saving care.    Subjective      Review of System: Review of Systems   Unable to perform ROS: Dementia      I have reviewed the ROS documented in H&P and agree.    Past Medical History:   Past Medical History:   Diagnosis Date    Acute angle-closure glaucoma, bilateral     Aphasia     Cardiac abnormality     CHF (congestive heart failure)     COPD (chronic obstructive pulmonary disease)     Diabetes     Glaucoma     Hemiparesis     Hemiplegia     Impaired functional mobility, balance, gait, and endurance     Schizophrenia, chronic condition     Vascular dementia        Past Surgical History: History reviewed. No pertinent surgical history.    Family History:   Family History   Problem Relation Age of Onset    Diabetes Other     Glaucoma Other        Social History:   Social History     Socioeconomic History    Marital status: Single   Tobacco Use    Smoking status: Never    Smokeless tobacco: Never   Vaping Use    Vaping Use: Never used   Substance and Sexual Activity    Alcohol use: No    Drug use: Never    Sexual activity: Defer       Medications:     Current  Facility-Administered Medications:     acetaminophen (TYLENOL) tablet 650 mg, 650 mg, Oral, Q4H PRN, 650 mg at 12/19/23 0516 **OR** acetaminophen (TYLENOL) suppository 650 mg, 650 mg, Rectal, Q4H PRN, Shruthi Cook DO    sennosides-docusate (PERICOLACE) 8.6-50 MG per tablet 2 tablet, 2 tablet, Oral, BID **AND** polyethylene glycol (MIRALAX) packet 17 g, 17 g, Oral, Daily PRN **AND** bisacodyl (DULCOLAX) EC tablet 5 mg, 5 mg, Oral, Daily PRN **AND** bisacodyl (DULCOLAX) suppository 10 mg, 10 mg, Rectal, Daily PRN, Shruthi Cook DO    cefepime (MAXIPIME) IVPB 2000 mg/50ml D5W (premix), 2,000 mg, Intravenous, Q12H, Shruthi Cook DO, 2,000 mg at 12/19/23 1006    dextrose (D50W) (25 g/50 mL) IV injection 10-50 mL, 10-50 mL, Intravenous, Q15 Min PRN, Shruthi Cook DO    dextrose (D50W) (25 g/50 mL) IV injection 25 g, 25 g, Intravenous, Q15 Min PRN, Salo Darden DO    dextrose (GLUTOSE) oral gel 15 g, 15 g, Oral, Q15 Min PRN, Salo Darden DO    dextrose 5 % and sodium chloride 0.45 % infusion, 150 mL/hr, Intravenous, Continuous, Shruthi Cook DO, Stopped at 12/19/23 1546    EPINEPHrine 5 mg in 250 mL NS infusion, 0.02-0.3 mcg/kg/min, Intravenous, Titrated, Shruthi Cook DO, Stopped at 12/19/23 0712    glucagon (GLUCAGEN) injection 1 mg, 1 mg, Intramuscular, Q15 Min PRN, Salo Darden DO    Insulin Aspart (novoLOG) injection 3-14 Units, 3-14 Units, Subcutaneous, 4x Daily AC & at Bedtime, Salo Darden DO    insulin detemir (LEVEMIR) injection 15 Units, 15 Units, Subcutaneous, BID, Salo Darden DO, 15 Units at 12/19/23 1200    insulin regular 1 unit/mL in 0.9% sodium chloride (Glucommander), 0-100 Units/hr, Intravenous, Titrated, Shruthi Cook DO, Stopped at 12/19/23 1404    nitroglycerin (NITROSTAT) SL tablet 0.4 mg, 0.4 mg, Sublingual, Q5 Min PRN, Shruthi Cook DO    norepinephrine (LEVOPHED) 8 mg in 250mL  D5W infusion, 0.02-0.3 mcg/kg/min, Intravenous, Titrated, Joyce, Shruthi Jesse, DO, Stopped at 12/19/23 0530    Pharmacy to dose vancomycin, , Does not apply, Continuous PRN, Karrick, Shruthi Jesse, DO    potassium chloride 10 mEq in 100 mL IVPB, 10 mEq, Intravenous, Q1H, KatalinaSalo Franklyn, DO, Last Rate: 100 mL/hr at 12/19/23 1545, 10 mEq at 12/19/23 1545    Potassium Replacement - Follow Nurse / BPA Driven Protocol, , Does not apply, PRN, Katalina Salo Franklyn, DO    sodium chloride 0.9 % flush 10 mL, 10 mL, Intravenous, PRN, Karrick, Shruthi Jesse, DO, 10 mL at 12/19/23 0009    sodium chloride 0.9 % flush 10 mL, 10 mL, Intravenous, Q12H, Karrick, Shruthi Jesse, DO, 10 mL at 12/19/23 1007    sodium chloride 0.9 % flush 10 mL, 10 mL, Intravenous, PRN, Karrick, Shruthi Jesse, DO    sodium chloride 0.9 % infusion 40 mL, 40 mL, Intravenous, PRN, Karrick, Shruthi Jesse, DO    sodium chloride 0.9 % infusion, 100 mL/hr, Intravenous, Continuous, Karroxane, Shruthi Jesse, DO, Last Rate: 100 mL/hr at 12/19/23 1547, 100 mL/hr at 12/19/23 1547    vancomycin 1000 mg/250 mL 0.9% NS (vial-mate), 1,000 mg, Intravenous, Q24H, Karrick, Shruthi Jesse, DO, 1,000 mg at 12/19/23 1440    Allergies:   Allergies   Allergen Reactions    Penicillins Rash       Objective     Physical Exam:   Vital Signs:   Vitals:    12/19/23 1300 12/19/23 1400 12/19/23 1500 12/19/23 1533   BP: 111/67 103/89 99/65    BP Location:       Patient Position:       Pulse: 79 80 79    Resp:       Temp:    99.8 °F (37.7 °C)   TempSrc:    Temporal   SpO2: 97% 97% 98%    Weight:       Height:         Body mass index is 30.41 kg/m².     Physical Exam  Vitals and nursing note reviewed.   Constitutional:       General: She is not in acute distress.     Appearance: She is not toxic-appearing.   HENT:      Mouth/Throat:      Mouth: Mucous membranes are moist.   Eyes:      Extraocular Movements: Extraocular movements intact.      Conjunctiva/sclera: Conjunctivae normal.    Cardiovascular:      Rate and Rhythm: Normal rate.   Pulmonary:      Effort: Pulmonary effort is normal. No respiratory distress.   Abdominal:      General: There is no distension.      Palpations: Abdomen is soft.      Tenderness: There is no abdominal tenderness.   Genitourinary:     Comments: 16Fr douglas catheter anchored with dark red hematuria present in douglas tubing and bag. Approximately 250cc dark red urine in douglas bag.   Skin:     General: Skin is warm and dry.   Neurological:      Mental Status: She is alert. Mental status is at baseline.         Labs:   I/O last 3 completed shifts:  In: 1050 [IV Piggyback:1050]  Out: 1150 [Urine:1150]    Lab Results   Component Value Date    GLUCOSE 242 (H) 12/19/2023    CALCIUM 9.2 12/19/2023     (H) 12/19/2023    K 3.6 12/19/2023    CO2 27.5 12/19/2023     (H) 12/19/2023    BUN 37 (H) 12/19/2023    CREATININE 1.06 (H) 12/19/2023    EGFRIFNONA 139 03/05/2021    BCR 34.9 (H) 12/19/2023    ANIONGAP 2.5 (L) 12/19/2023       Lab Results   Component Value Date    WBC 23.81 (H) 12/19/2023    HGB 14.3 12/19/2023    HCT 46.4 12/19/2023    MCV 90.6 12/19/2023     12/19/2023       Urine Culture   Date Value Ref Range Status   12/18/2023 >100,000 CFU/mL Gram Negative Bacilli (A)  Preliminary       Brief Urine Lab Results  (Last result in the past 365 days)        Color   Clarity   Blood   Leuk Est   Nitrite   Protein   CREAT   Urine HCG        12/18/23 1757 Red   Turbid   Large (3+)   Large (3+)   Positive   >=300 mg/dL (3+)                   Radiographic Studies  XR Chest 1 View    Result Date: 12/19/2023  Cardiomegaly. Vascular engorgement. No edema. Mild bibasilar atelectasis, left greater than right.  Images personally reviewed, interpreted and dictated by SUSANNA Stallworth.     This report was signed and finalized on 12/19/2023 8:47 AM by SUSANNA Stallworth.      CT Chest Without Contrast Diagnostic    Result Date: 12/18/2023  IMPRESSION: 1.  Subsegmental of the bilateral lung bases. Linear atelectasis within left lung base. Minor bronchial thickening within the lung bases could indicate reactive airways disease including bronchitis. No consolidation. 2. Cardiomegaly with coronary atherosclerosis. 3. Hypodense nodule within the left thyroid measures 1.5 cm. Recommend nonemergent thyroid ultrasound for further characterization. Authenticated and Electronically Signed by Warner Yarbrough DO on 12/18/2023 10:48:36 PM    CT Head Without Contrast    Result Date: 12/18/2023  IMPRESSION: 1. No acute intracranial process. 2. Changes of chronic microvascular ischemia with age-related global parenchymal volume loss. Old small lacunar infarct of the left basal ganglia. Authenticated and Electronically Signed by Warner Yarbrough DO on 12/18/2023 10:15:57 PM    CT Abdomen Pelvis Without Contrast    Result Date: 12/18/2023  Impression: Moderate distension of the rectum with stool. Mild thickening of the urinary bladder. This could represent cystitis. 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  Leigh catheter was inserted during her resuscitation.  Gross hematuria was noted and her urine was concerning for  infection.    The patient's urine is dark red in her Leigh catheter bag.  Leigh catheter was hand irrigated with sterile water with initial production of small clot burden and bright red hematuria.  750 cc of sterile water was irrigated and the urine has become clear with last syringe irrigated.  A fresh Leigh catheter bag is reattached to her Leigh catheter to watch for return of hematuria.    Sepsis, UTI  Urine Cx with GN Bacilli  Medical management by hospitalist     2. Hematuria   a. CT abd/pelvis yesterday with cystitis   B. Likely due to UTI   C. Hand irrigated easily and cleared with 750cc sterile water   D. Hand irrigate again as needed to clear small clot volume     Follow Up:   Will round on the patient again tomorrow, 12/20/2023      Gina Moreno PA-C  [facility]     Assessment and plan were discussed with attending Dr. Ordaz prior to signature of this note

## 2023-12-19 NOTE — PROGRESS NOTES
Pharmacy Consult-Vancomycin Dosing    Izabella Agudelo is a  63 y.o. female receiving vancomycin therapy.     Indication: sepsis/UTI  Consulting Provider: Dr. Cook    Goal AUC: 400-600 mg/:L*hr    Current Antimicrobial Therapy  Anti-Infectives (From admission, onward)      Ordered     Dose/Rate Route Frequency Start Stop    12/19/23 0505  vancomycin 1000 mg/250 mL 0.9% NS (vial-mate)        Ordering Provider: Shruthi Cook DO    1,000 mg  over 60 Minutes Intravenous Every 24 Hours 12/19/23 1400 12/24/23 1359    12/19/23 0116  cefepime (MAXIPIME) IVPB 2000 mg/50ml D5W (premix)        Ordering Provider: Shruthi Cook DO    2,000 mg  over 4 Hours Intravenous Every 12 Hours 12/19/23 1015 12/24/23 1014    12/19/23 0139  Pharmacy to dose vancomycin        Ordering Provider: Shruthi Cook DO     Does not apply Continuous PRN 12/19/23 0139 12/24/23 0138    12/18/23 1806  vancomycin IVPB 2000 mg in 0.9% Sodium Chloride 500 mL        Ordering Provider: Anderson Mcmahon PA-C    2,000 mg  250 mL/hr over 120 Minutes Intravenous Once 12/18/23 1822 12/18/23 2233    12/18/23 1805  cefepime (MAXIPIME) IVPB 2000 mg/50ml D5W (premix)        Ordering Provider: Anderson Mcmahon PA-C    2,000 mg  over 30 Minutes Intravenous Once 12/18/23 1821 12/18/23 1951            Labs  Results from last 7 days   Lab Units 12/19/23  0343 12/18/23  1744   WBC 10*3/mm3 23.81* 19.80*   CREATININE mg/dL 1.38* 2.08*      Estimated Creatinine Clearance: 49.8 mL/min (A) (by C-G formula based on SCr of 1.38 mg/dL (H)).  Temp Readings from Last 1 Encounters:   12/19/23 98.8 °F (37.1 °C) (Temporal)       Microbiology Culture results  Microbiology Results (last 10 days)       Procedure Component Value - Date/Time    Respiratory Panel PCR w/COVID-19(SARS-CoV-2) [facility]/J[facility]/ANNABELLE/[facility] In-House, NP Swab in UTM/VTM, 2 HR TAT - Swab, Nasopharynx [[id number]]  (Normal) Collected: 12/18/23 7540    Lab Status: Final result  "Specimen: Swab from Nasopharynx Updated: 12/18/23 1837     ADENOVIRUS, PCR Not Detected     Coronavirus 229E Not Detected     Coronavirus HKU1 Not Detected     Coronavirus NL63 Not Detected     Coronavirus OC43 Not Detected     COVID19 Not Detected     Human Metapneumovirus Not Detected     Human Rhinovirus/Enterovirus Not Detected     Influenza A PCR Not Detected     Influenza B PCR Not Detected     Parainfluenza Virus 1 Not Detected     Parainfluenza Virus 2 Not Detected     Parainfluenza Virus 3 Not Detected     Parainfluenza Virus 4 Not Detected     RSV, PCR Not Detected     Bordetella pertussis pcr Not Detected     Bordetella parapertussis PCR Not Detected     Chlamydophila pneumoniae PCR Not Detected     Mycoplasma pneumo by PCR Not Detected    Narrative:      In the setting of a positive respiratory panel with a viral infection PLUS a negative procalcitonin without other underlying concern for bacterial infection, consider observing off antibiotics or discontinuation of antibiotics and continue supportive care. If the respiratory panel is positive for atypical bacterial infection (Bordetella pertussis, Chlamydophila pneumoniae, or Mycoplasma pneumoniae), consider antibiotic de-escalation to target atypical bacterial infection.            Evaluation of Dosing     Last Dose Received in the ED/Outside Facility: Vancomycin 2000 mg 12/18@1952  Is Patient on Dialysis or Renal Replacement: N    Ht - 172.7 cm (68\")  Wt - 90.7 kg (200 lb)    Evaluation of Level    Results from last 7 days   Lab Units 12/19/23  0343   VANCOMYCIN RM mcg/mL 26.70                   [employer] AUC Calculation    Current AUC:      Predicted Steady State AUC on Current Dose: 436 mg/L*hr  _________________________________    Predicted Steady State AUC on New Dose:       Assessment/Plan    Pharmacy to dose vancomycin for sepsis/UTI. Goal -600 mg/L*hr.  Patient received loading dose of vancomycin 2000 mg (~22.1 mg/kg) IV on 12/18 @ 1952. " Initiate maintenance dose of vancomycin 1000 mg (~11 mg/kg) IV Q 24 hr on 12/19 @ 1400.  Assess clearance by vancomycin random level on 12/20 @ 0600.  Pharmacy will continue to monitor renal function, cultures and sensitivities, and clinical status to adjust regimen as necessary.      Thank you,  Phoebe Dave [facility],PharmD  12/19/23 05:12 EST

## 2023-12-19 NOTE — ED PROVIDER NOTES
Central Line At Bedside    Date/Time: 12-    Performed by: Sherman Briggs APRN  Authorized by: Dr. Avery MD    Consent:     Consent obtained: Emergent    Consent given by: Family    Risks, benefits, and alternatives were discussed: yes      Risks discussed:  Arterial puncture, bleeding, incorrect placement, infection, pneumothorax and nerve damage    Alternatives discussed:  No treatment, delayed treatment, alternative treatment, observation and referral  Universal protocol:     Procedure explained and questions answered to patient or proxy's satisfaction: yes      Patient identity confirmed: Emergent insertion, patient identified through hospital band and MRN number   pre-procedure details:     Indication(s): central venous access, hemodynamic monitoring and insufficient peripheral access      Hand hygiene: Hand hygiene performed prior to insertion      Sterile barrier technique: All elements of maximal sterile technique followed      Skin preparation:  Chlorhexidine    Skin preparation agent: Skin preparation agent completely dried prior to procedure    Sedation:     Sedation type:  None  Anesthesia:     Anesthesia method:  None  Procedure details:     Location: Right femoral vein    Site selection rationale: Easily accessible and visualized by ultrasound    Patient position:  Supine    Procedural supplies:  Triple lumen    Catheter size:  7 Fr    Landmarks identified: yes      Ultrasound guidance: yes      Ultrasound guidance timing: real time      Sterile ultrasound techniques: Sterile gel and sterile probe covers were used      Number of attempts:  1    Successful placement: yes    Post-procedure details:     Post-procedure:  Dressing applied and line sutured    Assessment:  Blood return through all ports    Procedure completion:  Tolerated well, no immediate complications         Sherman Briggs APRN  12/18/23 1924

## 2023-12-19 NOTE — PLAN OF CARE
Goal Outcome Evaluation:                   VSS, pt off pressor therapy and insulin drip, Tolerating puree consistant carb diet. Pt seen by urology APRN and urinary catheter irrigated to assess for clots.

## 2023-12-19 NOTE — PAYOR COMM NOTE
"TO:MK  FROM:BUZZ SPEAR, RN PHONE [phone]  IN CLINICALS REF# [id number]    Izabella Agudelo (63 y.o. Female)       Date of Birth   1960    Social Security Number       [address] [place] [address]    Home Phone   [phone]    MRN   [id number]       Taoism   None    Marital Status   Single                            Admission Date   12/18/23    Admission Type   Emergency    Admitting Provider   Shruthi Cook DO    Attending Provider   Salo Darden DO    Department, Room/Bed   [facility] INTENSIVE [facility], I03/1       Discharge Date       Discharge Disposition       Discharge Destination                                 Attending Provider: Salo Darden DO    Allergies: Penicillins    Isolation: None   Infection: None   Code Status: No CPR    Ht: 172.7 cm (68\")   Wt: 90.7 kg (200 lb)    Admission Cmt: None   Principal Problem: Sepsis [A41.9]                   Active Insurance as of 12/18/2023       Primary Coverage       Payor Plan Insurance Group Employer/Plan Group    MEDICARE MEDICARE A & B        Payor Plan Address Payor Plan Phone Number Payor Plan Fax Number Effective Dates    [address] [phone]  5/1/1988 - None Entered    [facility] [id number]         Subscriber Name Subscriber Birth Date Member ID       IZABELLA AGUDELO 1960 [id number]               Secondary Coverage       Payor Plan Insurance Group Employer/Plan Group    [place]        Payor Plan Address Payor Plan Phone Number Payor Plan Fax Number Effective Dates    [address] [phone]  10/28/2016 - None Entered    [place] [address]         Subscriber Name Subscriber Birth Date Member ID       IZABELLA AGUDELO 1960 [id number]                     Emergency Contacts        (Rel.) Home Phone Work Phone Mobile Phone    ALBERT HORVATH (Sister) [phone] -- --    Nazia Horvath " (Sister) [phone] -- --    ROSE MARY ZUÑIGA (Relative) [phone] -- --    CORRIE LYNN (Friend) [phone] -- --                 History & Physical        Joyce Shruthi MolinaDO at 23 0113            [facility]   HISTORY AND PHYSICAL      Name:  Izabella Agudelo   Age:  63 y.o.  Sex:  female  :  1960  MRN:  [id number]   Visit Number:  [id number]  Admission Date:  2023  Date Of Service:  23  Primary Care Physician:  Fabian Santos DO    Chief Complaint:     Altered mental status, hypotension    History Of Presenting Illness:      The patient is a chronically ill 63-year-old resident of local nursing home facility with a medical history of hemiplegia, COPD, diastolic CHF, schizophrenia, vascular dementia, aphasia, diabetes, who had presented with altered mental status.  History obtained from ER record and provider.  Per report, she had apparently been found at nursing facility slumped over in chair minimally responsive.  Glucose at the time was 422.  Oxygen saturations of 95% on nasal cannula.  Noted to be significantly hypotensive upon arrival.  Nursing home also noted fever.  History otherwise limited due to condition at this time.    In the ER, patient epinephrine norepinephrine with blood pressures in the 110/50 range.  9 9% on 2 L of oxygen.  Tmax 100.4 and heart rate in the 90s.  She had a creatinine 2.08 with a glucose of 300 and anion gap of 16.  She had white count of 19 hemoglobin 14 and platelets of 337.  Urinalysis with large blood, nitrite positive, TNTC white blood cells and red blood cells.  Procalcitonin 0.77.  ABG with mild hypoxia.  Downtrending troponin elevation.  Lactic acid of nearly 5.  Blood cultures obtained.  CT scan of the head with chronic changes no acute abnormality.  CT scan of the chest with atelectasis bilateral lung zones, bronchial wall thickening, cardiomegaly and a left thyroid nodule.  CT scan pelvis with  moderate distention of the rectum with stool and thickening of the urinary bladder.  Patient received initial fluid resuscitation per sepsis, Solu-Cortef, cefepime and vancomycin in addition to being started on pressure support.  A Leigh catheter and central venous line was placed.  She will be admitted to the ICU.    Of note, patient's sister and brother were at bedside in the ER and updated on plan of care.  Patient's POA, Emi, was contacted by ER provider and updated.  She did note patient is not a full code, and requested CODE STATUS to be DNI/DNI, but were okay with all other treatments.    Review Of Systems:    All systems were reviewed and negative except as mentioned in history of presenting illness, assessment and plan.    Past Medical History: Patient  has a past medical history of Acute angle-closure glaucoma, bilateral, Aphasia, Cardiac abnormality, CHF (congestive heart failure), COPD (chronic obstructive pulmonary disease), Diabetes, Glaucoma, Hemiparesis, Hemiplegia, Impaired functional mobility, balance, gait, and endurance, Schizophrenia, chronic condition, and Vascular dementia.    Past Surgical History: Patient  has no past surgical history on file.    Social History: Patient  reports that she has never smoked. She has never used smokeless tobacco. She reports that she does not drink alcohol and does not use drugs.    Family History:  Patient's family history has been reviewed and found to be noncontributory.     Allergies:      Penicillins    Home Medications:    Prior to Admission Medications       Prescriptions Last Dose Informant Patient Reported? Taking?    acetaminophen (TYLENOL) 650 MG 8 hr tablet   Yes No    Take 1 tablet by mouth 4 (Four) Times a Day As Needed for Mild Pain.    acetaminophen (TYLENOL) 650 MG suppository   Yes No    Insert 1 suppository into the rectum Every 4 (Four) Hours As Needed for Mild Pain.    aluminum-magnesium hydroxide-simethicone (MAALOX/MYLANTA) 200-200-20  MG/5ML suspension   Yes No    Take 20 mL by mouth Every 8 (Eight) Hours As Needed for Indigestion or Heartburn.    apixaban (ELIQUIS) 5 MG tablet tablet   Yes No    Take 1 tablet by mouth 2 (Two) Times a Day.    ARIPiprazole (ABILIFY) 20 MG tablet   No No    Take 1.5 tablets by mouth Daily.    Patient taking differently:  Take 1 tablet by mouth Daily.    atorvastatin (LIPITOR) 40 MG tablet   Yes No    Take 1 tablet by mouth Every Night.    B-D UF III MINI PEN NEEDLES 31G X 5 MM misc   Yes No    USE 3 TIMES A DAY    bisacodyl (Dulcolax) 10 MG suppository   Yes No    Insert 1 suppository into the rectum Daily As Needed for Constipation.    carvedilol (COREG) 12.5 MG tablet   No No    Hold for now.    divalproex (DEPAKOTE) 500 MG DR tablet   Yes No    Take 1 tablet by mouth 3 (Three) Times a Day.    furosemide (LASIX) 20 MG tablet   Yes No    Take 1 tablet by mouth Daily.    gabapentin (NEURONTIN) 300 MG capsule   No No    Take 1 capsule by mouth 2 (Two) Times a Day.    Insulin Aspart (novoLOG) 100 UNIT/ML injection   Yes No    Inject 12 Units under the skin into the appropriate area as directed 3 (Three) Times a Day Before Meals.    insulin detemir (LEVEMIR) 100 UNIT/ML injection   Yes No    Inject 30 Units under the skin into the appropriate area as directed 2 (Two) Times a Day.    lisinopril (PRINIVIL,ZESTRIL) 5 MG tablet   Yes No    Take 1 tablet by mouth Daily.    mirtazapine (REMERON) 15 MG tablet   Yes No    Take 1 tablet by mouth Every Night.    ondansetron ODT (ZOFRAN-ODT) 4 MG disintegrating tablet   Yes No    Place 1 tablet on the tongue Every 6 (Six) Hours As Needed for Nausea or Vomiting.    ONE TOUCH ULTRA TEST test strip   Yes No    TEST 2 TIMES A DAY    ONETOUCH DELICA LANCETS 33G misc   Yes No    TEST TWICE A DAY    pantoprazole (Protonix) 40 MG EC tablet   No No    Take 1 tablet by mouth Daily.    senna 8.6 MG tablet   Yes No    Take 1 tablet by mouth 2 (Two) Times a Day As Needed for Constipation.     "travoprost, ZENOBIA free, (TRAVATAN) 0.004 % solution ophthalmic solution   Yes No    Administer 1 drop to both eyes Every Evening. in affected eye(s)    vitamin D (ERGOCALCIFEROL) 1.25 MG (40076 UT) capsule capsule   Yes No    Take 1 capsule by mouth 1 (One) Time Per Week. On Thursdays          ED Medications:    Medications   sodium chloride 0.9 % flush 10 mL (10 mL Intravenous Given 12/19/23 0009)   norepinephrine (LEVOPHED) 8 mg in 250mL D5W infusion (0.06 mcg/kg/min × 90.7 kg Intravenous Currently Infusing 12/19/23 0007)   EPINEPHrine 5 mg in 250 mL NS infusion (0.08 mcg/kg/min × 90.7 kg Intravenous Currently Infusing 12/18/23 2356)   cefepime (MAXIPIME) IVPB 2000 mg/50ml D5W (premix) (0 mg Intravenous Stopped 12/18/23 1951)   sodium chloride 0.9 % bolus 500 mL (0 mL Intravenous Stopped 12/18/23 2025)   vancomycin IVPB 2000 mg in 0.9% Sodium Chloride 500 mL (0 mg Intravenous Stopped 12/18/23 2233)   sodium chloride 0.9 % bolus 1,000 mL (0 mL Intravenous Stopped 12/18/23 2057)   Hydrocortisone Sod Suc (PF) (Solu-CORTEF) injection 250 mg (250 mg Intravenous Given 12/18/23 1948)   acetaminophen (TYLENOL) suppository 650 mg (650 mg Rectal Given 12/18/23 2326)     Vital Signs:  Temp:  [97 °F (36.1 °C)-100.9 °F (38.3 °C)] 98.8 °F (37.1 °C)  Heart Rate:  [75-98] 93  Resp:  [16-20] 16  BP: ()/(31-78) 115/65        12/18/23  1730   Weight: 90.7 kg (200 lb)     Body mass index is 30.41 kg/m².    Physical Exam:     Most recent vital Signs: /65   Pulse 93   Temp 98.8 °F (37.1 °C) (Temporal)   Resp 16   Ht 172.7 cm (68\")   Wt 90.7 kg (200 lb)   SpO2 99%   BMI 30.41 kg/m²     Physical Exam  Constitutional:       Appearance: She is ill-appearing and toxic-appearing.      Comments: Lethargic, minimally arousable   HENT:      Mouth/Throat:      Mouth: Mucous membranes are dry.   Eyes:      Pupils: Pupils are equal, round, and reactive to light.   Cardiovascular:      Rate and Rhythm: Regular rhythm. Tachycardia " present.      Pulses: Normal pulses.      Heart sounds: No murmur heard.     No gallop.   Pulmonary:      Breath sounds: No stridor. Rhonchi present.   Abdominal:      General: Bowel sounds are normal. There is no distension.      Tenderness: There is no abdominal tenderness. There is no rebound.      Comments: Leigh catheter with bloody drainage   Musculoskeletal:      Right lower leg: No edema.      Left lower leg: No edema.   Skin:     General: Skin is warm.      Capillary Refill: Capillary refill takes less than 2 seconds.      Findings: No bruising or lesion.   Neurological:      Mental Status: She is disoriented.      Motor: Weakness present.         Laboratory data:    I have reviewed the labs done in the emergency room.    Results from last 7 days   Lab Units 12/18/23  1744   SODIUM mmol/L 146*   POTASSIUM mmol/L 4.6   CHLORIDE mmol/L 104   CO2 mmol/L 25.7   BUN mg/dL 49*   CREATININE mg/dL 2.08*   CALCIUM mg/dL 9.5   BILIRUBIN mg/dL 0.2   ALK PHOS U/L 89   ALT (SGPT) U/L 63*   AST (SGOT) U/L 43*   GLUCOSE mg/dL 300*     Results from last 7 days   Lab Units 12/18/23  1744   WBC 10*3/mm3 19.80*   HEMOGLOBIN g/dL 14.6   HEMATOCRIT % 47.3*   PLATELETS 10*3/mm3 337         Results from last 7 days   Lab Units 12/18/23  1957 12/18/23  1744   HSTROP T ng/L 57* 114*                 Results from last 7 days   Lab Units 12/18/23  1852   PH, ARTERIAL pH units 7.433   PO2 ART mm Hg 66.4*   PCO2, ARTERIAL mm Hg 41.7   HCO3 ART mmol/L 27.8     Results from last 7 days   Lab Units 12/18/23  1757   COLOR UA  Red*   GLUCOSE UA  >=1000 mg/dL (3+)*   KETONES UA  Negative   BLOOD UA  Large (3+)*   LEUKOCYTES UA  Large (3+)*   PH, URINE  5.5   BILIRUBIN UA  Negative   UROBILINOGEN UA  0.2 E.U./dL   RBC UA /HPF Too Numerous to Count*   WBC UA /HPF 21-50*       Pain Management Panel           No data to display                EKG:      Sinus rhythm, rate of 93, no acute ST elevations or T wave abnormalities.    Radiology:    CT  Chest Without Contrast Diagnostic    Result Date: 12/18/2023  FINAL REPORT TECHNIQUE: null CLINICAL HISTORY: Altered mental status COMPARISON: null FINDINGS: CT chest without contrast. Comparison: None Findings: Hypodense nodule within the left thyroid measures 1.5 cm. Recommend nonemergent thyroid ultrasound for further characterization. 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 Normal caliber of the thoracic aorta. Cardiomegaly. Coronary atherosclerosis. Dense calcifications of the mitral valve. Small nodes within the mediastinum. Subsegmental atelectasis greatest within the bilateral lung bases. Linear atelectasis within the left lung base. Bronchial thickening within the lower lobe bronchi may indicate a component of reactive airways disease. The bones are intact. Degenerative changes of the thoracic spine. Hepatomegaly and hepatic steatosis. Calcification within the inferior right lobe of the liver. Nodularity of the left adrenal gland.     IMPRESSION: 1. Subsegmental of the bilateral lung bases. Linear atelectasis within left lung base. Minor bronchial thickening within the lung bases could indicate reactive airways disease including bronchitis. No consolidation. 2. Cardiomegaly with coronary atherosclerosis. 3. Hypodense nodule within the left thyroid measures 1.5 cm. Recommend nonemergent thyroid ultrasound for further characterization. Authenticated and Electronically Signed by Warner Yarbrough DO on 12/18/2023 10:48:36 PM    CT Head Without Contrast    Result Date: 12/18/2023  FINAL REPORT TECHNIQUE: null CLINICAL HISTORY: altered mental status COMPARISON: null FINDINGS: CT head without contrast Comparison: None Findings: No intracranial mass, midline shift, hydrocephalus, or acute hemorrhage. Subcortical/periventricular white matter hypoattenuation statistically representing changes of chronic microvascular ischemia. Global parenchymal volume loss which is commensurate with reported age. Old  lacunar infarct involving the left basal ganglia. The visualized paranasal sinuses and mastoid air cells are normal. The orbits are unremarkable. No skull fracture.     IMPRESSION: 1. No acute intracranial process. 2. Changes of chronic microvascular ischemia with age-related global parenchymal volume loss. Old small lacunar infarct of the left basal ganglia. Authenticated and Electronically Signed by Warner Yarbrough DO on 12/18/2023 10:15:57 PM    CT Abdomen Pelvis Without Contrast    Result Date: 12/18/2023  FINAL REPORT TECHNIQUE: null CLINICAL HISTORY: altered mental status COMPARISON: null FINDINGS: Noncontrast CT of the abdomen and pelvis Technique: Noncontrast CT images from the lung bases through the ischial tuberosities. Comparison: None Findings: Low lung volumes. Normal noncontrast liver. No hepatic masses. The gallbladder is unremarkable by CT. Normal appearing adrenal glands. Normal noncontrast spleen. The spleen is normal size. Normal kidneys, no hydronephrosis or nephrolithiasis. No suspicious lesions. No stones in the course of either ureter. Moderate distension of the rectum with stool. No bowel obstruction. No pneumoperitoneum or abscess. Normal appendix. Normal retroperitoneum. No adenopathy. Normal caliber abdominal aorta. Normal pancreas. No evidence for pancreatitis. No pancreatic masses. Mild thickening of the urinary bladder. This could represent mild cystitis. Normal bones.     Impression: Moderate distension of the rectum with stool. Mild thickening of the urinary bladder. This could represent cystitis. Authenticated and Electronically Signed by Warner Dixon MD on 12/18/2023 07:23:40 PM     Assessment:    Sepsis with shock due to unknown organism, POA  Complicated urinary tract infection, POA  Bronchitis with concern for pneumonia, POA  Acute metabolic encephalopathy  Acute urinary retention  Hyperglycemia in setting of insulin-dependent diabetes  Acute kidney injury  Elevated troponin  suspect type II demand ischemia  Vascular dementia  History of oropharyngeal dysphagia  Paranoid schizophrenia    Plan:    Admit to ICU    Sepsis/shock:  Patient status post initial fluid resuscitation and initiated on pressor support.  Per ICU RN, blood pressure improving currently titrating down off 1 pressor.  Patient's mental status slowly improving as well.  Will continue with broad antibiotic coverage for now with vancomycin and cefepime due to long-term care facility exposure risk.  Cultures have been obtained.    PRIYA:  Likely due to sepsis and shock.  Leigh catheter placed in the emergency room had some retention.  She did have hematuria after placement.    Hyperglycemia:  Not currently in DKA.  Glucomander ordered.    Elevated troponin:  EKG does not show any obvious ischemic changes, downtrending troponin.  Suspect demand related to shock and sepsis.    Schizophrenia:  Complicates all aspects of care.  Reported vascular dementia as well.    Patient otherwise meets inpatient level.  Anticipate stay greater than 2 midnights.  Further recommendations will depend upon improvement clinical condition.  CODE STATUS updated after discussion with patient's sister who is her POA.  Prognosis is fairly poor.    Risk Assessment: High  DVT Prophylaxis: SCDs due to hematuria for now  Code Status: DNR  Diet: Bedside swallow, likely will need puréed    Advance Care Planning  ACP discussion was held with the patient during this visit. Patient has an advance directive in EMR which is still valid.            Shruthi Cook DO  12/19/23  01:13 EST    Dictated utilizing Dragon dictation.    Electronically signed by Shruthi Cook DO at 12/19/23 0138          Emergency Department Notes        Sherman Briggs APRN at 12/18/23 1922       Attestation signed by Harjeet Varela MD at 12/19/23 0402        SUPERVISE: For this patient encounter, I reviewed the APC's documentation, treatment plan, and medical  decision making.  Harjeet Varela MD 12/19/2023 04:04 EST                         Central Line At Bedside    Date/Time: 12-    Performed by: Sherman Briggs APRN  Authorized by: Dr. Avery MD    Consent:     Consent obtained: Emergent    Consent given by: Family    Risks, benefits, and alternatives were discussed: yes      Risks discussed:  Arterial puncture, bleeding, incorrect placement, infection, pneumothorax and nerve damage    Alternatives discussed:  No treatment, delayed treatment, alternative treatment, observation and referral  Universal protocol:     Procedure explained and questions answered to patient or proxy's satisfaction: yes      Patient identity confirmed: Emergent insertion, patient identified through hospital band and MRN number   pre-procedure details:     Indication(s): central venous access, hemodynamic monitoring and insufficient peripheral access      Hand hygiene: Hand hygiene performed prior to insertion      Sterile barrier technique: All elements of maximal sterile technique followed      Skin preparation:  Chlorhexidine    Skin preparation agent: Skin preparation agent completely dried prior to procedure    Sedation:     Sedation type:  None  Anesthesia:     Anesthesia method:  None  Procedure details:     Location: Right femoral vein    Site selection rationale: Easily accessible and visualized by ultrasound    Patient position:  Supine    Procedural supplies:  Triple lumen    Catheter size:  7 Fr    Landmarks identified: yes      Ultrasound guidance: yes      Ultrasound guidance timing: real time      Sterile ultrasound techniques: Sterile gel and sterile probe covers were used      Number of attempts:  1    Successful placement: yes    Post-procedure details:     Post-procedure:  Dressing applied and line sutured    Assessment:  Blood return through all ports    Procedure completion:  Tolerated well, no immediate complications         Sherman Briggs APRN  12/18/23  1924      Electronically signed by Harjeet Varela MD at 12/19/23 0404       Anderson Mcmahon PA-C at 12/18/23 1736       Attestation signed by Luh Sampson MD at 12/19/23 4136        NON FACE TO FACE: This visit was performed by BOTH a physician and an APC. I performed all aspects of the MDM as documented.  Luh Sampson MD 12/19/2023 07:59 EST                         Subjective  History of Present Illness:    This is a 63-year-old female chronically ill nursing home patient with a history of CHF COPD diabetes hemiparesis hemiplegia schizophrenia vascular dementia presenting the emergency room today for evaluation of altered mental status.  Report from EMS is that patient was found slumped over in chair with snoring respirations.  On arrival here patient will open her eyes to her name and pain response.  She followed 1 command when I awoke her and asked her to hold her hands out in front of her as she briefly held both of her hands out in front of her and drop them and fell back asleep.  She is extremely somnolent and difficult to arouse.  She does not answer any questions.  History extremely limited secondary to patient's mentation.  Fingerstick blood glucose by EMS was 422.  She is on oxygen therapy on my arrival satting 95%.  She is afebrile now with a blood pressure of 152/78 although reportedly extremely hypotensive on EMS arrival and difficulty in palpating a radial pulse, however her blood pressure did improve with 1 L of normal saline fluid resuscitation.  She was additionally reportedly febrile but has a temperature of 97 degrees here on arrival.      Nurses Notes reviewed and agree, including vitals, allergies, social history and prior medical history.     REVIEW OF SYSTEMS: All systems reviewed and not pertinent unless noted.  Review of Systems   Reason unable to perform ROS: Review of systems unable to be performed secondary to patient's clinical condition.       Past Medical  "History:   Diagnosis Date    Acute angle-closure glaucoma, bilateral     Aphasia     Cardiac abnormality     CHF (congestive heart failure)     COPD (chronic obstructive pulmonary disease)     Diabetes     Glaucoma     Hemiparesis     Hemiplegia     Impaired functional mobility, balance, gait, and endurance     Schizophrenia, chronic condition     Vascular dementia        Allergies:    Penicillins      History reviewed. No pertinent surgical history.      Social History     Socioeconomic History    Marital status: Single   Tobacco Use    Smoking status: Never    Smokeless tobacco: Never   Vaping Use    Vaping Use: Never used   Substance and Sexual Activity    Alcohol use: No    Drug use: Never    Sexual activity: Defer         Family History   Problem Relation Age of Onset    Diabetes Other     Glaucoma Other        Objective  Physical Exam:  /62   Pulse 93   Temp 100.2 °F (37.9 °C) (Axillary)   Resp 16   Ht 172.7 cm (68\")   Wt 90.7 kg (200 lb)   SpO2 93%   BMI 30.41 kg/m²      Physical Exam  Vitals and nursing note reviewed.   Constitutional:       General: She is in acute distress.      Appearance: She is obese. She is ill-appearing and toxic-appearing. She is not diaphoretic.      Comments: Somnolent   HENT:      Head: Normocephalic and atraumatic.      Mouth/Throat:      Pharynx: Oropharynx is clear.      Comments: Dry membranes  Eyes:      Pupils: Pupils are equal, round, and reactive to light. Pupils are equal.   Cardiovascular:      Rate and Rhythm: Normal rate and regular rhythm.      Heart sounds: Normal heart sounds.   Pulmonary:      Effort: Pulmonary effort is normal. No respiratory distress.      Breath sounds: No wheezing.      Comments: Coarse lung sounds bilaterally  Abdominal:      General: There is no distension.      Palpations: Abdomen is soft.      Tenderness: There is no abdominal tenderness. There is no guarding.   Musculoskeletal:      Cervical back: Normal range of motion and " neck supple.   Skin:     General: Skin is warm.   Neurological:      Mental Status: She is lethargic.      GCS: GCS eye subscore is 2. GCS verbal subscore is 3. GCS motor subscore is 5.      Cranial Nerves: No facial asymmetry.      Comments: Unable to assess neurologic status secondary to patient's mental status   Psychiatric:      Comments: Unable to assess               Procedures    ED Course:    ED Course as of 12/18/23 2300   Mon Dec 18, 2023   1824 EKG interpreted by me reveals sinus rhythm with rate of 93 bpm.  There are some nonspecific ST-T wave changes.  This is an abnormal appearing EKG. [TB]   1953 I did speak with the patient's power of  and her next of kin given that the patient's power of  is currently in the hospital and all medications, they want medical management only and no CPR or intubation. [JR]   2157 Does not appear to be in DKA. [JR]      ED Course User Index  [JR] Anderson Mcmahon PA-C  [TB] Luh Sampson MD       Lab Results (last 24 hours)       Procedure Component Value Units Date/Time    CBC & Differential [[id number]]  (Abnormal) Collected: 12/18/23 1744    Specimen: Blood Updated: 12/18/23 1753    Narrative:      The following orders were created for panel order CBC & Differential.  Procedure                               Abnormality         Status                     ---------                               -----------         ------                     CBC Auto Differential[[id number]]        Abnormal            Final result                 Please view results for these tests on the individual orders.    Comprehensive Metabolic Panel [[id number]]  (Abnormal) Collected: 12/18/23 1744    Specimen: Blood Updated: 12/18/23 1812     Glucose 300 mg/dL      Comment: Glucose >180, Hemoglobin A1C recommended.        BUN 49 mg/dL      Creatinine 2.08 mg/dL      Sodium 146 mmol/L      Potassium 4.6 mmol/L      Comment: Specimen hemolyzed.  Result may be falsely  elevated.        Chloride 104 mmol/L      CO2 25.7 mmol/L      Calcium 9.5 mg/dL      Total Protein 7.4 g/dL      Albumin 3.7 g/dL      ALT (SGPT) 63 U/L      Comment: Specimen hemolyzed.  Result may  be falsely elevated.        AST (SGOT) 43 U/L      Comment: Specimen hemolyzed.  Result may be falsely elevated.        Alkaline Phosphatase 89 U/L      Total Bilirubin 0.2 mg/dL      Globulin 3.7 gm/dL      A/G Ratio 1.0 g/dL      BUN/Creatinine Ratio 23.6     Anion Gap 16.3 mmol/L      eGFR 26.5 mL/min/1.73     Narrative:      GFR Normal >60  Chronic Kidney Disease <60  Kidney Failure <15      Single High Sensitivity Troponin T [[id number]]  (Abnormal) Collected: 12/18/23 1744    Specimen: Blood Updated: 12/18/23 1827     HS Troponin T 114 ng/L     Narrative:      High Sensitive Troponin T Reference Range:  <14.0 ng/L- Negative Female for AMI  <22.0 ng/L- Negative Male for AMI  >=14 - Abnormal Female indicating possible myocardial injury.  >=22 - Abnormal Male indicating possible myocardial injury.   Clinicians would have to utilize clinical acumen, EKG, Troponin, and serial changes to determine if it is an Acute Myocardial Infarction or myocardial injury due to an underlying chronic condition.         Magnesium [[id number]]  (Abnormal) Collected: 12/18/23 1744    Specimen: Blood Updated: 12/18/23 1812     Magnesium 2.5 mg/dL     Valproic Acid Level, Total [[id number]]  (Normal) Collected: 12/18/23 1744    Specimen: Blood Updated: 12/18/23 1810     Valproic Acid 50.0 mcg/mL     Narrative:      Therapeutic Ranges for Valproic Acid    Epilepsy:       mcg/ml  Bipolar/Alessia  up to 125 mcg/ml      CBC Auto Differential [[id number]]  (Abnormal) Collected: 12/18/23 1744    Specimen: Blood Updated: 12/18/23 1753     WBC 19.80 10*3/mm3      RBC 5.24 10*6/mm3      Hemoglobin 14.6 g/dL      Hematocrit 47.3 %      MCV 90.3 fL      MCH 27.9 pg      MCHC 30.9 g/dL      RDW 14.4 %      RDW-SD 47.3 fl      MPV 9.4 fL       Platelets 337 10*3/mm3      Neutrophil % 83.2 %      Lymphocyte % 8.4 %      Monocyte % 7.4 %      Eosinophil % 0.0 %      Basophil % 0.3 %      Immature Grans % 0.7 %      Neutrophils, Absolute 16.48 10*3/mm3      Lymphocytes, Absolute 1.67 10*3/mm3      Monocytes, Absolute 1.46 10*3/mm3      Eosinophils, Absolute 0.00 10*3/mm3      Basophils, Absolute 0.06 10*3/mm3      Immature Grans, Absolute 0.13 10*3/mm3      nRBC 0.0 /100 WBC     Lactic Acid, Plasma [[id number]]  (Abnormal) Collected: 12/18/23 1744    Specimen: Blood Updated: 12/18/23 1819     Lactate 4.5 mmol/L     Respiratory Panel PCR w/COVID-19(SARS-CoV-2) TERESA/JOHN/ANNABELLE/PAD/COR/ADRIANA In-House, NP Swab in UTM/VTM, 2 HR TAT - Swab, Nasopharynx [[id number]]  (Normal) Collected: 12/18/23 1745    Specimen: Swab from Nasopharynx Updated: 12/18/23 1837     ADENOVIRUS, PCR Not Detected     Coronavirus 229E Not Detected     Coronavirus HKU1 Not Detected     Coronavirus NL63 Not Detected     Coronavirus OC43 Not Detected     COVID19 Not Detected     Human Metapneumovirus Not Detected     Human Rhinovirus/Enterovirus Not Detected     Influenza A PCR Not Detected     Influenza B PCR Not Detected     Parainfluenza Virus 1 Not Detected     Parainfluenza Virus 2 Not Detected     Parainfluenza Virus 3 Not Detected     Parainfluenza Virus 4 Not Detected     RSV, PCR Not Detected     Bordetella pertussis pcr Not Detected     Bordetella parapertussis PCR Not Detected     Chlamydophila pneumoniae PCR Not Detected     Mycoplasma pneumo by PCR Not Detected    Narrative:      In the setting of a positive respiratory panel with a viral infection PLUS a negative procalcitonin without other underlying concern for bacterial infection, consider observing off antibiotics or discontinuation of antibiotics and continue supportive care. If the respiratory panel is positive for atypical bacterial infection (Bordetella pertussis, Chlamydophila pneumoniae, or Mycoplasma pneumoniae), consider  antibiotic de-escalation to target atypical bacterial infection.    Urinalysis With Culture If Indicated - Straight Cath [[id number]]  (Abnormal) Collected: 12/18/23 1757    Specimen: Urine from Straight Cath Updated: 12/18/23 1809     Color, UA Red     Appearance, UA Turbid     pH, UA 5.5     Specific Gravity, UA 1.022     Glucose, UA >=1000 mg/dL (3+)     Ketones, UA Negative     Bilirubin, UA Negative     Blood, UA Large (3+)     Protein, UA >=300 mg/dL (3+)     Leuk Esterase, UA Large (3+)     Nitrite, UA Positive     Urobilinogen, UA 0.2 E.U./dL    Narrative:      In absence of clinical symptoms, the presence of pyuria, bacteria, and/or nitrites on the urinalysis result does not correlate with infection.    Urinalysis, Microscopic Only - Straight Cath [[id number]]  (Abnormal) Collected: 12/18/23 1757    Specimen: Urine from Straight Cath Updated: 12/18/23 1839     RBC, UA Too Numerous to Count /HPF      WBC, UA 21-50 /HPF      Bacteria, UA 2+ /HPF      Squamous Epithelial Cells, UA 3-6 /HPF      Hyaline Casts, UA None Seen /LPF      Methodology Manual Light Microscopy    Urine Culture - Urine, Straight Cath [[id number]] Collected: 12/18/23 1757    Specimen: Urine from Straight Cath Updated: 12/18/23 1839    Blood Culture - Blood, Arm, Left [[id number]] Collected: 12/18/23 1831    Specimen: Blood from Arm, Left Updated: 12/18/23 1845    Procalcitonin [[id number]]  (Abnormal) Collected: 12/18/23 1839    Specimen: Blood Updated: 12/18/23 1931     Procalcitonin 0.77 ng/mL     Narrative:      As a Marker for Sepsis (Non-Neonates):    1. <0.5 ng/mL represents a low risk of severe sepsis and/or septic shock.  2. >2 ng/mL represents a high risk of severe sepsis and/or septic shock.    As a Marker for Lower Respiratory Tract Infections that require antibiotic therapy:    PCT on Admission    Antibiotic Therapy       6-12 Hrs later    >0.5                Strongly Recommended  >0.25 - <0.5        Recommended   0.1 - 0.25         "  Discouraged              Remeasure/reassess PCT  <0.1                Strongly Discouraged     Remeasure/reassess PCT    As 28 day mortality risk marker: \"Change in Procalcitonin Result\" (>80% or <=80%) if Day 0 (or Day 1) and Day 4 values are available. Refer to http://[email or web address]-pct-[email or web address]    Change in PCT <=80%  A decrease of PCT levels below or equal to 80% defines a positive change in PCT test result representing a higher risk for 28-day all-cause mortality of patients diagnosed with severe sepsis for septic shock.    Change in PCT >80%  A decrease of PCT levels of more than 80% defines a negative change in PCT result representing a lower risk for 28-day all-cause mortality of patients diagnosed with severe sepsis or septic shock.       Blood Culture - Blood, Arm, Right [[id number]] Collected: 12/18/23 1839    Specimen: Blood from Arm, Right Updated: 12/18/23 1845    Blood Gas, Arterial With Co-Ox [[id number]]  (Abnormal) Collected: 12/18/23 1852    Specimen: Arterial Blood Updated: 12/18/23 1852     Site Right Radial     Usama's Test N/A     pH, Arterial 7.433 pH units      pCO2, Arterial 41.7 mm Hg      pO2, Arterial 66.4 mm Hg      Comment: 84 Value below reference range        HCO3, Arterial 27.8 mmol/L      Comment: 83 Value above reference range        Base Excess, Arterial 3.1 mmol/L      Comment: 83 Value above reference range        O2 Saturation, Arterial 93.8 %      Comment: 84 Value below reference range        Hematocrit, Blood Gas 46.4 %      Oxyhemoglobin 92.6 %      Comment: 84 Value below reference range        Methemoglobin 0.00 %      Carboxyhemoglobin 1.3 %      A-a DO2 30.7 mmHg      Barometric Pressure for Blood Gas 734 mmHg      Modality N/A     FIO2 21 %      Ventilator Mode NA     Collected by REBEKA     Comment: Meter: [id number]     :  [id number]        pH, Temp Corrected --     pCO2, Temperature Corrected --     pO2, Temperature Corrected --    High Sensitivity " Troponin T 2Hr [[id number]]  (Abnormal) Collected: 12/18/23 1957    Specimen: Blood Updated: 12/18/23 2040     HS Troponin T 57 ng/L      Troponin T Delta -57 ng/L     Narrative:      High Sensitive Troponin T Reference Range:  <14.0 ng/L- Negative Female for AMI  <22.0 ng/L- Negative Male for AMI  >=14 - Abnormal Female indicating possible myocardial injury.  >=22 - Abnormal Male indicating possible myocardial injury.   Clinicians would have to utilize clinical acumen, EKG, Troponin, and serial changes to determine if it is an Acute Myocardial Infarction or myocardial injury due to an underlying chronic condition.         STAT Lactic Acid, Reflex [[id number]]  (Abnormal) Collected: 12/18/23 2101    Specimen: Blood Updated: 12/18/23 2133     Lactate 5.3 mmol/L              CT Chest Without Contrast Diagnostic    Result Date: 12/18/2023  FINAL REPORT TECHNIQUE: null CLINICAL HISTORY: Altered mental status COMPARISON: null FINDINGS: CT chest without contrast. Comparison: None Findings: Hypodense nodule within the left thyroid measures 1.5 cm. Recommend nonemergent thyroid ultrasound for further characterization. 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 Normal caliber of the thoracic aorta. Cardiomegaly. Coronary atherosclerosis. Dense calcifications of the mitral valve. Small nodes within the mediastinum. Subsegmental atelectasis greatest within the bilateral lung bases. Linear atelectasis within the left lung base. Bronchial thickening within the lower lobe bronchi may indicate a component of reactive airways disease. The bones are intact. Degenerative changes of the thoracic spine. Hepatomegaly and hepatic steatosis. Calcification within the inferior right lobe of the liver. Nodularity of the left adrenal gland.     Impression: IMPRESSION: 1. Subsegmental of the bilateral lung bases. Linear atelectasis within left lung base. Minor bronchial thickening within the lung bases could indicate reactive airways  disease including bronchitis. No consolidation. 2. Cardiomegaly with coronary atherosclerosis. 3. Hypodense nodule within the left thyroid measures 1.5 cm. Recommend nonemergent thyroid ultrasound for further characterization. Authenticated and Electronically Signed by Warner Yarbrough DO on 12/18/2023 10:48:36 PM    CT Head Without Contrast    Result Date: 12/18/2023  FINAL REPORT TECHNIQUE: null CLINICAL HISTORY: altered mental status COMPARISON: null FINDINGS: CT head without contrast Comparison: None Findings: No intracranial mass, midline shift, hydrocephalus, or acute hemorrhage. Subcortical/periventricular white matter hypoattenuation statistically representing changes of chronic microvascular ischemia. Global parenchymal volume loss which is commensurate with reported age. Old lacunar infarct involving the left basal ganglia. The visualized paranasal sinuses and mastoid air cells are normal. The orbits are unremarkable. No skull fracture.     Impression: IMPRESSION: 1. No acute intracranial process. 2. Changes of chronic microvascular ischemia with age-related global parenchymal volume loss. Old small lacunar infarct of the left basal ganglia. Authenticated and Electronically Signed by Warner Yarbrough DO on 12/18/2023 10:15:57 PM    CT Abdomen Pelvis Without Contrast    Result Date: 12/18/2023  FINAL REPORT TECHNIQUE: null CLINICAL HISTORY: altered mental status COMPARISON: null FINDINGS: Noncontrast CT of the abdomen and pelvis Technique: Noncontrast CT images from the lung bases through the ischial tuberosities. Comparison: None Findings: Low lung volumes. Normal noncontrast liver. No hepatic masses. The gallbladder is unremarkable by CT. Normal appearing adrenal glands. Normal noncontrast spleen. The spleen is normal size. Normal kidneys, no hydronephrosis or nephrolithiasis. No suspicious lesions. No stones in the course of either ureter. Moderate distension of the rectum with stool. No bowel obstruction. No  pneumoperitoneum or abscess. Normal appendix. Normal retroperitoneum. No adenopathy. Normal caliber abdominal aorta. Normal pancreas. No evidence for pancreatitis. No pancreatic masses. Mild thickening of the urinary bladder. This could represent mild cystitis. Normal bones.     Impression: Impression: Moderate distension of the rectum with stool. Mild thickening of the urinary bladder. This could represent cystitis. Authenticated and Electronically Signed by Warner Dixon MD on 12/18/2023 07:23:40 PM        [facility]     Amount and/or Complexity of Data Reviewed  Decide to obtain previous medical records or to obtain history from someone other than the patient: yes        Initial impression of presenting illness: Is a 63-year-old chronically ill female presenting from the nursing home via EMS for altered mental status    DDX: includes but is not limited to: Stroke, intracranial abnormality, sepsis, pneumonia, urinary tract infection, intra-abdominal infection, electrolyte abnormality, dehydration, intracranial abnormality, others    Patient arrives hemodynamically stable afebrile nonhypoxic with vitals interpreted by myself.     Pertinent features from physical exam: Chronically ill-appearing 63-year-old female.  Extremely somnolent minimally responsive opens eyes to pain.  Followed 1 command when asking her to hold her hands in front of her and she held both hands out briefly in front of her for 2 seconds and then became somnolent again and responsive to pain.  She does have sonorous respirations.  She is ill-appearing.  Unable to assess neurologic function secondary to patient's clinical condition.  No facial droop noted.    Initial diagnostic plan: CBC CMP magnesium respiratory panel urinalysis cath procalcitonin lactic acid valproic acid EKG CT head chest and abdomen pelvis without contrast chest x-ray blood cultures x 2, ABG    Results from initial plan were reviewed and interpreted by me revealing CBC revealed  significant leukocytosis at 19.8 with a left shift.  Urinalysis with pattern of infection nitrite positive large leukocytes large blood red turbid appearance.  Given this in combination with her lactic acidosis at 4.5, significant leukocytosis there is concern for urosepsis.  Initial troponin of 114, EKG revealed sinus rhythm rate of 93 with nonspecific ST-T wave changes.  Magnesium of 2.5.  Valproic acid of 50.  CMP glucose 300 creatinine of 2.08 sodium of 146 and a BUN of 49.  This is a significant elevation in her creatinine compared to just 2 months ago concerning for an acute kidney injury.  Mild ALT and AST elevation but specimen was hemolyzed.  Suspect her elevated troponin is likely secondary to acute kidney injury and profound hypotension when the patient was picked up from EMS.  Her troponin did downtrend after fluid resuscitation and pressure control.  Her respiratory panel was negative.  CT abdomen pelvis per radiology IMPRESSION:  Impression: Moderate distension of the rectum with stool.Mild thickening of the urinary bladder. This could represent cystitis.    CT head per radiology interpretation revealed no acute intracranial process but changes of chronic microvascular ischemia with age-related global parenchymal volume loss and old small lacunar infarct of the left basal ganglia.        Diagnostic information from other sources: Old records reviewed    Interventions / Re-evaluation: Patient was given 1 L fluid resuscitation prior to arrival.  Her blood pressure is stable at this time.  Cover with broad-spectrum antibiotics with vancomycin and cefepime given the patient does have an allergy to penicillins will avoid Zosyn.  Received 1 L prior to arrival and given another 500 cc gentle fluid hydration bolus secondary to her history of congestive heart failure. Leigh catheter inserted by nursing staff due to clinical status.     On my reassessment of the patient, patient had significant hypotension with  a systolic pressure in the 30s even after 1 and half liter fluid bolus with 1 L being given by EMS and 500 cc on arrival here when she had a normal pressure in the 120s.  Subsequently moved the blood pressure to the left arm where repeat pressure was obtained in the 50s systolic.  Norepinephrine immediately initiated through peripheral line until central line was placed in the right femoral vein.  Patient continued to remain hypotensive despite maxing out norepinephrine drip, was then initiated on epi drip and given another 1 L of fluids.  Her pressure continued to improve and she was then arousable again to painful stimuli.  She was additionally given Solu-Cortef for better pressure control.    Results/clinical rationale were discussed with patient's family.  I did talk to the power of  who is currently in the hospital, however she spoke to next of kin and they discussed the send she is not able to make decisions for herself and she has a medical power of  (carie solares) that they want medical management only and do not want CPR or intubation.  I did discuss with the family possible outcomes and that her prognosis was guarded at this time due to her clinical condition.    Consultations/Discussion of results with other physicians: Discussed with hospitalist, Dr. Cook graciously agreeable to accept the patient for admission.    Disposition plan: Admit to ICU critical care.    60 minutes of critical care provided. This time excludes other billable procedures. Time does include preparation of documents, medical consultations, review of old records, and direct bedside care. Patient is at high risk for life-threatening deterioration due to sepsis requiring fluid resuscitation and broad-spectrum antibiotics..    -----    Final diagnoses:   Sepsis, due to unspecified organism, unspecified whether acute organ dysfunction present   Altered mental status, unspecified altered mental status type   Urinary  tract infection without hematuria, site unspecified   Elevated troponin   Acute renal failure, unspecified acute renal failure type          Anderson Mcmahon PA-C  12/18/23 2301      Electronically signed by Luh Sampson MD at 12/19/23 0756       Vital Signs (last day)       Date/Time Temp Temp src Pulse Resp BP Patient Position SpO2    12/19/23 1100 100.1 (37.8) Temporal -- -- -- -- --    12/19/23 1000 -- -- 84 -- 98/70 -- 95    12/19/23 0945 -- -- 85 -- -- -- 97    12/19/23 0930 -- -- 82 -- -- -- 98    12/19/23 0915 -- -- 83 -- 109/64 -- 98    12/19/23 0900 -- -- 87 -- 114/71 -- 100    12/19/23 0845 -- -- 86 -- 99/72 -- 100    12/19/23 0830 -- -- 87 -- 114/71 -- 100    12/19/23 0815 -- -- 88 -- 113/60 -- 100    12/19/23 0800 -- -- 85 16 99/63 Lying 98    12/19/23 0745 -- -- 83 -- 102/59 -- 94    12/19/23 0730 -- -- 88 -- 101/66 -- 98    12/19/23 0715 -- -- 88 -- 104/71 -- 100    12/19/23 0712 101.4 (38.6) Temporal 87 -- 110/77 -- 89    12/19/23 0700 -- -- 87 -- 110/77 -- 100    12/19/23 0600 -- -- 99 -- 116/65 -- 100    12/19/23 0500 101.5 (38.6) Oral 97 12 135/68 Lying 97    12/19/23 0400 -- -- 96 -- 135/70 -- 98    12/19/23 0300 -- -- 95 -- 145/75 -- 98    12/19/23 0200 -- -- 97 -- 130/67 -- 100    12/19/23 0100 -- -- 92 -- 118/62 -- 96    12/19/23 0005 98.8 (37.1) Temporal 93 -- 115/65 -- --    12/19/23 0000 98.8 (37.1) Temporal 96 12 115/65 Lying 100    12/18/23 2315 -- -- 95 -- 111/60 -- 99    12/18/23 23:09:40 100.9 (38.3) Axillary -- -- -- -- --    12/18/23 2306 -- -- 97 -- -- -- 93    12/18/23 2305 -- -- 98 -- 115/75 -- 89    12/18/23 2250 -- -- 93 -- 124/62 -- 93    12/18/23 2245 -- -- 96 -- 131/69 -- 93    12/18/23 2220 -- -- 92 -- 111/64 -- 100    12/18/23 2200 100.2 (37.9) Axillary -- -- 116/62 -- 100    12/18/23 2145 -- -- 87 -- 122/59 -- 99    12/18/23 2130 -- -- 87 -- 123/63 -- 98    12/18/23 2125 -- -- 92 -- 107/52 -- 95    12/18/23 2110 -- -- 92 -- 99/46 -- 96    12/18/23 2105 -- -- 93  -- 106/51 -- 95    12/18/23 2100 -- -- 91 -- 107/57 -- 97    12/18/23 2057 -- -- 89 -- 84/52 -- --    12/18/23 2028 -- -- -- -- 102/55 Lying --    12/18/23 20:25:46 -- -- 90 16 97/51 Lying 94    12/18/23 2020 -- -- 90 -- 102/54 -- 93    12/18/23 2015 -- -- 92 -- 98/52 -- 94    12/18/23 2010 -- -- 93 -- 105/55 -- 94    12/18/23 2005 -- -- 92 -- 129/60 -- 94    12/18/23 2000 -- -- 93 -- 125/63 -- 98    12/18/23 1955 100.4 (38) Axillary -- -- -- -- --    12/18/23 1934 -- -- -- -- 160/72 -- 100    12/18/23 1933 -- -- 91 -- 69/52 -- 100    12/18/23 1928 -- -- 82 -- 104/57 -- 99    12/18/23 1926 -- -- 77 16 88/68 Lying 98    12/18/23 1924 -- -- 75 -- 88/68 -- 94    12/18/23 1920 -- -- 77 -- 98/47 -- 98    12/18/23 1906 -- -- 87 -- 65/31 -- --    12/18/23 1859 -- -- 89 -- 68/43 -- 90    12/18/23 1851 -- -- 89 -- 58/41 -- --    12/18/23 1733 -- -- -- -- 152/78 -- --    12/18/23 1730 97 (36.1) Axillary 92 20 -- -- 95          Current Facility-Administered Medications   Medication Dose Route Frequency Provider Last Rate Last Admin    acetaminophen (TYLENOL) tablet 650 mg  650 mg Oral Q4H PRShruthi Singh DO   650 mg at 12/19/23 0516    Or    acetaminophen (TYLENOL) suppository 650 mg  650 mg Rectal Q4H PRShruthi Singh DO        sennosides-docusate (PERICOLACE) 8.6-50 MG per tablet 2 tablet  2 tablet Oral BID Shruthi Cook DO        And    polyethylene glycol (MIRALAX) packet 17 g  17 g Oral Daily PRN Shruthi Cook DO        And    bisacodyl (DULCOLAX) EC tablet 5 mg  5 mg Oral Daily PRN Shruthi Cook DO        And    bisacodyl (DULCOLAX) suppository 10 mg  10 mg Rectal Daily PRN Shruthi Cook DO        cefepime (MAXIPIME) IVPB 2000 mg/50ml D5W (premix)  2,000 mg Intravenous Q12H Shruthi Cook DO   2,000 mg at 12/19/23 1006    dextrose (D50W) (25 g/50 mL) IV injection 10-50 mL  10-50 mL Intravenous Q15 Min PRN Shruthi Cook DO        dextrose  (D50W) (25 g/50 mL) IV injection 25 g  25 g Intravenous Q15 Min PRN Salo Darden DO        dextrose (GLUTOSE) oral gel 15 g  15 g Oral Q15 Min PRN Salo Darden DO        dextrose 5 % and sodium chloride 0.45 % infusion  150 mL/hr Intravenous Continuous Shruthi Cook,  mL/hr at 12/19/23 0729 150 mL/hr at 12/19/23 0729    EPINEPHrine 5 mg in 250 mL NS infusion  0.02-0.3 mcg/kg/min Intravenous Titrated Shruthi Cook, DO   Stopped at 12/19/23 0712    glucagon (GLUCAGEN) injection 1 mg  1 mg Intramuscular Q15 Min PRN Salo Darden DO        Insulin Aspart (novoLOG) injection 3-14 Units  3-14 Units Subcutaneous 4x Daily AC & at Bedtime Salo Darden DO        insulin detemir (LEVEMIR) injection 15 Units  15 Units Subcutaneous BID Salo Darden DO        insulin regular 1 unit/mL in 0.9% sodium chloride (Glucommander)  0-100 Units/hr Intravenous Titrated Shruthi Cook, DO 1.8 mL/hr at 12/19/23 1124 1.8 Units/hr at 12/19/23 1124    nitroglycerin (NITROSTAT) SL tablet 0.4 mg  0.4 mg Sublingual Q5 Min PRN Shruthi Cook DO        norepinephrine (LEVOPHED) 8 mg in 250mL D5W infusion  0.02-0.3 mcg/kg/min Intravenous Titrated Shruthi Cook,    Stopped at 12/19/23 0530    Pharmacy to dose vancomycin   Does not apply Continuous PRN Shruthi Cook DO        potassium chloride 10 mEq in 100 mL IVPB  10 mEq Intravenous Q1H Salo Darden DO        Potassium Replacement - Follow Nurse / BPA Driven Protocol   Does not apply PRN Salo Darden DO        sodium chloride 0.9 % flush 10 mL  10 mL Intravenous PRN Shruthi Cook, DO   10 mL at 12/19/23 0009    sodium chloride 0.9 % flush 10 mL  10 mL Intravenous Q12H Shruthi Cook, DO   10 mL at 12/19/23 1007    sodium chloride 0.9 % flush 10 mL  10 mL Intravenous PRN Shruthi Cook DO        sodium chloride 0.9 % infusion 40 mL  40 mL Intravenous PRN  Shruthi Cook, DO        sodium chloride 0.9 % infusion  100 mL/hr Intravenous Continuous Shruthi Cook,  mL/hr at 12/19/23 0505 100 mL/hr at 12/19/23 0505    vancomycin 1000 mg/250 mL 0.9% NS (vial-mate)  1,000 mg Intravenous Q24H Shruthi Cook, DO         Lab Results (last 24 hours)       Procedure Component Value Units Date/Time    Phosphorus [[id number]]  (Abnormal) Collected: 12/19/23 1039    Specimen: Blood Updated: 12/19/23 1132     Phosphorus 1.9 mg/dL     Basic Metabolic Panel [[id number]]  (Abnormal) Collected: 12/19/23 1039    Specimen: Blood Updated: 12/19/23 1117     Glucose 242 mg/dL      BUN 37 mg/dL      Creatinine 1.06 mg/dL      Sodium 149 mmol/L      Potassium 3.6 mmol/L      Chloride 119 mmol/L      CO2 27.5 mmol/L      Calcium 9.2 mg/dL      BUN/Creatinine Ratio 34.9     Anion Gap 2.5 mmol/L      eGFR 59.5 mL/min/1.73     Narrative:      GFR Normal >60  Chronic Kidney Disease <60  Kidney Failure <15      Magnesium [[id number]]  (Normal) Collected: 12/19/23 1039    Specimen: Blood Updated: 12/19/23 1117     Magnesium 2.4 mg/dL     STAT Lactic Acid, Reflex [[id number]]  (Normal) Collected: 12/19/23 1039    Specimen: Blood Updated: 12/19/23 1113     Lactate 1.8 mmol/L     POC Glucose Once [[id number]]  (Abnormal) Collected: 12/19/23 0810    Specimen: Blood Updated: 12/19/23 0920     Glucose 169 mg/dL      Comment: Serial Number: [id number]:  [id number]       POC Glucose Once [[id number]]  (Abnormal) Collected: 12/19/23 0737    Specimen: Blood Updated: 12/19/23 0919     Glucose 190 mg/dL      Comment: Serial Number: [id number]:  [id number]       POC Glucose Once [[id number]]  (Abnormal) Collected: 12/19/23 0704    Specimen: Blood Updated: 12/19/23 0919     Glucose 246 mg/dL      Comment: Serial Number: [id number]:  [id number]       POC Glucose Once [[id number]]  (Abnormal) Collected: 12/19/23 0602    Specimen: Blood Updated: 12/19/23 0919     Glucose 392 mg/dL       Comment: Serial Number: [id number]:  [id number]       STAT Lactic Acid, Reflex [[id number]]  (Abnormal) Collected: 12/19/23 0343    Specimen: Blood Updated: 12/19/23 0417     Lactate 5.0 mmol/L     Basic Metabolic Panel [[id number]]  (Abnormal) Collected: 12/19/23 0343    Specimen: Blood Updated: 12/19/23 0417     Glucose 471 mg/dL      BUN 41 mg/dL      Creatinine 1.38 mg/dL      Sodium 150 mmol/L      Potassium 4.5 mmol/L      Chloride 110 mmol/L      CO2 22.6 mmol/L      Calcium 9.1 mg/dL      BUN/Creatinine Ratio 29.7     Anion Gap 17.4 mmol/L      eGFR 43.4 mL/min/1.73     Narrative:      GFR Normal >60  Chronic Kidney Disease <60  Kidney Failure <15      Vancomycin, Random [[id number]]  (Normal) Collected: 12/19/23 0343    Specimen: Blood Updated: 12/19/23 0412     Vancomycin Random 26.70 mcg/mL     Narrative:      Therapeutic Ranges for Vancomycin    Vancomycin Random   5.0-40.0 mcg/mL  Vancomycin Trough   5.0-20.0 mcg/mL  Vancomycin Peak     20.0-40.0 mcg/mL    CBC (No Diff) [[id number]]  (Abnormal) Collected: 12/19/23 0343    Specimen: Blood Updated: 12/19/23 0350     WBC 23.81 10*3/mm3      RBC 5.12 10*6/mm3      Hemoglobin 14.3 g/dL      Hematocrit 46.4 %      MCV 90.6 fL      MCH 27.9 pg      MCHC 30.8 g/dL      RDW 14.7 %      RDW-SD 48.9 fl      MPV 9.2 fL      Platelets 397 10*3/mm3     STAT Lactic Acid, Reflex [[id number]]  (Abnormal) Collected: 12/18/23 2347    Specimen: Blood Updated: 12/19/23 0024     Lactate 6.4 mmol/L     Acinetobacter Screen - Swab, Axilla, Left [[id number]] Collected: 12/18/23 2354    Specimen: Swab from Axilla, Left Updated: 12/18/23 2355    MRSA Screen, PCR (Inpatient) - Swab, Nares [[id number]] Collected: 12/18/23 2354    Specimen: Swab from Nares Updated: 12/18/23 2354    VRE Culture - Swab, Per Rectum [[id number]] Collected: 12/18/23 2354    Specimen: Swab from Per Rectum Updated: 12/18/23 2354    STAT Lactic Acid, Reflex [[id number]]  (Abnormal) Collected: 12/18/23 2101  "   Specimen: Blood Updated: 12/18/23 2133     Lactate 5.3 mmol/L     High Sensitivity Troponin T 2Hr [[id number]]  (Abnormal) Collected: 12/18/23 1957    Specimen: Blood Updated: 12/18/23 2040     HS Troponin T 57 ng/L      Troponin T Delta -57 ng/L     Narrative:      High Sensitive Troponin T Reference Range:  <14.0 ng/L- Negative Female for AMI  <22.0 ng/L- Negative Male for AMI  >=14 - Abnormal Female indicating possible myocardial injury.  >=22 - Abnormal Male indicating possible myocardial injury.   Clinicians would have to utilize clinical acumen, EKG, Troponin, and serial changes to determine if it is an Acute Myocardial Infarction or myocardial injury due to an underlying chronic condition.         Procalcitonin [[id number]]  (Abnormal) Collected: 12/18/23 1839    Specimen: Blood Updated: 12/18/23 1931     Procalcitonin 0.77 ng/mL     Narrative:      As a Marker for Sepsis (Non-Neonates):    1. <0.5 ng/mL represents a low risk of severe sepsis and/or septic shock.  2. >2 ng/mL represents a high risk of severe sepsis and/or septic shock.    As a Marker for Lower Respiratory Tract Infections that require antibiotic therapy:    PCT on Admission    Antibiotic Therapy       6-12 Hrs later    >0.5                Strongly Recommended  >0.25 - <0.5        Recommended   0.1 - 0.25          Discouraged              Remeasure/reassess PCT  <0.1                Strongly Discouraged     Remeasure/reassess PCT    As 28 day mortality risk marker: \"Change in Procalcitonin Result\" (>80% or <=80%) if Day 0 (or Day 1) and Day 4 values are available. Refer to http://www.to bes-pct-calculator.com    Change in PCT <=80%  A decrease of PCT levels below or equal to 80% defines a positive change in PCT test result representing a higher risk for 28-day all-cause mortality of patients diagnosed with severe sepsis for septic shock.    Change in PCT >80%  A decrease of PCT levels of more than 80% defines a negative change in PCT result " representing a lower risk for 28-day all-cause mortality of patients diagnosed with severe sepsis or septic shock.       Blood Gas, Arterial With Co-Ox [[id number]]  (Abnormal) Collected: 12/18/23 1852    Specimen: Arterial Blood Updated: 12/18/23 1852     Site Right Radial     Usama's Test N/A     pH, Arterial 7.433 pH units      pCO2, Arterial 41.7 mm Hg      pO2, Arterial 66.4 mm Hg      Comment: 84 Value below reference range        HCO3, Arterial 27.8 mmol/L      Comment: 83 Value above reference range        Base Excess, Arterial 3.1 mmol/L      Comment: 83 Value above reference range        O2 Saturation, Arterial 93.8 %      Comment: 84 Value below reference range        Hematocrit, Blood Gas 46.4 %      Oxyhemoglobin 92.6 %      Comment: 84 Value below reference range        Methemoglobin 0.00 %      Carboxyhemoglobin 1.3 %      A-a DO2 30.7 mmHg      Barometric Pressure for Blood Gas 734 mmHg      Modality N/A     FIO2 21 %      Ventilator Mode NA     Collected by      Comment: Meter: [id number]     :  [id number]        pH, Temp Corrected --     pCO2, Temperature Corrected --     pO2, Temperature Corrected --    [facility] Draw [[id number]] Collected: 12/18/23 1744    Specimen: Blood Updated: 12/18/23 1846    Narrative:      The following orders were created for panel order [facility] Draw.  Procedure                               Abnormality         Status                     ---------                               -----------         ------                     Green Top (Gel)[[id number]]                                  Final result               Lavender Top[[id number]]                                     Final result               Gold Top - [facility][[id number]]                                   Final result               Light Blue Top[[id number]]                                   Final result                 Please view results for these tests on the individual orders.    Green Top (Gel) [[id number]]  Collected: 12/18/23 1744    Specimen: Blood Updated: 12/18/23 1846     Extra Tube Hold for add-ons.     Comment: Auto resulted.       Lavender Top [[id number]] Collected: 12/18/23 1744    Specimen: Blood Updated: 12/18/23 1846     Extra Tube hold for add-on     Comment: Auto resulted       Gold Top - [facility]ST [[id number]] Collected: 12/18/23 1744    Specimen: Blood Updated: 12/18/23 1846     Extra Tube Hold for add-ons.     Comment: Auto resulted.       Light Blue Top [[id number]] Collected: 12/18/23 1744    Specimen: Blood Updated: 12/18/23 1846     Extra Tube Hold for add-ons.     Comment: Auto resulted       Blood Culture - Blood, Arm, Left [[id number]] Collected: 12/18/23 1831    Specimen: Blood from Arm, Left Updated: 12/18/23 1845    Blood Culture - Blood, Arm, Right [[id number]] Collected: 12/18/23 1839    Specimen: Blood from Arm, Right Updated: 12/18/23 1845    Urinalysis, Microscopic Only - Straight Cath [[id number]]  (Abnormal) Collected: 12/18/23 1757    Specimen: Urine from Straight Cath Updated: 12/18/23 1839     RBC, UA Too Numerous to Count /HPF      WBC, UA 21-50 /HPF      Bacteria, UA 2+ /HPF      Squamous Epithelial Cells, UA 3-6 /HPF      Hyaline Casts, UA None Seen /LPF      Methodology Manual Light Microscopy    Urine Culture - Urine, Straight Cath [[id number]] Collected: 12/18/23 1757    Specimen: Urine from Straight Cath Updated: 12/18/23 1839    Respiratory Panel PCR w/COVID-19(SARS-CoV-2) TERESA/JOHN/ANNABELLE/PAD/COR/ADRIANA In-House, NP Swab in UTM/VTM, 2 HR TAT - Swab, Nasopharynx [[id number]]  (Normal) Collected: 12/18/23 1745    Specimen: Swab from Nasopharynx Updated: 12/18/23 1837     ADENOVIRUS, PCR Not Detected     Coronavirus 229E Not Detected     Coronavirus HKU1 Not Detected     Coronavirus NL63 Not Detected     Coronavirus OC43 Not Detected     COVID19 Not Detected     Human Metapneumovirus Not Detected     Human Rhinovirus/Enterovirus Not Detected     Influenza A PCR Not Detected     Influenza B PCR Not  Detected     Parainfluenza Virus 1 Not Detected     Parainfluenza Virus 2 Not Detected     Parainfluenza Virus 3 Not Detected     Parainfluenza Virus 4 Not Detected     RSV, PCR Not Detected     Bordetella pertussis pcr Not Detected     Bordetella parapertussis PCR Not Detected     Chlamydophila pneumoniae PCR Not Detected     Mycoplasma pneumo by PCR Not Detected    Narrative:      In the setting of a positive respiratory panel with a viral infection PLUS a negative procalcitonin without other underlying concern for bacterial infection, consider observing off antibiotics or discontinuation of antibiotics and continue supportive care. If the respiratory panel is positive for atypical bacterial infection (Bordetella pertussis, Chlamydophila pneumoniae, or Mycoplasma pneumoniae), consider antibiotic de-escalation to target atypical bacterial infection.    Single High Sensitivity Troponin T [[id number]]  (Abnormal) Collected: 12/18/23 1744    Specimen: Blood Updated: 12/18/23 1827     HS Troponin T 114 ng/L     Narrative:      High Sensitive Troponin T Reference Range:  <14.0 ng/L- Negative Female for AMI  <22.0 ng/L- Negative Male for AMI  >=14 - Abnormal Female indicating possible myocardial injury.  >=22 - Abnormal Male indicating possible myocardial injury.   Clinicians would have to utilize clinical acumen, EKG, Troponin, and serial changes to determine if it is an Acute Myocardial Infarction or myocardial injury due to an underlying chronic condition.         Lactic Acid, Plasma [[id number]]  (Abnormal) Collected: 12/18/23 1744    Specimen: Blood Updated: 12/18/23 1819     Lactate 4.5 mmol/L     Comprehensive Metabolic Panel [[id number]]  (Abnormal) Collected: 12/18/23 1744    Specimen: Blood Updated: 12/18/23 1812     Glucose 300 mg/dL      Comment: Glucose >180, Hemoglobin A1C recommended.        BUN 49 mg/dL      Creatinine 2.08 mg/dL      Sodium 146 mmol/L      Potassium 4.6 mmol/L      Comment: Specimen  hemolyzed.  Result may be falsely elevated.        Chloride 104 mmol/L      CO2 25.7 mmol/L      Calcium 9.5 mg/dL      Total Protein 7.4 g/dL      Albumin 3.7 g/dL      ALT (SGPT) 63 U/L      Comment: Specimen hemolyzed.  Result may  be falsely elevated.        AST (SGOT) 43 U/L      Comment: Specimen hemolyzed.  Result may be falsely elevated.        Alkaline Phosphatase 89 U/L      Total Bilirubin 0.2 mg/dL      Globulin 3.7 gm/dL      A/G Ratio 1.0 g/dL      BUN/Creatinine Ratio 23.6     Anion Gap 16.3 mmol/L      eGFR 26.5 mL/min/1.73     Narrative:      GFR Normal >60  Chronic Kidney Disease <60  Kidney Failure <15      Magnesium [[id number]]  (Abnormal) Collected: 12/18/23 1744    Specimen: Blood Updated: 12/18/23 1812     Magnesium 2.5 mg/dL     Valproic Acid Level, Total [[id number]]  (Normal) Collected: 12/18/23 1744    Specimen: Blood Updated: 12/18/23 1810     Valproic Acid 50.0 mcg/mL     Narrative:      Therapeutic Ranges for Valproic Acid    Epilepsy:       mcg/ml  Bipolar/Alessia  up to 125 mcg/ml      Urinalysis With Culture If Indicated - Straight Cath [[id number]]  (Abnormal) Collected: 12/18/23 1757    Specimen: Urine from Straight Cath Updated: 12/18/23 1809     Color, UA Red     Appearance, UA Turbid     pH, UA 5.5     Specific Gravity, UA 1.022     Glucose, UA >=1000 mg/dL (3+)     Ketones, UA Negative     Bilirubin, UA Negative     Blood, UA Large (3+)     Protein, UA >=300 mg/dL (3+)     Leuk Esterase, UA Large (3+)     Nitrite, UA Positive     Urobilinogen, UA 0.2 E.U./dL    Narrative:      In absence of clinical symptoms, the presence of pyuria, bacteria, and/or nitrites on the urinalysis result does not correlate with infection.    CBC & Differential [[id number]]  (Abnormal) Collected: 12/18/23 1744    Specimen: Blood Updated: 12/18/23 1753    Narrative:      The following orders were created for panel order CBC & Differential.  Procedure                               Abnormality          Status                     ---------                               -----------         ------                     CBC Auto Differential[[id number]]        Abnormal            Final result                 Please view results for these tests on the individual orders.    CBC Auto Differential [[id number]]  (Abnormal) Collected: 12/18/23 1744    Specimen: Blood Updated: 12/18/23 1753     WBC 19.80 10*3/mm3      RBC 5.24 10*6/mm3      Hemoglobin 14.6 g/dL      Hematocrit 47.3 %      MCV 90.3 fL      MCH 27.9 pg      MCHC 30.9 g/dL      RDW 14.4 %      RDW-SD 47.3 fl      MPV 9.4 fL      Platelets 337 10*3/mm3      Neutrophil % 83.2 %      Lymphocyte % 8.4 %      Monocyte % 7.4 %      Eosinophil % 0.0 %      Basophil % 0.3 %      Immature Grans % 0.7 %      Neutrophils, Absolute 16.48 10*3/mm3      Lymphocytes, Absolute 1.67 10*3/mm3      Monocytes, Absolute 1.46 10*3/mm3      Eosinophils, Absolute 0.00 10*3/mm3      Basophils, Absolute 0.06 10*3/mm3      Immature Grans, Absolute 0.13 10*3/mm3      nRBC 0.0 /100 WBC           Imaging Results (Last 24 Hours)       Procedure Component Value Units Date/Time    XR Chest 1 View [[id number]] Collected: 12/19/23 0845     Updated: 12/19/23 0849    Narrative:      CLINICAL INDICATION:    Weak/Dizzy/AMS triage protocol     EXAMINATION TECHNIQUE:  XR CHEST 1 VW-     COMPARISON:  Radiographs 10/11/2023.     FINDINGS:  Stable mild cardiomegaly. Coronary and aortic atherosclerosis. Dense  mitral annulus calcifications. Mild perihilar vascular engorgement. No  consolidation. Bibasilar atelectasis, left greater than right. No  pneumothorax or large pleural effusion.       Impression:      Cardiomegaly. Vascular engorgement. No edema. Mild bibasilar  atelectasis, left greater than right.     Images personally reviewed, interpreted and dictated by SUSANNA Stallworth.              This report was signed and finalized on 12/19/2023 8:47 AM by SUSANNA Stallworth.       CT Chest Without  Contrast Diagnostic [[id number]] Collected: 12/18/23 2248     Updated: 12/18/23 2250    Narrative:      FINAL REPORT    TECHNIQUE:  null    CLINICAL HISTORY:  Altered mental status    COMPARISON:  null    FINDINGS:  CT chest without contrast.    Comparison: None    Findings:    Hypodense nodule within the left thyroid measures 1.5 cm. Recommend nonemergent thyroid ultrasound for further characterization.    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    Normal caliber of the thoracic aorta.    Cardiomegaly. Coronary atherosclerosis. Dense calcifications of the mitral valve.    Small nodes within the mediastinum.    Subsegmental atelectasis greatest within the bilateral lung bases. Linear atelectasis within the left lung base. Bronchial thickening within the lower lobe bronchi may indicate a component of reactive airways disease.    The bones are intact. Degenerative changes of the thoracic spine.    Hepatomegaly and hepatic steatosis. Calcification within the inferior right lobe of the liver. Nodularity of the left adrenal gland.      Impression:      IMPRESSION:    1. Subsegmental of the bilateral lung bases. Linear atelectasis within left lung base. Minor bronchial thickening within the lung bases could indicate reactive airways disease including bronchitis. No consolidation.    2. Cardiomegaly with coronary atherosclerosis.    3. Hypodense nodule within the left thyroid measures 1.5 cm. Recommend nonemergent thyroid ultrasound for further characterization.    Authenticated and Electronically Signed by Warner Yarbrough DO on  12/18/2023 10:48:36 PM    CT Head Without Contrast [[id number]] Collected: 12/18/23 2215     Updated: 12/18/23 2217    Narrative:      FINAL REPORT    TECHNIQUE:  null    CLINICAL HISTORY:  altered mental status    COMPARISON:  null    FINDINGS:  CT head without contrast    Comparison: None    Findings:    No intracranial mass, midline shift, hydrocephalus, or acute  hemorrhage.    Subcortical/periventricular white matter hypoattenuation statistically representing changes of chronic microvascular ischemia. Global parenchymal volume loss which is commensurate with reported age.    Old lacunar infarct involving the left basal ganglia.    The visualized paranasal sinuses and mastoid air cells are normal.    The orbits are unremarkable.    No skull fracture.      Impression:      IMPRESSION:    1. No acute intracranial process.    2. Changes of chronic microvascular ischemia with age-related global parenchymal volume loss. Old small lacunar infarct of the left basal ganglia.    Authenticated and Electronically Signed by Warner Yarbrough DO on  12/18/2023 10:15:57 PM    CT Abdomen Pelvis Without Contrast [[id number]] Collected: 12/18/23 1923     Updated: 12/18/23 1925    Narrative:      FINAL REPORT    TECHNIQUE:  null    CLINICAL HISTORY:  altered mental status    COMPARISON:  null    FINDINGS:  Noncontrast CT of the abdomen and pelvis    Technique: Noncontrast CT images from the lung bases through the ischial tuberosities.    Comparison:    None    Findings: Low lung volumes.    Normal noncontrast liver. No hepatic masses.    The gallbladder is unremarkable by CT.    Normal appearing adrenal glands.    Normal noncontrast spleen. The spleen is normal size.    Normal kidneys, no hydronephrosis or nephrolithiasis. No suspicious lesions. No stones in the course of either ureter.    Moderate distension of the rectum with stool. No bowel obstruction. No pneumoperitoneum or abscess. Normal appendix.    Normal retroperitoneum. No adenopathy. Normal caliber abdominal aorta.    Normal pancreas. No evidence for pancreatitis. No pancreatic masses.    Mild thickening of the urinary bladder. This could represent mild cystitis.    Normal bones.      Impression:      Impression:    Moderate distension of the rectum with stool.    Mild thickening of the urinary bladder. This could represent  cystitis.    Authenticated and Electronically Signed by Warner Dixon MD on  12/18/2023 07:23:40 PM          Physician Progress Notes (last 24 hours)  Notes from 12/18/23 1157 through 12/19/23 1157   No notes of this type exist for this encounter.

## 2023-12-19 NOTE — PROGRESS NOTES
"Dietitian Assessment    Patient Name: Izabella Agudelo  YOB: 1960  MRN: [id number]  Admission date: 12/18/2023    Comment:      Clinical Nutrition Assessment      Reason for Assessment MST    H&P  Past Medical History:   Diagnosis Date    Acute angle-closure glaucoma, bilateral     Aphasia     Cardiac abnormality     CHF (congestive heart failure)     COPD (chronic obstructive pulmonary disease)     Diabetes     Glaucoma     Hemiparesis     Hemiplegia     Impaired functional mobility, balance, gait, and endurance     Schizophrenia, chronic condition     Vascular dementia        History reviewed. No pertinent surgical history.         Current Problems   Sepsis  Complicated UTI  Acute metabolic encephalopathy  PRIYA  Vascular dementia  Oropharyngeal dysphagia     Encounter Information        Trending Narrative     12/19: Pt w/ MST score of 2 and with reported difficulty chewing and swallowing. Pt w/ overweight/obese BMI for age and currently NPO. Will await diet advancement to order supplement.      Anthropometrics        Current Height, Weight Height: 172.7 cm (68\")  Weight: 90.7 kg (200 lb) (12/18/23 1730)   Trending Weight Hx     This admission:              PTA:     Wt Readings from Last 30 Encounters:   12/18/23 1730 90.7 kg (200 lb)   10/19/23 1327 83.3 kg (183 lb 9.6 oz)   10/18/23 0839 83.3 kg (183 lb 9.6 oz)   10/14/23 0344 84.4 kg (186 lb 1.1 oz)   10/13/23 2251 84.8 kg (186 lb 15.2 oz)   10/13/23 0400 83.2 kg (183 lb 6.8 oz)   10/12/23 0300 79.4 kg (175 lb 0.7 oz)   10/11/23 2200 79.4 kg (175 lb 0.7 oz)   10/11/23 1920 83 kg (183 lb)   10/11/23 1407 83 kg (183 lb)   10/03/23 1541 83 kg (183 lb)   08/18/23 1506 77.5 kg (170 lb 12.8 oz)   08/16/23 1315 77.5 kg (170 lb 12.8 oz)   06/21/23 0956 77.6 kg (171 lb)   06/02/23 0813 77.2 kg (170 lb 3.2 oz)   04/19/23 0900 78 kg (172 lb)   03/06/23 1456 76.7 kg (169 lb)   03/02/23 1904 76.7 kg (169 lb)   02/15/23 1029 76.2 kg (168 lb)   12/21/22 " 0837 76.4 kg (168 lb 7 oz)   10/19/22 0855 77.1 kg (170 lb)   09/06/22 1522 75.8 kg (167 lb)   08/18/22 1340 75.3 kg (165 lb 14.4 oz)   08/17/22 0946 75.3 kg (166 lb)   07/25/22 1624 76 kg (167 lb 8 oz)   07/22/22 0936 76 kg (167 lb 8 oz)   07/20/22 0928 76.2 kg (168 lb)   06/15/22 1355 76.7 kg (169 lb)   05/18/22 0848 74.8 kg (164 lb 14.4 oz)   04/20/22 0929 74.3 kg (163 lb 14.4 oz)   03/16/22 0944 73.3 kg (161 lb 9.6 oz)   01/19/22 1542 71.7 kg (158 lb)   12/15/21 1700 71.7 kg (158 lb)   10/20/21 1422 77.6 kg (171 lb)   09/15/21 1409 76.7 kg (169 lb)   08/19/21 1504 78 kg (172 lb)      BMI kg/m2 Body mass index is 30.41 kg/m².     Labs        Pertinent Labs     Results from last 7 days   Lab Units 12/19/23  0343 12/18/23  1744   SODIUM mmol/L 150* 146*   POTASSIUM mmol/L 4.5 4.6   CHLORIDE mmol/L 110* 104   CO2 mmol/L 22.6 25.7   BUN mg/dL 41* 49*   CREATININE mg/dL 1.38* 2.08*   CALCIUM mg/dL 9.1 9.5   BILIRUBIN mg/dL  --  0.2   ALK PHOS U/L  --  89   ALT (SGPT) U/L  --  63*   AST (SGOT) U/L  --  43*   GLUCOSE mg/dL 471* 300*       Results from last 7 days   Lab Units 12/19/23  0343 12/18/23  1744   MAGNESIUM mg/dL  --  2.5*   HEMOGLOBIN g/dL 14.3 14.6   HEMATOCRIT % 46.4 47.3*       Lab Results   Component Value Date    HGBA1C 11.10 (H) 10/11/2023            Medications       Scheduled Medications cefepime, 2,000 mg, Intravenous, Q12H  senna-docusate sodium, 2 tablet, Oral, BID  sodium chloride, 10 mL, Intravenous, Q12H  vancomycin, 1,000 mg, Intravenous, Q24H        Infusions dextrose 5 % and sodium chloride 0.45 %, 150 mL/hr, Last Rate: 150 mL/hr (12/19/23 0701)  EPINEPHrine, 0.02-0.3 mcg/kg/min, Last Rate: Stopped (12/19/23 0712)  insulin, 0-100 Units/hr, Last Rate: 3.1 Units/hr (12/19/23 0985)  norepinephrine, 0.02-0.3 mcg/kg/min, Last Rate: Stopped (12/19/23 2878)  Pharmacy to dose vancomycin,   sodium chloride, 100 mL/hr, Last Rate: 100 mL/hr (12/19/23 1261)         PRN Medications   acetaminophen **OR**  acetaminophen    senna-docusate sodium **AND** polyethylene glycol **AND** bisacodyl **AND** bisacodyl    dextrose    dextrose    glucagon (human recombinant)    nitroglycerin    Pharmacy to dose vancomycin    Potassium Replacement - Follow Nurse / BPA Driven Protocol    sodium chloride    sodium chloride    sodium chloride     Physical Findings        Trending Physical   Appearance, NFPE    --  Edema  None   Bowel Function Last bm 12/19   Tubes CVC   Chewing/Swallowing NPO   Skin WNL      Estimated/Assessed Needs       Energy Requirements    EST Needs, Method, Wt used 9358-3876 kcals/day using CBW and 25-30kcals/kg       Protein Requirements    EST Needs, Method, Wt used 73g protein per day using CBW and .8g/kg       Fluid Requirements     Estimated Needs (mL/day) 2113mL per day       Current Nutrition Orders & Evaluation of Intake       Oral Nutrition     Food Allergies    Current PO Diet NPO Diet NPO Type: Strict NPO   Supplement    PO Evaluation     Trending % PO Intake NPO     Enteral Nutrition    Enteral Route    Order, Modulars, Flushes    Residual/Tolerance    TF Observation         Parenteral Nutrition     TPN Route    Total # Days on TPN    TPN Order, Lipid Details    MVI & Trace Element Freq    TPN Observation       Nutrition Diagnosis         Nutrition Dx Problem 1 No nutrition dx at this time    Nutrition Dx Problem 2        Intervention Goal         Intervention Goal(s) Advance diet when medically appropriate  Maintain current body weight     Nutrition Intervention        RD Action RD to follow-up.     Nutrition Prescription          Diet Prescription NPO   Supplement Prescription      Enteral Prescription        TPN Prescription      Monitor/Evaluation        Monitor Per protocol, I&O, PO intake, Weight, Skin status, GI status, POC/GOC, Swallow function     RD to follow-up.     Electronically signed by:  Elpidio Louis RD  12/19/23 09:52 EST

## 2023-12-19 NOTE — CASE MANAGEMENT/SOCIAL WORK
Discharge Planning Assessment   [facility]     Patient Name: Izabella Agudelo  MRN: [id number]  Today's Date: 12/19/2023    Admit Date: 12/18/2023    Plan: DCP Return to [facility] for STR or LTC.   Discharge Needs Assessment       Row Name 12/19/23 1109       Living Environment    People in Home other (see comments)    Unique Family Situation Resides at [facility].    Duration at Residence 2-4  months    Potentially Unsafe Housing Conditions unable to assess    In the past 12 months has the electric, gas, oil, or water company threatened to shut off services in your home? No    Primary Care Provided by other (see comments)  [facility] resident.    Provides Primary Care For no one, unable/limited ability to care for self    Family Caregiver if Needed none    Quality of Family Relationships unable to assess    Able to Return to Prior Arrangements yes    Living Arrangement Comments [facility] confirmed bed hold. No pre cert required.       Resource/Environmental Concerns    Resource/Environmental Concerns other (see comments)    Transportation Concerns no car       Food Insecurity    Within the past 12 months, you worried that your food would run out before you got the money to buy more. Never true    Within the past 12 months, the food you bought just didn't last and you didn't have money to get more. Never true       Transition Planning    Patient/Family Anticipates Transition to inpatient rehabilitation facility    Patient/Family Anticipated Services at Transition rehabilitation services    Transportation Anticipated other (see comments)  Will need EMS for DC.       Discharge Needs Assessment    Readmission Within the Last 30 Days no previous admission in last 30 days    Current Outpatient/Agency/Support Group inpatient rehabilitation facility    Equipment Currently Used at Home none    Concerns to be Addressed basic needs;discharge planning    Anticipated Changes Related to Illness inability to care for self  "   Equipment Needed After Discharge other (see comments)  Will return to [facility].    Outpatient/Agency/Support Group Needs skilled nursing facility    Discharge Facility/Level of Care Needs nursing facility, skilled    Provided Post Acute Provider List? N/A    Provided Post Acute Provider Quality & Resource List? N/A                   Discharge Plan       Row Name 23 1121       Plan    Plan DCP Return to [facility] for STR vs LTC.    Plan Comments CM spoke with pt at bedside. Permission given to discuss DCP. Pt confirmed name and . Unable to answer any other questions. CM called /Emi and she states Does not have a LW or POA. Sister states is a \"DNR now\". Spoke with Alyssa at [facility] and she confirmed pt has a bed hold available and can return when stable. Also pt is LTC status. Requires (A) with all ADL's and mobility. DCP to return back to [facility] when medically stable.    Final Discharge Disposition Code 04 - intermediate care facility                  Continued Care and Services - Admitted Since 2023    Coordination has not been started for this encounter.       Selected Continued Care - Prior Encounters Includes continued care and service providers with selected services from prior encounters from 2023 to 2023      Discharged on 10/14/2023 Admission date: 10/11/2023 - Discharge disposition: Skilled Nursing Facility (DC - External)      Destination       Service Provider Selected Services Address Phone Fax Patient Preferred    [facility] [address] [id number] [phone] --                          Expected Discharge Date and Time       Expected Discharge Date Expected Discharge Time    Dec 21, 2023            Demographic Summary       Row Name 23 1104       General Information    Admission Type inpatient    Arrived From emergency department    Required Notices Provided Important Message from Medicare    " Referral Source admission list    Reason for Consult discharge planning    Preferred Language English       Contact Information    Permission Granted to Share Info With     Contact Information Obtained for                    Functional Status       Row Name 12/19/23 1103       Functional Status    Usual Activity Tolerance poor    Current Activity Tolerance poor    Functional Status Comments Pt admitted from [facility]       Physical Activity    On average, how many days per week do you engage in moderate to strenuous exercise (like a brisk walk)? 0 days    On average, how many minutes do you engage in exercise at this level? 0 min    Number of minutes of exercise per week 0       Assessment of Health Literacy    How often do you have someone help you read hospital materials? Always    How often do you have problems learning about your medical condition because of difficulty understanding written information? Always    How often do you have a problem understanding what is told to you about your medical condition? Always    How confident are you filling out medical forms by yourself? Not at all    Health Literacy Low       Functional Status, IADL    Medications assistive person    Meal Preparation assistive person    Housekeeping assistive person    Laundry assistive person    Shopping assistive person    IADL Comments Resides at [facility].       Mental Status    General Appearance WDL WDL       Mental Status Summary    Recent Changes in Mental Status/Cognitive Functioning unable to assess       Employment/    Employment Status disabled    Current or Previous Occupation not applicable                   Psychosocial       Row Name 12/19/23 1109       Developmental Stage (Eriksson's)    Developmental Stage Stage 7 (35-65 years/Middle Adulthood) Generativity vs. Stagnation                   Abuse/Neglect    No documentation.                  Legal    No documentation.                   Substance Abuse    No documentation.                  Patient Forms    No documentation.                     Jyotsna Jeffries RN

## 2023-12-20 LAB
ANION GAP SERPL CALCULATED.3IONS-SCNC: 5.8 MMOL/L (ref 5–15)
BACTERIA SPEC AEROBE CULT: ABNORMAL
BUN SERPL-MCNC: 30 MG/DL (ref 8–23)
BUN/CREAT SERPL: 41.7 (ref 7–25)
CALCIUM SPEC-SCNC: 9 MG/DL (ref 8.6–10.5)
CHLORIDE SERPL-SCNC: 118 MMOL/L (ref 98–107)
CO2 SERPL-SCNC: 27.2 MMOL/L (ref 22–29)
CREAT SERPL-MCNC: 0.72 MG/DL (ref 0.57–1)
DEPRECATED RDW RBC AUTO: 49.8 FL (ref 37–54)
EGFRCR SERPLBLD CKD-EPI 2021: 94.7 ML/MIN/1.73
ERYTHROCYTE [DISTWIDTH] IN BLOOD BY AUTOMATED COUNT: 14.7 % (ref 12.3–15.4)
GLUCOSE BLDC GLUCOMTR-MCNC: 125 MG/DL (ref 70–130)
GLUCOSE BLDC GLUCOMTR-MCNC: 128 MG/DL (ref 70–130)
GLUCOSE BLDC GLUCOMTR-MCNC: 164 MG/DL (ref 70–130)
GLUCOSE BLDC GLUCOMTR-MCNC: 191 MG/DL (ref 70–130)
GLUCOSE SERPL-MCNC: 146 MG/DL (ref 65–99)
HCT VFR BLD AUTO: 38.3 % (ref 34–46.6)
HGB BLD-MCNC: 11.6 G/DL (ref 12–15.9)
MCH RBC QN AUTO: 27.8 PG (ref 26.6–33)
MCHC RBC AUTO-ENTMCNC: 30.3 G/DL (ref 31.5–35.7)
MCV RBC AUTO: 91.6 FL (ref 79–97)
PLATELET # BLD AUTO: 217 10*3/MM3 (ref 140–450)
PMV BLD AUTO: 9.7 FL (ref 6–12)
POTASSIUM SERPL-SCNC: 4 MMOL/L (ref 3.5–5.2)
RBC # BLD AUTO: 4.18 10*6/MM3 (ref 3.77–5.28)
SODIUM SERPL-SCNC: 151 MMOL/L (ref 136–145)
VANCOMYCIN SERPL-MCNC: 16.4 MCG/ML (ref 5–40)
VRE SPEC QL CULT: NORMAL
WBC NRBC COR # BLD AUTO: 10.14 10*3/MM3 (ref 3.4–10.8)

## 2023-12-20 PROCEDURE — 85027 COMPLETE CBC AUTOMATED: CPT | Performed by: FAMILY MEDICINE

## 2023-12-20 PROCEDURE — 25010000002 CEFTRIAXONE SODIUM-DEXTROSE 1-3.74 GM-%(50ML) RECONSTITUTED SOLUTION: Performed by: INTERNAL MEDICINE

## 2023-12-20 PROCEDURE — 99232 SBSQ HOSP IP/OBS MODERATE 35: CPT | Performed by: PHYSICIAN ASSISTANT

## 2023-12-20 PROCEDURE — 80202 ASSAY OF VANCOMYCIN: CPT | Performed by: FAMILY MEDICINE

## 2023-12-20 PROCEDURE — 80048 BASIC METABOLIC PNL TOTAL CA: CPT | Performed by: FAMILY MEDICINE

## 2023-12-20 PROCEDURE — 51700 IRRIGATION OF BLADDER: CPT | Performed by: PHYSICIAN ASSISTANT

## 2023-12-20 PROCEDURE — 63710000001 INSULIN ASPART PER 5 UNITS: Performed by: INTERNAL MEDICINE

## 2023-12-20 PROCEDURE — 63710000001 INSULIN DETEMIR PER 5 UNITS: Performed by: INTERNAL MEDICINE

## 2023-12-20 PROCEDURE — 25010000002 LORAZEPAM PER 2 MG: Performed by: PHYSICIAN ASSISTANT

## 2023-12-20 PROCEDURE — 99232 SBSQ HOSP IP/OBS MODERATE 35: CPT | Performed by: INTERNAL MEDICINE

## 2023-12-20 PROCEDURE — 82948 REAGENT STRIP/BLOOD GLUCOSE: CPT

## 2023-12-20 RX ORDER — SENNOSIDES A AND B 8.6 MG/1
1 TABLET, FILM COATED ORAL 2 TIMES DAILY PRN
Status: DISCONTINUED | OUTPATIENT
Start: 2023-12-20 | End: 2023-12-22 | Stop reason: HOSPADM

## 2023-12-20 RX ORDER — LORAZEPAM 2 MG/ML
2 INJECTION INTRAMUSCULAR ONCE
Status: COMPLETED | OUTPATIENT
Start: 2023-12-20 | End: 2023-12-20

## 2023-12-20 RX ORDER — ATORVASTATIN CALCIUM 40 MG/1
40 TABLET, FILM COATED ORAL NIGHTLY
Status: DISCONTINUED | OUTPATIENT
Start: 2023-12-20 | End: 2023-12-22 | Stop reason: HOSPADM

## 2023-12-20 RX ORDER — MIRTAZAPINE 15 MG/1
15 TABLET, FILM COATED ORAL NIGHTLY
Status: DISCONTINUED | OUTPATIENT
Start: 2023-12-20 | End: 2023-12-22 | Stop reason: HOSPADM

## 2023-12-20 RX ORDER — GABAPENTIN 300 MG/1
300 CAPSULE ORAL 2 TIMES DAILY
Status: DISCONTINUED | OUTPATIENT
Start: 2023-12-20 | End: 2023-12-22 | Stop reason: HOSPADM

## 2023-12-20 RX ORDER — MELATONIN
5000 DAILY
Status: DISCONTINUED | OUTPATIENT
Start: 2023-12-20 | End: 2023-12-22 | Stop reason: HOSPADM

## 2023-12-20 RX ORDER — DIVALPROEX SODIUM 250 MG/1
500 TABLET, DELAYED RELEASE ORAL 2 TIMES DAILY
Status: DISCONTINUED | OUTPATIENT
Start: 2023-12-20 | End: 2023-12-22 | Stop reason: HOSPADM

## 2023-12-20 RX ORDER — DIVALPROEX SODIUM 125 MG/1
500 CAPSULE, COATED PELLETS ORAL 2 TIMES DAILY
COMMUNITY
End: 2023-12-22 | Stop reason: HOSPADM

## 2023-12-20 RX ORDER — PANTOPRAZOLE SODIUM 40 MG/1
40 TABLET, DELAYED RELEASE ORAL
Status: DISCONTINUED | OUTPATIENT
Start: 2023-12-20 | End: 2023-12-22 | Stop reason: HOSPADM

## 2023-12-20 RX ORDER — CEFTRIAXONE 1 G/50ML
1000 INJECTION, SOLUTION INTRAVENOUS EVERY 24 HOURS
Qty: 150 ML | Refills: 0 | Status: COMPLETED | OUTPATIENT
Start: 2023-12-20 | End: 2023-12-22

## 2023-12-20 RX ORDER — ARIPIPRAZOLE 5 MG/1
30 TABLET ORAL DAILY
Status: DISCONTINUED | OUTPATIENT
Start: 2023-12-20 | End: 2023-12-22 | Stop reason: HOSPADM

## 2023-12-20 RX ADMIN — INSULIN DETEMIR 15 UNITS: 100 INJECTION, SOLUTION SUBCUTANEOUS at 22:19

## 2023-12-20 RX ADMIN — ATORVASTATIN CALCIUM 40 MG: 40 TABLET, FILM COATED ORAL at 22:20

## 2023-12-20 RX ADMIN — GABAPENTIN 300 MG: 300 CAPSULE ORAL at 11:01

## 2023-12-20 RX ADMIN — Medication 5000 UNITS: at 11:01

## 2023-12-20 RX ADMIN — INSULIN ASPART 3 UNITS: 100 INJECTION, SOLUTION INTRAVENOUS; SUBCUTANEOUS at 11:07

## 2023-12-20 RX ADMIN — INSULIN ASPART 3 UNITS: 100 INJECTION, SOLUTION INTRAVENOUS; SUBCUTANEOUS at 08:04

## 2023-12-20 RX ADMIN — ARIPIPRAZOLE 30 MG: 5 TABLET ORAL at 11:01

## 2023-12-20 RX ADMIN — INSULIN DETEMIR 15 UNITS: 100 INJECTION, SOLUTION SUBCUTANEOUS at 08:04

## 2023-12-20 RX ADMIN — CEFTRIAXONE 1000 MG: 1 INJECTION, SOLUTION INTRAVENOUS at 09:54

## 2023-12-20 RX ADMIN — Medication 10 ML: at 22:12

## 2023-12-20 RX ADMIN — LORAZEPAM 2 MG: 2 INJECTION INTRAMUSCULAR; INTRAVENOUS at 11:07

## 2023-12-20 RX ADMIN — PANTOPRAZOLE SODIUM 40 MG: 40 TABLET, DELAYED RELEASE ORAL at 11:01

## 2023-12-20 RX ADMIN — Medication 10 ML: at 08:05

## 2023-12-20 RX ADMIN — DIVALPROEX SODIUM 500 MG: 250 TABLET, DELAYED RELEASE ORAL at 22:20

## 2023-12-20 RX ADMIN — DIVALPROEX SODIUM 500 MG: 250 TABLET, DELAYED RELEASE ORAL at 11:01

## 2023-12-20 NOTE — PROGRESS NOTES
[facility]  PROGRESS NOTE    Name:  Izabella Agudelo   Age:  63 y.o.  Sex:  female  :  1960  MRN:  [id number]   Visit Number:  [id number]  Admission Date:  2023  Date Of Service:  23  Primary Care Physician:  Fabian Santos DO     LOS: 2 days :  Patient Care Team:  Fabian Santos DO as PCP - General (Long Term Care Facility):    Subjective / Interval History:   Pt found sitting up in bed, alert, with no new complaints. States she is ready to go home with her family.         Vital Signs:  Temp:  [97.5 °F (36.4 °C)-99.8 °F (37.7 °C)] 97.5 °F (36.4 °C)  Heart Rate:  [70-88] 74  Resp:  [16-20] 18  BP: ()/() 134/76    Intake and output:  I/O last 3 completed shifts:  In: 6477 [P.O.:720; I.V.:4707; IV Piggyback:1050]  Out: 3315 [Urine:3315]  I/O this shift:  In: 360 [P.O.:360]  Out: -     Physical Examination:  Physical Exam  Vitals and nursing note reviewed.   Constitutional:       General: She is not in acute distress.     Appearance: She is not toxic-appearing.   HENT:      Mouth/Throat:      Mouth: Mucous membranes are moist.   Eyes:      Extraocular Movements: Extraocular movements intact.      Conjunctiva/sclera: Conjunctivae normal.   Cardiovascular:      Rate and Rhythm: Normal rate.   Pulmonary:      Effort: Pulmonary effort is normal. No respiratory distress.   Abdominal:      Palpations: Abdomen is soft.      Tenderness: There is no abdominal tenderness. There is no guarding.   Genitourinary:     General: Normal vulva.      Comments: 18Fr douglas catheter in place. Dark red UOP present in douglas catheter bag.  Skin:     General: Skin is warm and dry.   Neurological:      Mental Status: She is alert. Mental status is at baseline.         Laboratory results:  Lab Results (last 24 hours)       Procedure Component Value Units Date/Time    POC Glucose Once [[id number]]  (Abnormal) Collected: 23 0759    Specimen: Blood Updated: 23 08      Glucose 191 mg/dL      Comment: Serial Number: [id number]:  [id number]       Vancomycin, Random [[id number]]  (Normal) Collected: 12/20/23 0437    Specimen: Blood Updated: 12/20/23 0549     Vancomycin Random 16.40 mcg/mL     Narrative:      Therapeutic Ranges for Vancomycin    Vancomycin Random   5.0-40.0 mcg/mL  Vancomycin Trough   5.0-20.0 mcg/mL  Vancomycin Peak     20.0-40.0 mcg/mL    Basic Metabolic Panel [[id number]]  (Abnormal) Collected: 12/20/23 0437    Specimen: Blood Updated: 12/20/23 0549     Glucose 146 mg/dL      BUN 30 mg/dL      Creatinine 0.72 mg/dL      Sodium 151 mmol/L      Potassium 4.0 mmol/L      Chloride 118 mmol/L      CO2 27.2 mmol/L      Calcium 9.0 mg/dL      BUN/Creatinine Ratio 41.7     Anion Gap 5.8 mmol/L      eGFR 94.7 mL/min/1.73     Narrative:      GFR Normal >60  Chronic Kidney Disease <60  Kidney Failure <15      CBC (No Diff) [[id number]]  (Abnormal) Collected: 12/20/23 0437    Specimen: Blood Updated: 12/20/23 0546     WBC 10.14 10*3/mm3      RBC 4.18 10*6/mm3      Hemoglobin 11.6 g/dL      Hematocrit 38.3 %      MCV 91.6 fL      MCH 27.8 pg      MCHC 30.3 g/dL      RDW 14.7 %      RDW-SD 49.8 fl      MPV 9.7 fL      Platelets 217 10*3/mm3     Urine Culture - Urine, Straight Cath [[id number]]  (Abnormal)  (Susceptibility) Collected: 12/18/23 1757    Specimen: Urine from Straight Cath Updated: 12/20/23 0237     Urine Culture >100,000 CFU/mL Escherichia coli    Narrative:      Colonization of the urinary tract without infection is common. Treatment is discouraged unless the patient is symptomatic, pregnant, or undergoing an invasive urologic procedure.    Susceptibility        Escherichia coli      GARCIA      Ampicillin Susceptible      Ampicillin + Sulbactam Susceptible      Cefazolin Susceptible      Cefepime Susceptible      Ceftazidime Susceptible      Ceftriaxone Susceptible      Gentamicin Susceptible      Levofloxacin Susceptible      Nitrofurantoin Susceptible       Piperacillin + Tazobactam Susceptible      Trimethoprim + Sulfamethoxazole Susceptible                           Potassium [[id number]]  (Normal) Collected: 12/19/23 2141    Specimen: Blood from Hand, Right Updated: 12/19/23 2158     Potassium 3.7 mmol/L     POC Glucose Once [[id number]]  (Abnormal) Collected: 12/19/23 2037    Specimen: Blood Updated: 12/19/23 2045     Glucose 160 mg/dL      Comment: Serial Number: [id number]:  [id number]       POC Glucose Once [[id number]]  (Normal) Collected: 12/19/23 1603    Specimen: Blood Updated: 12/19/23 2044     Glucose 108 mg/dL      Comment: Serial Number: [id number]:  [id number]       Blood Culture - Blood, Arm, Left [[id number]]  (Normal) Collected: 12/18/23 1831    Specimen: Blood from Arm, Left Updated: 12/19/23 1846     Blood Culture No growth at 24 hours    Blood Culture - Blood, Arm, Right [[id number]]  (Normal) Collected: 12/18/23 1839    Specimen: Blood from Arm, Right Updated: 12/19/23 1846     Blood Culture No growth at 24 hours    POC Glucose Once [[id number]]  (Abnormal) Collected: 12/19/23 1327    Specimen: Blood Updated: 12/19/23 1526     Glucose 152 mg/dL      Comment: Serial Number: [id number]:  [id number]       POC Glucose Once [[id number]]  (Abnormal) Collected: 12/19/23 1224    Specimen: Blood Updated: 12/19/23 1525     Glucose 168 mg/dL      Comment: Serial Number: [id number]:  [id number]       POC Glucose Once [[id number]]  (Normal) Collected: 12/19/23 1119    Specimen: Blood Updated: 12/19/23 1525     Glucose 117 mg/dL      Comment: Serial Number: [id number]:  [id number]       POC Glucose Once [[id number]]  (Normal) Collected: 12/19/23 1016    Specimen: Blood Updated: 12/19/23 1525     Glucose 124 mg/dL      Comment: Serial Number: [id number]:  [id number]       POC Glucose Once [[id number]]  (Abnormal) Collected: 12/19/23 0913    Specimen: Blood Updated: 12/19/23 1525     Glucose 158 mg/dL      Comment: Serial Number:  [id number]:  [id number]       MRSA Screen, PCR (Inpatient) - Swab, Nares [[id number]]  (Normal) Collected: 12/18/23 2354    Specimen: Swab from Nares Updated: 12/19/23 1351     MRSA PCR No MRSA Detected    Narrative:      The negative predictive value of this diagnostic test is high and should only be used to consider de-escalating anti-MRSA therapy. A positive result may indicate colonization with MRSA and must be correlated clinically.    VRE Culture - Swab, Per Rectum [[id number]]  (Normal) Collected: 12/18/23 2354    Specimen: Swab from Per Rectum Updated: 12/19/23 1339     VRE Screen CX No Vancomycin Resistant Enterococcus Isolated            I have reviewed the patient's laboratory results.         Assessment/Plan    Sepsis    In summary, patient is a 63-year-old female with history of vascular dementia, CHF, COPD, diabetes who presented to the emergency department with somnolence and hypotension.  Douglas catheter was inserted during her resuscitation.  Gross hematuria was noted and her urine was concerning for infection.     Nursing reports that dark hematuria returned less than 30 minutes after hand irrigation yesterday. It remains present currently. Hand irrigation is attempted with minimal success. Most of the sterile water leaks around the douglas tubing and cannot be aspirated. Despite catheter manipulation, hand irrigation is not successful. Pt douglas changed to an 18Fr 3-way for irrigation. Even with fresh douglas, hand irrigation and CBI mainly leak from the urethra. Douglas catheter upsized to 24Fr 3-way. Hand irrigation produces small clots immediately. Urine quickly clears with slow drip CBI.     Plan:  Continue slow CBI and wean off if urine becomes clear overnight   Will round on the patient tomorrow, 12/21/2023    Gina Moreno PA-C  12/20/23  12:32 EST

## 2023-12-20 NOTE — PROGRESS NOTES
"Dietitian Follow-up    Patient Name: Izabella Agudelo  YOB: 1960  MRN: [id number]  Admission date: 12/18/2023    Comment:    Pt w/ good PO intake and averaging 100% x 2 meals - will order magic cup daily. RD available PRN.    Clinical Nutrition Follow-up   Encounter Information        Trending Narrative     12/20: Diet advanced to consistent carbohydrate, pureed, and with honey thickened liquids. Pt w/ good PO intake and averaging 100% x 2 meals. Will order magic cup daily for additional calories and protein.   12/19: Pt w/ MST score of 2 and with reported difficulty chewing and swallowing. Pt w/ overweight/obese BMI for age and currently NPO. Will await diet advancement to order supplement.       Anthropometrics        Current Height, Weight Height: 172.7 cm (68\")  Weight: 85.8 kg (189 lb 2.5 oz) (12/20/23 0400)       Trending Weight Hx     This admission:              PTA:     Wt Readings from Last 30 Encounters:   12/20/23 0400 85.8 kg (189 lb 2.5 oz)   12/18/23 1730 90.7 kg (200 lb)   10/19/23 1327 83.3 kg (183 lb 9.6 oz)   10/18/23 0839 83.3 kg (183 lb 9.6 oz)   10/14/23 0344 84.4 kg (186 lb 1.1 oz)   10/13/23 2251 84.8 kg (186 lb 15.2 oz)   10/13/23 0400 83.2 kg (183 lb 6.8 oz)   10/12/23 0300 79.4 kg (175 lb 0.7 oz)   10/11/23 2200 79.4 kg (175 lb 0.7 oz)   10/11/23 1920 83 kg (183 lb)   10/11/23 1407 83 kg (183 lb)   10/03/23 1541 83 kg (183 lb)   08/18/23 1506 77.5 kg (170 lb 12.8 oz)   08/16/23 1315 77.5 kg (170 lb 12.8 oz)   06/21/23 0956 77.6 kg (171 lb)   06/02/23 0813 77.2 kg (170 lb 3.2 oz)   04/19/23 0900 78 kg (172 lb)   03/06/23 1456 76.7 kg (169 lb)   03/02/23 1904 76.7 kg (169 lb)   02/15/23 1029 76.2 kg (168 lb)   12/21/22 0837 76.4 kg (168 lb 7 oz)   10/19/22 0855 77.1 kg (170 lb)   09/06/22 1522 75.8 kg (167 lb)   08/18/22 1340 75.3 kg (165 lb 14.4 oz)   08/17/22 0946 75.3 kg (166 lb)   07/25/22 1624 76 kg (167 lb 8 oz)   07/22/22 0936 76 kg (167 lb 8 oz)   07/20/22 0928 " 76.2 kg (168 lb)   06/15/22 1355 76.7 kg (169 lb)   05/18/22 0848 74.8 kg (164 lb 14.4 oz)   04/20/22 0929 74.3 kg (163 lb 14.4 oz)   03/16/22 0944 73.3 kg (161 lb 9.6 oz)   01/19/22 1542 71.7 kg (158 lb)   12/15/21 1700 71.7 kg (158 lb)   10/20/21 1422 77.6 kg (171 lb)   09/15/21 1409 76.7 kg (169 lb)   08/19/21 1504 78 kg (172 lb)      BMI kg/m2 Body mass index is 28.76 kg/m².     Labs        Pertinent Labs Results from last 7 days   Lab Units 12/20/23 0437 12/19/23 2141 12/19/23 1039 12/19/23 0343 12/18/23  1744   SODIUM mmol/L 151*  --  149* 150* 146*   POTASSIUM mmol/L 4.0 3.7 3.6 4.5 4.6   CHLORIDE mmol/L 118*  --  119* 110* 104   CO2 mmol/L 27.2  --  27.5 22.6 25.7   BUN mg/dL 30*  --  37* 41* 49*   CREATININE mg/dL 0.72  --  1.06* 1.38* 2.08*   CALCIUM mg/dL 9.0  --  9.2 9.1 9.5   BILIRUBIN mg/dL  --   --   --   --  0.2   ALK PHOS U/L  --   --   --   --  89   ALT (SGPT) U/L  --   --   --   --  63*   AST (SGOT) U/L  --   --   --   --  43*   GLUCOSE mg/dL 146*  --  242* 471* 300*     Results from last 7 days   Lab Units 12/20/23 0437 12/19/23  1039 12/19/23 0343 12/18/23  1744   MAGNESIUM mg/dL  --  2.4  --  2.5*   PHOSPHORUS mg/dL  --  1.9*  --   --    HEMOGLOBIN g/dL 11.6*  --    < > 14.6   HEMATOCRIT % 38.3  --    < > 47.3*    < > = values in this interval not displayed.         Medications    Scheduled Medications ARIPiprazole, 30 mg, Oral, Daily  atorvastatin, 40 mg, Oral, Nightly  cefTRIAXone, 1,000 mg, Intravenous, Q24H  cholecalciferol, 5,000 Units, Oral, Daily  divalproex, 500 mg, Oral, BID  gabapentin, 300 mg, Oral, BID  Insulin Aspart, 3-14 Units, Subcutaneous, 4x Daily AC & at Bedtime  insulin detemir, 15 Units, Subcutaneous, BID  mirtazapine, 15 mg, Oral, Nightly  pantoprazole, 40 mg, Oral, Q AM  senna-docusate sodium, 2 tablet, Oral, BID  sodium chloride, 10 mL, Intravenous, Q12H        Infusions dextrose 5 % and sodium chloride 0.45 %, 150 mL/hr, Last Rate: Stopped (12/19/23  1546)  EPINEPHrine, 0.02-0.3 mcg/kg/min, Last Rate: Stopped (12/19/23 0712)  insulin, 0-100 Units/hr, Last Rate: Stopped (12/19/23 1404)  norepinephrine, 0.02-0.3 mcg/kg/min, Last Rate: Stopped (12/19/23 0530)  sodium chloride, 100 mL/hr, Last Rate: 100 mL/hr (12/19/23 1547)        PRN Medications   acetaminophen **OR** acetaminophen    senna-docusate sodium **AND** polyethylene glycol **AND** bisacodyl **AND** bisacodyl    dextrose    dextrose    dextrose    glucagon (human recombinant)    nitroglycerin    Potassium Replacement - Follow Nurse / BPA Driven Protocol    senna    sodium chloride    sodium chloride    sodium chloride     Physical Findings        Trending Physical   Appearance, NFPE    --  Edema  N/A   Bowel Function Last bm 12/20   Tubes CVC triple lumen   Chewing/Swallowing Pureed w/ honey thickened liquids   Skin WNL   --  Current Nutrition Orders & Evaluation of Intake       Oral Nutrition     Food Allergies    Current PO Diet Diet: Diabetic Diets; Consistent Carbohydrate; Texture: Pureed (NDD 1); Fluid Consistency: Honey Thick   Supplement    PO Evaluation     Trending % PO Intake      Nutrition Diagnosis         Nutrition Dx Problem 1 No nutrition dx at this time      Nutrition Dx Problem 2        Intervention Goal         Intervention Goal(s) PO intake to meet >50% of estimated needs  Adhere to supplement intake  Maintain current body weight     Nutrition Intervention        RD Action RD to follow-up     Nutrition Prescription          Diet Prescription CC, pureed w/ honey thickened liquids   Supplement Prescription Magic cup daily   Enteral Nutrition Prescription     TPN Prescription       Monitor/Evaluation        Monitor Per protocol, PO intake, Supplement intake, Pertinent labs, Weight, Skin status     RD available PRN.    Electronically signed by:  Elpidio Louis RD  12/20/23 13:54 EST

## 2023-12-20 NOTE — CASE MANAGEMENT/SOCIAL WORK
1200: CM at bedside. Pt has eyes closed and quite at this time. Pt continues to have blood in [place] [facility]. CM gave [facility] update on pt.  DCP remains to return to [facility]. No pre cert required.

## 2023-12-20 NOTE — PROGRESS NOTES
[facility]    Name:  Izabella Agudelo   Age:  63 y.o.  Sex:  female  :  1960  MRN:  [id number]   Visit Number:  [id number]  Admission Date:  2023  Date Of Service:  23  Primary Care Physician:  Fabian Santos DO     LOS: 2 days :    Chief Complaint:      weakness    Subjective:    23: continues to be stable off pressors. Continues with hematuria. Getting CBI per urology. Abx narrowed based on cultures. Blood sugars improved. Tolerating PO per nursing. Fluids Dc'ed.    History Of Presenting Illness:       The patient is a chronically ill 63-year-old resident of local nursing home facility with a medical history of hemiplegia, COPD, diastolic CHF, schizophrenia, vascular dementia, aphasia, diabetes, who had presented with altered mental status.  History obtained from ER record and provider.  Per report, she had apparently been found at nursing facility slumped over in chair minimally responsive.  Glucose at the time was 422.  Oxygen saturations of 95% on nasal cannula.  Noted to be significantly hypotensive upon arrival.  Nursing home also noted fever.  History otherwise limited due to condition at this time.     In the ER, patient epinephrine norepinephrine with blood pressures in the 110/50 range.  9 9% on 2 L of oxygen.  Tmax 100.4 and heart rate in the 90s.  She had a creatinine 2.08 with a glucose of 300 and anion gap of 16.  She had white count of 19 hemoglobin 14 and platelets of 337.  Urinalysis with large blood, nitrite positive, TNTC white blood cells and red blood cells.  Procalcitonin 0.77.  ABG with mild hypoxia.  Downtrending troponin elevation.  Lactic acid of nearly 5.  Blood cultures obtained.  CT scan of the head with chronic changes no acute abnormality.  CT scan of the chest with atelectasis bilateral lung zones, bronchial wall thickening, cardiomegaly and a left thyroid nodule.  CT scan pelvis with moderate  distention of the rectum with stool and thickening of the urinary bladder.  Patient received initial fluid resuscitation per sepsis, Solu-Cortef, cefepime and vancomycin in addition to being started on pressure support.  A Leigh catheter and central venous line was placed.  She will be admitted to the ICU.     Of note, patient's sister and brother were at bedside in the ER and updated on plan of care.  Patient's POA, Emi, was contacted by ER provider and updated.  She did note patient is not a full code, and requested CODE STATUS to be DNI/DNI, but were okay with all other treatments.  Edited by: Salo Darden DO at 12/20/2023 9032     Review of Systems:     All systems were reviewed and negative except as mentioned in subjective, assessment and plan.    Vital Signs:    Temp:  [97.5 °F (36.4 °C)-99.8 °F (37.7 °C)] 97.5 °F (36.4 °C)  Heart Rate:  [70-88] 74  Resp:  [16-20] 18  BP: ()/() 134/76    Intake and output:    I/O last 3 completed shifts:  In: 6477 [P.O.:720; I.V.:4707; IV Piggyback:1050]  Out: 3315 [Urine:3315]  I/O this shift:  In: 360 [P.O.:360]  Out: -     Physical Examination:    Constitutional:       Appearance: Awake and alert  HENT:      Mouth/Throat:      Mouth: Mucous membranes are moist.   Eyes:      Pupils: Pupils are equal, round, and reactive to light.   Cardiovascular:      Rate and Rhythm: regular rate and rhythm.      Pulses: Normal pulses.      Heart sounds: No murmur heard.     No gallop.   Pulmonary:      Breath sounds: CTAB, no wheeze/rhonchi  Abdominal:      General: Bowel sounds are normal. There is no distension.      Tenderness: There is no abdominal tenderness. There is no rebound.      Comments: Leigh catheter with bloody drainage   Musculoskeletal:      Right lower leg: No edema.      Left lower leg: No edema.   Skin:     General: Skin is warm.      Capillary Refill: Capillary refill takes less than 2 seconds.      Findings: No bruising or lesion.    Neurological:      Mental Status: She is disoriented.      Motor: Weakness present.   Edited by: Salo Darden, DO at 12/20/2023 1328     Laboratory results:    Results from last 7 days   Lab Units 12/20/23 0437 12/19/23  2141 12/19/23 1039 12/19/23 0343 12/18/23  1744   SODIUM mmol/L 151*  --  149* 150* 146*   POTASSIUM mmol/L 4.0 3.7 3.6 4.5 4.6   CHLORIDE mmol/L 118*  --  119* 110* 104   CO2 mmol/L 27.2  --  27.5 22.6 25.7   BUN mg/dL 30*  --  37* 41* 49*   CREATININE mg/dL 0.72  --  1.06* 1.38* 2.08*   CALCIUM mg/dL 9.0  --  9.2 9.1 9.5   BILIRUBIN mg/dL  --   --   --   --  0.2   ALK PHOS U/L  --   --   --   --  89   ALT (SGPT) U/L  --   --   --   --  63*   AST (SGOT) U/L  --   --   --   --  43*   GLUCOSE mg/dL 146*  --  242* 471* 300*     Results from last 7 days   Lab Units 12/20/23 0437 12/19/23 0343 12/18/23  1744   WBC 10*3/mm3 10.14 23.81* 19.80*   HEMOGLOBIN g/dL 11.6* 14.3 14.6   HEMATOCRIT % 38.3 46.4 47.3*   PLATELETS 10*3/mm3 217 397 337         Results from last 7 days   Lab Units 12/18/23 1957 12/18/23  1744   HSTROP T ng/L 57* 114*     Results from last 7 days   Lab Units 12/18/23  1839 12/18/23  1831 12/18/23  1757   BLOODCX  No growth at 24 hours No growth at 24 hours  --    URINECX   --   --  >100,000 CFU/mL Escherichia coli*     Recent Labs     12/18/23  1852   PHART 7.433   KFF4GAN 41.7   PO2ART 66.4*   ABZ4GNG 27.8   BASEEXCESS 3.1*      I have reviewed the patient's laboratory results.    Radiology results:    XR Chest 1 View    Result Date: 12/19/2023  CLINICAL INDICATION:  Weak/Dizzy/AMS triage protocol  EXAMINATION TECHNIQUE: XR CHEST 1 VW-  COMPARISON: Radiographs 10/11/2023.  FINDINGS: Stable mild cardiomegaly. Coronary and aortic atherosclerosis. Dense mitral annulus calcifications. Mild perihilar vascular engorgement. No consolidation. Bibasilar atelectasis, left greater than right. No pneumothorax or large pleural effusion.      Impression: Cardiomegaly. Vascular  engorgement. No edema. Mild bibasilar atelectasis, left greater than right.  Images personally reviewed, interpreted and dictated by SUSANNA Stallworth.     This report was signed and finalized on 12/19/2023 8:47 AM by SUSANNA Stallworth.      CT Chest Without Contrast Diagnostic    Result Date: 12/18/2023  FINAL REPORT TECHNIQUE: null CLINICAL HISTORY: Altered mental status COMPARISON: null FINDINGS: CT chest without contrast. Comparison: None Findings: Hypodense nodule within the left thyroid measures 1.5 cm. Recommend nonemergent thyroid ultrasound for further characterization. 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 Normal caliber of the thoracic aorta. Cardiomegaly. Coronary atherosclerosis. Dense calcifications of the mitral valve. Small nodes within the mediastinum. Subsegmental atelectasis greatest within the bilateral lung bases. Linear atelectasis within the left lung base. Bronchial thickening within the lower lobe bronchi may indicate a component of reactive airways disease. The bones are intact. Degenerative changes of the thoracic spine. Hepatomegaly and hepatic steatosis. Calcification within the inferior right lobe of the liver. Nodularity of the left adrenal gland.     Impression: IMPRESSION: 1. Subsegmental of the bilateral lung bases. Linear atelectasis within left lung base. Minor bronchial thickening within the lung bases could indicate reactive airways disease including bronchitis. No consolidation. 2. Cardiomegaly with coronary atherosclerosis. 3. Hypodense nodule within the left thyroid measures 1.5 cm. Recommend nonemergent thyroid ultrasound for further characterization. Authenticated and Electronically Signed by Warner Yarbrough DO on 12/18/2023 10:48:36 PM    CT Head Without Contrast    Result Date: 12/18/2023  FINAL REPORT TECHNIQUE: null CLINICAL HISTORY: altered mental status COMPARISON: null FINDINGS: CT head without contrast Comparison: None Findings: No intracranial  mass, midline shift, hydrocephalus, or acute hemorrhage. Subcortical/periventricular white matter hypoattenuation statistically representing changes of chronic microvascular ischemia. Global parenchymal volume loss which is commensurate with reported age. Old lacunar infarct involving the left basal ganglia. The visualized paranasal sinuses and mastoid air cells are normal. The orbits are unremarkable. No skull fracture.     Impression: IMPRESSION: 1. No acute intracranial process. 2. Changes of chronic microvascular ischemia with age-related global parenchymal volume loss. Old small lacunar infarct of the left basal ganglia. Authenticated and Electronically Signed by Warner Yarbrough DO on 12/18/2023 10:15:57 PM    CT Abdomen Pelvis Without Contrast    Result Date: 12/18/2023  FINAL REPORT TECHNIQUE: null CLINICAL HISTORY: altered mental status COMPARISON: null FINDINGS: Noncontrast CT of the abdomen and pelvis Technique: Noncontrast CT images from the lung bases through the ischial tuberosities. Comparison: None Findings: Low lung volumes. Normal noncontrast liver. No hepatic masses. The gallbladder is unremarkable by CT. Normal appearing adrenal glands. Normal noncontrast spleen. The spleen is normal size. Normal kidneys, no hydronephrosis or nephrolithiasis. No suspicious lesions. No stones in the course of either ureter. Moderate distension of the rectum with stool. No bowel obstruction. No pneumoperitoneum or abscess. Normal appendix. Normal retroperitoneum. No adenopathy. Normal caliber abdominal aorta. Normal pancreas. No evidence for pancreatitis. No pancreatic masses. Mild thickening of the urinary bladder. This could represent mild cystitis. Normal bones.     Impression: Impression: Moderate distension of the rectum with stool. Mild thickening of the urinary bladder. This could represent cystitis. Authenticated and Electronically Signed by Warner Dixon MD on 12/18/2023 07:23:40 PM   I have reviewed the  patient's radiology reports.    Medication Review:     I have reviewed the patient's active and prn medications.     Problem List:      Sepsis      Assessment/Plan:    Sepsis/shock resolved:  Complicated UTI  PRIYA resolved suspect multifactorial due to infection retention hypovolemia:  Schizophrenia:  Hematuria:  Off pressors 12/20/2023  Urine culture pansensitive E. coli ceftriaxone appropriate antibiotic coverage initiated 12/18/2023.  CBI per urology Leigh in place      Disposition: Short-term rehab versus long-term care at [facility]    Prophylaxis: SCDs    Edited by: Salo Darden DO at 12/20/2023 1246     DVT Prophylaxis: scds  Code Status:   Code Status and Medical Interventions:   Ordered at: 12/19/23 0115     Medical Intervention Limits:    NO intubation (DNI)    NO cardioversion     Code Status (Patient has no pulse and is not breathing):    No CPR (Do Not Attempt to Resuscitate)     Medical Interventions (Patient has pulse or is breathing):    Limited Support      Diet:   Dietary Orders (From admission, onward)       Start     Ordered    12/19/23 1405  Diet: Diabetic Diets; Consistent Carbohydrate; Texture: Pureed (NDD 1); Fluid Consistency: Honey Thick  Diet Effective Now        References:    Diet Order [facility]   Question Answer Comment   Diets: Diabetic Diets    Diabetic Diet: Consistent Carbohydrate    Texture: Pureed (NDD 1)    Fluid Consistency: Honey Thick        12/19/23 1405                   Discharge Plan: STR vs LTC in 2 to 3 days.    Salo Darden DO  12/20/23  13:28 EST    Dictated utilizing Dragon dictation.

## 2023-12-21 LAB
ACINETOBACTER SCREEN CX: NORMAL
ANION GAP SERPL CALCULATED.3IONS-SCNC: 6.8 MMOL/L (ref 5–15)
BUN SERPL-MCNC: 19 MG/DL (ref 8–23)
BUN/CREAT SERPL: 31.1 (ref 7–25)
CALCIUM SPEC-SCNC: 9 MG/DL (ref 8.6–10.5)
CHLORIDE SERPL-SCNC: 112 MMOL/L (ref 98–107)
CO2 SERPL-SCNC: 28.2 MMOL/L (ref 22–29)
CREAT SERPL-MCNC: 0.61 MG/DL (ref 0.57–1)
DEPRECATED RDW RBC AUTO: 47 FL (ref 37–54)
EGFRCR SERPLBLD CKD-EPI 2021: 101.2 ML/MIN/1.73
ERYTHROCYTE [DISTWIDTH] IN BLOOD BY AUTOMATED COUNT: 14.5 % (ref 12.3–15.4)
GLUCOSE BLDC GLUCOMTR-MCNC: 104 MG/DL (ref 70–130)
GLUCOSE BLDC GLUCOMTR-MCNC: 221 MG/DL (ref 70–130)
GLUCOSE BLDC GLUCOMTR-MCNC: 226 MG/DL (ref 70–130)
GLUCOSE BLDC GLUCOMTR-MCNC: 239 MG/DL (ref 70–130)
GLUCOSE SERPL-MCNC: 139 MG/DL (ref 65–99)
HCT VFR BLD AUTO: 36.9 % (ref 34–46.6)
HGB BLD-MCNC: 11.2 G/DL (ref 12–15.9)
MCH RBC QN AUTO: 27.4 PG (ref 26.6–33)
MCHC RBC AUTO-ENTMCNC: 30.4 G/DL (ref 31.5–35.7)
MCV RBC AUTO: 90.2 FL (ref 79–97)
PLATELET # BLD AUTO: 203 10*3/MM3 (ref 140–450)
PMV BLD AUTO: 9.7 FL (ref 6–12)
POTASSIUM SERPL-SCNC: 3.8 MMOL/L (ref 3.5–5.2)
RBC # BLD AUTO: 4.09 10*6/MM3 (ref 3.77–5.28)
SODIUM SERPL-SCNC: 147 MMOL/L (ref 136–145)
TSH SERPL DL<=0.05 MIU/L-ACNC: 2.18 UIU/ML (ref 0.27–4.2)
WBC NRBC COR # BLD AUTO: 9.35 10*3/MM3 (ref 3.4–10.8)

## 2023-12-21 PROCEDURE — 99231 SBSQ HOSP IP/OBS SF/LOW 25: CPT | Performed by: PHYSICIAN ASSISTANT

## 2023-12-21 PROCEDURE — 25010000002 CEFTRIAXONE SODIUM-DEXTROSE 1-3.74 GM-%(50ML) RECONSTITUTED SOLUTION: Performed by: INTERNAL MEDICINE

## 2023-12-21 PROCEDURE — 85027 COMPLETE CBC AUTOMATED: CPT | Performed by: INTERNAL MEDICINE

## 2023-12-21 PROCEDURE — 80048 BASIC METABOLIC PNL TOTAL CA: CPT | Performed by: INTERNAL MEDICINE

## 2023-12-21 PROCEDURE — 99232 SBSQ HOSP IP/OBS MODERATE 35: CPT | Performed by: INTERNAL MEDICINE

## 2023-12-21 PROCEDURE — 82948 REAGENT STRIP/BLOOD GLUCOSE: CPT

## 2023-12-21 PROCEDURE — 63710000001 INSULIN DETEMIR PER 5 UNITS: Performed by: INTERNAL MEDICINE

## 2023-12-21 PROCEDURE — 63710000001 INSULIN ASPART PER 5 UNITS: Performed by: INTERNAL MEDICINE

## 2023-12-21 PROCEDURE — 84443 ASSAY THYROID STIM HORMONE: CPT | Performed by: INTERNAL MEDICINE

## 2023-12-21 RX ADMIN — INSULIN DETEMIR 15 UNITS: 100 INJECTION, SOLUTION SUBCUTANEOUS at 21:23

## 2023-12-21 RX ADMIN — DIVALPROEX SODIUM 500 MG: 250 TABLET, DELAYED RELEASE ORAL at 21:23

## 2023-12-21 RX ADMIN — PANTOPRAZOLE SODIUM 40 MG: 40 TABLET, DELAYED RELEASE ORAL at 08:12

## 2023-12-21 RX ADMIN — Medication 5000 UNITS: at 08:12

## 2023-12-21 RX ADMIN — ACETAMINOPHEN 650 MG: 325 TABLET, FILM COATED ORAL at 10:19

## 2023-12-21 RX ADMIN — GABAPENTIN 300 MG: 300 CAPSULE ORAL at 21:23

## 2023-12-21 RX ADMIN — DIVALPROEX SODIUM 500 MG: 250 TABLET, DELAYED RELEASE ORAL at 08:12

## 2023-12-21 RX ADMIN — INSULIN ASPART 5 UNITS: 100 INJECTION, SOLUTION INTRAVENOUS; SUBCUTANEOUS at 21:23

## 2023-12-21 RX ADMIN — Medication 10 ML: at 08:13

## 2023-12-21 RX ADMIN — ARIPIPRAZOLE 30 MG: 5 TABLET ORAL at 08:12

## 2023-12-21 RX ADMIN — INSULIN DETEMIR 15 UNITS: 100 INJECTION, SOLUTION SUBCUTANEOUS at 08:13

## 2023-12-21 RX ADMIN — ATORVASTATIN CALCIUM 40 MG: 40 TABLET, FILM COATED ORAL at 21:23

## 2023-12-21 RX ADMIN — CEFTRIAXONE 1000 MG: 1 INJECTION, SOLUTION INTRAVENOUS at 10:19

## 2023-12-21 RX ADMIN — INSULIN ASPART 5 UNITS: 100 INJECTION, SOLUTION INTRAVENOUS; SUBCUTANEOUS at 11:59

## 2023-12-21 RX ADMIN — INSULIN ASPART 5 UNITS: 100 INJECTION, SOLUTION INTRAVENOUS; SUBCUTANEOUS at 18:00

## 2023-12-21 RX ADMIN — GABAPENTIN 300 MG: 300 CAPSULE ORAL at 08:12

## 2023-12-21 RX ADMIN — Medication 10 ML: at 21:24

## 2023-12-21 RX ADMIN — MIRTAZAPINE 15 MG: 15 TABLET, FILM COATED ORAL at 21:23

## 2023-12-21 NOTE — PROGRESS NOTES
[facility]  PROGRESS NOTE    Name:  Izabella Agudelo   Age:  63 y.o.  Sex:  female  :  1960  MRN:  [id number]   Visit Number:  [id number]  Admission Date:  2023  Date Of Service:  23  Primary Care Physician:  Fabian Santos DO     LOS: 3 days :  Patient Care Team:  Fabian Santos DO as PCP - General (Long Term Care Facility):      Subjective / Interval History:   Patient found sitting up in bed, no acute distress, no complaints.      Vital Signs:  Temp:  [97.5 °F (36.4 °C)-98.8 °F (37.1 °C)] 97.6 °F (36.4 °C)  Heart Rate:  [60-90] 69  Resp:  [12-22] 12  BP: ()/(51-89) 151/87    Intake and output:  I/O last 3 completed shifts:  In: 3409 [P.O.:596; I.V.:2813]  Out: 3625 [Urine:3625]  I/O this shift:  In: 600 [P.O.:600]  Out: -     Physical Examination:  Physical Exam  Vitals and nursing note reviewed.   Constitutional:       General: She is not in acute distress.     Appearance: She is not toxic-appearing.   Eyes:      Extraocular Movements: Extraocular movements intact.      Conjunctiva/sclera: Conjunctivae normal.   Cardiovascular:      Rate and Rhythm: Normal rate.   Pulmonary:      Effort: Pulmonary effort is normal. No respiratory distress.   Abdominal:      Palpations: Abdomen is soft.      Tenderness: There is no abdominal tenderness. There is no guarding.   Genitourinary:     Comments: Urine output in Leigh catheter is pink-tinged and CBI on slow drip  Neurological:      Mental Status: She is alert. Mental status is at baseline.         Laboratory results:  Lab Results (last 24 hours)       Procedure Component Value Units Date/Time    POC Glucose Once [[id number]]  (Normal) Collected: 23 0746    Specimen: Blood Updated: 23 0820     Glucose 104 mg/dL      Comment: Serial Number: [id number]:  [id number]       Basic Metabolic Panel [[id number]]  (Abnormal) Collected: 23 0426    Specimen: Blood Updated: 23 0545     Glucose 139  mg/dL      BUN 19 mg/dL      Creatinine 0.61 mg/dL      Sodium 147 mmol/L      Potassium 3.8 mmol/L      Chloride 112 mmol/L      CO2 28.2 mmol/L      Calcium 9.0 mg/dL      BUN/Creatinine Ratio 31.1     Anion Gap 6.8 mmol/L      eGFR 101.2 mL/min/1.73     Narrative:      GFR Normal >60  Chronic Kidney Disease <60  Kidney Failure <15      CBC (No Diff) [[id number]]  (Abnormal) Collected: 12/21/23 0426    Specimen: Blood Updated: 12/21/23 0519     WBC 9.35 10*3/mm3      RBC 4.09 10*6/mm3      Hemoglobin 11.2 g/dL      Hematocrit 36.9 %      MCV 90.2 fL      MCH 27.4 pg      MCHC 30.4 g/dL      RDW 14.5 %      RDW-SD 47.0 fl      MPV 9.7 fL      Platelets 203 10*3/mm3     POC Glucose Once [[id number]]  (Normal) Collected: 12/20/23 2043    Specimen: Blood Updated: 12/20/23 2050     Glucose 125 mg/dL      Comment: Serial Number: [id number]:  [id number]       Blood Culture - Blood, Arm, Left [[id number]]  (Normal) Collected: 12/18/23 1831    Specimen: Blood from Arm, Left Updated: 12/20/23 1845     Blood Culture No growth at 2 days    Blood Culture - Blood, Arm, Right [[id number]]  (Normal) Collected: 12/18/23 1839    Specimen: Blood from Arm, Right Updated: 12/20/23 1845     Blood Culture No growth at 2 days    POC Glucose Once [[id number]]  (Normal) Collected: 12/20/23 1543    Specimen: Blood Updated: 12/20/23 1626     Glucose 128 mg/dL      Comment: Serial Number: [id number]:  [id number]       POC Glucose Once [[id number]]  (Abnormal) Collected: 12/20/23 1106    Specimen: Blood Updated: 12/20/23 1625     Glucose 164 mg/dL      Comment: Serial Number: [id number]:  [id number]       VRE Culture - Swab, Per Rectum [[id number]]  (Normal) Collected: 12/18/23 2354    Specimen: Swab from Per Rectum Updated: 12/20/23 1334     VRE Screen CX No Vancomycin Resistant Enterococcus Isolated at 2 days            I have reviewed the patient's laboratory results.    Radiology results:  Imaging Results (Last 24 Hours)       ** No  results found for the last 24 hours. **            I have reviewed the patient's radiology reports.       Assessment/Plan    Sepsis      In summary, patient is a 63-year-old female with history of vascular dementia, CHF, COPD, diabetes who presented to the emergency department with somnolence and hypotension.  Leigh catheter was inserted during her resuscitation.  Gross hematuria was noted and her urine was concerning for infection.     Hematuria continues to clear with slow CBI.     Plan:  Continue to wean CBI  Once CBI is off, plan to remove catheter for voiding trial    Gina Moreno PA-C  12/21/23  10:30 EST

## 2023-12-21 NOTE — PROGRESS NOTES
[facility]    Name:  Izabella Agudelo   Age:  63 y.o.  Sex:  female  :  1960  MRN:  [id number]   Visit Number:  [id number]  Admission Date:  2023  Date Of Service:  23  Primary Care Physician:  Fabian Santos DO     LOS: 3 days :    Chief Complaint:      Bloody urine    Subjective:    23: Discussed with urology, Emi her sister, her nurse, as well as the patient at the bedside.  Patient remained stable but unfortunately with persistent hematuria.  Continuing CBI tolerating p.o.  No new complaints.    History Of Presenting Illness:       The patient is a chronically ill 63-year-old resident of local nursing home facility with a medical history of hemiplegia, COPD, diastolic CHF, schizophrenia, vascular dementia, aphasia, diabetes, who had presented with altered mental status.  History obtained from ER record and provider.  Per report, she had apparently been found at nursing facility slumped over in chair minimally responsive.  Glucose at the time was 422.  Oxygen saturations of 95% on nasal cannula.  Noted to be significantly hypotensive upon arrival.  Nursing home also noted fever.  History otherwise limited due to condition at this time.     In the ER, patient epinephrine norepinephrine with blood pressures in the 110/50 range.  9 9% on 2 L of oxygen.  Tmax 100.4 and heart rate in the 90s.  She had a creatinine 2.08 with a glucose of 300 and anion gap of 16.  She had white count of 19 hemoglobin 14 and platelets of 337.  Urinalysis with large blood, nitrite positive, TNTC white blood cells and red blood cells.  Procalcitonin 0.77.  ABG with mild hypoxia.  Downtrending troponin elevation.  Lactic acid of nearly 5.  Blood cultures obtained.  CT scan of the head with chronic changes no acute abnormality.  CT scan of the chest with atelectasis bilateral lung zones, bronchial wall thickening, cardiomegaly and a left thyroid nodule.  CT  scan pelvis with moderate distention of the rectum with stool and thickening of the urinary bladder.  Patient received initial fluid resuscitation per sepsis, Solu-Cortef, cefepime and vancomycin in addition to being started on pressure support.  A Leigh catheter and central venous line was placed.  She will be admitted to the ICU.     Of note, patient's sister and brother were at bedside in the ER and updated on plan of care.  Patient's POA, Emi, was contacted by ER provider and updated.  She did note patient is not a full code, and requested CODE STATUS to be DNI/DNI, but were okay with all other treatments.  Edited by: Salo Darden DO at 12/21/2023 3159     Review of Systems:     All systems were reviewed and negative except as mentioned in subjective, assessment and plan.    Vital Signs:    Temp:  [97.6 °F (36.4 °C)-98.8 °F (37.1 °C)] 97.7 °F (36.5 °C)  Heart Rate:  [60-87] 87  Resp:  [12-22] 18  BP: ()/() 142/77    Intake and output:    I/O last 3 completed shifts:  In: 3409 [P.O.:596; I.V.:2813]  Out: 3625 [Urine:3625]  I/O this shift:  In: 960 [P.O.:960]  Out: -     Physical Examination:    Constitutional:       Appearance: Awake and alert  HENT:      Mouth/Throat:      Mouth: Mucous membranes are moist.   Eyes:      Pupils: Pupils are equal, round, and reactive to light.   Cardiovascular:      Rate and Rhythm: regular rate and rhythm.      Pulses: Normal pulses.      Heart sounds: No murmur heard.     No gallop.   Pulmonary:      Breath sounds: CTAB, no wheeze/rhonchi  Abdominal:      General: Bowel sounds are normal. There is no distension.      Tenderness: There is no abdominal tenderness. There is no rebound.      Comments: Leigh catheter with pink drainage   Musculoskeletal:      Right lower leg: No edema.      Left lower leg: No edema.   Skin:     General: Skin is warm.      Capillary Refill: Capillary refill takes less than 2 seconds.      Findings: No bruising or lesion.    Neurological:      Mental Status: She is disoriented.      Motor: Weakness present.   Edited by: Salo Darden, DO at 12/21/2023 6804     Laboratory results:    Results from last 7 days   Lab Units 12/21/23  0426 12/20/23  0437 12/19/23  2141 12/19/23  1039 12/19/23  0343 12/18/23  1744   SODIUM mmol/L 147* 151*  --  149*   < > 146*   POTASSIUM mmol/L 3.8 4.0 3.7 3.6   < > 4.6   CHLORIDE mmol/L 112* 118*  --  119*   < > 104   CO2 mmol/L 28.2 27.2  --  27.5   < > 25.7   BUN mg/dL 19 30*  --  37*   < > 49*   CREATININE mg/dL 0.61 0.72  --  1.06*   < > 2.08*   CALCIUM mg/dL 9.0 9.0  --  9.2   < > 9.5   BILIRUBIN mg/dL  --   --   --   --   --  0.2   ALK PHOS U/L  --   --   --   --   --  89   ALT (SGPT) U/L  --   --   --   --   --  63*   AST (SGOT) U/L  --   --   --   --   --  43*   GLUCOSE mg/dL 139* 146*  --  242*   < > 300*    < > = values in this interval not displayed.     Results from last 7 days   Lab Units 12/21/23  0426 12/20/23 0437 12/19/23  0343   WBC 10*3/mm3 9.35 10.14 23.81*   HEMOGLOBIN g/dL 11.2* 11.6* 14.3   HEMATOCRIT % 36.9 38.3 46.4   PLATELETS 10*3/mm3 203 217 397         Results from last 7 days   Lab Units 12/18/23 1957 12/18/23  1744   HSTROP T ng/L 57* 114*     Results from last 7 days   Lab Units 12/18/23 1839 12/18/23  1831 12/18/23  1757   BLOODCX  No growth at 2 days No growth at 2 days  --    URINECX   --   --  >100,000 CFU/mL Escherichia coli*     Recent Labs     12/18/23  1852   PHART 7.433   CBW1RXP 41.7   PO2ART 66.4*   QLA4EAS 27.8   BASEEXCESS 3.1*      I have reviewed the patient's laboratory results.    Radiology results:    No radiology results from the last 24 hrs  I have reviewed the patient's radiology reports.    Medication Review:     I have reviewed the patient's active and prn medications.     Problem List:      Sepsis      Assessment/Plan:    Sepsis/shock resolved:  Complicated UTI  PRIYA resolved suspect multifactorial due to infection retention  hypovolemia:  Schizophrenia:  Hematuria:  Off pressors 12/20/2023  Urine culture pansensitive E. coli ceftriaxone appropriate antibiotic coverage initiated 12/18/2023.  CBI per urology Leigh in place.  -Updated Emi her sister 12/21/2023        Edited by: Salo Darden DO at 12/21/2023 1838     DVT Prophylaxis: SCDs  Code Status:   Code Status and Medical Interventions:   Ordered at: 12/19/23 0115     Medical Intervention Limits:    NO intubation (DNI)    NO cardioversion     Code Status (Patient has no pulse and is not breathing):    No CPR (Do Not Attempt to Resuscitate)     Medical Interventions (Patient has pulse or is breathing):    Limited Support      Diet:   Dietary Orders (From admission, onward)       Start     Ordered    12/20/23 1800  Dietary Nutrition Supplements Magic Cup  Daily With Dinner      Question:  Select Supplement:  Answer:  Magic Cup    12/20/23 1400    12/19/23 1405  Diet: Diabetic Diets; Consistent Carbohydrate; Texture: Pureed (NDD 1); Fluid Consistency: Honey Thick  Diet Effective Now        References:    Diet Order [place]   Question Answer Comment   Diets: Diabetic Diets    Diabetic Diet: Consistent Carbohydrate    Texture: Pureed (NDD 1)    Fluid Consistency: Honey Thick        12/19/23 1405                   Discharge Plan: Short-term rehab versus long-term care at [facility] in 1 to 2 days    Salo Darden DO  12/21/23  14:58 EST    Dictated utilizing Dragon dictation.

## 2023-12-21 NOTE — PROGRESS NOTES
Pt reassessed at 1300. Urine output is nearly clear with very light pink tinge. CBI turned off and nursing aware.     Will round on the patient tomorrow morning, 12/22/2023

## 2023-12-21 NOTE — CASE MANAGEMENT/SOCIAL WORK
Case Management/Social Work    Patient Name:  Izabella Agudelo  YOB: 1960  MRN: [id number]  Admit Date:  12/18/2023        09:17 EST  Met with patient at bedside. No new CM needs identified at this time. Plan for patient to return to [facility] [place].        Electronically signed by:  Alonzo Brenner RN  12/21/23 09:17 EST

## 2023-12-22 VITALS
HEART RATE: 64 BPM | RESPIRATION RATE: 20 BRPM | TEMPERATURE: 97.6 F | HEIGHT: 68 IN | WEIGHT: 182.76 LBS | SYSTOLIC BLOOD PRESSURE: 128 MMHG | DIASTOLIC BLOOD PRESSURE: 100 MMHG | OXYGEN SATURATION: 100 % | BODY MASS INDEX: 27.7 KG/M2

## 2023-12-22 LAB
ANION GAP SERPL CALCULATED.3IONS-SCNC: 8.2 MMOL/L (ref 5–15)
BUN SERPL-MCNC: 13 MG/DL (ref 8–23)
BUN/CREAT SERPL: 23.6 (ref 7–25)
CALCIUM SPEC-SCNC: 9 MG/DL (ref 8.6–10.5)
CHLORIDE SERPL-SCNC: 109 MMOL/L (ref 98–107)
CO2 SERPL-SCNC: 29.8 MMOL/L (ref 22–29)
CREAT SERPL-MCNC: 0.55 MG/DL (ref 0.57–1)
DEPRECATED RDW RBC AUTO: 45.5 FL (ref 37–54)
EGFRCR SERPLBLD CKD-EPI 2021: 103.8 ML/MIN/1.73
ERYTHROCYTE [DISTWIDTH] IN BLOOD BY AUTOMATED COUNT: 14.2 % (ref 12.3–15.4)
GLUCOSE BLDC GLUCOMTR-MCNC: 123 MG/DL (ref 70–130)
GLUCOSE SERPL-MCNC: 114 MG/DL (ref 65–99)
HCT VFR BLD AUTO: 34.4 % (ref 34–46.6)
HGB BLD-MCNC: 10.8 G/DL (ref 12–15.9)
MCH RBC QN AUTO: 27.9 PG (ref 26.6–33)
MCHC RBC AUTO-ENTMCNC: 31.4 G/DL (ref 31.5–35.7)
MCV RBC AUTO: 88.9 FL (ref 79–97)
PLATELET # BLD AUTO: 180 10*3/MM3 (ref 140–450)
PMV BLD AUTO: 10.3 FL (ref 6–12)
POTASSIUM SERPL-SCNC: 3.6 MMOL/L (ref 3.5–5.2)
RBC # BLD AUTO: 3.87 10*6/MM3 (ref 3.77–5.28)
SODIUM SERPL-SCNC: 147 MMOL/L (ref 136–145)
WBC NRBC COR # BLD AUTO: 7.69 10*3/MM3 (ref 3.4–10.8)

## 2023-12-22 PROCEDURE — 25010000002 CEFTRIAXONE SODIUM-DEXTROSE 1-3.74 GM-%(50ML) RECONSTITUTED SOLUTION: Performed by: INTERNAL MEDICINE

## 2023-12-22 PROCEDURE — 82948 REAGENT STRIP/BLOOD GLUCOSE: CPT

## 2023-12-22 PROCEDURE — 63710000001 INSULIN DETEMIR PER 5 UNITS: Performed by: INTERNAL MEDICINE

## 2023-12-22 PROCEDURE — 99231 SBSQ HOSP IP/OBS SF/LOW 25: CPT | Performed by: PHYSICIAN ASSISTANT

## 2023-12-22 PROCEDURE — 63710000001 INSULIN ASPART PER 5 UNITS: Performed by: INTERNAL MEDICINE

## 2023-12-22 PROCEDURE — 85027 COMPLETE CBC AUTOMATED: CPT | Performed by: INTERNAL MEDICINE

## 2023-12-22 PROCEDURE — 80048 BASIC METABOLIC PNL TOTAL CA: CPT | Performed by: INTERNAL MEDICINE

## 2023-12-22 PROCEDURE — 99239 HOSP IP/OBS DSCHRG MGMT >30: CPT | Performed by: INTERNAL MEDICINE

## 2023-12-22 RX ORDER — POTASSIUM CHLORIDE 1.5 G/1.58G
40 POWDER, FOR SOLUTION ORAL EVERY 4 HOURS
Status: COMPLETED | OUTPATIENT
Start: 2023-12-22 | End: 2023-12-22

## 2023-12-22 RX ORDER — INSULIN ASPART 100 [IU]/ML
3-14 INJECTION, SOLUTION INTRAVENOUS; SUBCUTANEOUS
Start: 2023-12-22

## 2023-12-22 RX ADMIN — PANTOPRAZOLE SODIUM 40 MG: 40 TABLET, DELAYED RELEASE ORAL at 06:26

## 2023-12-22 RX ADMIN — POTASSIUM CHLORIDE 40 MEQ: 1.5 POWDER, FOR SOLUTION ORAL at 11:02

## 2023-12-22 RX ADMIN — POTASSIUM CHLORIDE 40 MEQ: 1.5 POWDER, FOR SOLUTION ORAL at 06:26

## 2023-12-22 RX ADMIN — Medication 5000 UNITS: at 08:45

## 2023-12-22 RX ADMIN — ACETAMINOPHEN 650 MG: 325 TABLET, FILM COATED ORAL at 11:49

## 2023-12-22 RX ADMIN — CEFTRIAXONE 1000 MG: 1 INJECTION, SOLUTION INTRAVENOUS at 11:03

## 2023-12-22 RX ADMIN — INSULIN ASPART 5 UNITS: 100 INJECTION, SOLUTION INTRAVENOUS; SUBCUTANEOUS at 11:47

## 2023-12-22 RX ADMIN — ARIPIPRAZOLE 30 MG: 5 TABLET ORAL at 08:45

## 2023-12-22 RX ADMIN — INSULIN DETEMIR 15 UNITS: 100 INJECTION, SOLUTION SUBCUTANEOUS at 08:52

## 2023-12-22 RX ADMIN — Medication 10 ML: at 08:48

## 2023-12-22 RX ADMIN — DIVALPROEX SODIUM 500 MG: 250 TABLET, DELAYED RELEASE ORAL at 08:46

## 2023-12-22 RX ADMIN — GABAPENTIN 300 MG: 300 CAPSULE ORAL at 08:45

## 2023-12-22 NOTE — PROGRESS NOTES
[facility]  PROGRESS NOTE    Name:  Izabella Agudelo   Age:  63 y.o.  Sex:  female  :  1960  MRN:  [id number]   Visit Number:  [id number]  Admission Date:  2023  Date Of Service:  23  Primary Care Physician:  Fabian Santos DO     LOS: 4 days :  Patient Care Team:  Fabian Santos DO as PCP - General (Long Term Care Facility):      Subjective / Interval History:   Pt found sitting up in bed, eating breakfast. No complaints this morning.       Vital Signs:  Temp:  [97 °F (36.1 °C)-97.6 °F (36.4 °C)] 97.6 °F (36.4 °C)  Heart Rate:  [47-87] 58  Resp:  [16-22] 20  BP: ()/(51-82) 134/79    Intake and output:  I/O last 3 completed shifts:  In:  [P.O.:1496; I.V.:530]  Out: 3225 [Urine:3225]  I/O this shift:  In: 600 [P.O.:600]  Out: -     Physical Examination:  Physical Exam  Constitutional:       General: She is not in acute distress.  Eyes:      Extraocular Movements: Extraocular movements intact.      Conjunctiva/sclera: Conjunctivae normal.   Cardiovascular:      Rate and Rhythm: Normal rate.   Pulmonary:      Effort: Pulmonary effort is normal. No respiratory distress.   Genitourinary:     Comments: 500cc yellow UOP in douglas bag.   Neurological:      Mental Status: She is alert. Mental status is at baseline.         Laboratory results:  Lab Results (last 24 hours)       Procedure Component Value Units Date/Time    POC Glucose Once [[id number]]  (Normal) Collected: 23 0710    Specimen: Blood Updated: 23     Glucose 123 mg/dL      Comment: Serial Number: [id number]:  [id number]       Basic Metabolic Panel [[id number]]  (Abnormal) Collected: 23 0414    Specimen: Blood Updated: 23 0504     Glucose 114 mg/dL      BUN 13 mg/dL      Creatinine 0.55 mg/dL      Sodium 147 mmol/L      Potassium 3.6 mmol/L      Comment: Slight hemolysis detected by analyzer. Result may be falsely elevated.        Chloride 109 mmol/L      CO2 29.8 mmol/L       Calcium 9.0 mg/dL      BUN/Creatinine Ratio 23.6     Anion Gap 8.2 mmol/L      eGFR 103.8 mL/min/1.73     Narrative:      GFR Normal >60  Chronic Kidney Disease <60  Kidney Failure <15      CBC (No Diff) [[id number]]  (Abnormal) Collected: 12/22/23 0414    Specimen: Blood Updated: 12/22/23 0452     WBC 7.69 10*3/mm3      RBC 3.87 10*6/mm3      Hemoglobin 10.8 g/dL      Hematocrit 34.4 %      MCV 88.9 fL      MCH 27.9 pg      MCHC 31.4 g/dL      RDW 14.2 %      RDW-SD 45.5 fl      MPV 10.3 fL      Platelets 180 10*3/mm3     TSH [[id number]]  (Normal) Collected: 12/21/23 0426    Specimen: Blood Updated: 12/21/23 2301     TSH 2.180 uIU/mL     POC Glucose Once [[id number]]  (Abnormal) Collected: 12/21/23 2033    Specimen: Blood Updated: 12/21/23 2101     Glucose 226 mg/dL      Comment: Serial Number: [id number]:  [id number]       POC Glucose Once [[id number]]  (Abnormal) Collected: 12/21/23 1509    Specimen: Blood Updated: 12/21/23 2101     Glucose 221 mg/dL      Comment: Serial Number: [id number]:  [id number]       Blood Culture - Blood, Arm, Left [[id number]]  (Normal) Collected: 12/18/23 1831    Specimen: Blood from Arm, Left Updated: 12/21/23 1846     Blood Culture No growth at 3 days    Blood Culture - Blood, Arm, Right [[id number]]  (Normal) Collected: 12/18/23 1839    Specimen: Blood from Arm, Right Updated: 12/21/23 1846     Blood Culture No growth at 3 days    Acinetobacter Screen - Swab, Axilla, Left [[id number]]  (Normal) Collected: 12/18/23 2354    Specimen: Swab from Axilla, Left Updated: 12/21/23 1338     Acinetobacter Screen CX No Acinetobacter isolated            I have reviewed the patient's laboratory results.    Radiology results:  Imaging Results (Last 24 Hours)       ** No results found for the last 24 hours. **            I have reviewed the patient's radiology reports.       Assessment/Plan    Sepsis    In summary, patient is a 63-year-old female with history of vascular dementia, CHF, COPD,  diabetes who presented to the emergency department with somnolence and hypotension.  Leigh catheter was inserted during her resuscitation.  Gross hematuria was noted and her urine was concerning for infection.     CBI has been off for almost 12 hours and the urine has completely normalized with yellow output.  Recommend discontinuing Leigh catheter for voiding trial.  Patient is known to be incontinent at baseline.    Plan:  Return to NH and follow up with urology as needed    Gina Moreno PA-C  12/22/23  11:45 EST

## 2023-12-22 NOTE — DISCHARGE SUMMARY
[facility]      Name:  Izabella Agudelo   Age:  63 y.o.  Sex:  female  :  1960  MRN:  [id number]   Visit Number:  [id number]    Admission Date:  2023  Date of Discharge:  2023  Primary Care Physician:  Fabian Santos, DO    Important issues to note:    Start: Nothing  Stop: Lasix, Jardiance, lisinopril  Follow up: PCP and urology  Brief Summary: Presented with septic shock due to UTI with E. coli. Initially had required ICU management and pressors which improved to having a normal blood pressure and being back to her baseline cognition by the time of discharge.  Also had hematuria requiring CBI which was caused by the UTI which had resolved by the time of discharge.  Was safe to resume Eliquis per urology.    Discharge Diagnoses:       Sepsis        Problem List:     Active Hospital Problems    Diagnosis  POA    **Sepsis [A41.9]  Yes      Resolved Hospital Problems   No resolved problems to display.     Presenting Problem:    Chief Complaint   Patient presents with    Altered Mental Status      Consults:     Consulting Physician(s)         Provider   Role Specialty     Severo Ordaz MD  Consulting Physician Urology          History Of Presenting Illness:       The patient is a chronically ill 63-year-old resident of local nursing home facility with a medical history of hemiplegia, COPD, diastolic CHF, schizophrenia, vascular dementia, aphasia, diabetes, who had presented with altered mental status.  History obtained from ER record and provider.  Per report, she had apparently been found at nursing facility slumped over in chair minimally responsive.  Glucose at the time was 422.  Oxygen saturations of 95% on nasal cannula.  Noted to be significantly hypotensive upon arrival.  Nursing home also noted fever.  History otherwise limited due to condition at this time.     In the ER, patient epinephrine norepinephrine with blood  pressures in the 110/50 range.  9 9% on 2 L of oxygen.  Tmax 100.4 and heart rate in the 90s.  She had a creatinine 2.08 with a glucose of 300 and anion gap of 16.  She had white count of 19 hemoglobin 14 and platelets of 337.  Urinalysis with large blood, nitrite positive, TNTC white blood cells and red blood cells.  Procalcitonin 0.77.  ABG with mild hypoxia.  Downtrending troponin elevation.  Lactic acid of nearly 5.  Blood cultures obtained.  CT scan of the head with chronic changes no acute abnormality.  CT scan of the chest with atelectasis bilateral lung zones, bronchial wall thickening, cardiomegaly and a left thyroid nodule.  CT scan pelvis with moderate distention of the rectum with stool and thickening of the urinary bladder.  Patient received initial fluid resuscitation per sepsis, Solu-Cortef, cefepime and vancomycin in addition to being started on pressure support.  A Leigh catheter and central venous line was placed.  She will be admitted to the ICU.     Of note, patient's sister and brother were at bedside in the ER and updated on plan of care.  Patient's POA, Emi, was contacted by ER provider and updated.  She did note patient is not a full code, and requested CODE STATUS to be DNI/DNI, but were okay with all other treatments.  Edited by: Salo Darden DO at 12/21/2023 8402    Hospital course:     Sepsis/shock resolved:  Complicated UTI  PRIYA resolved suspect multifactorial due to infection retention hypovolemia:  Schizophrenia:  Hematuria:  Off pressors 12/20/2023  Urine culture pansensitive E. coli status post 5 days IV antibiotics  Leigh was placed and continuous bladder irrigation managed by urology and discontinued 24 hours prior to discharge without any resolution of bleed.  -Updated Emi her sister 12/22/2023        Vital Signs:    Temp:  [97 °F (36.1 °C)-97.6 °F (36.4 °C)] 97.6 °F (36.4 °C)  Heart Rate:  [47-75] 64  Resp:  [16-22] 20  BP: ()/() 128/100    Physical  Exam:    Constitutional:       Appearance: Awake and alert  HENT:      Mouth/Throat:      Mouth: Mucous membranes are moist.   Eyes:      Pupils: Pupils are equal, round, and reactive to light.   Cardiovascular:      Rate and Rhythm: regular rate and rhythm.      Pulses: Normal pulses.      Heart sounds: No murmur heard.     No gallop.   Pulmonary:      Breath sounds: CTAB, no wheeze/rhonchi  Abdominal:      General: Bowel sounds are normal. There is no distension.      Tenderness: There is no abdominal tenderness. There is no rebound.      Comments: Leigh catheter with yellow urine  Musculoskeletal:      Right lower leg: No edema.      Left lower leg: No edema.   Skin:     General: Skin is warm.      Capillary Refill: Capillary refill takes less than 2 seconds.      Findings: No bruising or lesion.   Neurological:      Mental Status: She is oriented to self and location.      Motor: Weakness improved to baseline.       Pertinent Lab Results:     Results from last 7 days   Lab Units 12/22/23 0414 12/21/23 0426 12/20/23 0437 12/19/23  0343 12/18/23  1744   SODIUM mmol/L 147* 147* 151*   < > 146*   POTASSIUM mmol/L 3.6 3.8 4.0   < > 4.6   CHLORIDE mmol/L 109* 112* 118*   < > 104   CO2 mmol/L 29.8* 28.2 27.2   < > 25.7   BUN mg/dL 13 19 30*   < > 49*   CREATININE mg/dL 0.55* 0.61 0.72   < > 2.08*   CALCIUM mg/dL 9.0 9.0 9.0   < > 9.5   BILIRUBIN mg/dL  --   --   --   --  0.2   ALK PHOS U/L  --   --   --   --  89   ALT (SGPT) U/L  --   --   --   --  63*   AST (SGOT) U/L  --   --   --   --  43*   GLUCOSE mg/dL 114* 139* 146*   < > 300*    < > = values in this interval not displayed.     Results from last 7 days   Lab Units 12/22/23 0414 12/21/23 0426 12/20/23 0437   WBC 10*3/mm3 7.69 9.35 10.14   HEMOGLOBIN g/dL 10.8* 11.2* 11.6*   HEMATOCRIT % 34.4 36.9 38.3   PLATELETS 10*3/mm3 180 203 217         Results from last 7 days   Lab Units 12/18/23 1957 12/18/23  1744   HSTROP T ng/L 57* 114*                 Results  from last 7 days   Lab Units 12/18/23  1852   PH, ARTERIAL pH units 7.433   PO2 ART mm Hg 66.4*   PCO2, ARTERIAL mm Hg 41.7   HCO3 ART mmol/L 27.8     Results from last 7 days   Lab Units 12/18/23  1839 12/18/23  1831 12/18/23  1757   BLOODCX  No growth at 3 days No growth at 3 days  --    URINECX   --   --  >100,000 CFU/mL Escherichia coli*       Pertinent Radiology Results:    Imaging Results (All)       Procedure Component Value Units Date/Time    XR Chest 1 View [[id number]] Collected: 12/19/23 0845     Updated: 12/19/23 0849    Narrative:      CLINICAL INDICATION:    Weak/Dizzy/AMS triage protocol     EXAMINATION TECHNIQUE:  XR CHEST 1 VW-     COMPARISON:  Radiographs 10/11/2023.     FINDINGS:  Stable mild cardiomegaly. Coronary and aortic atherosclerosis. Dense  mitral annulus calcifications. Mild perihilar vascular engorgement. No  consolidation. Bibasilar atelectasis, left greater than right. No  pneumothorax or large pleural effusion.       Impression:      Cardiomegaly. Vascular engorgement. No edema. Mild bibasilar  atelectasis, left greater than right.     Images personally reviewed, interpreted and dictated by SUSANNA Stallworth.              This report was signed and finalized on 12/19/2023 8:47 AM by SUSANNA Stallworth.       CT Chest Without Contrast Diagnostic [[id number]] Collected: 12/18/23 2248     Updated: 12/18/23 2250    Narrative:      FINAL REPORT    TECHNIQUE:  null    CLINICAL HISTORY:  Altered mental status    COMPARISON:  null    FINDINGS:  CT chest without contrast.    Comparison: None    Findings:    Hypodense nodule within the left thyroid measures 1.5 cm. Recommend nonemergent thyroid ultrasound for further characterization.    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    Normal caliber of the thoracic aorta.    Cardiomegaly. Coronary atherosclerosis. Dense calcifications of the mitral valve.    Small nodes within the mediastinum.    Subsegmental atelectasis  greatest within the bilateral lung bases. Linear atelectasis within the left lung base. Bronchial thickening within the lower lobe bronchi may indicate a component of reactive airways disease.    The bones are intact. Degenerative changes of the thoracic spine.    Hepatomegaly and hepatic steatosis. Calcification within the inferior right lobe of the liver. Nodularity of the left adrenal gland.      Impression:      IMPRESSION:    1. Subsegmental of the bilateral lung bases. Linear atelectasis within left lung base. Minor bronchial thickening within the lung bases could indicate reactive airways disease including bronchitis. No consolidation.    2. Cardiomegaly with coronary atherosclerosis.    3. Hypodense nodule within the left thyroid measures 1.5 cm. Recommend nonemergent thyroid ultrasound for further characterization.    Authenticated and Electronically Signed by Warner Yarbrough DO on  12/18/2023 10:48:36 PM    CT Head Without Contrast [[id number]] Collected: 12/18/23 2215     Updated: 12/18/23 2217    Narrative:      FINAL REPORT    TECHNIQUE:  null    CLINICAL HISTORY:  altered mental status    COMPARISON:  null    FINDINGS:  CT head without contrast    Comparison: None    Findings:    No intracranial mass, midline shift, hydrocephalus, or acute hemorrhage.    Subcortical/periventricular white matter hypoattenuation statistically representing changes of chronic microvascular ischemia. Global parenchymal volume loss which is commensurate with reported age.    Old lacunar infarct involving the left basal ganglia.    The visualized paranasal sinuses and mastoid air cells are normal.    The orbits are unremarkable.    No skull fracture.      Impression:      IMPRESSION:    1. No acute intracranial process.    2. Changes of chronic microvascular ischemia with age-related global parenchymal volume loss. Old small lacunar infarct of the left basal ganglia.    Authenticated and Electronically Signed by Warner Yarbrough DO  on  12/18/2023 10:15:57 PM    CT Abdomen Pelvis Without Contrast [[id number]] Collected: 12/18/23 1923     Updated: 12/18/23 1925    Narrative:      FINAL REPORT    TECHNIQUE:  null    CLINICAL HISTORY:  altered mental status    COMPARISON:  null    FINDINGS:  Noncontrast CT of the abdomen and pelvis    Technique: Noncontrast CT images from the lung bases through the ischial tuberosities.    Comparison:    None    Findings: Low lung volumes.    Normal noncontrast liver. No hepatic masses.    The gallbladder is unremarkable by CT.    Normal appearing adrenal glands.    Normal noncontrast spleen. The spleen is normal size.    Normal kidneys, no hydronephrosis or nephrolithiasis. No suspicious lesions. No stones in the course of either ureter.    Moderate distension of the rectum with stool. No bowel obstruction. No pneumoperitoneum or abscess. Normal appendix.    Normal retroperitoneum. No adenopathy. Normal caliber abdominal aorta.    Normal pancreas. No evidence for pancreatitis. No pancreatic masses.    Mild thickening of the urinary bladder. This could represent mild cystitis.    Normal bones.      Impression:      Impression:    Moderate distension of the rectum with stool.    Mild thickening of the urinary bladder. This could represent cystitis.    Authenticated and Electronically Signed by Warner Dixon MD on  12/18/2023 07:23:40 PM            Echo:    Results for orders placed during the hospital encounter of 01/14/19    Adult Transthoracic Echo Complete W/ Cont if Necessary Per Protocol    Interpretation Summary  · Left ventricular wall thickness is consistent with mild concentric hypertrophy.    Technically adequate study  1) Mild LVH with normal LV systolic function ( EF is over 55%)  2) Moderate bi atrial enlargement with mild elevation in LVedp  3) Calcified posterior leaflet of the mitral valve with mild MR  4) Sigmoid septum with dynamic narrowing of the aortic outflow in midsystolic - no gradient  noted  5) Aortic sclerosis without stenosis  6) Mild TR with PAsp of 40 mm of hg  7) Mild Dilation of the RV with normal function    Condition on Discharge:      Stable.    Code status during the hospital stay:    Code Status and Medical Interventions:   Ordered at: 12/19/23 0115     Medical Intervention Limits:    NO intubation (DNI)    NO cardioversion     Code Status (Patient has no pulse and is not breathing):    No CPR (Do Not Attempt to Resuscitate)     Medical Interventions (Patient has pulse or is breathing):    Limited Support     Discharge Disposition:    Long Term Care (DC - External)    Discharge Medications:       Discharge Medications        Changes to Medications        Instructions Start Date   divalproex 500 MG DR tablet  Commonly known as: DEPAKOTE  What changed: Another medication with the same name was removed. Continue taking this medication, and follow the directions you see here.   500 mg, Oral, 2 Times Daily      Insulin Aspart 100 UNIT/ML injection  Commonly known as: novoLOG  What changed:   how much to take  when to take this   3-14 Units, Subcutaneous, 4 Times Daily Before Meals & Nightly      insulin detemir 100 UNIT/ML injection  Commonly known as: LEVEMIR  What changed: how much to take   15 Units, Subcutaneous, 2 Times Daily             Continue These Medications        Instructions Start Date   acetaminophen 650 MG 8 hr tablet  Commonly known as: TYLENOL   650 mg, Oral, 4 Times Daily PRN      acetaminophen 650 MG suppository  Commonly known as: TYLENOL   650 mg, Rectal, Every 4 Hours PRN      aluminum-magnesium hydroxide-simethicone 200-200-20 MG/5ML suspension  Commonly known as: MAALOX/MYLANTA   20 mL, Oral, Every 8 Hours PRN      apixaban 5 MG tablet tablet  Commonly known as: ELIQUIS   5 mg, Oral, 2 Times Daily      ARIPiprazole 20 MG tablet  Commonly known as: ABILIFY   30 mg, Oral, Daily      atorvastatin 40 MG tablet  Commonly known as: LIPITOR   40 mg, Oral, Nightly      B-D  UF III MINI PEN NEEDLES 31G X 5 MM misc  Generic drug: Insulin Pen Needle   USE 3 TIMES A DAY      Dulcolax 10 MG suppository  Generic drug: bisacodyl   10 mg, Rectal, Daily PRN      gabapentin 300 MG capsule  Commonly known as: NEURONTIN   300 mg, Oral, 2 Times Daily      mirtazapine 15 MG tablet  Commonly known as: REMERON   15 mg, Oral, Nightly      omeprazole 20 MG capsule  Commonly known as: priLOSEC   20 mg, Oral, Daily      ondansetron ODT 4 MG disintegrating tablet  Commonly known as: ZOFRAN-ODT   4 mg, Translingual, Every 6 Hours PRN      ONE TOUCH ULTRA TEST test strip  Generic drug: glucose blood   TEST 2 TIMES A DAY      OneTouch Delica Lancets 33G misc   TEST TWICE A DAY      pantoprazole 40 MG EC tablet  Commonly known as: Protonix   40 mg, Oral, Daily      senna 8.6 MG tablet  Commonly known as: SENOKOT   1 tablet, Oral, 2 Times Daily PRN      travoprost (ZENOBIA free) 0.004 % solution ophthalmic solution  Commonly known as: TRAVATAN   1 drop, Both Eyes, Every Evening, in affected eye(s)      vitamin D 1.25 MG (94397 UT) capsule capsule  Commonly known as: ERGOCALCIFEROL   50,000 Units, Oral, Weekly, On Thursdays             Stop These Medications      furosemide 20 MG tablet  Commonly known as: LASIX     Jardiance 10 MG tablet tablet  Generic drug: empagliflozin     lisinopril 5 MG tablet  Commonly known as: PRINIVIL,ZESTRIL            Discharge Diet:     Diet Instructions       Advance Diet As Tolerated -Target Diet: consistent carbohydrate, pureed, honey thick liquids      Target Diet: consistent carbohydrate, pureed, honey thick liquids          Activity at Discharge:       Follow-up Appointments:    Additional Instructions for the Follow-ups that You Need to Schedule       Discharge Follow-up with PCP   As directed       Currently Documented PCP:    Fabian Santos DO    PCP Phone Number:    [phone]     Follow Up Details: 1 week        Discharge Follow-up with Specified Provider: Urology; 1  Month   As directed      To: Urology   Follow Up: 1 Month               Contact information for follow-up providers       Fabian Santos DO .    Specialty: Family Medicine  Why: 1 week  Contact information:  [address]  [place] [address] [phone]                       Contact information for after-discharge care       Destination       [facility] .    Service: Intermediate Care  Contact information:  [address] [facility] [address] [phone]                                 No future appointments.  Test Results Pending at Discharge:    Pending Labs       Order Current Status    Blood Culture - Blood, Arm, Left Preliminary result    Blood Culture - Blood, Arm, Right Preliminary result               Salo Darden DO  12/22/23  13:19 EST    Time: I spent 45 minutes on this discharge activity which included: face-to-face encounter with the patient, reviewing the data in the system, coordination of the care with the nursing staff as well as consultants, documentation, and entering orders.     Dictated utilizing Dragon dictation.

## 2023-12-22 NOTE — PROGRESS NOTES
Malnutrition Severity Assessment       Spoke w/ patient regarding recent reported wt loss. Pt somewhat confused but was able to convey that she has gained weight recently. Reviewed patient's weight history chart and patient has been stable w/in the last few months. RD available PRN.   Malnutrition Type (last 8 hours)       Malnutrition Severity Assessment       Row Name 12/22/23 1440       Malnutrition Severity Assessment    Malnutrition Type --      Row Name 12/22/23 1440       Insufficient Energy Intake     Insufficient Energy Intake Findings None      Row Name 12/22/23 1440       Unintentional Weight Loss     Unintentional Weight Loss Findings None      Row Name 12/22/23 1440       Muscle Loss    Loss of Muscle Mass Findings None    [place] Region None    Clavicle Bone Region None    Acromion Bone Region None    Scapular Bone Region None    Dorsal Hand Region None    Patellar Region None    Anterior Thigh Region None      Row Name 12/22/23 1440       Fat Loss    Subcutaneous Fat Loss Findings None    Orbital Region  None    Upper Arm Region None    Thoracic & Lumbar Region None

## 2023-12-22 NOTE — CASE MANAGEMENT/SOCIAL WORK
Case Management Discharge Note           Provided Post Acute Provider List?: N/A  Provided Post Acute Provider Quality & Resource List?: N/A    Selected Continued Care - Admitted Since 12/18/2023       Destination Coordination complete.      Service Provider Selected Services Address Phone Fax Patient Preferred    [facility] [address] [place] [address] [phone] --              Durable Medical Equipment    No services have been selected for the patient.                Dialysis/Infusion    No services have been selected for the patient.                Home Medical Care    No services have been selected for the patient.                Therapy    No services have been selected for the patient.                Community Resources    No services have been selected for the patient.                Community & DME    No services have been selected for the patient.                    Selected Continued Care - Prior Encounters Includes continued care and service providers with selected services from prior encounters from 9/19/2023 to 12/22/2023      Discharged on 10/14/2023 Admission date: 10/11/2023 - Discharge disposition: Skilled Nursing Facility (DC - External)      Destination       Service Provider Selected Services Address Phone Fax Patient Preferred    [facility] [address] [place] [address] [phone] --                          Transportation Services  Ambulance: [facility]    Final Discharge Disposition Code: 04 - intermediate care facility

## 2023-12-23 LAB
BACTERIA SPEC AEROBE CULT: NORMAL
BACTERIA SPEC AEROBE CULT: NORMAL
GLUCOSE BLDC GLUCOMTR-MCNC: 206 MG/DL (ref 70–130)

## 2023-12-24 NOTE — PROGRESS NOTES
Enter Query Response Below      Query Response: hemiplegia due to old stroke POA             If applicable, please update the problem list.         Patient: Izabella Agudelo        : 1960  Account: [id number]           Admit Date: 2023        How to Respond to this query:       a. Click New Note     b. Answer query within the yellow box.                c. Update the Problem List, if applicable.      If you have any questions about this query contact me at: [email or web address]    Dr. Darden:      63 year old female admitted due to sepsis with shock, urinary tract infection, bronchitis, acute metabolic encephalopathy, acute urinary retention, hyperglycemia in setting of insulin-dependent diabetes, acute kidney injury, elevated troponin, vascular dementia, paranoid schizophrenia.  Patient with noted hemiplegia, hemiparesis, impaired functional mobility, balance, gait and endurance. Patient with noted history of ischemic stroke.    Please clarify the following:    hemiplegia due to stroke  hemiplegia not due to stroke  Other- specify______   Unable to determine    By submitting this query, we are merely seeking further clarification of documentation to accurately reflect all conditions that you are monitoring, evaluating, treating or that extend the hospitalization or utilize additional resources of care. Please utilize your independent clinical judgment when addressing the question(s) above.     This query and your response, once completed, will be entered into the legal medical record.    Sincerely,  Venus Roper  Clinical Documentation Integrity Program

## 2023-12-27 ENCOUNTER — TELEPHONE (OUTPATIENT)
Dept: UROLOGY | Facility: CLINIC | Age: 63
End: 2023-12-27

## 2023-12-27 ENCOUNTER — NURSING HOME (OUTPATIENT)
Dept: FAMILY MEDICINE CLINIC | Facility: CLINIC | Age: 63
End: 2023-12-27
Payer: MEDICARE

## 2023-12-27 VITALS
RESPIRATION RATE: 18 BRPM | DIASTOLIC BLOOD PRESSURE: 70 MMHG | TEMPERATURE: 98 F | BODY MASS INDEX: 28.28 KG/M2 | WEIGHT: 186 LBS | OXYGEN SATURATION: 97 % | SYSTOLIC BLOOD PRESSURE: 124 MMHG | HEART RATE: 68 BPM

## 2023-12-27 DIAGNOSIS — E11.40 TYPE 2 DIABETES MELLITUS WITH DIABETIC NEUROPATHY, WITH LONG-TERM CURRENT USE OF INSULIN: ICD-10-CM

## 2023-12-27 DIAGNOSIS — I69.90 LATE EFFECTS OF CVA (CEREBROVASCULAR ACCIDENT): ICD-10-CM

## 2023-12-27 DIAGNOSIS — Z79.4 TYPE 2 DIABETES MELLITUS WITH DIABETIC NEUROPATHY, WITH LONG-TERM CURRENT USE OF INSULIN: ICD-10-CM

## 2023-12-27 DIAGNOSIS — F01.B18 MODERATE VASCULAR DEMENTIA WITH OTHER BEHAVIORAL DISTURBANCE: ICD-10-CM

## 2023-12-27 DIAGNOSIS — Z86.19 HISTORY OF SEPSIS: ICD-10-CM

## 2023-12-27 DIAGNOSIS — Z74.09 IMPAIRED MOBILITY AND ADLS: ICD-10-CM

## 2023-12-27 DIAGNOSIS — Z78.9 IMPAIRED MOBILITY AND ADLS: ICD-10-CM

## 2023-12-27 DIAGNOSIS — Z87.19 HISTORY OF CONSTIPATION: ICD-10-CM

## 2023-12-27 DIAGNOSIS — Z09 HOSPITAL DISCHARGE FOLLOW-UP: Primary | ICD-10-CM

## 2023-12-27 PROBLEM — A41.9 SEPSIS: Status: RESOLVED | Noted: 2023-12-18 | Resolved: 2023-12-27

## 2023-12-27 NOTE — LETTER
Nursing Home Follow Up Note      Fabian Santos DO []   [facility] [x]  [address] [place] [address]  Phone: [phone]  Fax: [phone] Yohan Cuellar MD []    Chaz Mandel DO []   [address] [place] [address]  Phone: [phone]  Fax: [phone]     PATIENT NAME: Izabella Agudelo                                                                          YOB: 1960           DATE OF SERVICE: 12/27/2023  FACILITY:  [facility]   [facility]   [x] [facility]   [] [facility]   [] Other ______________________________________________________________________      CHIEF COMPLAINT:    Follow-up hospital visit.     HISTORY OF PRESENT ILLNESS:     Patient with altered mental status and some respiratory distress on 12/18. She was found slumped over and minimally responsive. She was sent to the hospital for further evaluation. found at nursing facility slumped over in chair minimally responsive.  In ED glucose was 422.  Oxygen saturations of 95% on nasal cannula.  Noted to be significantly hypotensive.epinephrine She was given norepinephrine with blood pressures in the 110/50 range.  99% on 2 L of oxygen.  Tmax 100.4 and heart rate in the 90s.  She had a creatinine 2.08. She had white count of 19 hemoglobin 14 and platelets of 337.  Urinalysis with large blood, nitrite positive, TNTC white blood cells and red blood cells.  Lactic acid of nearly 5.  Blood cultures obtained.  CT scan of the head with chronic changes no acute abnormality.  CT scan of the chest with atelectasis bilateral lung zones, bronchial wall thickening, cardiomegaly and a left thyroid nodule.  CT scan pelvis with moderate distention of the rectum with stool and thickening of the urinary bladder.  Patient received initial fluid resuscitation per sepsis, Solu-Cortef, cefepime and vancomycin in addition to being started on pressure support.  A Leigh catheter and central venous line was placed and she  was admitted to ICU. Once stabilized she was discharged back to facility on 12/22. She has been stable since returning to the facility. She has been moving about facility in wheelchair and participating in activities. Resting in bed today during visit with no complaints or signs and symptoms of any distress.     PAST MEDICAL & SURGICAL HISTORY:   Past Medical History:   Diagnosis Date    Acute angle-closure glaucoma, bilateral     Aphasia     Cardiac abnormality     CHF (congestive heart failure)     COPD (chronic obstructive pulmonary disease)     Diabetes     Glaucoma     Hemiparesis     Hemiplegia     Impaired functional mobility, balance, gait, and endurance     Schizophrenia, chronic condition     Vascular dementia       No past surgical history on file.      MEDICATIONS:  I have reviewed and reconciled the patients medication list in the patients chart at the skilled nursing facility today.      ALLERGIES:    Allergies   Allergen Reactions    Penicillins Rash         SOCIAL HISTORY:    Social History     Socioeconomic History    Marital status: Single   Tobacco Use    Smoking status: Never    Smokeless tobacco: Never   Vaping Use    Vaping Use: Never used   Substance and Sexual Activity    Alcohol use: No    Drug use: Never    Sexual activity: Defer       FAMILY HISTORY:    Family History   Problem Relation Age of Onset    Diabetes Other     Glaucoma Other        REVIEW OF SYSTEMS:    Review of Systems   Reason unable to perform ROS: ROS per nursing and patient.   Constitutional:  Negative for activity change, appetite change, chills, diaphoresis, fatigue, fever, unexpected weight gain and unexpected weight loss.   HENT:  Positive for trouble swallowing. Negative for congestion, mouth sores, nosebleeds and sore throat.    Respiratory:  Negative for cough, choking, chest tightness and shortness of breath.    Cardiovascular:  Negative for chest pain.   Gastrointestinal:  Negative for abdominal pain,  constipation, diarrhea, nausea, rectal pain and vomiting.   Endocrine: Positive for polyphagia. Negative for polydipsia and polyuria.   Genitourinary:  Positive for urinary incontinence. Negative for decreased urine volume, difficulty urinating, dysuria, frequency and hematuria.   Musculoskeletal:  Positive for arthralgias (chronic). Negative for joint swelling and myalgias.   Skin:  Negative for color change and rash.   Neurological:  Positive for speech difficulty, weakness, memory problem and confusion. Negative for dizziness.   Psychiatric/Behavioral:  Positive for behavioral problems (intermittently). Negative for dysphoric mood, hallucinations, sleep disturbance and depressed mood. The patient is not nervous/anxious.          PHYSICAL EXAMINATION:   VITAL SIGNS:   Vitals:    12/27/23 1534   BP: 124/70   Pulse: 68   Resp: 18   Temp: 98 °F (36.7 °C)   SpO2: 97%   Weight: 84.4 kg (186 lb)       Physical Exam  Vitals and nursing note reviewed.   Constitutional:       General: She is not in acute distress.     Appearance: She is well-developed.   Eyes:      Conjunctiva/sclera: Conjunctivae normal.   Cardiovascular:      Rate and Rhythm: Normal rate and regular rhythm.      Heart sounds: Normal heart sounds.   Pulmonary:      Effort: Pulmonary effort is normal.      Breath sounds: Normal breath sounds. No wheezing or rales.   Abdominal:      General: Bowel sounds are normal. There is no distension.      Palpations: Abdomen is soft.      Tenderness: There is no abdominal tenderness.   Skin:     General: Skin is warm and dry.   Neurological:      Mental Status: She is alert. Mental status is at baseline. She is disoriented.      Gait: Gait abnormal.      Comments: Chronic NM deficits related to CVA   Psychiatric:         Mood and Affect: Mood normal. Affect is flat.         Behavior: Behavior normal.         Cognition and Memory: Cognition is impaired. Memory is impaired.         RECORDS REVIEW:   I have reviewed and  interpreted the discharge summary and medications    ASSESSMENT     Diagnoses and all orders for this visit:    1. Hospital discharge follow-up (Primary)    2. Moderate vascular dementia with other behavioral disturbance    3. Late effects of CVA (cerebrovascular accident)    4. History of sepsis    5. History of constipation    6. Impaired mobility and ADLs            PLAN    Hospital follow up/dementia/history CVA/history sepsis/history constipation  -Stable since returning to facility. She needs referral to Urology in 1 month. Obtain CBC and BMP  in am. Will follow up and continue to monitor.     Nursing encouraged to keep me informed of any acute changes, lack of improvement, or any new concerning symptoms.    Staff to continue supportive care for all ADLs.    [x]  Discussed Patient in detail with nursing/staff, addressed all needs today.     [x]  Plan of Care Reviewed   [x]  PT/OT Reviewed   [x]  Order Changes  []  Discharge Plans Reviewed  [x]  Advance Directive on file with Nursing Home.   [x]  POA on file with Nursing Home.   [x]  Code Status listed: [x]  Full Code   []  DNR       “I confirm accuracy of unchanged data/findings which have been carried forward from previous visit, as well as I have updated appropriately those that have changed.”     I spent 35 minutes caring for Izabella on this date of service. This time includes time spent by me in the following activities:preparing for the visit, reviewing tests, obtaining and/or reviewing a separately obtained history, performing a medically appropriate examination and/or evaluation , counseling and educating the patient/family/caregiver, ordering medications, tests, or procedures, referring and communicating with other health care professionals , documenting information in the medical record, independently interpreting results and communicating that information with the patient/family/caregiver, and care coordination                                    Fariha Cardona, APRN.

## 2023-12-27 NOTE — TELEPHONE ENCOUNTER
Hub staff attempted to follow warm transfer process and was unsuccessful     Caller: JOE    Relationship to patient: [facility]    Best call back number: [phone]    Patient is needing: PT WAS SEEN BY BRIANNA TELLEZ WHILE IN THE HOSPITAL. PT IS NEEDING A FOLLOW UP APPT, NO NOTES FOUND IN CHART, THEY THINK MAYBE IN 1 MONTH. PLEASE REVIEW AND CONTACT NURSING HOME TO SCHEDULE APPT. PT WAS DISCHARGED ON 12/22/23.

## 2023-12-27 NOTE — TELEPHONE ENCOUNTER
[facility] REQUESTING MED REFILL FOR GABAPENTIN 300 MG.    DIRECTIONS: GABAPENTIN 300 MG 1 CAP PO BID.

## 2023-12-28 RX ORDER — GABAPENTIN 300 MG/1
300 CAPSULE ORAL 2 TIMES DAILY
Qty: 60 CAPSULE | Refills: 5 | Status: SHIPPED | OUTPATIENT
Start: 2023-12-28

## 2023-12-28 NOTE — PROGRESS NOTES
Nursing Home Follow Up Note      Fabian Santos DO []   [facility] [x]  [address] [place] [address]  Phone: [phone]  Fax: [phone] Yohan Cuellar MD []    Chaz Mandel DO []   [address] [place] [address]  Phone: [phone]  Fax: [phone]     PATIENT NAME: Izabella Agudelo                                                                          YOB: 1960           DATE OF SERVICE: 12/27/2023  FACILITY:  [][facility]   [facility]   [x] [facility]   [] [facility]   [] Other ______________________________________________________________________      CHIEF COMPLAINT:    Follow-up hospital visit.     HISTORY OF PRESENT ILLNESS:     Patient with altered mental status and some respiratory distress on 12/18. She was found slumped over and minimally responsive. She was sent to the hospital for further evaluation. found at nursing facility slumped over in chair minimally responsive.  In ED glucose was 422.  Oxygen saturations of 95% on nasal cannula.  Noted to be significantly hypotensive.epinephrine She was given norepinephrine with blood pressures in the 110/50 range.  99% on 2 L of oxygen.  Tmax 100.4 and heart rate in the 90s.  She had a creatinine 2.08. She had white count of 19 hemoglobin 14 and platelets of 337.  Urinalysis with large blood, nitrite positive, TNTC white blood cells and red blood cells.  Lactic acid of nearly 5.  Blood cultures obtained.  CT scan of the head with chronic changes no acute abnormality.  CT scan of the chest with atelectasis bilateral lung zones, bronchial wall thickening, cardiomegaly and a left thyroid nodule.  CT scan pelvis with moderate distention of the rectum with stool and thickening of the urinary bladder.  Patient received initial fluid resuscitation per sepsis, Solu-Cortef, cefepime and vancomycin in addition to being started on pressure support.  A Leigh catheter and central venous line was placed and she  was admitted to ICU. Once stabilized she was discharged back to facility on 12/22. She has been stable since returning to the facility. She has been moving about facility in wheelchair and participating in activities. Resting in bed today during visit with no complaints or signs and symptoms of any distress.     PAST MEDICAL & SURGICAL HISTORY:   Past Medical History:   Diagnosis Date    Acute angle-closure glaucoma, bilateral     Aphasia     Cardiac abnormality     CHF (congestive heart failure)     COPD (chronic obstructive pulmonary disease)     Diabetes     Glaucoma     Hemiparesis     Hemiplegia     Impaired functional mobility, balance, gait, and endurance     Schizophrenia, chronic condition     Vascular dementia       No past surgical history on file.      MEDICATIONS:  I have reviewed and reconciled the patients medication list in the patients chart at the skilled nursing facility today.      ALLERGIES:    Allergies   Allergen Reactions    Penicillins Rash         SOCIAL HISTORY:    Social History     Socioeconomic History    Marital status: Single   Tobacco Use    Smoking status: Never    Smokeless tobacco: Never   Vaping Use    Vaping Use: Never used   Substance and Sexual Activity    Alcohol use: No    Drug use: Never    Sexual activity: Defer       FAMILY HISTORY:    Family History   Problem Relation Age of Onset    Diabetes Other     Glaucoma Other        REVIEW OF SYSTEMS:    Review of Systems   Reason unable to perform ROS: ROS per nursing and patient.   Constitutional:  Negative for activity change, appetite change, chills, diaphoresis, fatigue, fever, unexpected weight gain and unexpected weight loss.   HENT:  Positive for trouble swallowing. Negative for congestion, mouth sores, nosebleeds and sore throat.    Respiratory:  Negative for cough, choking, chest tightness and shortness of breath.    Cardiovascular:  Negative for chest pain.   Gastrointestinal:  Negative for abdominal pain,  constipation, diarrhea, nausea, rectal pain and vomiting.   Endocrine: Positive for polyphagia. Negative for polydipsia and polyuria.   Genitourinary:  Positive for urinary incontinence. Negative for decreased urine volume, difficulty urinating, dysuria, frequency and hematuria.   Musculoskeletal:  Positive for arthralgias (chronic). Negative for joint swelling and myalgias.   Skin:  Negative for color change and rash.   Neurological:  Positive for speech difficulty, weakness, memory problem and confusion. Negative for dizziness.   Psychiatric/Behavioral:  Positive for behavioral problems (intermittently). Negative for dysphoric mood, hallucinations, sleep disturbance and depressed mood. The patient is not nervous/anxious.          PHYSICAL EXAMINATION:   VITAL SIGNS:   Vitals:    12/27/23 1534   BP: 124/70   Pulse: 68   Resp: 18   Temp: 98 °F (36.7 °C)   SpO2: 97%   Weight: 84.4 kg (186 lb)       Physical Exam  Vitals and nursing note reviewed.   Constitutional:       General: She is not in acute distress.     Appearance: She is well-developed.   Eyes:      Conjunctiva/sclera: Conjunctivae normal.   Cardiovascular:      Rate and Rhythm: Normal rate and regular rhythm.      Heart sounds: Normal heart sounds.   Pulmonary:      Effort: Pulmonary effort is normal.      Breath sounds: Normal breath sounds. No wheezing or rales.   Abdominal:      General: Bowel sounds are normal. There is no distension.      Palpations: Abdomen is soft.      Tenderness: There is no abdominal tenderness.   Skin:     General: Skin is warm and dry.   Neurological:      Mental Status: She is alert. Mental status is at baseline. She is disoriented.      Gait: Gait abnormal.      Comments: Chronic NM deficits related to CVA   Psychiatric:         Mood and Affect: Mood normal. Affect is flat.         Behavior: Behavior normal.         Cognition and Memory: Cognition is impaired. Memory is impaired.         RECORDS REVIEW:   I have reviewed and  interpreted the discharge summary and medications    ASSESSMENT     Diagnoses and all orders for this visit:    1. Hospital discharge follow-up (Primary)    2. Moderate vascular dementia with other behavioral disturbance    3. Late effects of CVA (cerebrovascular accident)    4. History of sepsis    5. History of constipation    6. Impaired mobility and ADLs            PLAN    Hospital follow up/dementia/history CVA/history sepsis/history constipation  -Stable since returning to facility. She needs referral to Urology in 1 month. Obtain CBC and BMP  in am. Will follow up and continue to monitor.     Nursing encouraged to keep me informed of any acute changes, lack of improvement, or any new concerning symptoms.    Staff to continue supportive care for all ADLs.    [x]  Discussed Patient in detail with nursing/staff, addressed all needs today.     [x]  Plan of Care Reviewed   [x]  PT/OT Reviewed   [x]  Order Changes  []  Discharge Plans Reviewed  [x]  Advance Directive on file with Nursing Home.   [x]  POA on file with Nursing Home.   [x]  Code Status listed: [x]  Full Code   []  DNR       “I confirm accuracy of unchanged data/findings which have been carried forward from previous visit, as well as I have updated appropriately those that have changed.”     I spent 35 minutes caring for Izabella on this date of service. This time includes time spent by me in the following activities:preparing for the visit, reviewing tests, obtaining and/or reviewing a separately obtained history, performing a medically appropriate examination and/or evaluation , counseling and educating the patient/family/caregiver, ordering medications, tests, or procedures, referring and communicating with other health care professionals , documenting information in the medical record, independently interpreting results and communicating that information with the patient/family/caregiver, and care coordination                                    Fariha Cardona, APRN.

## 2024-01-09 ENCOUNTER — NURSING HOME (OUTPATIENT)
Dept: FAMILY MEDICINE CLINIC | Facility: CLINIC | Age: 64
End: 2024-01-09
Payer: MEDICARE

## 2024-01-09 VITALS
OXYGEN SATURATION: 95 % | RESPIRATION RATE: 18 BRPM | HEART RATE: 72 BPM | SYSTOLIC BLOOD PRESSURE: 120 MMHG | DIASTOLIC BLOOD PRESSURE: 70 MMHG | BODY MASS INDEX: 27.67 KG/M2 | TEMPERATURE: 98.3 F | WEIGHT: 182 LBS

## 2024-01-09 DIAGNOSIS — Z78.9 IMPAIRED MOBILITY AND ADLS: ICD-10-CM

## 2024-01-09 DIAGNOSIS — Z79.4 CONTROLLED TYPE 2 DIABETES MELLITUS WITH DIABETIC AMYOTROPHY, WITH LONG-TERM CURRENT USE OF INSULIN: Primary | ICD-10-CM

## 2024-01-09 DIAGNOSIS — Z79.899 MEDICATION MANAGEMENT: ICD-10-CM

## 2024-01-09 DIAGNOSIS — Z74.09 IMPAIRED MOBILITY AND ADLS: ICD-10-CM

## 2024-01-09 DIAGNOSIS — F01.B18 MODERATE VASCULAR DEMENTIA WITH OTHER BEHAVIORAL DISTURBANCE: ICD-10-CM

## 2024-01-09 DIAGNOSIS — E11.44 CONTROLLED TYPE 2 DIABETES MELLITUS WITH DIABETIC AMYOTROPHY, WITH LONG-TERM CURRENT USE OF INSULIN: Primary | ICD-10-CM

## 2024-01-09 PROCEDURE — 99309 SBSQ NF CARE MODERATE MDM 30: CPT | Performed by: NURSE PRACTITIONER

## 2024-01-09 NOTE — LETTER
Nursing Home Follow Up Note      Fabian Santos DO []   [facility] [x]  [address] [place] [address]  Phone: [phone]  Fax: [phone] Yohan Cuellar MD []    Chaz Mandel DO []   [address] [place] [address]  Phone: [phone]  Fax: [phone]     PATIENT NAME: Izabella Agudelo                                                                          YOB: 1960           DATE OF SERVICE: 1/9/2024  FACILITY:  [][place]   [place]   [x] [facility]   [] [facility]   [] Other ______________________________________________________________________      CHIEF COMPLAINT:    Uncontrolled Diabetes with continued hyperglycemia.       HISTORY OF PRESENT ILLNESS:     Per review in [facility], patient's blood sugars have improved a great deal but do continue to be slightly elevated. Family has been better about bringing her as many snacks and sodas. She does still eat some however and does not follow diabetic diet. She did participate in activity earlier today. Is moving about her room during visit today. No complaints or signs and symptoms of any distress.     PAST MEDICAL & SURGICAL HISTORY:   Past Medical History:   Diagnosis Date    Acute angle-closure glaucoma, bilateral     Aphasia     Cardiac abnormality     CHF (congestive heart failure)     COPD (chronic obstructive pulmonary disease)     Diabetes     Glaucoma     Hemiparesis     Hemiplegia     Impaired functional mobility, balance, gait, and endurance     Schizophrenia, chronic condition     Vascular dementia       No past surgical history on file.      MEDICATIONS:  I have reviewed and reconciled the patients medication list in the patients chart at the skilled nursing facility today.      ALLERGIES:    Allergies   Allergen Reactions    Penicillins Rash         SOCIAL HISTORY:    Social History     Socioeconomic History    Marital status: Single   Tobacco Use    Smoking status: Never    Smokeless tobacco:  Never   Vaping Use    Vaping Use: Never used   Substance and Sexual Activity    Alcohol use: No    Drug use: Never    Sexual activity: Defer       FAMILY HISTORY:    Family History   Problem Relation Age of Onset    Diabetes Other     Glaucoma Other        REVIEW OF SYSTEMS:    Review of Systems   Reason unable to perform ROS: ROS per nursing and patient.   Constitutional:  Negative for activity change, appetite change, chills, diaphoresis, fatigue, fever, unexpected weight gain and unexpected weight loss.   HENT:  Positive for trouble swallowing. Negative for congestion, mouth sores, nosebleeds and sore throat.    Respiratory:  Negative for cough, choking, chest tightness and shortness of breath.    Cardiovascular:  Negative for chest pain.   Gastrointestinal:  Negative for abdominal pain, constipation, diarrhea, nausea and vomiting.   Endocrine: Positive for polyphagia. Negative for polydipsia and polyuria.   Genitourinary:  Positive for urinary incontinence. Negative for decreased urine volume, difficulty urinating, dysuria, frequency and hematuria.   Musculoskeletal:  Positive for arthralgias (chronic). Negative for joint swelling and myalgias.   Skin:  Negative for color change and rash.   Neurological:  Positive for speech difficulty, weakness, memory problem and confusion. Negative for dizziness.   Psychiatric/Behavioral:  Positive for behavioral problems (intermittently). Negative for dysphoric mood, hallucinations, sleep disturbance and depressed mood. The patient is not nervous/anxious.          PHYSICAL EXAMINATION:   VITAL SIGNS:   Vitals:    01/09/24 1609   BP: 120/70   Pulse: 72   Resp: 18   Temp: 98.3 °F (36.8 °C)   SpO2: 95%   Weight: 82.6 kg (182 lb)       Physical Exam  Vitals and nursing note reviewed.   Constitutional:       General: She is not in acute distress.     Appearance: She is well-developed.   Eyes:      Conjunctiva/sclera: Conjunctivae normal.   Cardiovascular:      Rate and Rhythm:  Normal rate and regular rhythm.      Heart sounds: Normal heart sounds.   Pulmonary:      Effort: Pulmonary effort is normal.      Breath sounds: Normal breath sounds. No wheezing or rales.   Abdominal:      General: Bowel sounds are normal. There is no distension.      Palpations: Abdomen is soft.      Tenderness: There is no abdominal tenderness.   Skin:     General: Skin is warm and dry.   Neurological:      Mental Status: She is alert. Mental status is at baseline. She is disoriented.      Gait: Gait abnormal.      Comments: Chronic NM deficits related to CVA   Psychiatric:         Mood and Affect: Mood normal. Affect is flat.         Behavior: Behavior normal.         Cognition and Memory: Cognition is impaired. Memory is impaired.         RECORDS REVIEW:   I have reviewed and interpreted the discharge summary and medications    ASSESSMENT     Diagnoses and all orders for this visit:    1. 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 Moderate vascular dementia with other behavioral disturbance    3. Impaired mobility and ADLs    4. Medication management      PLAN    Uncontrolled DM with hyperglycemia  -Will increase Levemir to 37 units bid. Continue meal time insulin at 12 units with meals.  Will continue to adjust accordingly. Will follow up and continue to monitor.     Nursing encouraged to keep me informed of any acute changes, lack of improvement, or any new concerning symptoms.    Staff to continue supportive care for all ADLs.    [x]  Discussed Patient in detail with nursing/staff, addressed all needs today.     [x]  Plan of Care Reviewed   [x]  PT/OT Reviewed   [x]  Order Changes  []  Discharge Plans Reviewed  [x]  Advance Directive on file with Nursing Home.   [x]  POA on file with Nursing Home.   [x]  Code Status listed: [x]  Full Code   []  DNR         “I confirm accuracy of unchanged data/findings which have been carried forward  from previous visit, as well as I have updated appropriately those that have changed.”     I spent 30 minutes caring for Izabella on this date of service. This time includes time spent by me in the following activities:preparing for the visit, obtaining and/or reviewing a separately obtained history, performing a medically appropriate examination and/or evaluation , counseling and educating the patient/family/caregiver, ordering medications, tests, or procedures, referring and communicating with other health care professionals , documenting information in the medical record, independently interpreting results and communicating that information with the patient/family/caregiver, and care coordination                   Fariha Cardona, APRN.

## 2024-01-10 NOTE — PROGRESS NOTES
Nursing Home Follow Up Note      Fabian Santos DO []   [facility] [x]  [address] [place] [address]  Phone: [phone]  Fax: [phone] Yohan Cuellar MD []    Chaz Mandel DO []   [address] [place] [address]  Phone: [phone]  Fax: [phone]     PATIENT NAME: Izabella Agudelo                                                                          YOB: 1960           DATE OF SERVICE: 1/9/2024  FACILITY:  [place]   [place]   [x] [facility] [place]   [] [facility]   [] Other ______________________________________________________________________      CHIEF COMPLAINT:    Uncontrolled Diabetes with continued hyperglycemia.       HISTORY OF PRESENT ILLNESS:     Per review in [facility], patient's blood sugars have improved a great deal but do continue to be slightly elevated. Family has been better about bringing her as many snacks and sodas. She does still eat some however and does not follow diabetic diet. She did participate in activity earlier today. Is moving about her room during visit today. No complaints or signs and symptoms of any distress.     PAST MEDICAL & SURGICAL HISTORY:   Past Medical History:   Diagnosis Date    Acute angle-closure glaucoma, bilateral     Aphasia     Cardiac abnormality     CHF (congestive heart failure)     COPD (chronic obstructive pulmonary disease)     Diabetes     Glaucoma     Hemiparesis     Hemiplegia     Impaired functional mobility, balance, gait, and endurance     Schizophrenia, chronic condition     Vascular dementia       No past surgical history on file.      MEDICATIONS:  I have reviewed and reconciled the patients medication list in the patients chart at the skilled nursing facility today.      ALLERGIES:    Allergies   Allergen Reactions    Penicillins Rash         SOCIAL HISTORY:    Social History     Socioeconomic History    Marital status: Single   Tobacco Use    Smoking status: Never    Smokeless tobacco:  Never   Vaping Use    Vaping Use: Never used   Substance and Sexual Activity    Alcohol use: No    Drug use: Never    Sexual activity: Defer       FAMILY HISTORY:    Family History   Problem Relation Age of Onset    Diabetes Other     Glaucoma Other        REVIEW OF SYSTEMS:    Review of Systems   Reason unable to perform ROS: ROS per nursing and patient.   Constitutional:  Negative for activity change, appetite change, chills, diaphoresis, fatigue, fever, unexpected weight gain and unexpected weight loss.   HENT:  Positive for trouble swallowing. Negative for congestion, mouth sores, nosebleeds and sore throat.    Respiratory:  Negative for cough, choking, chest tightness and shortness of breath.    Cardiovascular:  Negative for chest pain.   Gastrointestinal:  Negative for abdominal pain, constipation, diarrhea, nausea and vomiting.   Endocrine: Positive for polyphagia. Negative for polydipsia and polyuria.   Genitourinary:  Positive for urinary incontinence. Negative for decreased urine volume, difficulty urinating, dysuria, frequency and hematuria.   Musculoskeletal:  Positive for arthralgias (chronic). Negative for joint swelling and myalgias.   Skin:  Negative for color change and rash.   Neurological:  Positive for speech difficulty, weakness, memory problem and confusion. Negative for dizziness.   Psychiatric/Behavioral:  Positive for behavioral problems (intermittently). Negative for dysphoric mood, hallucinations, sleep disturbance and depressed mood. The patient is not nervous/anxious.          PHYSICAL EXAMINATION:   VITAL SIGNS:   Vitals:    01/09/24 1609   BP: 120/70   Pulse: 72   Resp: 18   Temp: 98.3 °F (36.8 °C)   SpO2: 95%   Weight: 82.6 kg (182 lb)       Physical Exam  Vitals and nursing note reviewed.   Constitutional:       General: She is not in acute distress.     Appearance: She is well-developed.   Eyes:      Conjunctiva/sclera: Conjunctivae normal.   Cardiovascular:      Rate and Rhythm:  Normal rate and regular rhythm.      Heart sounds: Normal heart sounds.   Pulmonary:      Effort: Pulmonary effort is normal.      Breath sounds: Normal breath sounds. No wheezing or rales.   Abdominal:      General: Bowel sounds are normal. There is no distension.      Palpations: Abdomen is soft.      Tenderness: There is no abdominal tenderness.   Skin:     General: Skin is warm and dry.   Neurological:      Mental Status: She is alert. Mental status is at baseline. She is disoriented.      Gait: Gait abnormal.      Comments: Chronic NM deficits related to CVA   Psychiatric:         Mood and Affect: Mood normal. Affect is flat.         Behavior: Behavior normal.         Cognition and Memory: Cognition is impaired. Memory is impaired.         RECORDS REVIEW:   I have reviewed and interpreted the discharge summary and medications    ASSESSMENT     Diagnoses and all orders for this visit:    1. 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 Moderate vascular dementia with other behavioral disturbance    3. Impaired mobility and ADLs    4. Medication management      PLAN    Uncontrolled DM with hyperglycemia  -Will increase Levemir to 37 units bid. Continue meal time insulin at 12 units with meals.  Will continue to adjust accordingly. Will follow up and continue to monitor.     Nursing encouraged to keep me informed of any acute changes, lack of improvement, or any new concerning symptoms.    Staff to continue supportive care for all ADLs.    [x]  Discussed Patient in detail with nursing/staff, addressed all needs today.     [x]  Plan of Care Reviewed   [x]  PT/OT Reviewed   [x]  Order Changes  []  Discharge Plans Reviewed  [x]  Advance Directive on file with Nursing Home.   [x]  POA on file with Nursing Home.   [x]  Code Status listed: [x]  Full Code   []  DNR         “I confirm accuracy of unchanged data/findings which have been carried forward  from previous visit, as well as I have updated appropriately those that have changed.”     I spent 30 minutes caring for Izabella on this date of service. This time includes time spent by me in the following activities:preparing for the visit, obtaining and/or reviewing a separately obtained history, performing a medically appropriate examination and/or evaluation , counseling and educating the patient/family/caregiver, ordering medications, tests, or procedures, referring and communicating with other health care professionals , documenting information in the medical record, independently interpreting results and communicating that information with the patient/family/caregiver, and care coordination                   Fariha Cardona, APRN.

## 2024-01-17 ENCOUNTER — NURSING HOME (OUTPATIENT)
Dept: FAMILY MEDICINE CLINIC | Facility: CLINIC | Age: 64
End: 2024-01-17
Payer: MEDICARE

## 2024-01-17 VITALS
SYSTOLIC BLOOD PRESSURE: 124 MMHG | DIASTOLIC BLOOD PRESSURE: 76 MMHG | HEART RATE: 77 BPM | OXYGEN SATURATION: 97 % | WEIGHT: 182 LBS | BODY MASS INDEX: 27.67 KG/M2 | TEMPERATURE: 97.8 F | RESPIRATION RATE: 18 BRPM

## 2024-01-17 DIAGNOSIS — K21.9 GERD WITHOUT ESOPHAGITIS: ICD-10-CM

## 2024-01-17 DIAGNOSIS — Z79.4 CONTROLLED TYPE 2 DIABETES MELLITUS WITH DIABETIC AMYOTROPHY, WITH LONG-TERM CURRENT USE OF INSULIN: ICD-10-CM

## 2024-01-17 DIAGNOSIS — Z74.09 IMPAIRED MOBILITY AND ADLS: Primary | ICD-10-CM

## 2024-01-17 DIAGNOSIS — I10 ESSENTIAL HYPERTENSION: ICD-10-CM

## 2024-01-17 DIAGNOSIS — E11.44 CONTROLLED TYPE 2 DIABETES MELLITUS WITH DIABETIC AMYOTROPHY, WITH LONG-TERM CURRENT USE OF INSULIN: ICD-10-CM

## 2024-01-17 DIAGNOSIS — I69.90 LATE EFFECTS OF CVA (CEREBROVASCULAR ACCIDENT): ICD-10-CM

## 2024-01-17 DIAGNOSIS — F01.518 VASCULAR DEMENTIA WITH OTHER BEHAVIORAL DISTURBANCE, UNSPECIFIED DEMENTIA SEVERITY: ICD-10-CM

## 2024-01-17 DIAGNOSIS — Z78.9 IMPAIRED MOBILITY AND ADLS: Primary | ICD-10-CM

## 2024-01-17 DIAGNOSIS — R13.12 OROPHARYNGEAL DYSPHAGIA: ICD-10-CM

## 2024-01-17 NOTE — PROGRESS NOTES
Nursing Home Progress Note        Fabian Santos DO [x]  [facility] []  [address] [place] [address]  Phone: [phone]  Fax: [phone] Yohan Cuellar MD []  Chaz Mandel DO []  [address] [place] [address]  Phone: [phone]  Fax: [phone]     PATIENT NAME: Izabella Agudelo                                                                          YOB: 1960           DATE OF SERVICE: 1/17/2024  FACILITY: []  [place]  [] [place]  [x]  [facility]  [] [facility]  []  Other ______________________________________________________________________      CHIEF COMPLAINT:  Impaired mobility and ADL/paranoid schizophrenia/diabetes mellitus treated with insulin/hypertension/dyslipidemia/diabetic amyotrophy      HISTORY OF PRESENT ILLNESS:   [x]  Follow Up visit for coordination of long term care issues and chronic medical management of Diagnoses and all orders for this visit:    1. Impaired mobility and ADLs (Primary)    2. Late effects of CVA (cerebrovascular accident)    3. Vascular dementia with other behavioral disturbance, unspecified dementia severity    4. GERD without esophagitis    5. Oropharyngeal dysphagia    6. 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 Essential hypertension    Nursing/staff reports the patient has been receptive to supportive care for mobilization/transfer assistance, no falls or injuries reported.  She has been somewhat reluctant to maintain recommended dietary changes, although no nutritional/hydration deficits reported.      No reports of any focal aspiration.    No reports of cyclical/persistent hypoglycemic episodes.  No acute behavioral changes.    Vital signs have been stable.          PAST MEDICAL & SURGICAL HISTORY:   Past Medical History:   Diagnosis Date    Acute angle-closure glaucoma, bilateral     Aphasia     Cardiac abnormality     CHF (congestive  heart failure)     COPD (chronic obstructive pulmonary disease)     Diabetes     Glaucoma     Hemiparesis     Hemiplegia     Impaired functional mobility, balance, gait, and endurance     Schizophrenia, chronic condition     Vascular dementia       No past surgical history on file.      MEDICATIONS:  I have reviewed and reconciled the patients medication list in the patients chart at the skilled nursing facility today.      ALLERGIES:    Allergies   Allergen Reactions    Penicillins Rash         SOCIAL HISTORY:    Social History     Socioeconomic History    Marital status: Single   Tobacco Use    Smoking status: Never    Smokeless tobacco: Never   Vaping Use    Vaping Use: Never used   Substance and Sexual Activity    Alcohol use: No    Drug use: Never    Sexual activity: Defer       FAMILY HISTORY:    Family History   Problem Relation Age of Onset    Diabetes Other     Glaucoma Other        REVIEW OF SYSTEMS:    Review of Systems  Appetite: Fair []   Good [x]   Poor []   Weight Loss []  [x]  Weight Stable   Unavoidable Weight Loss []  Tolerating Tube Feeding []    Supplements Provided []   Constitutional: Negative for fever, chills, diaphoresis or fatigue and weight change.  Patient is interactive but a poor historian.  HENT: No dysphagia; no changes to vision/hearing/smell/taste; no epistaxis  Eyes: Negative for redness and visual disturbance.   Respiratory: Occasional cough, no hemoptysis.  Cardiovascular: Negative for chest pain and palpitations.   Gastrointestinal: Negative for abdominal distention, abdominal pain and blood in stool.   Endocrine: Negative for cold intolerance and heat intolerance.   Genitourinary: Negative for difficulty urinating, dysuria and frequency.Negative for hematuria   Musculoskeletal: Chronic myalgias and arthralgias.  Worsened lumbago as per above.  Integumentary: No open wounds, rash or concerning skin lesions  Neurological: Negative for syncope, weakness and headaches.    Hematological: Negative for adenopathy. Does not bruise/bleed easily.   Immunological: Negative for reported allergies or immunological disorders  Psychological: Chronic behavioral issues, no acute changes.    PHYSICAL EXAMINATION:   VITAL SIGNS:   Vitals:    01/17/24 0811   BP: 124/76   Pulse: 77   Resp: 18   Temp: 97.8 °F (36.6 °C)   SpO2: 97%         Physical Exam    General Appearance:  [x]  Alert   [x]  Oriented x person  [x]  No acute distress     [x]  Confused, chronically []  Disoriented   []  Comatose   Head:  Atraumatic and normocephalic, without obvious abnormality.   Eyes:         PERRLA, conjunctivae and sclerae normal, no Icterus. No pallor. Extra-occular movements are within normal limits.   Ears:  Ears appear intact with no abnormalities noted.   Throat: No oral lesions, no thrush, oral mucosa moist.   Neck: Supple, trachea midline, no thyromegaly, no carotid bruit.   Back:   No kyphoscoliosis. No tenderness to palpation.   Lungs:   Chest shape is normal. Breath sounds heard bilaterally equally.  No wheezing.  Audible air exchange noted all lung fields.   Heart:  Normal S1 and S2, no murmur, no gallop, no rub. No JVD.   Abdomen:   Normal bowel sounds, no masses, no organomegaly. Soft, non-tender, non-distended, no guarding.  No CVA tenderness.   Extremities: Moves all extremities, without edema, cyanosis or clubbing.  Frail build.   Poor core strength and stability.   Pulses: Pulses palpable and equal bilaterally.   Skin: Generalized dry skin noted.  Age-related atrophy of skin.   Neurologic: [x] Normal speech []  Normal mental status    [x] Cranial nerves II through XII intact   [x]  No anosmia [x]  DTR 2+ [x]  Proprioception intact  [x]  Chronic motor/sensory deficits      Psych/Mood:                    [x]  No acute changes, flat affect[]  Depressed      Urinary:      [x]  Continent  [x]  Incontinent, at times[]  Retention  []  F/C     []  UTI w/treatment in progress         ASSESSMENT      Diagnoses and all orders for this visit:    1. Impaired mobility and ADLs (Primary)    2. Late effects of CVA (cerebrovascular accident)    3. Vascular dementia with other behavioral disturbance, unspecified dementia severity    4. GERD without esophagitis    5. Oropharyngeal dysphagia    6. 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 Essential hypertension          PLAN  Continue supportive care for mobilization/transfer assistance.  Ambulates predominantly with self-propelled wheelchair.  Fall precautions in place.  Continue to follow nutritional/hydration status, I have advised staff to encourage avoidance of prolonged fasting periods, as well as maintain appropriate hydration status.  Continue blood glucose monitoring.    Continue aspiration precautions, as well as dietary//no modifications to aid in symptom management of chronic GERD.    Vital signs demonstrate hemodynamic stability, blood pressure is at goal.  No new neurological deficits.    Surveillance labs when needed.    [x]  Discussed Patient in detail with nursing/staff, addressed all needs today.     [x]  Plan of Care Reviewed   []  PT/OT Reviewed   []  Order Changes  []  Discharge Plans Reviewed   []  Code Status Changes      I spent 35 minutes caring for Izabella on this date of service. This time includes time spent by me in the following activities:preparing for the visit, performing a medically appropriate examination and/or evaluation , counseling and educating the patient/family/caregiver, ordering medications, tests, or procedures, documenting information in the medical record, and care coordination    I have reviewed and updated all copied forward information, as appropriate.  I attest to the accuracy and relevance of any unchanged information.       Fabian Santos DO  1/17/2024

## 2024-01-17 NOTE — LETTER
Nursing Home Progress Note        Fabian Santos DO [x]  [facility] []  [address] [place] [address]  Phone: [phone]  Fax: [phone] Yohan Cuellar MD []  Chaz Mandel DO []  [address] [place] [address]  Phone: [phone]  Fax: [phone]     PATIENT NAME: Izabella Agudelo                                                                          YOB: 1960           DATE OF SERVICE: 1/17/2024  FACILITY: []  [place]  [] [place]  [x]  [facility]  [] [facility]  []  Other ______________________________________________________________________      CHIEF COMPLAINT:  Impaired mobility and ADL/paranoid schizophrenia/diabetes mellitus treated with insulin/hypertension/dyslipidemia/diabetic amyotrophy      HISTORY OF PRESENT ILLNESS:   [x]  Follow Up visit for coordination of long term care issues and chronic medical management of Diagnoses and all orders for this visit:    1. Impaired mobility and ADLs (Primary)    2. Late effects of CVA (cerebrovascular accident)    3. Vascular dementia with other behavioral disturbance, unspecified dementia severity    4. GERD without esophagitis    5. Oropharyngeal dysphagia    6. 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 Essential hypertension    Nursing/staff reports the patient has been receptive to supportive care for mobilization/transfer assistance, no falls or injuries reported.  She has been somewhat reluctant to maintain recommended dietary changes, although no nutritional/hydration deficits reported.      No reports of any focal aspiration.    No reports of cyclical/persistent hypoglycemic episodes.  No acute behavioral changes.    Vital signs have been stable.          PAST MEDICAL & SURGICAL HISTORY:   Past Medical History:   Diagnosis Date    Acute angle-closure glaucoma, bilateral     Aphasia     Cardiac abnormality     CHF (congestive  heart failure)     COPD (chronic obstructive pulmonary disease)     Diabetes     Glaucoma     Hemiparesis     Hemiplegia     Impaired functional mobility, balance, gait, and endurance     Schizophrenia, chronic condition     Vascular dementia       No past surgical history on file.      MEDICATIONS:  I have reviewed and reconciled the patients medication list in the patients chart at the skilled nursing facility today.      ALLERGIES:    Allergies   Allergen Reactions    Penicillins Rash         SOCIAL HISTORY:    Social History     Socioeconomic History    Marital status: Single   Tobacco Use    Smoking status: Never    Smokeless tobacco: Never   Vaping Use    Vaping Use: Never used   Substance and Sexual Activity    Alcohol use: No    Drug use: Never    Sexual activity: Defer       FAMILY HISTORY:    Family History   Problem Relation Age of Onset    Diabetes Other     Glaucoma Other        REVIEW OF SYSTEMS:    Review of Systems  Appetite: Fair []   Good [x]   Poor []   Weight Loss []  [x]  Weight Stable   Unavoidable Weight Loss []  Tolerating Tube Feeding []    Supplements Provided []   Constitutional: Negative for fever, chills, diaphoresis or fatigue and weight change.  Patient is interactive but a poor historian.  HENT: No dysphagia; no changes to vision/hearing/smell/taste; no epistaxis  Eyes: Negative for redness and visual disturbance.   Respiratory: Occasional cough, no hemoptysis.  Cardiovascular: Negative for chest pain and palpitations.   Gastrointestinal: Negative for abdominal distention, abdominal pain and blood in stool.   Endocrine: Negative for cold intolerance and heat intolerance.   Genitourinary: Negative for difficulty urinating, dysuria and frequency.Negative for hematuria   Musculoskeletal: Chronic myalgias and arthralgias.  Worsened lumbago as per above.  Integumentary: No open wounds, rash or concerning skin lesions  Neurological: Negative for syncope, weakness and headaches.    Hematological: Negative for adenopathy. Does not bruise/bleed easily.   Immunological: Negative for reported allergies or immunological disorders  Psychological: Chronic behavioral issues, no acute changes.    PHYSICAL EXAMINATION:   VITAL SIGNS:   Vitals:    01/17/24 0811   BP: 124/76   Pulse: 77   Resp: 18   Temp: 97.8 °F (36.6 °C)   SpO2: 97%         Physical Exam    General Appearance:  [x]  Alert   [x]  Oriented x person  [x]  No acute distress     [x]  Confused, chronically []  Disoriented   []  Comatose   Head:  Atraumatic and normocephalic, without obvious abnormality.   Eyes:         PERRLA, conjunctivae and sclerae normal, no Icterus. No pallor. Extra-occular movements are within normal limits.   Ears:  Ears appear intact with no abnormalities noted.   Throat: No oral lesions, no thrush, oral mucosa moist.   Neck: Supple, trachea midline, no thyromegaly, no carotid bruit.   Back:   No kyphoscoliosis. No tenderness to palpation.   Lungs:   Chest shape is normal. Breath sounds heard bilaterally equally.  No wheezing.  Audible air exchange noted all lung fields.   Heart:  Normal S1 and S2, no murmur, no gallop, no rub. No JVD.   Abdomen:   Normal bowel sounds, no masses, no organomegaly. Soft, non-tender, non-distended, no guarding.  No CVA tenderness.   Extremities: Moves all extremities, without edema, cyanosis or clubbing.  Frail build.   Poor core strength and stability.   Pulses: Pulses palpable and equal bilaterally.   Skin: Generalized dry skin noted.  Age-related atrophy of skin.   Neurologic: [x] Normal speech []  Normal mental status    [x] Cranial nerves II through XII intact   [x]  No anosmia [x]  DTR 2+ [x]  Proprioception intact  [x]  Chronic motor/sensory deficits      Psych/Mood:                    [x]  No acute changes, flat affect[]  Depressed      Urinary:      [x]  Continent  [x]  Incontinent, at times[]  Retention  []  F/C     []  UTI w/treatment in progress         ASSESSMENT      Diagnoses and all orders for this visit:    1. Impaired mobility and ADLs (Primary)    2. Late effects of CVA (cerebrovascular accident)    3. Vascular dementia with other behavioral disturbance, unspecified dementia severity    4. GERD without esophagitis    5. Oropharyngeal dysphagia    6. 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 Essential hypertension          PLAN  Continue supportive care for mobilization/transfer assistance.  Ambulates predominantly with self-propelled wheelchair.  Fall precautions in place.  Continue to follow nutritional/hydration status, I have advised staff to encourage avoidance of prolonged fasting periods, as well as maintain appropriate hydration status.  Continue blood glucose monitoring.    Continue aspiration precautions, as well as dietary//no modifications to aid in symptom management of chronic GERD.    Vital signs demonstrate hemodynamic stability, blood pressure is at goal.  No new neurological deficits.    Surveillance labs when needed.    [x]  Discussed Patient in detail with nursing/staff, addressed all needs today.     [x]  Plan of Care Reviewed   []  PT/OT Reviewed   []  Order Changes  []  Discharge Plans Reviewed   []  Code Status Changes      I spent 35 minutes caring for Izabella on this date of service. This time includes time spent by me in the following activities:preparing for the visit, performing a medically appropriate examination and/or evaluation , counseling and educating the patient/family/caregiver, ordering medications, tests, or procedures, documenting information in the medical record, and care coordination    I have reviewed and updated all copied forward information, as appropriate.  I attest to the accuracy and relevance of any unchanged information.       Fabian Santos DO  1/17/2024

## 2024-01-24 ENCOUNTER — NURSING HOME (OUTPATIENT)
Dept: FAMILY MEDICINE CLINIC | Facility: CLINIC | Age: 64
End: 2024-01-24
Payer: MEDICARE

## 2024-01-24 VITALS
BODY MASS INDEX: 27.67 KG/M2 | HEART RATE: 72 BPM | DIASTOLIC BLOOD PRESSURE: 74 MMHG | TEMPERATURE: 97.8 F | OXYGEN SATURATION: 94 % | SYSTOLIC BLOOD PRESSURE: 132 MMHG | WEIGHT: 182 LBS | RESPIRATION RATE: 16 BRPM

## 2024-01-24 DIAGNOSIS — E11.42 TYPE 2 DIABETES MELLITUS WITH DIABETIC POLYNEUROPATHY, WITH LONG-TERM CURRENT USE OF INSULIN: ICD-10-CM

## 2024-01-24 DIAGNOSIS — Z79.4 TYPE 2 DIABETES MELLITUS WITH DIABETIC POLYNEUROPATHY, WITH LONG-TERM CURRENT USE OF INSULIN: ICD-10-CM

## 2024-01-24 DIAGNOSIS — E11.65 TYPE 2 DIABETES MELLITUS WITH HYPERGLYCEMIA, WITHOUT LONG-TERM CURRENT USE OF INSULIN: ICD-10-CM

## 2024-01-24 DIAGNOSIS — Z79.899 MEDICATION MANAGEMENT: Primary | ICD-10-CM

## 2024-01-24 DIAGNOSIS — Z74.09 IMPAIRED MOBILITY AND ADLS: ICD-10-CM

## 2024-01-24 DIAGNOSIS — F01.B18 MODERATE VASCULAR DEMENTIA WITH OTHER BEHAVIORAL DISTURBANCE: ICD-10-CM

## 2024-01-24 DIAGNOSIS — Z78.9 IMPAIRED MOBILITY AND ADLS: ICD-10-CM

## 2024-01-24 RX ORDER — GABAPENTIN 400 MG/1
400 CAPSULE ORAL 2 TIMES DAILY
Qty: 60 CAPSULE | Refills: 5 | Status: SHIPPED | OUTPATIENT
Start: 2024-01-24

## 2024-01-24 RX ORDER — GABAPENTIN 400 MG/1
400 CAPSULE ORAL 2 TIMES DAILY
Qty: 60 CAPSULE | Refills: 5 | Status: SHIPPED | OUTPATIENT
Start: 2024-01-24 | End: 2024-01-24 | Stop reason: SDUPTHER

## 2024-01-24 NOTE — LETTER
Nursing Home Follow Up Note      Fabian Santos DO []   FLOWER Montalvo [x]  [address] [place] [address]  Phone: [phone]  Fax: [phone] Yohan Cuellar MD []    Chaz Mandel DO []   [address] [place] [address]  Phone: [phone]  Fax: [phone]     PATIENT NAME: Izabella Agudelo                                                                          YOB: 1960           DATE OF SERVICE: 10/3/2023  FACILITY:  [][facility]   [][facility]   [x] [facility]   [] [facility]   [] Other ______________________________________________________________________      CHIEF COMPLAINT:    Uncontrolled Diabetes with hyperglycemia and neuropathy.         HISTORY OF PRESENT ILLNESS:     Nursing reports that patient continues to have elevated blood sugars. She had insulin increased a couple weeks ago.  They are never controlled due to non compliance with diet, and eating a lot of snacks. Her family brings her a lot of foods and snacks and sodas that are not on a Diabetic diet.  She had not and fever,  abnormalities, respiratory abnormalities or other signs of infection.  Nursing has no other concerns today. She is on 37 units bid Levemir and 12 units of short acting insulin with meals for treatment.    Nursing reports she has had complaints of increase pain to legs and feet the past couple days. She reports the pain is burning and tingling. Sitting in wheelchair moving about her room during visit. No other complaints and no signs and symptoms of any distress.        PAST MEDICAL & SURGICAL HISTORY:   Past Medical History:   Diagnosis Date    Acute angle-closure glaucoma, bilateral     Aphasia     Cardiac abnormality     CHF (congestive heart failure)     COPD (chronic obstructive pulmonary disease)     Diabetes     Glaucoma     Hemiparesis     Hemiplegia     Impaired functional mobility, balance, gait, and endurance     Schizophrenia, chronic condition     Vascular  dementia       History reviewed. No pertinent surgical history.      MEDICATIONS:  I have reviewed and reconciled the patients medication list in the patients chart at the skilled nursing facility today.      ALLERGIES:    Allergies   Allergen Reactions    Penicillins Rash         SOCIAL HISTORY:    Social History     Socioeconomic History    Marital status: Single   Tobacco Use    Smoking status: Never    Smokeless tobacco: Never   Vaping Use    Vaping Use: Never used   Substance and Sexual Activity    Alcohol use: No    Drug use: Never    Sexual activity: Defer       FAMILY HISTORY:    Family History   Problem Relation Age of Onset    Diabetes Other     Glaucoma Other        REVIEW OF SYSTEMS:    Review of Systems   Reason unable to perform ROS: ROS per nursing and patient.   Constitutional:  Negative for activity change, appetite change, chills, diaphoresis, fatigue, fever, unexpected weight gain and unexpected weight loss.   HENT:  Negative for congestion, mouth sores, nosebleeds, sore throat and trouble swallowing.    Respiratory:  Negative for cough, choking, chest tightness and shortness of breath.    Cardiovascular:  Negative for chest pain.   Gastrointestinal:  Negative for abdominal pain, constipation, diarrhea, nausea and vomiting.   Endocrine: Positive for polyphagia. Negative for polydipsia and polyuria.   Genitourinary:  Positive for urinary incontinence. Negative for decreased urine volume, difficulty urinating, dysuria, frequency and hematuria.   Musculoskeletal:  Positive for arthralgias (chronic). Negative for joint swelling and myalgias.   Skin:  Negative for color change and rash.   Neurological:  Positive for speech difficulty, weakness, memory problem and confusion. Negative for dizziness.        Neuropathy BLE   Psychiatric/Behavioral:  Positive for behavioral problems (intermittently). Negative for dysphoric mood, hallucinations, sleep disturbance and depressed mood. The patient is not  nervous/anxious.          PHYSICAL EXAMINATION:   VITAL SIGNS:   Vitals:    01/24/24 1456   BP: 132/74   Pulse: 72   Resp: 16   Temp: 97.8 °F (36.6 °C)   SpO2: 94%   Weight: 82.6 kg (182 lb)       Physical Exam  Vitals and nursing note reviewed.   Constitutional:       General: She is not in acute distress.     Appearance: She is well-developed.   Eyes:      Conjunctiva/sclera: Conjunctivae normal.   Cardiovascular:      Rate and Rhythm: Normal rate and regular rhythm.      Heart sounds: Normal heart sounds.   Pulmonary:      Effort: Pulmonary effort is normal.      Breath sounds: Normal breath sounds. No wheezing or rales.   Abdominal:      General: Bowel sounds are normal. There is no distension.      Palpations: Abdomen is soft.      Tenderness: There is no abdominal tenderness.   Feet:      Comments: Unable to perform accurate foot exam due to dementia  Skin:     General: Skin is warm and dry.   Neurological:      Mental Status: She is alert. Mental status is at baseline. She is disoriented.      Gait: Gait abnormal.      Comments: Chronic NM deficits related to CVA   Psychiatric:         Mood and Affect: Mood normal. Affect is flat.         Behavior: Behavior normal.         Cognition and Memory: Cognition is impaired. Memory is impaired.         RECORDS REVIEW:   I have reviewed and interpreted the discharge summary and medications    ASSESSMENT     Diagnoses and all orders for this visit:    1. Medication management (Primary)    2. Type 2 diabetes mellitus with diabetic polyneuropathy, with long-term current use of insulin  -     Discontinue: gabapentin (NEURONTIN) 400 MG capsule; Take 1 capsule by mouth 2 (Two) Times a Day.  Dispense: 60 capsule; Refill: 5  -     gabapentin (NEURONTIN) 400 MG capsule; Take 1 capsule by mouth 2 (Two) Times a Day.  Dispense: 60 capsule; Refill: 5    3. Type 2 diabetes mellitus with hyperglycemia, without long-term current use of insulin    4. Moderate vascular dementia with  other behavioral disturbance    5. Impaired mobility and ADLs          PLAN    Uncontrolled DM with hyperglycemia and neuropathy  -Will increase Levemir to 40 units bid. Continue meal time insulin 12 units with meals. Will increase Gabapentin to 400 mg po bid.  Will continue to adjust accordingly. Will follow up and continue to monitor. Will continue routine labs.     Nursing encouraged to keep me informed of any acute changes, lack of improvement, or any new concerning symptoms.    Staff to continue supportive care for all ADLs.    [x]  Discussed Patient in detail with nursing/staff, addressed all needs today.     [x]  Plan of Care Reviewed   [x]  PT/OT Reviewed   [x]  Order Changes  []  Discharge Plans Reviewed  [x]  Advance Directive on file with Nursing Home.   [x]  POA on file with Nursing Home.   [x]  Code Status listed: [x]  Full Code   []  DNR       “I confirm accuracy of unchanged data/findings which have been carried forward from previous visit, as well as I have updated appropriately those that have changed.”     I spent 30 minutes caring for Izabella on this date of service. This time includes time spent by me in the following activities:preparing for the visit, reviewing tests, obtaining and/or reviewing a separately obtained history, performing a medically appropriate examination and/or evaluation , counseling and educating the patient/family/caregiver, ordering medications, tests, or procedures, referring and communicating with other health care professionals , documenting information in the medical record, and care coordination                                   FLOWER Arreguin.

## 2024-01-25 NOTE — PROGRESS NOTES
Nursing Home Follow Up Note      Fabian Santos DO []   FL[facility]alvo [x]  [address] [place] [address]  Phone: [phone]  Fax: [phone] Yohan Cuellar MD []    Chaz Mandel DO []   [address] [place] [address]  Phone: [phone]  Fax: [phone]     PATIENT NAME: Izabella Agudelo                                                                          YOB: 1960           DATE OF SERVICE: 10/3/2023  FACILITY:  [][facility]   [][place] [facility]   [x] [facility]   [] [facility]   [] Other ______________________________________________________________________      CHIEF COMPLAINT:    Uncontrolled Diabetes with hyperglycemia and neuropathy.         HISTORY OF PRESENT ILLNESS:     Nursing reports that patient continues to have elevated blood sugars. She had insulin increased a couple weeks ago.  They are never controlled due to non compliance with diet, and eating a lot of snacks. Her family brings her a lot of foods and snacks and sodas that are not on a Diabetic diet.  She had not and fever,  abnormalities, respiratory abnormalities or other signs of infection.  Nursing has no other concerns today. She is on 37 units bid Levemir and 12 units of short acting insulin with meals for treatment.    Nursing reports she has had complaints of increase pain to legs and feet the past couple days. She reports the pain is burning and tingling. Sitting in wheelchair moving about her room during visit. No other complaints and no signs and symptoms of any distress.        PAST MEDICAL & SURGICAL HISTORY:   Past Medical History:   Diagnosis Date    Acute angle-closure glaucoma, bilateral     Aphasia     Cardiac abnormality     CHF (congestive heart failure)     COPD (chronic obstructive pulmonary disease)     Diabetes     Glaucoma     Hemiparesis     Hemiplegia     Impaired functional mobility, balance, gait, and endurance     Schizophrenia, chronic condition     Vascular  dementia       History reviewed. No pertinent surgical history.      MEDICATIONS:  I have reviewed and reconciled the patients medication list in the patients chart at the skilled nursing facility today.      ALLERGIES:    Allergies   Allergen Reactions    Penicillins Rash         SOCIAL HISTORY:    Social History     Socioeconomic History    Marital status: Single   Tobacco Use    Smoking status: Never    Smokeless tobacco: Never   Vaping Use    Vaping Use: Never used   Substance and Sexual Activity    Alcohol use: No    Drug use: Never    Sexual activity: Defer       FAMILY HISTORY:    Family History   Problem Relation Age of Onset    Diabetes Other     Glaucoma Other        REVIEW OF SYSTEMS:    Review of Systems   Reason unable to perform ROS: ROS per nursing and patient.   Constitutional:  Negative for activity change, appetite change, chills, diaphoresis, fatigue, fever, unexpected weight gain and unexpected weight loss.   HENT:  Negative for congestion, mouth sores, nosebleeds, sore throat and trouble swallowing.    Respiratory:  Negative for cough, choking, chest tightness and shortness of breath.    Cardiovascular:  Negative for chest pain.   Gastrointestinal:  Negative for abdominal pain, constipation, diarrhea, nausea and vomiting.   Endocrine: Positive for polyphagia. Negative for polydipsia and polyuria.   Genitourinary:  Positive for urinary incontinence. Negative for decreased urine volume, difficulty urinating, dysuria, frequency and hematuria.   Musculoskeletal:  Positive for arthralgias (chronic). Negative for joint swelling and myalgias.   Skin:  Negative for color change and rash.   Neurological:  Positive for speech difficulty, weakness, memory problem and confusion. Negative for dizziness.        Neuropathy BLE   Psychiatric/Behavioral:  Positive for behavioral problems (intermittently). Negative for dysphoric mood, hallucinations, sleep disturbance and depressed mood. The patient is not  nervous/anxious.          PHYSICAL EXAMINATION:   VITAL SIGNS:   Vitals:    01/24/24 1456   BP: 132/74   Pulse: 72   Resp: 16   Temp: 97.8 °F (36.6 °C)   SpO2: 94%   Weight: 82.6 kg (182 lb)       Physical Exam  Vitals and nursing note reviewed.   Constitutional:       General: She is not in acute distress.     Appearance: She is well-developed.   Eyes:      Conjunctiva/sclera: Conjunctivae normal.   Cardiovascular:      Rate and Rhythm: Normal rate and regular rhythm.      Heart sounds: Normal heart sounds.   Pulmonary:      Effort: Pulmonary effort is normal.      Breath sounds: Normal breath sounds. No wheezing or rales.   Abdominal:      General: Bowel sounds are normal. There is no distension.      Palpations: Abdomen is soft.      Tenderness: There is no abdominal tenderness.   Feet:      Comments: Unable to perform accurate foot exam due to dementia  Skin:     General: Skin is warm and dry.   Neurological:      Mental Status: She is alert. Mental status is at baseline. She is disoriented.      Gait: Gait abnormal.      Comments: Chronic NM deficits related to CVA   Psychiatric:         Mood and Affect: Mood normal. Affect is flat.         Behavior: Behavior normal.         Cognition and Memory: Cognition is impaired. Memory is impaired.         RECORDS REVIEW:   I have reviewed and interpreted the discharge summary and medications    ASSESSMENT     Diagnoses and all orders for this visit:    1. Medication management (Primary)    2. Type 2 diabetes mellitus with diabetic polyneuropathy, with long-term current use of insulin  -     Discontinue: gabapentin (NEURONTIN) 400 MG capsule; Take 1 capsule by mouth 2 (Two) Times a Day.  Dispense: 60 capsule; Refill: 5  -     gabapentin (NEURONTIN) 400 MG capsule; Take 1 capsule by mouth 2 (Two) Times a Day.  Dispense: 60 capsule; Refill: 5    3. Type 2 diabetes mellitus with hyperglycemia, without long-term current use of insulin    4. Moderate vascular dementia with  other behavioral disturbance    5. Impaired mobility and ADLs          PLAN    Uncontrolled DM with hyperglycemia and neuropathy  -Will increase Levemir to 40 units bid. Continue meal time insulin 12 units with meals. Will increase Gabapentin to 400 mg po bid.  Will continue to adjust accordingly. Will follow up and continue to monitor. Will continue routine labs.     Nursing encouraged to keep me informed of any acute changes, lack of improvement, or any new concerning symptoms.    Staff to continue supportive care for all ADLs.    [x]  Discussed Patient in detail with nursing/staff, addressed all needs today.     [x]  Plan of Care Reviewed   [x]  PT/OT Reviewed   [x]  Order Changes  []  Discharge Plans Reviewed  [x]  Advance Directive on file with Nursing Home.   [x]  POA on file with Nursing Home.   [x]  Code Status listed: [x]  Full Code   []  DNR       “I confirm accuracy of unchanged data/findings which have been carried forward from previous visit, as well as I have updated appropriately those that have changed.”     I spent 30 minutes caring for Izabella on this date of service. This time includes time spent by me in the following activities:preparing for the visit, reviewing tests, obtaining and/or reviewing a separately obtained history, performing a medically appropriate examination and/or evaluation , counseling and educating the patient/family/caregiver, ordering medications, tests, or procedures, referring and communicating with other health care professionals , documenting information in the medical record, and care coordination                                   FLOWER Arreguin.

## 2024-02-22 ENCOUNTER — OFFICE VISIT (OUTPATIENT)
Dept: UROLOGY | Facility: CLINIC | Age: 64
End: 2024-02-22
Payer: MEDICARE

## 2024-02-22 ENCOUNTER — PATIENT ROUNDING (BHMG ONLY) (OUTPATIENT)
Dept: UROLOGY | Facility: CLINIC | Age: 64
End: 2024-02-22
Payer: MEDICARE

## 2024-02-22 VITALS
SYSTOLIC BLOOD PRESSURE: 126 MMHG | HEIGHT: 68 IN | TEMPERATURE: 97.1 F | DIASTOLIC BLOOD PRESSURE: 82 MMHG | BODY MASS INDEX: 27.58 KG/M2 | WEIGHT: 182 LBS

## 2024-02-22 DIAGNOSIS — N39.0 URINARY TRACT INFECTION WITHOUT HEMATURIA, SITE UNSPECIFIED: Primary | ICD-10-CM

## 2024-02-22 RX ORDER — MULTIVIT WITH MINERALS/LUTEIN
TABLET ORAL
COMMUNITY
Start: 2024-02-11

## 2024-02-22 RX ORDER — NITROFURANTOIN 25; 75 MG/1; MG/1
100 CAPSULE ORAL NIGHTLY
Qty: 90 CAPSULE | Refills: 1 | Status: SHIPPED | OUTPATIENT
Start: 2024-02-22

## 2024-02-22 NOTE — PROGRESS NOTES
Office Visit General Female New      Patient Name: Izabella Agudelo  : 1960   MRN: [id number]     Chief Complaint:   Chief Complaint   Patient presents with    Urinary Tract Infection       Referring Provider: No ref. provider found    History of Present Illness: Ms. Agudelo is a 63 y.o. female with below past medical history who presents with f/u after septic UTI in December.  She may have had 1 UTI prior to this episode, Her sister is her guardian and a retired RN who is with her today.  H/o a-fib with a stroke that sent her to the NH  Had became dehydrated because she did not like the pureed foods and was not drinking   She incontinent with urine and stool      Subjective      Review of System:   As noted in HPI    Past Medical History:   Past Medical History:   Diagnosis Date    Acute angle-closure glaucoma, bilateral     Aphasia     Cardiac abnormality     CHF (congestive heart failure)     COPD (chronic obstructive pulmonary disease)     Diabetes     Glaucoma     Hemiparesis     Hemiplegia     Impaired functional mobility, balance, gait, and endurance     Schizophrenia, chronic condition     Vascular dementia        Past Surgical History: History reviewed. No pertinent surgical history.    Family History:   Family History   Problem Relation Age of Onset    Diabetes Other     Glaucoma Other        Social History:   Social History     Socioeconomic History    Marital status: Single   Tobacco Use    Smoking status: Never     Passive exposure: Never    Smokeless tobacco: Never   Vaping Use    Vaping Use: Never used   Substance and Sexual Activity    Alcohol use: No    Drug use: Never    Sexual activity: Defer       Medications:     Current Outpatient Medications:     vitamin C (ASCORBIC ACID) 250 MG tablet, , Disp: , Rfl:     acetaminophen (TYLENOL) 650 MG 8 hr tablet, Take 1 tablet by mouth 4 (Four) Times a Day As Needed for Mild Pain., Disp: , Rfl:     acetaminophen (TYLENOL) 650 MG  suppository, Insert 1 suppository into the rectum Every 4 (Four) Hours As Needed for Mild Pain., Disp: , Rfl:     aluminum-magnesium hydroxide-simethicone (MAALOX/MYLANTA) 200-200-20 MG/5ML suspension, Take 20 mL by mouth Every 8 (Eight) Hours As Needed for Indigestion or Heartburn., Disp: , Rfl:     apixaban (ELIQUIS) 5 MG tablet tablet, Take 1 tablet by mouth 2 (Two) Times a Day., Disp: , Rfl:     ARIPiprazole (ABILIFY) 20 MG tablet, Take 1.5 tablets by mouth Daily., Disp: , Rfl:     atorvastatin (LIPITOR) 40 MG tablet, Take 1 tablet by mouth Every Night., Disp: , Rfl:     B-D UF III MINI PEN NEEDLES 31G X 5 MM misc, USE 3 TIMES A DAY, Disp: , Rfl: 5    bisacodyl (Dulcolax) 10 MG suppository, Insert 1 suppository into the rectum Daily As Needed for Constipation., Disp: , Rfl:     divalproex (DEPAKOTE) 500 MG DR tablet, Take 1 tablet by mouth 2 (Two) Times a Day., Disp: , Rfl:     gabapentin (NEURONTIN) 400 MG capsule, Take 1 capsule by mouth 2 (Two) Times a Day., Disp: 60 capsule, Rfl: 5    Insulin Aspart (novoLOG) 100 UNIT/ML injection, Inject 3-14 Units under the skin into the appropriate area as directed 4 (Four) Times a Day Before Meals & at Bedtime., Disp: , Rfl:     insulin detemir (LEVEMIR) 100 UNIT/ML injection, Inject 15 Units under the skin into the appropriate area as directed 2 (Two) Times a Day., Disp: , Rfl:     mirtazapine (REMERON) 15 MG tablet, Take 1 tablet by mouth Every Night., Disp: , Rfl:     nitrofurantoin, macrocrystal-monohydrate, (Macrobid) 100 MG capsule, Take 1 capsule by mouth Every Night., Disp: 90 capsule, Rfl: 1    omeprazole (priLOSEC) 20 MG capsule, Take 1 capsule by mouth Daily., Disp: , Rfl:     ondansetron ODT (ZOFRAN-ODT) 4 MG disintegrating tablet, Place 1 tablet on the tongue Every 6 (Six) Hours As Needed for Nausea or Vomiting., Disp: , Rfl:     ONE TOUCH ULTRA TEST test strip, TEST 2 TIMES A DAY, Disp: , Rfl: 5    ONETOUCH DELICA LANCETS 33G misc, TEST TWICE A DAY, Disp: ,  "Rfl: 5    pantoprazole (Protonix) 40 MG EC tablet, Take 1 tablet by mouth Daily., Disp: , Rfl:     senna 8.6 MG tablet, Take 1 tablet by mouth 2 (Two) Times a Day As Needed for Constipation., Disp: , Rfl:     travoprost, BAK free, (TRAVATAN) 0.004 % solution ophthalmic solution, Administer 1 drop to both eyes Every Evening. in affected eye(s), Disp: , Rfl:     vitamin D (ERGOCALCIFEROL) 1.25 MG (50010 UT) capsule capsule, Take 1 capsule by mouth 1 (One) Time Per Week. On Thursdays, Disp: , Rfl:     Allergies:   Allergies   Allergen Reactions    Penicillins Rash       Objective     Physical Exam:   Vital Signs:   Vitals:    02/22/24 1111   BP: 126/82   Temp: 97.1 °F (36.2 °C)   Weight: 82.6 kg (182 lb)   Height: 172.7 cm (67.99\")     Body mass index is 27.68 kg/m².     Constitutional: Pleasant obese female with dementia    Labs  Brief Urine Lab Results  (Last result in the past 365 days)        Color   Clarity   Blood   Leuk Est   Nitrite   Protein   CREAT   Urine HCG        12/18/23 1757 Red   Turbid   Large (3+)   Large (3+)   Positive   >=300 mg/dL (3+)                   Urine Culture          10/11/2023    15:00 12/18/2023    17:57   Urine Culture   Urine Culture >100,000 CFU/mL Mixed Ryanne Isolated  >100,000 CFU/mL Escherichia coli         Lab Results   Component Value Date    GLUCOSE 114 (H) 12/22/2023    CALCIUM 9.0 12/22/2023     (H) 12/22/2023    K 3.6 12/22/2023    CO2 29.8 (H) 12/22/2023     (H) 12/22/2023    BUN 13 12/22/2023    CREATININE 0.55 (L) 12/22/2023    EGFRIFNONA 139 03/05/2021    BCR 23.6 12/22/2023    ANIONGAP 8.2 12/22/2023       Lab Results   Component Value Date    WBC 7.69 12/22/2023    HGB 10.8 (L) 12/22/2023    HCT 34.4 12/22/2023    MCV 88.9 12/22/2023     12/22/2023       Images:   XR Chest 1 View    Result Date: 12/19/2023  Cardiomegaly. Vascular engorgement. No edema. Mild bibasilar atelectasis, left greater than right.  Images personally reviewed, interpreted and " dictated by SUSANNA Stallworth.     This report was signed and finalized on 12/19/2023 8:47 AM by SUSANNA Stallworth.      CT Chest Without Contrast Diagnostic    Result Date: 12/18/2023  IMPRESSION: 1. Subsegmental of the bilateral lung bases. Linear atelectasis within left lung base. Minor bronchial thickening within the lung bases could indicate reactive airways disease including bronchitis. No consolidation. 2. Cardiomegaly with coronary atherosclerosis. 3. Hypodense nodule within the left thyroid measures 1.5 cm. Recommend nonemergent thyroid ultrasound for further characterization. Authenticated and Electronically Signed by Warner Yarbrough DO on 12/18/2023 10:48:36 PM    CT Head Without Contrast    Result Date: 12/18/2023  IMPRESSION: 1. No acute intracranial process. 2. Changes of chronic microvascular ischemia with age-related global parenchymal volume loss. Old small lacunar infarct of the left basal ganglia. Authenticated and Electronically Signed by Warner Yarbrough DO on 12/18/2023 10:15:57 PM    CT Abdomen Pelvis Without Contrast    Result Date: 12/18/2023  Impression: Moderate distension of the rectum with stool. Mild thickening of the urinary bladder. This could represent cystitis. Authenticated and Electronically Signed by Warner Dixon MD on 12/18/2023 07:23:40 PM      PVR  Post-void residual performed with ultrasound scanner by staff and interpreted by me - [facility]      Assessment / Plan      .Assessment  Ms. Agudelo is a 63 y.o. female with history of septic UTI and the December.  Patient's sister provides history for her today due to patient's dementia, she has possibly had 1 UTI prior to her episode of sepsis, she had decreased her fluid intake during that time.  We discussed preventative measures to decrease UTIs including increased fluid intake, PPx with Hiprex or Macrobid.  Previous culture was E. coli susceptible to Macrobid.  Patient's sister makes the decision to do Macrobid PPx where she was  septic from UTI.  Will have patient follow-up in 6 months to reevaluate or sooner if she develops UTI symptoms.    Plan  1.  Start Macrobid 100 mg nightly for UTI suppression    Follow Up:   Return in about 6 months (around 8/22/2024) for Follow up FLOWER Garcia, NP-C  [facility]

## 2024-02-23 NOTE — PROGRESS NOTES
February 23, 2024    Hello, may I speak with Izabella Agudelo? Left message, unable to reach patient.    My name is Gabby.      I am  with [facility]  [address]  [address]  [facility] [address] [phone].    Before we get started may I verify your date of birth? 1960    I am calling to officially welcome you to our practice and ask about your recent visit. Is this a good time to talk?     Tell me about your visit with us. What things went well?         We're always looking for ways to make our patients' experiences even better. Do you have recommendations on ways we may improve?      Overall were you satisfied with your first visit to our practice?        I appreciate you taking the time to speak with me today. Is there anything else I can do for you?       Thank you, and have a great day.

## 2024-03-20 ENCOUNTER — NURSING HOME (OUTPATIENT)
Dept: FAMILY MEDICINE CLINIC | Facility: CLINIC | Age: 64
End: 2024-03-20
Payer: MEDICARE

## 2024-03-20 VITALS
HEART RATE: 60 BPM | TEMPERATURE: 97.5 F | BODY MASS INDEX: 27.68 KG/M2 | OXYGEN SATURATION: 95 % | RESPIRATION RATE: 18 BRPM | SYSTOLIC BLOOD PRESSURE: 135 MMHG | DIASTOLIC BLOOD PRESSURE: 75 MMHG | WEIGHT: 182 LBS

## 2024-03-20 DIAGNOSIS — I10 ESSENTIAL HYPERTENSION: ICD-10-CM

## 2024-03-20 DIAGNOSIS — K21.9 GERD WITHOUT ESOPHAGITIS: ICD-10-CM

## 2024-03-20 DIAGNOSIS — F01.518 VASCULAR DEMENTIA WITH OTHER BEHAVIORAL DISTURBANCE, UNSPECIFIED DEMENTIA SEVERITY: ICD-10-CM

## 2024-03-20 DIAGNOSIS — R13.12 OROPHARYNGEAL DYSPHAGIA: ICD-10-CM

## 2024-03-20 DIAGNOSIS — I69.90 LATE EFFECTS OF CVA (CEREBROVASCULAR ACCIDENT): ICD-10-CM

## 2024-03-20 DIAGNOSIS — E11.44 TYPE 2 DIABETES MELLITUS WITH DIABETIC AMYOTROPHY, WITH LONG-TERM CURRENT USE OF INSULIN: ICD-10-CM

## 2024-03-20 DIAGNOSIS — Z78.9 IMPAIRED MOBILITY AND ADLS: Primary | ICD-10-CM

## 2024-03-20 DIAGNOSIS — Z79.4 TYPE 2 DIABETES MELLITUS WITH DIABETIC AMYOTROPHY, WITH LONG-TERM CURRENT USE OF INSULIN: ICD-10-CM

## 2024-03-20 DIAGNOSIS — Z74.09 IMPAIRED MOBILITY AND ADLS: Primary | ICD-10-CM

## 2024-03-22 ENCOUNTER — NURSING HOME (OUTPATIENT)
Dept: FAMILY MEDICINE CLINIC | Facility: CLINIC | Age: 64
End: 2024-03-22
Payer: MEDICARE

## 2024-03-22 VITALS
SYSTOLIC BLOOD PRESSURE: 153 MMHG | DIASTOLIC BLOOD PRESSURE: 75 MMHG | RESPIRATION RATE: 18 BRPM | BODY MASS INDEX: 27.68 KG/M2 | HEART RATE: 60 BPM | WEIGHT: 182 LBS | OXYGEN SATURATION: 95 % | TEMPERATURE: 97.5 F

## 2024-03-22 DIAGNOSIS — Z78.9 IMPAIRED MOBILITY AND ADLS: ICD-10-CM

## 2024-03-22 DIAGNOSIS — Z74.09 IMPAIRED MOBILITY AND ADLS: ICD-10-CM

## 2024-03-22 DIAGNOSIS — Z79.899 MEDICATION MANAGEMENT: ICD-10-CM

## 2024-03-22 DIAGNOSIS — Z79.4 TYPE 2 DIABETES MELLITUS WITH DIABETIC AMYOTROPHY, WITH LONG-TERM CURRENT USE OF INSULIN: Primary | ICD-10-CM

## 2024-03-22 DIAGNOSIS — E11.44 TYPE 2 DIABETES MELLITUS WITH DIABETIC AMYOTROPHY, WITH LONG-TERM CURRENT USE OF INSULIN: Primary | ICD-10-CM

## 2024-03-22 NOTE — LETTER
Nursing Home Follow Up Note      Fabian Santos DO []   [facility] [x]  [address] [place] [address]  Phone: [phone]  Fax: [phone] oYhan Cuellar MD []    Chaz Mandel DO []   [address] [place] [address]  Phone: [phone]  Fax: [phone]     PATIENT NAME: Izabella Agudelo                                                                          YOB: 1960           DATE OF SERVICE: 3/22/2024  FACILITY:  [][place]   [][place]   [x] [facility]   [] [facility]   [] Other ______________________________________________________________________      CHIEF COMPLAINT:    Uncontrolled Diabetes with hyperglycemia and neuropathy.         HISTORY OF PRESENT ILLNESS:     Nursing reports that patient continues to have elevated blood sugars. She had insulin increased a couple weeks ago.  They are never controlled due to non compliance with diet, and eating a lot of snacks. Her family brings her a lot of foods and snacks that are not on a Diabetic diet.  She had not and fever,  abnormalities, respiratory abnormalities or other signs of infection.  Nursing has no other concerns today. She is on 37 units bid Levemir and 12 units of short acting insulin with meals for treatment.    Nursing reports she has had complaints of increase pain to legs and feet the past couple days. She reports the pain is burning and tingling. Sitting in wheelchair moving about her room during visit. No other complaints and no signs and symptoms of any distress.        PAST MEDICAL & SURGICAL HISTORY:   Past Medical History:   Diagnosis Date    Acute angle-closure glaucoma, bilateral     Aphasia     Cardiac abnormality     CHF (congestive heart failure)     COPD (chronic obstructive pulmonary disease)     Diabetes     Glaucoma     Hemiparesis     Hemiplegia     Impaired functional mobility, balance, gait, and endurance     Schizophrenia, chronic condition     Vascular dementia        No past surgical history on file.      MEDICATIONS:  I have reviewed and reconciled the patients medication list in the patients chart at the skilled nursing facility today.      ALLERGIES:    Allergies   Allergen Reactions    Penicillins Rash         SOCIAL HISTORY:    Social History     Socioeconomic History    Marital status: Single   Tobacco Use    Smoking status: Never     Passive exposure: Never    Smokeless tobacco: Never   Vaping Use    Vaping status: Never Used   Substance and Sexual Activity    Alcohol use: No    Drug use: Never    Sexual activity: Defer       FAMILY HISTORY:    Family History   Problem Relation Age of Onset    Diabetes Other     Glaucoma Other        REVIEW OF SYSTEMS:    Review of Systems   Reason unable to perform ROS: ROS per nursing and patient.   Constitutional:  Negative for activity change, appetite change, chills, diaphoresis, fatigue, fever, unexpected weight gain and unexpected weight loss.   HENT:  Negative for congestion, mouth sores, nosebleeds, sore throat and trouble swallowing.    Respiratory:  Negative for cough, choking, chest tightness and shortness of breath.    Cardiovascular:  Negative for chest pain.   Gastrointestinal:  Negative for abdominal pain, constipation, diarrhea, nausea and vomiting.   Endocrine: Positive for polyphagia. Negative for polydipsia and polyuria.   Genitourinary:  Positive for urinary incontinence. Negative for decreased urine volume, difficulty urinating, dysuria, frequency and hematuria.   Musculoskeletal:  Positive for arthralgias (chronic). Negative for joint swelling and myalgias.   Skin:  Negative for color change and rash.   Neurological:  Positive for speech difficulty, weakness, memory problem and confusion. Negative for dizziness.        Neuropathy BLE   Psychiatric/Behavioral:  Positive for behavioral problems (intermittently). Negative for dysphoric mood, hallucinations, sleep disturbance and depressed mood. The patient is not  nervous/anxious.          PHYSICAL EXAMINATION:   VITAL SIGNS:   Vitals:    03/22/24 1030   BP: 153/75   Pulse: 60   Resp: 18   Temp: 97.5 °F (36.4 °C)   SpO2: 95%   Weight: 82.6 kg (182 lb)       Physical Exam  Vitals and nursing note reviewed.   Constitutional:       General: She is not in acute distress.     Appearance: She is well-developed.   Eyes:      Conjunctiva/sclera: Conjunctivae normal.   Cardiovascular:      Rate and Rhythm: Normal rate and regular rhythm.      Heart sounds: Normal heart sounds.   Pulmonary:      Effort: Pulmonary effort is normal.      Breath sounds: Normal breath sounds. No wheezing or rales.   Abdominal:      General: Bowel sounds are normal. There is no distension.      Palpations: Abdomen is soft.      Tenderness: There is no abdominal tenderness.   Feet:      Comments: Unable to perform accurate foot exam due to dementia  Skin:     General: Skin is warm and dry.   Neurological:      Mental Status: She is alert. Mental status is at baseline. She is disoriented.      Gait: Gait abnormal.      Comments: Chronic NM deficits related to CVA   Psychiatric:         Mood and Affect: Mood normal. Affect is flat.         Behavior: Behavior normal.         Cognition and Memory: Cognition is impaired. Memory is impaired.         RECORDS REVIEW:   I have reviewed and interpreted the discharge summary and medications    ASSESSMENT     Diagnoses and all orders for this visit:    1. 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 Medication management    3. Impaired mobility and ADLs            PLAN    Uncontrolled DM with hyperglycemia and neuropathy  -Will increase Levemir to 40 units bid. Increase meal time insulin to 14 units with meals. Will increase Gabapentin to 400 mg po tid.  Will continue to adjust accordingly. Will follow up and continue to monitor. Will continue routine labs.     Nursing encouraged to keep me informed of any  acute changes, lack of improvement, or any new concerning symptoms.    Staff to continue supportive care for all ADLs.    [x]  Discussed Patient in detail with nursing/staff, addressed all needs today.     [x]  Plan of Care Reviewed   [x]  PT/OT Reviewed   [x]  Order Changes  []  Discharge Plans Reviewed  [x]  Advance Directive on file with Nursing Home.   [x]  POA on file with Nursing Home.   [x]  Code Status listed: [x]  Full Code   []  DNR       “I confirm accuracy of unchanged data/findings which have been carried forward from previous visit, as well as I have updated appropriately those that have changed.”     I spent 35 minutes caring for Izabella on this date of service. This time includes time spent by me in the following activities:preparing for the visit, reviewing tests, obtaining and/or reviewing a separately obtained history, performing a medically appropriate examination and/or evaluation , counseling and educating the patient/family/caregiver, ordering medications, tests, or procedures, referring and communicating with other health care professionals , documenting information in the medical record, independently interpreting results and communicating that information with the patient/family/caregiver, and care coordination                                   FLOWER Arreguin.

## 2024-03-24 NOTE — PROGRESS NOTES
Nursing Home Follow Up Note      Fabian Santos DO []   [facility] [x]  [address] [place] [address]  Phone: [phone]  Fax: [phone] Yohan Cuellar MD []    Chaz Mandel DO []   [address] [place] [address]  Phone: [phone]  Fax: [phone]     PATIENT NAME: Izabella Agudelo                                                                          YOB: 1960           DATE OF SERVICE: 3/22/2024  FACILITY:  [][place]   [][place]   [x] [facility]   [] [facility]   [] Other ______________________________________________________________________      CHIEF COMPLAINT:    Uncontrolled Diabetes with hyperglycemia and neuropathy.         HISTORY OF PRESENT ILLNESS:     Nursing reports that patient continues to have elevated blood sugars. She had insulin increased a couple weeks ago.  They are never controlled due to non compliance with diet, and eating a lot of snacks. Her family brings her a lot of foods and snacks that are not on a Diabetic diet.  She had not and fever,  abnormalities, respiratory abnormalities or other signs of infection.  Nursing has no other concerns today. She is on 37 units bid Levemir and 12 units of short acting insulin with meals for treatment.    Nursing reports she has had complaints of increase pain to legs and feet the past couple days. She reports the pain is burning and tingling. Sitting in wheelchair moving about her room during visit. No other complaints and no signs and symptoms of any distress.        PAST MEDICAL & SURGICAL HISTORY:   Past Medical History:   Diagnosis Date    Acute angle-closure glaucoma, bilateral     Aphasia     Cardiac abnormality     CHF (congestive heart failure)     COPD (chronic obstructive pulmonary disease)     Diabetes     Glaucoma     Hemiparesis     Hemiplegia     Impaired functional mobility, balance, gait, and endurance     Schizophrenia, chronic condition     Vascular dementia        No past surgical history on file.      MEDICATIONS:  I have reviewed and reconciled the patients medication list in the patients chart at the skilled nursing facility today.      ALLERGIES:    Allergies   Allergen Reactions    Penicillins Rash         SOCIAL HISTORY:    Social History     Socioeconomic History    Marital status: Single   Tobacco Use    Smoking status: Never     Passive exposure: Never    Smokeless tobacco: Never   Vaping Use    Vaping status: Never Used   Substance and Sexual Activity    Alcohol use: No    Drug use: Never    Sexual activity: Defer       FAMILY HISTORY:    Family History   Problem Relation Age of Onset    Diabetes Other     Glaucoma Other        REVIEW OF SYSTEMS:    Review of Systems   Reason unable to perform ROS: ROS per nursing and patient.   Constitutional:  Negative for activity change, appetite change, chills, diaphoresis, fatigue, fever, unexpected weight gain and unexpected weight loss.   HENT:  Negative for congestion, mouth sores, nosebleeds, sore throat and trouble swallowing.    Respiratory:  Negative for cough, choking, chest tightness and shortness of breath.    Cardiovascular:  Negative for chest pain.   Gastrointestinal:  Negative for abdominal pain, constipation, diarrhea, nausea and vomiting.   Endocrine: Positive for polyphagia. Negative for polydipsia and polyuria.   Genitourinary:  Positive for urinary incontinence. Negative for decreased urine volume, difficulty urinating, dysuria, frequency and hematuria.   Musculoskeletal:  Positive for arthralgias (chronic). Negative for joint swelling and myalgias.   Skin:  Negative for color change and rash.   Neurological:  Positive for speech difficulty, weakness, memory problem and confusion. Negative for dizziness.        Neuropathy BLE   Psychiatric/Behavioral:  Positive for behavioral problems (intermittently). Negative for dysphoric mood, hallucinations, sleep disturbance and depressed mood. The patient is not  nervous/anxious.          PHYSICAL EXAMINATION:   VITAL SIGNS:   Vitals:    03/22/24 1030   BP: 153/75   Pulse: 60   Resp: 18   Temp: 97.5 °F (36.4 °C)   SpO2: 95%   Weight: 82.6 kg (182 lb)       Physical Exam  Vitals and nursing note reviewed.   Constitutional:       General: She is not in acute distress.     Appearance: She is well-developed.   Eyes:      Conjunctiva/sclera: Conjunctivae normal.   Cardiovascular:      Rate and Rhythm: Normal rate and regular rhythm.      Heart sounds: Normal heart sounds.   Pulmonary:      Effort: Pulmonary effort is normal.      Breath sounds: Normal breath sounds. No wheezing or rales.   Abdominal:      General: Bowel sounds are normal. There is no distension.      Palpations: Abdomen is soft.      Tenderness: There is no abdominal tenderness.   Feet:      Comments: Unable to perform accurate foot exam due to dementia  Skin:     General: Skin is warm and dry.   Neurological:      Mental Status: She is alert. Mental status is at baseline. She is disoriented.      Gait: Gait abnormal.      Comments: Chronic NM deficits related to CVA   Psychiatric:         Mood and Affect: Mood normal. Affect is flat.         Behavior: Behavior normal.         Cognition and Memory: Cognition is impaired. Memory is impaired.         RECORDS REVIEW:   I have reviewed and interpreted the discharge summary and medications    ASSESSMENT     Diagnoses and all orders for this visit:    1. 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 Medication management    3. Impaired mobility and ADLs            PLAN    Uncontrolled DM with hyperglycemia and neuropathy  -Will increase Levemir to 40 units bid. Increase meal time insulin to 14 units with meals. Will increase Gabapentin to 400 mg po tid.  Will continue to adjust accordingly. Will follow up and continue to monitor. Will continue routine labs.     Nursing encouraged to keep me informed of any  acute changes, lack of improvement, or any new concerning symptoms.    Staff to continue supportive care for all ADLs.    [x]  Discussed Patient in detail with nursing/staff, addressed all needs today.     [x]  Plan of Care Reviewed   [x]  PT/OT Reviewed   [x]  Order Changes  []  Discharge Plans Reviewed  [x]  Advance Directive on file with Nursing Home.   [x]  POA on file with Nursing Home.   [x]  Code Status listed: [x]  Full Code   []  DNR       “I confirm accuracy of unchanged data/findings which have been carried forward from previous visit, as well as I have updated appropriately those that have changed.”     I spent 35 minutes caring for Izabella on this date of service. This time includes time spent by me in the following activities:preparing for the visit, reviewing tests, obtaining and/or reviewing a separately obtained history, performing a medically appropriate examination and/or evaluation , counseling and educating the patient/family/caregiver, ordering medications, tests, or procedures, referring and communicating with other health care professionals , documenting information in the medical record, independently interpreting results and communicating that information with the patient/family/caregiver, and care coordination                                   FLOWER Arreguin.

## 2024-03-25 DIAGNOSIS — Z79.4 TYPE 2 DIABETES MELLITUS WITH DIABETIC POLYNEUROPATHY, WITH LONG-TERM CURRENT USE OF INSULIN: ICD-10-CM

## 2024-03-25 DIAGNOSIS — E11.42 TYPE 2 DIABETES MELLITUS WITH DIABETIC POLYNEUROPATHY, WITH LONG-TERM CURRENT USE OF INSULIN: ICD-10-CM

## 2024-03-25 RX ORDER — GABAPENTIN 400 MG/1
400 CAPSULE ORAL 3 TIMES DAILY
Qty: 90 CAPSULE | Refills: 5 | Status: SHIPPED | OUTPATIENT
Start: 2024-03-25

## 2024-03-25 NOTE — TELEPHONE ENCOUNTER
(NEWS SCRIPT)     [facility] REQUESTING REFILL FOR GABAPENTIN 400 MG.    DIRECTIONS: GABAPENTIN 400 MG 1 CAP PO TID.

## 2024-04-05 NOTE — PROGRESS NOTES
Nursing Home Progress Note        Fabian Santos DO [x]  [facility] []  [address] [place] [address]  Phone: [phone]  Fax: [phone] Yohan Cuellar MD []  Chaz Mandel DO []  [address] [place] [address]  Phone: [phone]  Fax: [phone]     PATIENT NAME: Izabella Agudelo                                                                          YOB: 1960           DATE OF SERVICE: 3/20/2024  FACILITY: []  [place]  [] [place]  [x]  [facility]  [] [facility]  []  Other ______________________________________________________________________      CHIEF COMPLAINT:  Impaired mobility and ADL/paranoid schizophrenia/diabetes mellitus treated with insulin/hypertension/dyslipidemia/diabetic amyotrophy      HISTORY OF PRESENT ILLNESS:   [x]  Follow Up visit for coordination of long term care issues and chronic medical management of Diagnoses and all orders for this visit:    1. Impaired mobility and ADLs (Primary)    2. GERD without esophagitis    3. Oropharyngeal dysphagia    4. 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 Essential hypertension    6. Vascular dementia with other behavioral disturbance, unspecified dementia severity    7. Late effects of CVA (cerebrovascular accident)      Nursing/staff reports the patient has been receptive to supportive care for mobilization/transfer assistance, as well as ADLs of need.  No falls or injuries reported.  No acute behavioral changes.  Sleeping well, no nutritional/hydration deficits reported.    No reports of any focal aspiration.    No reports of cyclical/persistent hypoglycemia.    No new neurological deficits.    Vital signs have been stable.        PAST MEDICAL & SURGICAL HISTORY:   Past Medical History:   Diagnosis Date    Acute angle-closure glaucoma, bilateral     Aphasia     Cardiac abnormality     CHF (congestive heart failure)     COPD (chronic  obstructive pulmonary disease)     Diabetes     Glaucoma     Hemiparesis     Hemiplegia     Impaired functional mobility, balance, gait, and endurance     Schizophrenia, chronic condition     Vascular dementia       No past surgical history on file.      MEDICATIONS:  I have reviewed and reconciled the patients medication list in the patients chart at the skilled nursing facility today.      ALLERGIES:    Allergies   Allergen Reactions    Penicillins Rash         SOCIAL HISTORY:    Social History     Socioeconomic History    Marital status: Single   Tobacco Use    Smoking status: Never     Passive exposure: Never    Smokeless tobacco: Never   Vaping Use    Vaping status: Never Used   Substance and Sexual Activity    Alcohol use: No    Drug use: Never    Sexual activity: Defer       FAMILY HISTORY:    Family History   Problem Relation Age of Onset    Diabetes Other     Glaucoma Other        REVIEW OF SYSTEMS:    Review of Systems  Appetite: Fair []   Good [x]   Poor []   Weight Loss []  [x]  Weight Stable   Unavoidable Weight Loss []  Tolerating Tube Feeding []    Supplements Provided []   Constitutional: Negative for fever, chills, diaphoresis or fatigue and weight change.  Patient is interactive but a poor historian.  HENT: No dysphagia; no changes to vision/hearing/smell/taste; no epistaxis  Eyes: Negative for redness and visual disturbance.   Respiratory: Occasional cough, no hemoptysis.  Cardiovascular: Negative for chest pain and palpitations.   Gastrointestinal: Negative for abdominal distention, abdominal pain and blood in stool.   Endocrine: Negative for cold intolerance and heat intolerance.   Genitourinary: Negative for difficulty urinating, dysuria and frequency.Negative for hematuria   Musculoskeletal: Chronic myalgias and arthralgias.  Worsened lumbago as per above.  Integumentary: No open wounds, rash or concerning skin lesions  Neurological: Negative for syncope, weakness and headaches.    Hematological: Negative for adenopathy. Does not bruise/bleed easily.   Immunological: Negative for reported allergies or immunological disorders  Psychological: Chronic behavioral issues, no acute changes.    PHYSICAL EXAMINATION:   VITAL SIGNS:   Vitals:    03/20/24 0853   BP: 135/75   Pulse: 60   Resp: 18   Temp: 97.5 °F (36.4 °C)   SpO2: 95%         Physical Exam    General Appearance:  [x]  Alert   [x]  Oriented x person  [x]  No acute distress     [x]  Confused, chronically []  Disoriented   []  Comatose   Head:  Atraumatic and normocephalic, without obvious abnormality.   Eyes:         PERRLA, conjunctivae and sclerae normal, no Icterus. No pallor. Extra-occular movements are within normal limits.   Ears:  Ears appear intact with no abnormalities noted.   Throat: No oral lesions, no thrush, oral mucosa moist.   Neck: Supple, trachea midline, no thyromegaly, no carotid bruit.   Back:   No kyphoscoliosis. No tenderness to palpation.   Lungs:   Chest shape is normal. Breath sounds heard bilaterally equally.  No wheezing.  Audible air exchange noted all lung fields.   Heart:  Normal S1 and S2, no murmur, no gallop, no rub. No JVD.   Abdomen:   Normal bowel sounds, no masses, no organomegaly. Soft, non-tender, non-distended, no guarding.  No CVA tenderness.   Extremities: Moves all extremities, without edema, cyanosis or clubbing.  Frail build.   Poor core strength and stability.   Pulses: Pulses palpable and equal bilaterally.   Skin: Generalized dry skin noted.  Age-related atrophy of skin.   Neurologic: [x] Normal speech []  Normal mental status    [x] Cranial nerves II through XII intact   [x]  No anosmia [x]  DTR 2+ [x]  Proprioception intact  [x]  Chronic motor/sensory deficits      Psych/Mood:                    [x]  No acute changes, flat affect[]  Depressed      Urinary:      [x]  Continent  [x]  Incontinent, at times[]  Retention  []  F/C     []  UTI w/treatment in progress         ASSESSMENT      Diagnoses and all orders for this visit:    1. Impaired mobility and ADLs (Primary)    2. GERD without esophagitis    3. Oropharyngeal dysphagia    4. 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 Essential hypertension    6. Vascular dementia with other behavioral disturbance, unspecified dementia severity    7. Late effects of CVA (cerebrovascular accident)          PLAN  Continue current supportive care for mobilization/transfer assistance, as well as ADLs of need.  Continue to follow nutritional/hydration status.    No acute behavioral changes, no new neurological deficits.  Fall precautions in place given her impaired mobility.    Continue blood glucose monitoring, avoid prolonged fasting periods as best able.  Continue healthy dietary choices.  Maintain appropriate duration status.    Vital signs demonstrate hemodynamic stability.    Surveillance labs when needed.    [x]  Discussed Patient in detail with nursing/staff, addressed all needs today.     [x]  Plan of Care Reviewed   []  PT/OT Reviewed   []  Order Changes  []  Discharge Plans Reviewed   []  Code Status Changes      I spent 35 minutes caring for Izabella on this date of service. This time includes time spent by me in the following activities:preparing for the visit, performing a medically appropriate examination and/or evaluation , counseling and educating the patient/family/caregiver, ordering medications, tests, or procedures, documenting information in the medical record, and care coordination    I have reviewed and updated all copied forward information, as appropriate.  I attest to the accuracy and relevance of any unchanged information.       Fabian Santos DO  3/20/2024

## 2024-04-16 ENCOUNTER — NURSING HOME (OUTPATIENT)
Dept: FAMILY MEDICINE CLINIC | Facility: CLINIC | Age: 64
End: 2024-04-16
Payer: MEDICARE

## 2024-04-16 VITALS
DIASTOLIC BLOOD PRESSURE: 79 MMHG | TEMPERATURE: 97.9 F | HEART RATE: 81 BPM | BODY MASS INDEX: 27.83 KG/M2 | WEIGHT: 183 LBS | OXYGEN SATURATION: 94 % | SYSTOLIC BLOOD PRESSURE: 132 MMHG | RESPIRATION RATE: 18 BRPM

## 2024-04-16 DIAGNOSIS — E11.44 TYPE 2 DIABETES MELLITUS WITH DIABETIC AMYOTROPHY, WITH LONG-TERM CURRENT USE OF INSULIN: Primary | ICD-10-CM

## 2024-04-16 DIAGNOSIS — Z79.4 TYPE 2 DIABETES MELLITUS WITH DIABETIC AMYOTROPHY, WITH LONG-TERM CURRENT USE OF INSULIN: Primary | ICD-10-CM

## 2024-04-16 DIAGNOSIS — Z51.89 ENCOUNTER FOR MEDICATION ADJUSTMENT: ICD-10-CM

## 2024-04-16 DIAGNOSIS — Z74.09 IMPAIRED MOBILITY AND ADLS: ICD-10-CM

## 2024-04-16 DIAGNOSIS — Z78.9 IMPAIRED MOBILITY AND ADLS: ICD-10-CM

## 2024-04-16 PROCEDURE — 99309 SBSQ NF CARE MODERATE MDM 30: CPT | Performed by: NURSE PRACTITIONER

## 2024-04-16 NOTE — LETTER
Nursing Home Follow Up Note      Fabian Santos DO []   [facility] [x]  [address] [place] [address]  Phone: [phone]  Fax: [phone] Yohan Cuellar MD []    Chaz Mandel DO []   [address] [place] [address]  Phone: [phone]  Fax: [phone]     PATIENT NAME: Izabella Agudelo                                                                          YOB: 1960           DATE OF SERVICE: 4/16/2024  FACILITY:  [][facility]   [][facility]   [x] [facility]   [] [facility]   [] Other ______________________________________________________________________      CHIEF COMPLAINT:    Uncontrolled Diabetes with hyperglycemia.      HISTORY OF PRESENT ILLNESS:     Nursing reports that patient continues to have elevated blood sugars. She had insulin increased a couple weeks ago.  They are never controlled due to non compliance with diet, and eating a lot of snacks. Her family brings her a lot of foods and snacks that are not on a Diabetic diet.  She had not and fever,  abnormalities, respiratory abnormalities or other signs of infection.  Nursing has no other concerns today. She is on 40 units bid Levemir and 14 units of short acting insulin with meals for treatment. Moving about the facility with no signs and symptoms of any distress. No complaints.       PAST MEDICAL & SURGICAL HISTORY:   Past Medical History:   Diagnosis Date    Acute angle-closure glaucoma, bilateral     Aphasia     Cardiac abnormality     CHF (congestive heart failure)     COPD (chronic obstructive pulmonary disease)     Diabetes     Glaucoma     Hemiparesis     Hemiplegia     Impaired functional mobility, balance, gait, and endurance     Schizophrenia, chronic condition     Vascular dementia       No past surgical history on file.      MEDICATIONS:  I have reviewed and reconciled the patients medication list in the patients chart at the skilled nursing facility today.       ALLERGIES:    Allergies   Allergen Reactions    Penicillins Rash         SOCIAL HISTORY:    Social History     Socioeconomic History    Marital status: Single   Tobacco Use    Smoking status: Never     Passive exposure: Never    Smokeless tobacco: Never   Vaping Use    Vaping status: Never Used   Substance and Sexual Activity    Alcohol use: No    Drug use: Never    Sexual activity: Defer       FAMILY HISTORY:    Family History   Problem Relation Age of Onset    Diabetes Other     Glaucoma Other        REVIEW OF SYSTEMS:    Review of Systems   Reason unable to perform ROS: ROS per nursing and patient.   Constitutional:  Negative for activity change, appetite change, chills, diaphoresis, fatigue, fever, unexpected weight gain and unexpected weight loss.   HENT:  Negative for congestion, mouth sores, nosebleeds, sore throat and trouble swallowing.    Respiratory:  Negative for cough, choking, chest tightness and shortness of breath.    Cardiovascular:  Negative for chest pain.   Gastrointestinal:  Negative for abdominal pain, constipation, diarrhea, nausea and vomiting.   Endocrine: Positive for polyphagia. Negative for polydipsia and polyuria.   Genitourinary:  Positive for urinary incontinence. Negative for decreased urine volume, difficulty urinating, dysuria, frequency and hematuria.   Musculoskeletal:  Positive for arthralgias (chronic). Negative for joint swelling and myalgias.   Skin:  Negative for color change and rash.   Neurological:  Positive for speech difficulty, weakness, memory problem and confusion. Negative for dizziness.        Neuropathy BLE   Psychiatric/Behavioral:  Positive for behavioral problems (intermittently). Negative for dysphoric mood, hallucinations, sleep disturbance and depressed mood. The patient is not nervous/anxious.          PHYSICAL EXAMINATION:   VITAL SIGNS:   Vitals:    04/16/24 1455   BP: 132/79   Pulse: 81   Resp: 18   Temp: 97.9 °F (36.6 °C)   SpO2: 94%   Weight: 83 kg  (183 lb)       Physical Exam  Vitals and nursing note reviewed.   Constitutional:       General: She is not in acute distress.     Appearance: She is well-developed.   Eyes:      Conjunctiva/sclera: Conjunctivae normal.   Cardiovascular:      Rate and Rhythm: Normal rate and regular rhythm.      Heart sounds: Normal heart sounds.   Pulmonary:      Effort: Pulmonary effort is normal.      Breath sounds: Normal breath sounds. No wheezing or rales.   Abdominal:      General: Bowel sounds are normal. There is no distension.      Palpations: Abdomen is soft.      Tenderness: There is no abdominal tenderness.   Feet:      Comments: Unable to perform accurate foot exam due to dementia  Skin:     General: Skin is warm and dry.   Neurological:      Mental Status: She is alert. Mental status is at baseline. She is disoriented.      Gait: Gait abnormal.      Comments: Chronic NM deficits related to CVA   Psychiatric:         Mood and Affect: Mood normal. Affect is flat.         Behavior: Behavior normal.         Cognition and Memory: Cognition is impaired. Memory is impaired.         RECORDS REVIEW:   I have reviewed and interpreted the discharge summary and medications    ASSESSMENT     Diagnoses and all orders for this visit:    1. 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 Encounter for medication adjustment    3. Impaired mobility and ADLs        PLAN    Uncontrolled DM with hyperglycemia and neuropathy  -Will increase Tresiba to 45 units bid. Continue meal time insulin to 14 units with meals.   Will continue to adjust accordingly. She is non compliant with diet.  Will follow up and continue to monitor. Will continue routine labs.     Nursing encouraged to keep me informed of any acute changes, lack of improvement, or any new concerning symptoms.    Staff to continue supportive care for all ADLs.    [x]  Discussed Patient in detail with nursing/staff, addressed  all needs today.     [x]  Plan of Care Reviewed   [x]  PT/OT Reviewed   [x]  Order Changes  []  Discharge Plans Reviewed  [x]  Advance Directive on file with Nursing Home.   [x]  POA on file with Nursing Home.   [x]  Code Status listed: [x]  Full Code   []  DNR       “I confirm accuracy of unchanged data/findings which have been carried forward from previous visit, as well as I have updated appropriately those that have changed.”     I spent 35 minutes caring for Izabella on this date of service. This time includes time spent by me in the following activities:preparing for the visit, reviewing tests, obtaining and/or reviewing a separately obtained history, performing a medically appropriate examination and/or evaluation , counseling and educating the patient/family/caregiver, ordering medications, tests, or procedures, referring and communicating with other health care professionals , documenting information in the medical record, independently interpreting results and communicating that information with the patient/family/caregiver, and care coordination                                   Fariha Cardona, APRN.

## 2024-04-17 NOTE — PROGRESS NOTES
Nursing Home Follow Up Note      Fabian Santos DO []   [facility] [x]  [address] [place] [address]  Phone: [phone]  Fax: [phone] Yohan Cuellar MD []    Chaz Mandel DO []   [address] [place] [address]  Phone: [phone]  Fax: [phone]     PATIENT NAME: Izabella Agudelo                                                                          YOB: 1960           DATE OF SERVICE: 4/16/2024  FACILITY:  [][facility]   [][facility]   [x] [facility]   [] [facility]   [] Other ______________________________________________________________________      CHIEF COMPLAINT:    Uncontrolled Diabetes with hyperglycemia.      HISTORY OF PRESENT ILLNESS:     Nursing reports that patient continues to have elevated blood sugars. She had insulin increased a couple weeks ago.  They are never controlled due to non compliance with diet, and eating a lot of snacks. Her family brings her a lot of foods and snacks that are not on a Diabetic diet.  She had not and fever,  abnormalities, respiratory abnormalities or other signs of infection.  Nursing has no other concerns today. She is on 40 units bid Levemir and 14 units of short acting insulin with meals for treatment. Moving about the facility with no signs and symptoms of any distress. No complaints.       PAST MEDICAL & SURGICAL HISTORY:   Past Medical History:   Diagnosis Date    Acute angle-closure glaucoma, bilateral     Aphasia     Cardiac abnormality     CHF (congestive heart failure)     COPD (chronic obstructive pulmonary disease)     Diabetes     Glaucoma     Hemiparesis     Hemiplegia     Impaired functional mobility, balance, gait, and endurance     Schizophrenia, chronic condition     Vascular dementia       No past surgical history on file.      MEDICATIONS:  I have reviewed and reconciled the patients medication list in the patients chart at the skilled nursing facility today.       ALLERGIES:    Allergies   Allergen Reactions    Penicillins Rash         SOCIAL HISTORY:    Social History     Socioeconomic History    Marital status: Single   Tobacco Use    Smoking status: Never     Passive exposure: Never    Smokeless tobacco: Never   Vaping Use    Vaping status: Never Used   Substance and Sexual Activity    Alcohol use: No    Drug use: Never    Sexual activity: Defer       FAMILY HISTORY:    Family History   Problem Relation Age of Onset    Diabetes Other     Glaucoma Other        REVIEW OF SYSTEMS:    Review of Systems   Reason unable to perform ROS: ROS per nursing and patient.   Constitutional:  Negative for activity change, appetite change, chills, diaphoresis, fatigue, fever, unexpected weight gain and unexpected weight loss.   HENT:  Negative for congestion, mouth sores, nosebleeds, sore throat and trouble swallowing.    Respiratory:  Negative for cough, choking, chest tightness and shortness of breath.    Cardiovascular:  Negative for chest pain.   Gastrointestinal:  Negative for abdominal pain, constipation, diarrhea, nausea and vomiting.   Endocrine: Positive for polyphagia. Negative for polydipsia and polyuria.   Genitourinary:  Positive for urinary incontinence. Negative for decreased urine volume, difficulty urinating, dysuria, frequency and hematuria.   Musculoskeletal:  Positive for arthralgias (chronic). Negative for joint swelling and myalgias.   Skin:  Negative for color change and rash.   Neurological:  Positive for speech difficulty, weakness, memory problem and confusion. Negative for dizziness.        Neuropathy BLE   Psychiatric/Behavioral:  Positive for behavioral problems (intermittently). Negative for dysphoric mood, hallucinations, sleep disturbance and depressed mood. The patient is not nervous/anxious.          PHYSICAL EXAMINATION:   VITAL SIGNS:   Vitals:    04/16/24 1455   BP: 132/79   Pulse: 81   Resp: 18   Temp: 97.9 °F (36.6 °C)   SpO2: 94%   Weight: 83 kg  (183 lb)       Physical Exam  Vitals and nursing note reviewed.   Constitutional:       General: She is not in acute distress.     Appearance: She is well-developed.   Eyes:      Conjunctiva/sclera: Conjunctivae normal.   Cardiovascular:      Rate and Rhythm: Normal rate and regular rhythm.      Heart sounds: Normal heart sounds.   Pulmonary:      Effort: Pulmonary effort is normal.      Breath sounds: Normal breath sounds. No wheezing or rales.   Abdominal:      General: Bowel sounds are normal. There is no distension.      Palpations: Abdomen is soft.      Tenderness: There is no abdominal tenderness.   Feet:      Comments: Unable to perform accurate foot exam due to dementia  Skin:     General: Skin is warm and dry.   Neurological:      Mental Status: She is alert. Mental status is at baseline. She is disoriented.      Gait: Gait abnormal.      Comments: Chronic NM deficits related to CVA   Psychiatric:         Mood and Affect: Mood normal. Affect is flat.         Behavior: Behavior normal.         Cognition and Memory: Cognition is impaired. Memory is impaired.         RECORDS REVIEW:   I have reviewed and interpreted the discharge summary and medications    ASSESSMENT     Diagnoses and all orders for this visit:    1. 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 Encounter for medication adjustment    3. Impaired mobility and ADLs        PLAN    Uncontrolled DM with hyperglycemia and neuropathy  -Will increase Tresiba to 45 units bid. Continue meal time insulin to 14 units with meals.   Will continue to adjust accordingly. She is non compliant with diet.  Will follow up and continue to monitor. Will continue routine labs.     Nursing encouraged to keep me informed of any acute changes, lack of improvement, or any new concerning symptoms.    Staff to continue supportive care for all ADLs.    [x]  Discussed Patient in detail with nursing/staff, addressed  all needs today.     [x]  Plan of Care Reviewed   [x]  PT/OT Reviewed   [x]  Order Changes  []  Discharge Plans Reviewed  [x]  Advance Directive on file with Nursing Home.   [x]  POA on file with Nursing Home.   [x]  Code Status listed: [x]  Full Code   []  DNR       “I confirm accuracy of unchanged data/findings which have been carried forward from previous visit, as well as I have updated appropriately those that have changed.”     I spent 35 minutes caring for Izabella on this date of service. This time includes time spent by me in the following activities:preparing for the visit, reviewing tests, obtaining and/or reviewing a separately obtained history, performing a medically appropriate examination and/or evaluation , counseling and educating the patient/family/caregiver, ordering medications, tests, or procedures, referring and communicating with other health care professionals , documenting information in the medical record, independently interpreting results and communicating that information with the patient/family/caregiver, and care coordination                                   Fariha Cardona, APRN.

## 2024-05-15 ENCOUNTER — NURSING HOME (OUTPATIENT)
Dept: FAMILY MEDICINE CLINIC | Facility: CLINIC | Age: 64
End: 2024-05-15
Payer: MEDICARE

## 2024-05-15 VITALS
WEIGHT: 191 LBS | RESPIRATION RATE: 18 BRPM | SYSTOLIC BLOOD PRESSURE: 134 MMHG | DIASTOLIC BLOOD PRESSURE: 70 MMHG | OXYGEN SATURATION: 100 % | HEART RATE: 75 BPM | TEMPERATURE: 97.8 F | BODY MASS INDEX: 29.05 KG/M2

## 2024-05-15 DIAGNOSIS — F01.518 VASCULAR DEMENTIA WITH OTHER BEHAVIORAL DISTURBANCE, UNSPECIFIED DEMENTIA SEVERITY: ICD-10-CM

## 2024-05-15 DIAGNOSIS — E11.44 TYPE 2 DIABETES MELLITUS WITH DIABETIC AMYOTROPHY, WITH LONG-TERM CURRENT USE OF INSULIN: ICD-10-CM

## 2024-05-15 DIAGNOSIS — Z74.09 IMPAIRED MOBILITY AND ADLS: Primary | ICD-10-CM

## 2024-05-15 DIAGNOSIS — I10 ESSENTIAL HYPERTENSION: ICD-10-CM

## 2024-05-15 DIAGNOSIS — K21.9 GERD WITHOUT ESOPHAGITIS: ICD-10-CM

## 2024-05-15 DIAGNOSIS — R13.12 OROPHARYNGEAL DYSPHAGIA: ICD-10-CM

## 2024-05-15 DIAGNOSIS — Z78.9 IMPAIRED MOBILITY AND ADLS: Primary | ICD-10-CM

## 2024-05-15 DIAGNOSIS — F20.9 SCHIZOPHRENIA, CHRONIC CONDITION: ICD-10-CM

## 2024-05-15 DIAGNOSIS — Z79.4 TYPE 2 DIABETES MELLITUS WITH DIABETIC AMYOTROPHY, WITH LONG-TERM CURRENT USE OF INSULIN: ICD-10-CM

## 2024-05-15 DIAGNOSIS — I69.90 LATE EFFECTS OF CVA (CEREBROVASCULAR ACCIDENT): ICD-10-CM

## 2024-05-15 PROCEDURE — 99309 SBSQ NF CARE MODERATE MDM 30: CPT | Performed by: FAMILY MEDICINE

## 2024-05-15 NOTE — PROGRESS NOTES
Nursing Home Progress Note        Fabian Santos DO [x]  [facility] []  [address] [place] [address]  Phone: [phone]  Fax: [phone] Yohan Cuellar MD []  Chaz Mandel DO []  [address] [place] [address]  Phone: [phone]  Fax: [phone]     PATIENT NAME: Izabella Agudelo                                                                          YOB: 1960           DATE OF SERVICE: 5/15/2024  FACILITY: []  [facility]  [] [facility]  [x]  [facility]  [] [facility]  []  Other ______________________________________________________________________      CHIEF COMPLAINT:  Impaired mobility and ADL/paranoid schizophrenia/diabetes mellitus treated with insulin/hypertension/dyslipidemia/diabetic amyotrophy      HISTORY OF PRESENT ILLNESS:   [x]  Follow Up visit for coordination of long term care issues and chronic medical management of Diagnoses and all orders for this visit:    1. Impaired mobility and ADLs (Primary)    2. Late effects of CVA (cerebrovascular accident)    3. Vascular dementia with other behavioral disturbance, unspecified dementia severity    4. Schizophrenia, chronic condition    5. GERD without esophagitis    6. Oropharyngeal dysphagia    7. 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 Essential hypertension    Nursing/staff reports patient has been receptive to supportive care, no falls or injuries reported.  No new neurological deficits noted.    No reports of focal aspiration.  No nutritional/hydration deficits reported.    No reports of cyclical/persistent hypoglycemia.      Vital signs have been stable.          PAST MEDICAL & SURGICAL HISTORY:   Past Medical History:   Diagnosis Date    Acute angle-closure glaucoma, bilateral     Aphasia     Cardiac abnormality     CHF (congestive heart failure)     COPD (chronic obstructive pulmonary disease)     Diabetes     Glaucoma      Hemiparesis     Hemiplegia     Impaired functional mobility, balance, gait, and endurance     Schizophrenia, chronic condition     Vascular dementia       No past surgical history on file.      MEDICATIONS:  I have reviewed and reconciled the patients medication list in the patients chart at the skilled nursing facility today.      ALLERGIES:    Allergies   Allergen Reactions    Penicillins Rash         SOCIAL HISTORY:    Social History     Socioeconomic History    Marital status: Single   Tobacco Use    Smoking status: Never     Passive exposure: Never    Smokeless tobacco: Never   Vaping Use    Vaping status: Never Used   Substance and Sexual Activity    Alcohol use: No    Drug use: Never    Sexual activity: Defer       FAMILY HISTORY:    Family History   Problem Relation Age of Onset    Diabetes Other     Glaucoma Other        REVIEW OF SYSTEMS:    Review of Systems  Appetite: Fair []   Good [x]   Poor []   Weight Loss []  [x]  Weight Stable   Unavoidable Weight Loss []  Tolerating Tube Feeding []    Supplements Provided []   Constitutional: Negative for fever, chills, diaphoresis or fatigue and weight change.  Patient is interactive but a poor historian.  HENT: No dysphagia; no changes to vision/hearing/smell/taste; no epistaxis  Eyes: Negative for redness and visual disturbance.   Respiratory: Occasional cough, no hemoptysis.  Cardiovascular: Negative for chest pain and palpitations.   Gastrointestinal: Negative for abdominal distention, abdominal pain and blood in stool.   Endocrine: Negative for cold intolerance and heat intolerance.   Genitourinary: Negative for difficulty urinating, dysuria and frequency.Negative for hematuria   Musculoskeletal: Chronic myalgias and arthralgias.  Worsened lumbago as per above.  Integumentary: No open wounds, rash or concerning skin lesions  Neurological: Negative for syncope, weakness and headaches.   Hematological: Negative for adenopathy. Does not bruise/bleed easily.    Immunological: Negative for reported allergies or immunological disorders  Psychological: Chronic behavioral issues, no acute changes.    PHYSICAL EXAMINATION:   VITAL SIGNS:   Vitals:    05/15/24 0846   BP: 134/70   Pulse: 75   Resp: 18   Temp: 97.8 °F (36.6 °C)   SpO2: 100%           Physical Exam    General Appearance:  [x]  Alert   [x]  Oriented x person  [x]  No acute distress     [x]  Confused, chronically []  Disoriented   []  Comatose   Head:  Atraumatic and normocephalic, without obvious abnormality.   Eyes:         PERRLA, conjunctivae and sclerae normal, no Icterus. No pallor. Extra-occular movements are within normal limits.   Ears:  Ears appear intact with no abnormalities noted.   Throat: No oral lesions, no thrush, oral mucosa moist.   Neck: Supple, trachea midline, no thyromegaly, no carotid bruit.   Back:   No kyphoscoliosis. No tenderness to palpation.   Lungs:   Chest shape is normal. Breath sounds heard bilaterally equally.  No wheezing.  Audible air exchange noted all lung fields.   Heart:  Normal S1 and S2, no murmur, no gallop, no rub. No JVD.   Abdomen:   Normal bowel sounds, no masses, no organomegaly. Soft, non-tender, non-distended, no guarding.  No CVA tenderness.   Extremities: Moves all extremities, without edema, cyanosis or clubbing.  Frail build.   Poor core strength and stability.   Pulses: Pulses palpable and equal bilaterally.   Skin: Generalized dry skin noted.  Age-related atrophy of skin.   Neurologic: [x] Normal speech []  Normal mental status    [x] Cranial nerves II through XII intact   [x]  No anosmia [x]  DTR 2+ [x]  Proprioception intact  [x]  Chronic motor/sensory deficits      Psych/Mood:                    [x]  No acute changes, flat affect[]  Depressed      Urinary:      [x]  Continent  [x]  Incontinent, at times[]  Retention  []  F/C     []  UTI w/treatment in progress         ASSESSMENT     Diagnoses and all orders for this visit:    1. Impaired mobility and ADLs  (Primary)    2. Late effects of CVA (cerebrovascular accident)    3. Vascular dementia with other behavioral disturbance, unspecified dementia severity    4. Schizophrenia, chronic condition    5. GERD without esophagitis    6. Oropharyngeal dysphagia    7. 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 Essential hypertension          PLAN  Continue current supportive care for mobilization/transfers.  Assist ADLs as needed.  Fall precautions in place given her impaired mobility and chronic neurological deficits.  Chronic behaviors, no acute changes.    Vital signs demonstrate hemodynamic stability, blood pressure is at goal.  Continue aspiration precautions, as well as    Dietary/lifestyle modifications to aid in symptom management of chronic GERD.    Continue blood glucose monitoring, changes to treatment regimen when needed to maximize blood glucose control and minimize hypoglycemic episodes.    Surveillance labs when needed.    [x]  Discussed Patient in detail with nursing/staff, addressed all needs today.     [x]  Plan of Care Reviewed   []  PT/OT Reviewed   []  Order Changes  []  Discharge Plans Reviewed   []  Code Status Changes      I spent 35 minutes caring for Izabella on this date of service. This time includes time spent by me in the following activities:preparing for the visit, performing a medically appropriate examination and/or evaluation , counseling and educating the patient/family/caregiver, ordering medications, tests, or procedures, documenting information in the medical record, and care coordination    I have reviewed and updated all copied forward information, as appropriate.  I attest to the accuracy and relevance of any unchanged information.       Fabian Santos DO  5/15/2024

## 2024-07-12 DIAGNOSIS — Z79.4 TYPE 2 DIABETES MELLITUS WITH DIABETIC POLYNEUROPATHY, WITH LONG-TERM CURRENT USE OF INSULIN: ICD-10-CM

## 2024-07-12 DIAGNOSIS — E11.42 TYPE 2 DIABETES MELLITUS WITH DIABETIC POLYNEUROPATHY, WITH LONG-TERM CURRENT USE OF INSULIN: ICD-10-CM

## 2024-07-12 RX ORDER — GABAPENTIN 400 MG/1
400 CAPSULE ORAL 3 TIMES DAILY
Qty: 90 CAPSULE | Refills: 5 | Status: SHIPPED | OUTPATIENT
Start: 2024-07-12

## 2024-07-12 NOTE — TELEPHONE ENCOUNTER
[facility] REQUESTING MED REFILL FOR GABAPENTIN 400 MG.    DIRECTIONS: GABAPENTIN 400 MG 1 CAP PO TID.

## 2024-07-16 ENCOUNTER — APPOINTMENT (OUTPATIENT)
Dept: CT IMAGING | Facility: HOSPITAL | Age: 64
End: 2024-07-16
Payer: MEDICARE

## 2024-07-16 ENCOUNTER — HOSPITAL ENCOUNTER (OUTPATIENT)
Facility: HOSPITAL | Age: 64
Setting detail: OBSERVATION
Discharge: NURSING, REHAB OR LONG-TERM CARE | End: 2024-07-17
Attending: STUDENT IN AN ORGANIZED HEALTH CARE EDUCATION/TRAINING PROGRAM | Admitting: INTERNAL MEDICINE
Payer: MEDICARE

## 2024-07-16 DIAGNOSIS — R73.9 HYPERGLYCEMIA: ICD-10-CM

## 2024-07-16 DIAGNOSIS — K31.84 GASTROPARESIS: Primary | ICD-10-CM

## 2024-07-16 DIAGNOSIS — R11.2 INTRACTABLE NAUSEA AND VOMITING: ICD-10-CM

## 2024-07-16 LAB
ALBUMIN SERPL-MCNC: 4.4 G/DL (ref 3.5–5.2)
ALBUMIN/GLOB SERPL: 1.2 G/DL
ALP SERPL-CCNC: 119 U/L (ref 39–117)
ALT SERPL W P-5'-P-CCNC: 42 U/L (ref 1–33)
ANION GAP SERPL CALCULATED.3IONS-SCNC: 14.4 MMOL/L (ref 5–15)
AST SERPL-CCNC: 28 U/L (ref 1–32)
BACTERIA UR QL AUTO: ABNORMAL /HPF
BASOPHILS # BLD AUTO: 0.03 10*3/MM3 (ref 0–0.2)
BASOPHILS NFR BLD AUTO: 0.3 % (ref 0–1.5)
BILIRUB SERPL-MCNC: 0.4 MG/DL (ref 0–1.2)
BILIRUB UR QL STRIP: NEGATIVE
BUN SERPL-MCNC: 16 MG/DL (ref 8–23)
BUN/CREAT SERPL: 22.5 (ref 7–25)
CALCIUM SPEC-SCNC: 10.1 MG/DL (ref 8.6–10.5)
CHLORIDE SERPL-SCNC: 89 MMOL/L (ref 98–107)
CLARITY UR: CLEAR
CO2 SERPL-SCNC: 27.6 MMOL/L (ref 22–29)
COLOR UR: YELLOW
CREAT SERPL-MCNC: 0.71 MG/DL (ref 0.57–1)
D-LACTATE SERPL-SCNC: 2.4 MMOL/L (ref 0.5–2)
D-LACTATE SERPL-SCNC: 3.4 MMOL/L (ref 0.5–2)
DEPRECATED RDW RBC AUTO: 43.2 FL (ref 37–54)
EGFRCR SERPLBLD CKD-EPI 2021: 95.7 ML/MIN/1.73
EOSINOPHIL # BLD AUTO: 0.04 10*3/MM3 (ref 0–0.4)
EOSINOPHIL NFR BLD AUTO: 0.4 % (ref 0.3–6.2)
ERYTHROCYTE [DISTWIDTH] IN BLOOD BY AUTOMATED COUNT: 14.5 % (ref 12.3–15.4)
GLOBULIN UR ELPH-MCNC: 3.7 GM/DL
GLUCOSE SERPL-MCNC: 352 MG/DL (ref 65–99)
GLUCOSE UR STRIP-MCNC: NEGATIVE MG/DL
HCT VFR BLD AUTO: 45.9 % (ref 34–46.6)
HGB BLD-MCNC: 15.2 G/DL (ref 12–15.9)
HGB UR QL STRIP.AUTO: ABNORMAL
HOLD SPECIMEN: NORMAL
HOLD SPECIMEN: NORMAL
HYALINE CASTS UR QL AUTO: ABNORMAL /LPF
IMM GRANULOCYTES # BLD AUTO: 0.06 10*3/MM3 (ref 0–0.05)
IMM GRANULOCYTES NFR BLD AUTO: 0.6 % (ref 0–0.5)
KETONES UR QL STRIP: ABNORMAL
LEUKOCYTE ESTERASE UR QL STRIP.AUTO: ABNORMAL
LIPASE SERPL-CCNC: 21 U/L (ref 13–60)
LYMPHOCYTES # BLD AUTO: 0.98 10*3/MM3 (ref 0.7–3.1)
LYMPHOCYTES NFR BLD AUTO: 10 % (ref 19.6–45.3)
MCH RBC QN AUTO: 27.3 PG (ref 26.6–33)
MCHC RBC AUTO-ENTMCNC: 33.1 G/DL (ref 31.5–35.7)
MCV RBC AUTO: 82.6 FL (ref 79–97)
MONOCYTES # BLD AUTO: 0.39 10*3/MM3 (ref 0.1–0.9)
MONOCYTES NFR BLD AUTO: 4 % (ref 5–12)
NEUTROPHILS NFR BLD AUTO: 8.26 10*3/MM3 (ref 1.7–7)
NEUTROPHILS NFR BLD AUTO: 84.7 % (ref 42.7–76)
NITRITE UR QL STRIP: NEGATIVE
NRBC BLD AUTO-RTO: 0 /100 WBC (ref 0–0.2)
PH UR STRIP.AUTO: 5.5 [PH] (ref 5–8)
PLATELET # BLD AUTO: 276 10*3/MM3 (ref 140–450)
PMV BLD AUTO: 9.4 FL (ref 6–12)
POTASSIUM SERPL-SCNC: 4.6 MMOL/L (ref 3.5–5.2)
PROT SERPL-MCNC: 8.1 G/DL (ref 6–8.5)
PROT UR QL STRIP: ABNORMAL
RBC # BLD AUTO: 5.56 10*6/MM3 (ref 3.77–5.28)
RBC # UR STRIP: ABNORMAL /HPF
REF LAB TEST METHOD: ABNORMAL
SODIUM SERPL-SCNC: 131 MMOL/L (ref 136–145)
SP GR UR STRIP: 1.01 (ref 1–1.03)
SQUAMOUS #/AREA URNS HPF: ABNORMAL /HPF
TROPONIN T SERPL HS-MCNC: 17 NG/L
TROPONIN T SERPL HS-MCNC: 18 NG/L
UROBILINOGEN UR QL STRIP: ABNORMAL
WBC # UR STRIP: ABNORMAL /HPF
WBC NRBC COR # BLD AUTO: 9.76 10*3/MM3 (ref 3.4–10.8)
WHOLE BLOOD HOLD COAG: NORMAL
WHOLE BLOOD HOLD SPECIMEN: NORMAL

## 2024-07-16 PROCEDURE — G0378 HOSPITAL OBSERVATION PER HR: HCPCS

## 2024-07-16 PROCEDURE — 93005 ELECTROCARDIOGRAM TRACING: CPT

## 2024-07-16 PROCEDURE — 71260 CT THORAX DX C+: CPT

## 2024-07-16 PROCEDURE — 36415 COLL VENOUS BLD VENIPUNCTURE: CPT

## 2024-07-16 PROCEDURE — 74177 CT ABD & PELVIS W/CONTRAST: CPT

## 2024-07-16 PROCEDURE — 99285 EMERGENCY DEPT VISIT HI MDM: CPT

## 2024-07-16 PROCEDURE — 25510000001 IOPAMIDOL 61 % SOLUTION: Performed by: STUDENT IN AN ORGANIZED HEALTH CARE EDUCATION/TRAINING PROGRAM

## 2024-07-16 PROCEDURE — 96374 THER/PROPH/DIAG INJ IV PUSH: CPT

## 2024-07-16 PROCEDURE — 25810000003 SODIUM CHLORIDE 0.9 % SOLUTION

## 2024-07-16 PROCEDURE — 83690 ASSAY OF LIPASE: CPT | Performed by: STUDENT IN AN ORGANIZED HEALTH CARE EDUCATION/TRAINING PROGRAM

## 2024-07-16 PROCEDURE — 84484 ASSAY OF TROPONIN QUANT: CPT

## 2024-07-16 PROCEDURE — 83605 ASSAY OF LACTIC ACID: CPT | Performed by: STUDENT IN AN ORGANIZED HEALTH CARE EDUCATION/TRAINING PROGRAM

## 2024-07-16 PROCEDURE — 96376 TX/PRO/DX INJ SAME DRUG ADON: CPT

## 2024-07-16 PROCEDURE — 83036 HEMOGLOBIN GLYCOSYLATED A1C: CPT | Performed by: INTERNAL MEDICINE

## 2024-07-16 PROCEDURE — 96361 HYDRATE IV INFUSION ADD-ON: CPT

## 2024-07-16 PROCEDURE — 25010000002 ONDANSETRON PER 1 MG

## 2024-07-16 PROCEDURE — 85025 COMPLETE CBC W/AUTO DIFF WBC: CPT | Performed by: STUDENT IN AN ORGANIZED HEALTH CARE EDUCATION/TRAINING PROGRAM

## 2024-07-16 PROCEDURE — 80053 COMPREHEN METABOLIC PANEL: CPT | Performed by: STUDENT IN AN ORGANIZED HEALTH CARE EDUCATION/TRAINING PROGRAM

## 2024-07-16 PROCEDURE — 99223 1ST HOSP IP/OBS HIGH 75: CPT | Performed by: INTERNAL MEDICINE

## 2024-07-16 PROCEDURE — 81001 URINALYSIS AUTO W/SCOPE: CPT

## 2024-07-16 RX ORDER — NICOTINE POLACRILEX 4 MG
15 LOZENGE BUCCAL
Status: DISCONTINUED | OUTPATIENT
Start: 2024-07-16 | End: 2024-07-17

## 2024-07-16 RX ORDER — IBUPROFEN 600 MG/1
1 TABLET ORAL
Status: DISCONTINUED | OUTPATIENT
Start: 2024-07-16 | End: 2024-07-17

## 2024-07-16 RX ORDER — DEXTROSE MONOHYDRATE 25 G/50ML
10-50 INJECTION, SOLUTION INTRAVENOUS
Status: DISCONTINUED | OUTPATIENT
Start: 2024-07-16 | End: 2024-07-17

## 2024-07-16 RX ORDER — AMOXICILLIN 250 MG
2 CAPSULE ORAL 2 TIMES DAILY PRN
Status: DISCONTINUED | OUTPATIENT
Start: 2024-07-16 | End: 2024-07-17 | Stop reason: HOSPADM

## 2024-07-16 RX ORDER — ONDANSETRON 4 MG/1
4 TABLET, ORALLY DISINTEGRATING ORAL EVERY 8 HOURS PRN
Qty: 12 TABLET | Refills: 0 | Status: SHIPPED | OUTPATIENT
Start: 2024-07-16

## 2024-07-16 RX ORDER — SODIUM CHLORIDE 0.9 % (FLUSH) 0.9 %
10 SYRINGE (ML) INJECTION AS NEEDED
Status: DISCONTINUED | OUTPATIENT
Start: 2024-07-16 | End: 2024-07-17 | Stop reason: HOSPADM

## 2024-07-16 RX ORDER — SODIUM CHLORIDE 9 MG/ML
75 INJECTION, SOLUTION INTRAVENOUS CONTINUOUS
Status: DISCONTINUED | OUTPATIENT
Start: 2024-07-17 | End: 2024-07-17 | Stop reason: HOSPADM

## 2024-07-16 RX ORDER — LATANOPROST 50 UG/ML
1 SOLUTION/ DROPS OPHTHALMIC NIGHTLY
Status: DISCONTINUED | OUTPATIENT
Start: 2024-07-17 | End: 2024-07-17 | Stop reason: HOSPADM

## 2024-07-16 RX ORDER — PANTOPRAZOLE SODIUM 40 MG/1
40 TABLET, DELAYED RELEASE ORAL
Status: DISCONTINUED | OUTPATIENT
Start: 2024-07-17 | End: 2024-07-17 | Stop reason: HOSPADM

## 2024-07-16 RX ORDER — BISACODYL 5 MG/1
5 TABLET, DELAYED RELEASE ORAL DAILY PRN
Status: DISCONTINUED | OUTPATIENT
Start: 2024-07-16 | End: 2024-07-17 | Stop reason: HOSPADM

## 2024-07-16 RX ORDER — SODIUM CHLORIDE 9 MG/ML
40 INJECTION, SOLUTION INTRAVENOUS AS NEEDED
Status: DISCONTINUED | OUTPATIENT
Start: 2024-07-16 | End: 2024-07-17 | Stop reason: HOSPADM

## 2024-07-16 RX ORDER — ONDANSETRON 2 MG/ML
4 INJECTION INTRAMUSCULAR; INTRAVENOUS EVERY 6 HOURS PRN
Status: DISCONTINUED | OUTPATIENT
Start: 2024-07-16 | End: 2024-07-17 | Stop reason: HOSPADM

## 2024-07-16 RX ORDER — INSULIN LISPRO 100 [IU]/ML
1-200 INJECTION, SOLUTION INTRAVENOUS; SUBCUTANEOUS AS NEEDED
Status: DISCONTINUED | OUTPATIENT
Start: 2024-07-16 | End: 2024-07-17

## 2024-07-16 RX ORDER — POLYETHYLENE GLYCOL 3350 17 G/17G
17 POWDER, FOR SOLUTION ORAL DAILY PRN
Status: DISCONTINUED | OUTPATIENT
Start: 2024-07-16 | End: 2024-07-17 | Stop reason: HOSPADM

## 2024-07-16 RX ORDER — BISACODYL 10 MG
10 SUPPOSITORY, RECTAL RECTAL DAILY PRN
Status: DISCONTINUED | OUTPATIENT
Start: 2024-07-16 | End: 2024-07-17 | Stop reason: HOSPADM

## 2024-07-16 RX ORDER — DIVALPROEX SODIUM 250 MG/1
500 TABLET, DELAYED RELEASE ORAL 2 TIMES DAILY
Status: DISCONTINUED | OUTPATIENT
Start: 2024-07-17 | End: 2024-07-17 | Stop reason: HOSPADM

## 2024-07-16 RX ORDER — ONDANSETRON 2 MG/ML
4 INJECTION INTRAMUSCULAR; INTRAVENOUS ONCE
Status: COMPLETED | OUTPATIENT
Start: 2024-07-16 | End: 2024-07-16

## 2024-07-16 RX ORDER — INSULIN LISPRO 100 [IU]/ML
1-200 INJECTION, SOLUTION INTRAVENOUS; SUBCUTANEOUS EVERY 6 HOURS SCHEDULED
Status: DISCONTINUED | OUTPATIENT
Start: 2024-07-17 | End: 2024-07-17

## 2024-07-16 RX ORDER — GABAPENTIN 400 MG/1
400 CAPSULE ORAL 3 TIMES DAILY
Status: DISCONTINUED | OUTPATIENT
Start: 2024-07-17 | End: 2024-07-17 | Stop reason: HOSPADM

## 2024-07-16 RX ORDER — SODIUM CHLORIDE 0.9 % (FLUSH) 0.9 %
10 SYRINGE (ML) INJECTION EVERY 12 HOURS SCHEDULED
Status: DISCONTINUED | OUTPATIENT
Start: 2024-07-17 | End: 2024-07-17 | Stop reason: HOSPADM

## 2024-07-16 RX ORDER — METOCLOPRAMIDE HYDROCHLORIDE 5 MG/ML
5 INJECTION INTRAMUSCULAR; INTRAVENOUS EVERY 6 HOURS
Status: DISCONTINUED | OUTPATIENT
Start: 2024-07-17 | End: 2024-07-17 | Stop reason: HOSPADM

## 2024-07-16 RX ADMIN — ONDANSETRON 4 MG: 2 INJECTION INTRAMUSCULAR; INTRAVENOUS at 19:17

## 2024-07-16 RX ADMIN — SODIUM CHLORIDE 500 ML: 9 INJECTION, SOLUTION INTRAVENOUS at 20:09

## 2024-07-16 RX ADMIN — ONDANSETRON 4 MG: 2 INJECTION INTRAMUSCULAR; INTRAVENOUS at 23:06

## 2024-07-16 RX ADMIN — IOPAMIDOL 100 ML: 612 INJECTION, SOLUTION INTRAVENOUS at 19:39

## 2024-07-16 NOTE — ED PROVIDER NOTES
Subjective  History of Present Illness:    This is a 64-year-old female, history of aphasia, CHF, COPD, diabetes, hemiparesis, hemiplegia, impaired functional mobility balance and gait and endurance, vascular dementia, prior CVA presented for evaluation of abdominal pain.  Reportedly per report, patient had abnormal imaging and was concern for bowel obstruction.  Patient has had vomiting at the nursing home.  History to be obtained is limited further, patient is alert to year and place and herself.  However, likely secondary to her chronic aphasia she is not able provide much history.  She does not endorse any chest pain or dyspnea at the time of my exam.  She does not endorse any urinary symptoms.  Patient denies any known blood in her stool.      Nurses Notes reviewed and agree, including vitals, allergies, social history and prior medical history.     REVIEW OF SYSTEMS: All systems reviewed and not pertinent unless noted.  Review of Systems   Constitutional:  Negative for fever.   Respiratory:  Negative for shortness of breath.    Cardiovascular:  Negative for chest pain.   Gastrointestinal:  Positive for abdominal pain, nausea and vomiting. Negative for blood in stool.   All other systems reviewed and are negative.      Past Medical History:   Diagnosis Date    Acute angle-closure glaucoma, bilateral     Aphasia     Cardiac abnormality     CHF (congestive heart failure)     COPD (chronic obstructive pulmonary disease)     Diabetes     Glaucoma     Hemiparesis     Hemiplegia     Impaired functional mobility, balance, gait, and endurance     Schizophrenia, chronic condition     Vascular dementia        Allergies:    Penicillins      History reviewed. No pertinent surgical history.      Social History     Socioeconomic History    Marital status: Single   Tobacco Use    Smoking status: Never     Passive exposure: Never    Smokeless tobacco: Never   Vaping Use    Vaping status: Never Used   Substance and Sexual  "Activity    Alcohol use: No    Drug use: Never    Sexual activity: Defer         Family History   Problem Relation Age of Onset    Diabetes Other     Glaucoma Other        Objective  Physical Exam:  /99 (BP Location: Left arm, Patient Position: Lying)   Pulse 93   Temp 97.8 °F (36.6 °C) (Oral)   Resp 20   Ht 172.7 cm (68\")   Wt 86.6 kg (190 lb 14.7 oz)   SpO2 93%   BMI 29.03 kg/m²      Physical Exam  Vitals and nursing note reviewed.   Constitutional:       General: She is not in acute distress.     Appearance: She is obese. She is not toxic-appearing or diaphoretic.      Comments: Chronically ill-appearing   HENT:      Head: Normocephalic and atraumatic.      Mouth/Throat:      Mouth: Mucous membranes are moist.   Eyes:      Extraocular Movements: Extraocular movements intact.      Pupils: Pupils are equal, round, and reactive to light.   Cardiovascular:      Rate and Rhythm: Normal rate and regular rhythm.      Heart sounds: Normal heart sounds.   Pulmonary:      Effort: Pulmonary effort is normal. No respiratory distress.      Breath sounds: Normal breath sounds.   Abdominal:      General: Abdomen is protuberant. There is distension.      Palpations: Abdomen is soft.      Tenderness: There is generalized abdominal tenderness.   Skin:     General: Skin is warm and dry.      Capillary Refill: Capillary refill takes less than 2 seconds.   Neurological:      General: No focal deficit present.      Mental Status: She is alert.      Comments: At baseline   Psychiatric:         Mood and Affect: Mood normal.         Behavior: Behavior normal.               Procedures    ED Course:    ED Course as of 07/16/24 2301 Tue Jul 16, 2024   7732 I have reviewed the mid-level provider(s) note and verbally discussed the case/plan of care.  I was available for consultation as needed at all times during the patient's visit in the Emergency Department.  I agree with the clinical impression, plan, and disposition unless " otherwise stated in the MDM below.    ATTENDING ATTESTATION  HPI: 64-year-old female with significant past medical history including previous CVA, aphasia, impaired functional mobility who presents with abdominal pain.    MDM: ED workup reviewed.    Labs reviewed.  CBC clinic unremarkable per glucose 352.  Normal anion gap.  No metabolic derangement.  Normal renal and hepatic function.  Lipase normal.  Lactic acid 3.4 -> 2.4.  Troponin 17.  UA shows trace leukocyte esterase and hematuria.    Radiology reports reviewed. CT chest negative for pneumonia.  Atelectasis. CT abdomen pelvis negative for obstructive process or lesion.  Findings may represent gastroparesis. [JS]      ED Course User Index  [JS] Franklyn Coffey DO       Lab Results (last 24 hours)       Procedure Component Value Units Date/Time    CBC & Differential [[id number]]  (Abnormal) Collected: 07/16/24 1852    Specimen: Blood Updated: 07/16/24 1907    Narrative:      The following orders were created for panel order CBC & Differential.  Procedure                               Abnormality         Status                     ---------                               -----------         ------                     CBC Auto Differential[[id number]]        Abnormal            Final result                 Please view results for these tests on the individual orders.    Comprehensive Metabolic Panel [[id number]]  (Abnormal) Collected: 07/16/24 1852    Specimen: Blood Updated: 07/16/24 1925     Glucose 352 mg/dL      Comment: Glucose >180, Hemoglobin A1C recommended.        BUN 16 mg/dL      Creatinine 0.71 mg/dL      Sodium 131 mmol/L      Potassium 4.6 mmol/L      Chloride 89 mmol/L      CO2 27.6 mmol/L      Calcium 10.1 mg/dL      Total Protein 8.1 g/dL      Albumin 4.4 g/dL      ALT (SGPT) 42 U/L      AST (SGOT) 28 U/L      Alkaline Phosphatase 119 U/L      Total Bilirubin 0.4 mg/dL      Globulin 3.7 gm/dL      A/G Ratio 1.2 g/dL      BUN/Creatinine Ratio 22.5      Anion Gap 14.4 mmol/L      eGFR 95.7 mL/min/1.73     Narrative:      GFR Normal >60  Chronic Kidney Disease <60  Kidney Failure <15      Lipase [[id number]]  (Normal) Collected: 07/16/24 1852    Specimen: Blood Updated: 07/16/24 1925     Lipase 21 U/L     Lactic Acid, Plasma [[id number]]  (Abnormal) Collected: 07/16/24 1852    Specimen: Blood Updated: 07/16/24 1930     Lactate 3.4 mmol/L     CBC Auto Differential [[id number]]  (Abnormal) Collected: 07/16/24 1852    Specimen: Blood Updated: 07/16/24 1907     WBC 9.76 10*3/mm3      RBC 5.56 10*6/mm3      Hemoglobin 15.2 g/dL      Hematocrit 45.9 %      MCV 82.6 fL      MCH 27.3 pg      MCHC 33.1 g/dL      RDW 14.5 %      RDW-SD 43.2 fl      MPV 9.4 fL      Platelets 276 10*3/mm3      Neutrophil % 84.7 %      Lymphocyte % 10.0 %      Monocyte % 4.0 %      Eosinophil % 0.4 %      Basophil % 0.3 %      Immature Grans % 0.6 %      Neutrophils, Absolute 8.26 10*3/mm3      Lymphocytes, Absolute 0.98 10*3/mm3      Monocytes, Absolute 0.39 10*3/mm3      Eosinophils, Absolute 0.04 10*3/mm3      Basophils, Absolute 0.03 10*3/mm3      Immature Grans, Absolute 0.06 10*3/mm3      nRBC 0.0 /100 WBC     Single High Sensitivity Troponin T [[id number]]  (Abnormal) Collected: 07/16/24 1852    Specimen: Blood Updated: 07/16/24 1924     HS Troponin T 18 ng/L     Narrative:      High Sensitive Troponin T Reference Range:  <14.0 ng/L- Negative Female for AMI  <22.0 ng/L- Negative Male for AMI  >=14 - Abnormal Female indicating possible myocardial injury.  >=22 - Abnormal Male indicating possible myocardial injury.   Clinicians would have to utilize clinical acumen, EKG, Troponin, and serial changes to determine if it is an Acute Myocardial Infarction or myocardial injury due to an underlying chronic condition.         Urinalysis With Culture If Indicated - Urine, Clean Catch [[id number]]  (Abnormal) Collected: 07/16/24 1915    Specimen: Urine, Clean Catch Updated: 07/16/24 1932     Color, UA  Yellow     Appearance, UA Clear     pH, UA 5.5     Specific Gravity, UA 1.008     Glucose, UA Negative     Ketones, UA Trace     Bilirubin, UA Negative     Blood, UA Trace     Protein, UA Trace     Leuk Esterase, UA Trace     Nitrite, UA Negative     Urobilinogen, UA 0.2 E.U./dL    Narrative:      In absence of clinical symptoms, the presence of pyuria, bacteria, and/or nitrites on the urinalysis result does not correlate with infection.    Urinalysis, Microscopic Only - Urine, Clean Catch [[id number]]  (Abnormal) Collected: 07/16/24 1915    Specimen: Urine, Clean Catch Updated: 07/16/24 2035     RBC, UA 0-2 /HPF      WBC, UA 0-2 /HPF      Comment: Urine culture not indicated.        Bacteria, UA Trace /HPF      Squamous Epithelial Cells, UA 0-2 /HPF      Hyaline Casts, UA None Seen /LPF      Methodology Manual Light Microscopy    Single High Sensitivity Troponin T [[id number]]  (Abnormal) Collected: 07/16/24 2105    Specimen: Blood Updated: 07/16/24 2127     HS Troponin T 17 ng/L     Narrative:      High Sensitive Troponin T Reference Range:  <14.0 ng/L- Negative Female for AMI  <22.0 ng/L- Negative Male for AMI  >=14 - Abnormal Female indicating possible myocardial injury.  >=22 - Abnormal Male indicating possible myocardial injury.   Clinicians would have to utilize clinical acumen, EKG, Troponin, and serial changes to determine if it is an Acute Myocardial Infarction or myocardial injury due to an underlying chronic condition.         STAT Lactic Acid, Reflex [[id number]]  (Abnormal) Collected: 07/16/24 2112    Specimen: Blood Updated: 07/16/24 2207     Lactate 2.4 mmol/L              CT Chest With Contrast Diagnostic    Result Date: 7/16/2024  PROCEDURE: CT CHEST W CONTRAST DIAGNOSTIC-  HISTORY: altered mental status rule out pneumonia, cough  COMPARISON: 12/18/2023.  TECHNIQUE: Axial CT with IV contrast administration.  FINDINGS:  Coarse linear densities are seen of the lung bases compatible with atelectasis and  scarring. There is no evidence of pneumonia. A small nodule of the right upper lobe is noted most likely granuloma.  Aberrant right subclavian artery is present as a normal variant..  No pleural or pericardial effusion is seen. No adenopathy or mass lesion is present.      Impression: 1. No evidence of pneumonia. Atelectasis.   This study was performed with techniques to keep radiation doses as low as reasonably achievable (ALARA). Individualized dose reduction techniques using automated exposure control or adjustment of vA and/or kV according to the patient size were employed.  This report was signed and finalized on 7/16/2024 8:26 PM by Warner Sigala MD.      CT Abdomen Pelvis With Contrast    Result Date: 7/16/2024  PRELIMINARY REPORT TECHNIQUE: Postcontrast axial images through the abdomen and pelvis were performed. This study was performed with techniques to keep radiation doses as low as reasonably achievable, (ALARA). Individualized dose reduction techniques using automated exposure control or adjustment of mA and/or kV according to the patient's size were employed. CLINICAL HISTORY: nausea vomiting assess bowel obstruction, reported prior imaging showed possib FINDINGS: Abdomen: There is severe distention with fluid and gas of the stomach without evidence of obstructing lesion or inflammatory process within the distal stomach.  Findings likely reflective of gastroparesis.  The liver is normal in size and attenuation. The gallbladder is unremarkable.  The spleen is unremarkable. The adrenals are normal.  The pancreas is unremarkable. The kidneys enhance appropriately. There is no hydronephrosis, renal calculi, or evidence of a solid renal mass. The aorta is normal in caliber. No free air, free fluid or adenopathy is identified. No findings for mechanical bowel obstruction are identified. Pelvis: The appendix is not identified but there is no inflammatory change in the pericecal region/right lower quadrant to  suggest acute appendicitis.  The urinary bladder is unremarkable. No free fluid, free air, abscess or adenopathy is identified.     Impression: 1.  Severe fluid and gaseous distention of the stomach without evidence of obstructing process or lesion.  Findings may represent gastroparesis.  2.  No evidence of small bowel obstruction. This is a preliminary report.        MDM     Amount and/or Complexity of Data Reviewed  Clinical lab tests: reviewed  Tests in the radiology section of CPT®: reviewed  Tests in the medicine section of CPT®: reviewed  Decide to obtain previous medical records or to obtain history from someone other than the patient: yes        Initial impression of presenting illness: This is a 64-year-old female from skilled nursing facility presented a for evaluation of possible bowel obstruction and vomiting    DDX: includes but is not limited to: Bowel obstruction, viral illness, gastroenteritis, colitis, gallbladder abnormality, pancreatitis, UTI, sepsis, others    Patient arrives hemodynamically stable, afebrile, nontachycardic nontachypneic nonhypoxic with vitals interpreted by myself.     Pertinent features from physical exam: Abdomen is distended, soft, generalized tenderness to palpation.  Lungs clear, cardiac oscitation regular rate and rhythm,    Initial diagnostic plan: CBC CMP lipase lactic acid troponin EKG urinalysis CT chest with contrast and CT abdomen pelvis with contrast to rule out bowel obstruction    Results from initial plan were reviewed and interpreted by me revealing CBC is unremarkable, CT of chest per radiology without pneumonia unremarkable.  CT abdomen pelvis per radiology    IMPRESSION:  1.  Severe fluid and gaseous distention of the stomach without  evidence of obstructing process or lesion.  Findings may  represent gastroparesis.  2.  No evidence of small bowel  obstruction.    CBC with no leukocytosis, CMP with hyperglycemia at 352, sodium of 131 likely  pseudohyponatremia, ALT 42 alkaline phosphatase 119 otherwise unremarkable, lactic acidosis at 3.4, lipase normal.  Initial troponin of 18, urinalysis with noninfectious pattern trace leukocyte Estrace bacteria and 0-2 whites only.    Lactic improved to 2.4 with fluid hydration.  Repeat troponin flat.  ACS not suspected.  Suspect gastroparesis as cause of patient's symptoms.     Diagnostic information from other sources: Record reviewed    Interventions / Re-evaluation: Zofran, 500 cc bolus of fluid for lactic acidosis.  Repeat Zofran for episode of vomiting.     Results/clinical rationale were discussed with patient at bedside.    Consultations/Discussion of results with other physicians: Discussed case with attending ED physician.  Reached out to hospitalist, patient had seem to be doing well, however prior to planned discharge from the ED his symptoms were controlled, developed projectile vomiting again.  Given her comorbidities and living at a skilled nursing facility, and vomiting after antiemetic administration, discussed with hospitalist for admission and observation with gentle fluid IV hydration and antiemetics, Dr. Laughlin.  He was agreeable to admit the patient to observation telemetry.    Disposition plan: Admit to the hospital service observation telemetry level care      -----    Final diagnoses:   Gastroparesis   Hyperglycemia   Intractable nausea and vomiting          Anderson Mcmahon PA-C  07/16/24 [id number]       Anderson Mcmahon PA-C  07/16/24 2308

## 2024-07-17 VITALS
RESPIRATION RATE: 18 BRPM | BODY MASS INDEX: 28.94 KG/M2 | DIASTOLIC BLOOD PRESSURE: 119 MMHG | SYSTOLIC BLOOD PRESSURE: 153 MMHG | OXYGEN SATURATION: 92 % | HEART RATE: 80 BPM | TEMPERATURE: 97.6 F | HEIGHT: 68 IN | WEIGHT: 190.92 LBS

## 2024-07-17 LAB
ANION GAP SERPL CALCULATED.3IONS-SCNC: 15.3 MMOL/L (ref 5–15)
BUN SERPL-MCNC: 20 MG/DL (ref 8–23)
BUN/CREAT SERPL: 31.7 (ref 7–25)
CALCIUM SPEC-SCNC: 8.8 MG/DL (ref 8.6–10.5)
CHLORIDE SERPL-SCNC: 95 MMOL/L (ref 98–107)
CO2 SERPL-SCNC: 25.7 MMOL/L (ref 22–29)
CREAT SERPL-MCNC: 0.63 MG/DL (ref 0.57–1)
D-LACTATE SERPL-SCNC: 2.3 MMOL/L (ref 0.5–2)
D-LACTATE SERPL-SCNC: 2.8 MMOL/L (ref 0.5–2)
D-LACTATE SERPL-SCNC: 3.3 MMOL/L (ref 0.5–2)
DEPRECATED RDW RBC AUTO: 43.9 FL (ref 37–54)
EGFRCR SERPLBLD CKD-EPI 2021: 99.8 ML/MIN/1.73
ERYTHROCYTE [DISTWIDTH] IN BLOOD BY AUTOMATED COUNT: 14.6 % (ref 12.3–15.4)
GLUCOSE BLDC GLUCOMTR-MCNC: 254 MG/DL (ref 70–130)
GLUCOSE BLDC GLUCOMTR-MCNC: 294 MG/DL (ref 70–130)
GLUCOSE BLDC GLUCOMTR-MCNC: 312 MG/DL (ref 70–130)
GLUCOSE SERPL-MCNC: 339 MG/DL (ref 65–99)
HBA1C MFR BLD: 10.1 % (ref 4.8–5.6)
HCT VFR BLD AUTO: 41.4 % (ref 34–46.6)
HGB BLD-MCNC: 13.6 G/DL (ref 12–15.9)
MCH RBC QN AUTO: 27.4 PG (ref 26.6–33)
MCHC RBC AUTO-ENTMCNC: 32.9 G/DL (ref 31.5–35.7)
MCV RBC AUTO: 83.3 FL (ref 79–97)
MRSA DNA SPEC QL NAA+PROBE: NORMAL
PLATELET # BLD AUTO: 267 10*3/MM3 (ref 140–450)
PMV BLD AUTO: 9.2 FL (ref 6–12)
POTASSIUM SERPL-SCNC: 4.2 MMOL/L (ref 3.5–5.2)
RBC # BLD AUTO: 4.97 10*6/MM3 (ref 3.77–5.28)
SODIUM SERPL-SCNC: 136 MMOL/L (ref 136–145)
WBC NRBC COR # BLD AUTO: 13.1 10*3/MM3 (ref 3.4–10.8)

## 2024-07-17 PROCEDURE — 96376 TX/PRO/DX INJ SAME DRUG ADON: CPT

## 2024-07-17 PROCEDURE — 87081 CULTURE SCREEN ONLY: CPT | Performed by: INTERNAL MEDICINE

## 2024-07-17 PROCEDURE — 83605 ASSAY OF LACTIC ACID: CPT | Performed by: STUDENT IN AN ORGANIZED HEALTH CARE EDUCATION/TRAINING PROGRAM

## 2024-07-17 PROCEDURE — G0378 HOSPITAL OBSERVATION PER HR: HCPCS

## 2024-07-17 PROCEDURE — 87641 MR-STAPH DNA AMP PROBE: CPT | Performed by: INTERNAL MEDICINE

## 2024-07-17 PROCEDURE — 96375 TX/PRO/DX INJ NEW DRUG ADDON: CPT

## 2024-07-17 PROCEDURE — 82948 REAGENT STRIP/BLOOD GLUCOSE: CPT | Performed by: INTERNAL MEDICINE

## 2024-07-17 PROCEDURE — 85027 COMPLETE CBC AUTOMATED: CPT | Performed by: INTERNAL MEDICINE

## 2024-07-17 PROCEDURE — 25010000002 METOCLOPRAMIDE PER 10 MG: Performed by: INTERNAL MEDICINE

## 2024-07-17 PROCEDURE — 99239 HOSP IP/OBS DSCHRG MGMT >30: CPT | Performed by: INTERNAL MEDICINE

## 2024-07-17 PROCEDURE — 63710000001 INSULIN GLARGINE PER 5 UNITS: Performed by: INTERNAL MEDICINE

## 2024-07-17 PROCEDURE — 25810000003 SODIUM CHLORIDE 0.9 % SOLUTION: Performed by: INTERNAL MEDICINE

## 2024-07-17 PROCEDURE — 80048 BASIC METABOLIC PNL TOTAL CA: CPT | Performed by: INTERNAL MEDICINE

## 2024-07-17 PROCEDURE — 82948 REAGENT STRIP/BLOOD GLUCOSE: CPT

## 2024-07-17 PROCEDURE — 63710000001 INSULIN LISPRO (HUMAN) PER 5 UNITS: Performed by: INTERNAL MEDICINE

## 2024-07-17 RX ORDER — DEXTROSE MONOHYDRATE 25 G/50ML
10-50 INJECTION, SOLUTION INTRAVENOUS
Status: DISCONTINUED | OUTPATIENT
Start: 2024-07-17 | End: 2024-07-17 | Stop reason: HOSPADM

## 2024-07-17 RX ORDER — INSULIN LISPRO 100 [IU]/ML
1-200 INJECTION, SOLUTION INTRAVENOUS; SUBCUTANEOUS AS NEEDED
Status: DISCONTINUED | OUTPATIENT
Start: 2024-07-17 | End: 2024-07-17 | Stop reason: HOSPADM

## 2024-07-17 RX ORDER — INSULIN LISPRO 100 [IU]/ML
14 INJECTION, SOLUTION INTRAVENOUS; SUBCUTANEOUS
COMMUNITY

## 2024-07-17 RX ORDER — METOCLOPRAMIDE 10 MG/1
10 TABLET ORAL
Qty: 120 TABLET | Refills: 0 | Status: SHIPPED | OUTPATIENT
Start: 2024-07-17 | End: 2024-08-16

## 2024-07-17 RX ORDER — NICOTINE POLACRILEX 4 MG
15 LOZENGE BUCCAL
Status: DISCONTINUED | OUTPATIENT
Start: 2024-07-17 | End: 2024-07-17 | Stop reason: HOSPADM

## 2024-07-17 RX ORDER — INSULIN DEGLUDEC 100 U/ML
50 INJECTION, SOLUTION SUBCUTANEOUS 2 TIMES DAILY
COMMUNITY

## 2024-07-17 RX ORDER — IBUPROFEN 600 MG/1
1 TABLET ORAL
Status: DISCONTINUED | OUTPATIENT
Start: 2024-07-17 | End: 2024-07-17 | Stop reason: HOSPADM

## 2024-07-17 RX ORDER — DIVALPROEX SODIUM 125 MG/1
500 CAPSULE, COATED PELLETS ORAL 2 TIMES DAILY
COMMUNITY

## 2024-07-17 RX ORDER — ZINC SULFATE 50(220)MG
220 CAPSULE ORAL DAILY
COMMUNITY

## 2024-07-17 RX ORDER — INSULIN LISPRO 100 [IU]/ML
1-200 INJECTION, SOLUTION INTRAVENOUS; SUBCUTANEOUS
Status: DISCONTINUED | OUTPATIENT
Start: 2024-07-17 | End: 2024-07-17 | Stop reason: HOSPADM

## 2024-07-17 RX ORDER — INSULIN LISPRO 100 [IU]/ML
1-200 INJECTION, SOLUTION INTRAVENOUS; SUBCUTANEOUS
Status: DISCONTINUED | OUTPATIENT
Start: 2024-07-17 | End: 2024-07-17

## 2024-07-17 RX ADMIN — DIVALPROEX SODIUM 500 MG: 250 TABLET, DELAYED RELEASE ORAL at 08:30

## 2024-07-17 RX ADMIN — APIXABAN 5 MG: 5 TABLET, FILM COATED ORAL at 08:30

## 2024-07-17 RX ADMIN — INSULIN LISPRO 2 UNITS: 100 INJECTION, SOLUTION INTRAVENOUS; SUBCUTANEOUS at 01:25

## 2024-07-17 RX ADMIN — SODIUM CHLORIDE 75 ML/HR: 9 INJECTION, SOLUTION INTRAVENOUS at 00:41

## 2024-07-17 RX ADMIN — Medication 10 ML: at 00:45

## 2024-07-17 RX ADMIN — INSULIN GLARGINE 13 UNITS: 100 INJECTION, SOLUTION SUBCUTANEOUS at 01:24

## 2024-07-17 RX ADMIN — INSULIN LISPRO 2 UNITS: 100 INJECTION, SOLUTION INTRAVENOUS; SUBCUTANEOUS at 06:15

## 2024-07-17 RX ADMIN — PANTOPRAZOLE SODIUM 40 MG: 40 TABLET, DELAYED RELEASE ORAL at 06:15

## 2024-07-17 RX ADMIN — APIXABAN 5 MG: 5 TABLET, FILM COATED ORAL at 00:48

## 2024-07-17 RX ADMIN — METOCLOPRAMIDE 5 MG: 5 INJECTION, SOLUTION INTRAMUSCULAR; INTRAVENOUS at 00:42

## 2024-07-17 RX ADMIN — METOCLOPRAMIDE 5 MG: 5 INJECTION, SOLUTION INTRAMUSCULAR; INTRAVENOUS at 06:17

## 2024-07-17 RX ADMIN — LATANOPROST 1 DROP: 50 SOLUTION OPHTHALMIC at 00:45

## 2024-07-17 RX ADMIN — GABAPENTIN 400 MG: 400 CAPSULE ORAL at 08:30

## 2024-07-17 RX ADMIN — DIVALPROEX SODIUM 500 MG: 250 TABLET, DELAYED RELEASE ORAL at 00:48

## 2024-07-17 RX ADMIN — Medication 10 ML: at 08:30

## 2024-07-17 NOTE — PROGRESS NOTES
[facility]    Name:  Izabella Agudelo   Age:  64 y.o.  Sex:  female  :  1960  MRN:  [id number]   Visit Number:  [id number]  Admission Date:  2024  Date Of Service:  24  Primary Care Physician:  Fabian Santos DO     LOS: 0 days :    Chief Complaint:      Nausea and vomiting    Subjective:    24 patient complain of left leg hernia with pain. LA trending up slightly. N/V/abdominal pain resolve    History Of Presenting Illness:       Izabella Agudelo is a 64-year-old resident of nursing home facility with a history of hemiplegia, COPD, A-fib on Eliquis, diastolic CHF, dementia, diabetes mellitus type 2 was brought to the emergency room by EMS with symptoms of abdominal pain and vomiting.  Patient unfortunately has speech difficulty from her previous stroke and is unable to give much history.  She does however nod when asked about abdominal pain.     In the emergency room, she was afebrile and hemodynamically stable saturating at 94% on room air.  Initial troponin 18 with a repeat at 17.  Sodium 131, glucose 352, alkaline phosphatase 119, ALT 42.  Lactic acid was 3.4 with repeat at 2.4.  Lipase was normal.  CBC was unremarkable.  Urinalysis was unremarkable.  CT of the chest was negative for any pneumonia but showed atelectasis.  CT of the abdomen and pelvis showed severe fluid and gaseous distention of the stomach without evidence of obstructing process, findings may represent gastroparesis.  No evidence of small bowel obstruction.  Patient was given 500 mL of normal saline bolus, IV Zofran and was subsequently admitted to the medical floor with telemetry due to intractable nausea and vomiting and possible gastroparesis and gastric distention.  Edited by: Salo Darden DO at 2024 1028     Review of Systems:     All systems were reviewed and negative except as mentioned in subjective, assessment and plan.    Vital  Signs:    Temp:  [97.4 °F (36.3 °C)-98.4 °F (36.9 °C)] 97.4 °F (36.3 °C)  Heart Rate:  [71-99] 71  Resp:  [16-20] 18  BP: (104-171)/() 104/74    Intake and output:    I/O last 3 completed shifts:  In: 1072 [I.V.:572; IV Piggyback:500]  Out: 150 [Urine:150]  No intake/output data recorded.    Physical Examination:    Constitutional: No acute distress, awake, alert  HENT: NCAT, mucous membranes moist  Respiratory: Clear to auscultation bilaterally, respiratory effort normal   Cardiovascular: RRR, no murmurs, rubs, or gallops  Gastrointestinal: Positive bowel sounds, soft, nontender, nondistended  Musculoskeletal: No bilateral ankle edema  Psychiatric: Appropriate affect, cooperative  Neurologic: Oriented x 3, speech slurred chronically, moves all extremities,  Skin: No rashes  Edited by: Salo Darden,  at 7/17/2024 1038     Laboratory results:    Results from last 7 days   Lab Units 07/17/24  0345 07/16/24  1852   SODIUM mmol/L 136 131*   POTASSIUM mmol/L 4.2 4.6   CHLORIDE mmol/L 95* 89*   CO2 mmol/L 25.7 27.6   BUN mg/dL 20 16   CREATININE mg/dL 0.63 0.71   CALCIUM mg/dL 8.8 10.1   BILIRUBIN mg/dL  --  0.4   ALK PHOS U/L  --  119*   ALT (SGPT) U/L  --  42*   AST (SGOT) U/L  --  28   GLUCOSE mg/dL 339* 352*     Results from last 7 days   Lab Units 07/17/24  0345 07/16/24  1852   WBC 10*3/mm3 13.10* 9.76   HEMOGLOBIN g/dL 13.6 15.2   HEMATOCRIT % 41.4 45.9   PLATELETS 10*3/mm3 267 276         Results from last 7 days   Lab Units 07/16/24  2105 07/16/24  1852   HSTROP T ng/L 17* 18*         Recent Labs     12/18/23  1852   PHART 7.433   ATK9GAA 41.7   PO2ART 66.4*   [id number] 27.8   BASEEXCESS 3.1*      I have reviewed the patient's laboratory results.    Radiology results:    CT Chest With Contrast Diagnostic    Result Date: 7/16/2024  PROCEDURE: CT CHEST W CONTRAST DIAGNOSTIC-  HISTORY: altered mental status rule out pneumonia, cough  COMPARISON: 12/18/2023.  TECHNIQUE: Axial CT with IV contrast  administration.  FINDINGS:  Coarse linear densities are seen of the lung bases compatible with atelectasis and scarring. There is no evidence of pneumonia. A small nodule of the right upper lobe is noted most likely granuloma.  Aberrant right subclavian artery is present as a normal variant..  No pleural or pericardial effusion is seen. No adenopathy or mass lesion is present.      Impression: 1. No evidence of pneumonia. Atelectasis.   This study was performed with techniques to keep radiation doses as low as reasonably achievable (ALARA). Individualized dose reduction techniques using automated exposure control or adjustment of vA and/or kV according to the patient size were employed.  This report was signed and finalized on 7/16/2024 8:26 PM by Warner Sigala MD.      CT Abdomen Pelvis With Contrast    Result Date: 7/16/2024  PRELIMINARY REPORT TECHNIQUE: Postcontrast axial images through the abdomen and pelvis were performed. This study was performed with techniques to keep radiation doses as low as reasonably achievable, (ALARA). Individualized dose reduction techniques using automated exposure control or adjustment of mA and/or kV according to the patient's size were employed. CLINICAL HISTORY: nausea vomiting assess bowel obstruction, reported prior imaging showed possib FINDINGS: Abdomen: There is severe distention with fluid and gas of the stomach without evidence of obstructing lesion or inflammatory process within the distal stomach.  Findings likely reflective of gastroparesis.  The liver is normal in size and attenuation. The gallbladder is unremarkable.  The spleen is unremarkable. The adrenals are normal.  The pancreas is unremarkable. The kidneys enhance appropriately. There is no hydronephrosis, renal calculi, or evidence of a solid renal mass. The aorta is normal in caliber. No free air, free fluid or adenopathy is identified. No findings for mechanical bowel obstruction are identified. Pelvis: The  appendix is not identified but there is no inflammatory change in the pericecal region/right lower quadrant to suggest acute appendicitis.  The urinary bladder is unremarkable. No free fluid, free air, abscess or adenopathy is identified.     Impression: 1.  Severe fluid and gaseous distention of the stomach without evidence of obstructing process or lesion.  Findings may represent gastroparesis.  2.  No evidence of small bowel obstruction. This is a preliminary report.   I have reviewed the patient's radiology reports.    Medication Review:     I have reviewed the patient's active and prn medications.     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  - Suspected diabetic gastroparesis, improved with Reglan advancing diet  - At this time there is no evidence of small bowel obstruction.  I do not suspect gastroenteritis.  - No indication for NG tube at this time.     Diabetes mellitus with hyperglycemia.  - She will be placed on subcutaneous insulin protocol per [facility].  - A1c levels 10.1.     Paroxysmal atrial fibrillation.  - Continue apixaban.       DVT Prophylaxis: Apixaban  Code Status: Full  Diet: diabetic soft to chew  Disposition: anticipate back to NH in 1 to 2 days  Edited by: Salo Darden DO at 7/17/2024 1038           Salo Darden DO  07/17/24  10:38 EDT    Dictated utilizing Dragon dictation.

## 2024-07-17 NOTE — DISCHARGE SUMMARY
[facility]   DISCHARGE SUMMARY      Name:  Izabella Agudelo   Age:  64 y.o.  Sex:  female  :  1960  MRN:  [id number]   Visit Number:  [id number]    Admission Date:  2024  Date of Discharge:  2024  Primary Care Physician:  Fabian Santos, DO    Important issues to note:    Start: reglan  Stop: nothing  Follow up: PCP   Brief Summary: Presented with nausea vomiting abdominal pain due to gastroparesis. Initially had been started on reglan which improved to baseline by the time of discharge.    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     Presenting Problem:    Chief Complaint   Patient presents with    Abdominal Pain      Consults:     Consulting Physician(s)                     None                History Of Presenting Illness:       Izabella Agudelo is a 64-year-old resident of nursing home facility with a history of hemiplegia, COPD, A-fib on Eliquis, diastolic CHF, dementia, diabetes mellitus type 2 was brought to the emergency room by EMS with symptoms of abdominal pain and vomiting.  Patient unfortunately has speech difficulty from her previous stroke and is unable to give much history.  She does however nod when asked about abdominal pain.     In the emergency room, she was afebrile and hemodynamically stable saturating at 94% on room air.  Initial troponin 18 with a repeat at 17.  Sodium 131, glucose 352, alkaline phosphatase 119, ALT 42.  Lactic acid was 3.4 with repeat at  2.4.  Lipase was normal.  CBC was unremarkable.  Urinalysis was unremarkable.  CT of the chest was negative for any pneumonia but showed atelectasis.  CT of the abdomen and pelvis showed severe fluid and gaseous distention of the stomach without evidence of obstructing process, findings may represent gastroparesis.  No evidence of small bowel obstruction.  Patient was given 500 mL of normal saline bolus, IV Zofran and was subsequently admitted to the medical floor with telemetry due to intractable nausea and vomiting and possible gastroparesis and gastric distention.  Edited by: Salo Darden DO at 7/17/2024 1028      Hospital Course:     Intractable nausea and vomiting.  - Suspected diabetic gastroparesis, improved with Reglan advancing diet, tolerated well  - At this time there is no evidence of small bowel obstruction.  I do not suspect gastroenteritis.  - No indication for NG tube at this time.  - symptoms resolved stable for DC     Diabetes mellitus with hyperglycemia.  - Was placed on subcutaneous insulin protocol per Glucomander, resume previous insulin regimen at DC.  - A1c levels 10.1.     Paroxysmal atrial fibrillation.  - Continue apixaban.     Vital Signs:    Temp:  [97.4 °F (36.3 °C)-98.4 °F (36.9 °C)] 97.6 °F (36.4 °C)  Heart Rate:  [69-99] 75  Resp:  [16-20] 17  BP: ()/() 109/67    Physical Exam:    Constitutional: No acute distress, awake, alert  HENT: NCAT, mucous membranes moist  Respiratory: Clear to auscultation bilaterally, respiratory effort normal   Cardiovascular: RRR, no murmurs, rubs, or gallops  Gastrointestinal: Positive bowel sounds, soft, nontender, nondistended  Musculoskeletal: No bilateral ankle edema  Psychiatric: Appropriate affect, cooperative  Neurologic: Oriented x 3, speech slurred chronically, moves all extremities,  Skin: No rashes  Edited by: Salo Darden DO at 7/17/2024 1038    Pertinent Lab Results:     Results from last 7 days   Lab Units  07/17/24  0345 07/16/24  1852   SODIUM mmol/L 136 131*   POTASSIUM mmol/L 4.2 4.6   CHLORIDE mmol/L 95* 89*   CO2 mmol/L 25.7 27.6   BUN mg/dL 20 16   CREATININE mg/dL 0.63 0.71   CALCIUM mg/dL 8.8 10.1   BILIRUBIN mg/dL  --  0.4   ALK PHOS U/L  --  119*   ALT (SGPT) U/L  --  42*   AST (SGOT) U/L  --  28   GLUCOSE mg/dL 339* 352*     Results from last 7 days   Lab Units 07/17/24  0345 07/16/24  1852   WBC 10*3/mm3 13.10* 9.76   HEMOGLOBIN g/dL 13.6 15.2   HEMATOCRIT % 41.4 45.9   PLATELETS 10*3/mm3 267 276         Results from last 7 days   Lab Units 07/16/24  2105 07/16/24  1852   HSTROP T ng/L 17* 18*             Results from last 7 days   Lab Units 07/16/24  1852   LIPASE U/L 21               Pertinent Radiology Results:    Imaging Results (All)       Procedure Component Value Units Date/Time    CT Chest With Contrast Diagnostic [[id number]] Collected: 07/16/24 2012     Updated: 07/16/24 2028    Narrative:      PROCEDURE: CT CHEST W CONTRAST DIAGNOSTIC-     HISTORY: altered mental status rule out pneumonia, cough     COMPARISON: 12/18/2023.     TECHNIQUE: Axial CT with IV contrast administration.     FINDINGS:     Coarse linear densities are seen of the lung bases compatible with  atelectasis and scarring. There is no evidence of pneumonia. A small  nodule of the right upper lobe is noted most likely granuloma.     Aberrant right subclavian artery is present as a normal variant..     No pleural or pericardial effusion is seen. No adenopathy or mass lesion  is present.       Impression:      1. No evidence of pneumonia. Atelectasis.        This study was performed with techniques to keep radiation doses as low  as reasonably achievable (ALARA). Individualized dose reduction  techniques using automated exposure control or adjustment of vA and/or  kV according to the patient size were employed.      This report was signed and finalized on 7/16/2024 8:26 PM by Warner Sigala MD.       CT Abdomen Pelvis With Contrast  [[id number]] Collected: 07/16/24 2026     Updated: 07/16/24 2027    Narrative:      PRELIMINARY REPORT    TECHNIQUE:  Postcontrast axial images through the abdomen and pelvis were  performed. This study was performed with techniques to keep  radiation doses as low as reasonably achievable, (ALARA).  Individualized dose reduction techniques using automated  exposure control or adjustment of mA and/or kV according to the  patient's size were employed.    CLINICAL HISTORY:  nausea vomiting assess bowel obstruction, reported prior imaging  showed possib    FINDINGS:  Abdomen: There is severe distention with fluid and gas of the  stomach without evidence of obstructing lesion or inflammatory  process within the distal stomach.  Findings likely reflective  of gastroparesis.  The liver is normal in size and attenuation.  The gallbladder is unremarkable.  The spleen is unremarkable.  The adrenals are normal.  The pancreas is unremarkable. The  kidneys enhance appropriately. There is no hydronephrosis, renal  calculi, or evidence of a solid renal mass. The aorta is normal  in caliber. No free air, free fluid or adenopathy is identified.  No findings for mechanical bowel obstruction are identified.  Pelvis: The appendix is not identified but there is no  inflammatory change in the pericecal region/right lower quadrant  to suggest acute appendicitis.  The urinary bladder is  unremarkable. No free fluid, free air, abscess or adenopathy is  identified.      Impression:      1.  Severe fluid and gaseous distention of the stomach without  evidence of obstructing process or lesion.  Findings may  represent gastroparesis.  2.  No evidence of small bowel  obstruction.    This is a preliminary report.            Echo:    Results for orders placed during the hospital encounter of 01/14/19    Adult Transthoracic Echo Complete W/ Cont if Necessary Per Protocol    Interpretation Summary  · Left ventricular wall thickness is consistent with  mild concentric hypertrophy.    Technically adequate study  1) Mild LVH with normal LV systolic function ( EF is over 55%)  2) Moderate bi atrial enlargement with mild elevation in LVedp  3) Calcified posterior leaflet of the mitral valve with mild MR  4) Sigmoid septum with dynamic narrowing of the aortic outflow in midsystolic - no gradient noted  5) Aortic sclerosis without stenosis  6) Mild TR with PAsp of 40 mm of hg  7) Mild Dilation of the RV with normal function    Condition on Discharge:      Stable.    Code status during the hospital stay:    Code Status and Medical Interventions:   Ordered at: 07/16/24 2348     Code Status (Patient has no pulse and is not breathing):    CPR (Attempt to Resuscitate)     Medical Interventions (Patient has pulse or is breathing):    Full Support     Discharge Disposition:    Long Term Care (DC - External)    Discharge Medications:       Discharge Medications        New Medications        Instructions Start Date   metoclopramide 10 MG tablet  Commonly known as: Reglan   10 mg, Oral, 4 Times Daily Before Meals & Nightly             Changes to Medications        Instructions Start Date   ondansetron ODT 4 MG disintegrating tablet  Commonly known as: ZOFRAN-ODT  What changed: Another medication with the same name was added. Make sure you understand how and when to take each.   4 mg, Translingual, Every 6 Hours PRN      ondansetron ODT 4 MG disintegrating tablet  Commonly known as: ZOFRAN-ODT  What changed: You were already taking a medication with the same name, and this prescription was added. Make sure you understand how and when to take each.   4 mg, Translingual, Every 8 Hours PRN             Continue These Medications        Instructions Start Date   acetaminophen 650 MG 8 hr tablet  Commonly known as: TYLENOL   650 mg, Oral, 4 Times Daily PRN      acetaminophen 650 MG suppository  Commonly known as: TYLENOL   650 mg, Rectal, Every 4 Hours PRN      aluminum-magnesium  hydroxide-simethicone 200-200-20 MG/5ML suspension  Commonly known as: MAALOX/MYLANTA   20 mL, Oral, Every 8 Hours PRN      apixaban 5 MG tablet tablet  Commonly known as: ELIQUIS   5 mg, Oral, 2 Times Daily      ARIPiprazole 20 MG tablet  Commonly known as: ABILIFY   30 mg, Oral, Daily      atorvastatin 40 MG tablet  Commonly known as: LIPITOR   40 mg, Oral, Nightly      B-D UF III MINI PEN NEEDLES 31G X 5 MM misc  Generic drug: Insulin Pen Needle   USE 3 TIMES A DAY      cholecalciferol 1.25 MG (19186 UT) capsule  Commonly known as: VITAMIN D3   50,000 Units, Oral, Every 7 Days, Every Thursday      divalproex 500 MG DR tablet  Commonly known as: DEPAKOTE   500 mg, 2 Times Daily      Divalproex Sodium 125 MG capsule  Commonly known as: DEPAKOTE SPRINKLE   500 mg, Oral, 2 Times Daily      Dulcolax 10 MG suppository  Generic drug: bisacodyl   10 mg, Rectal, Daily PRN      gabapentin 400 MG capsule  Commonly known as: NEURONTIN   400 mg, Oral, 3 Times Daily      Insulin Aspart 100 UNIT/ML injection  Commonly known as: novoLOG   3-14 Units, Subcutaneous, 4 Times Daily Before Meals & Nightly      Insulin Degludec 100 UNIT/ML solution injection  Commonly known as: TRESIBA   50 Units, Subcutaneous, 2 Times Daily      Insulin Lispro 100 UNIT/ML injection  Commonly known as: humaLOG   14 Units, Subcutaneous, 3 Times Daily Before Meals      mirtazapine 15 MG tablet  Commonly known as: REMERON   15 mg, Oral, Nightly      nitrofurantoin (macrocrystal-monohydrate) 100 MG capsule  Commonly known as: Macrobid   100 mg, Oral, Nightly      omeprazole 20 MG capsule  Commonly known as: priLOSEC   20 mg, Oral, Daily      ONE TOUCH ULTRA TEST test strip  Generic drug: glucose blood   TEST 2 TIMES A DAY      OneTouch Delica Lancets 33G misc   TEST TWICE A DAY      senna 8.6 MG tablet  Commonly known as: SENOKOT   1 tablet, Oral, 2 Times Daily PRN      travoprost (ZENOBIA free) 0.004 % solution ophthalmic solution  Commonly known as:  TRAVATAN   1 drop, Both Eyes, Every Evening, in affected eye(s)      vitamin C 250 MG tablet  Commonly known as: ASCORBIC ACID   Take 1 tablet by mouth 2 (Two) Times a Day.      zinc sulfate 220 (50 Zn) MG capsule  Commonly known as: ZINCATE   220 mg, Oral, Daily             Stop These Medications      insulin detemir 100 UNIT/ML injection  Commonly known as: LEVEMIR            Discharge Diet:     Diet Instructions       Advance Diet As Tolerated -Target Diet: soft to chew diabetic      Target Diet: soft to chew diabetic          Activity at Discharge:       Follow-up Appointments:    Additional Instructions for the Follow-ups that You Need to Schedule       Discharge Follow-up with PCP   As directed       Currently Documented PCP:    Fabian Santos DO    PCP Phone Number:    [phone]     Follow Up Details: 1 week               Follow-up Information       [facility] EMERGENCY DEPARTMENT.    Specialty: Emergency Medicine  Contact information:  [address] [facility] [id number] [phone]             Fabian Santos DO.    Specialty: Family Medicine  Contact information:  [address] [address]  [phone]               Fabian Santos DO .    Specialty: Family Medicine  Why: 1 week  Contact information:  [address] [address]  [phone]                           Future Appointments   Date Time Provider Department Center   8/28/2024 11:30 AM Dionte Garzon APRN [facility] (     Test Results Pending at Discharge:    Pending Labs       Order Current Status    Acinetobacter Screen - Swab, Axilla, Left In process    MRSA Screen, PCR (Inpatient) - Swab, Nares In process    VRE Culture - Swab, Per Rectum In process               Salo Darden DO  07/17/24  14:36 EDT    Time: I spent 45 minutes on this discharge activity which included: face-to-face encounter with the patient, reviewing the data in the system, coordination of the care with  the nursing staff as well as consultants, documentation, and entering orders.     Dictated utilizing Dragon dictation.

## 2024-07-17 NOTE — PROGRESS NOTES
"Dietitian Assessment    Patient Name: Izabella Agudelo  YOB: 1960  MRN: [id number]  Admission date: 7/16/2024    Comment:        Clinical Nutrition Assessment      Reason for Assessment MST=2   H&P  Past Medical History:   Diagnosis Date    Acute angle-closure glaucoma, bilateral     Aphasia     Cardiac abnormality     CHF (congestive heart failure)     COPD (chronic obstructive pulmonary disease)     Diabetes     Glaucoma     Hemiparesis     Hemiplegia     Impaired functional mobility, balance, gait, and endurance     Schizophrenia, chronic condition     Vascular dementia        History reviewed. No pertinent surgical history.         Current Problems        Encounter Information        Trending Narrative     7/17: Pt admitted with n/v. EMR does not show any wt loss. One meal documented at 50%. RD will f/up and add ONS once PO intake is established.      Anthropometrics        Current Height, Weight Height: 172.7 cm (68\")  Weight: 86.6 kg (190 lb 14.7 oz) (07/16/24 1848)   Trending Weight Hx     This admission:              PTA:     Wt Readings from Last 30 Encounters:   07/16/24 1848 86.6 kg (190 lb 14.7 oz)   05/15/24 0846 86.6 kg (191 lb)   04/16/24 1455 83 kg (183 lb)   03/22/24 1030 82.6 kg (182 lb)   03/20/24 0853 82.6 kg (182 lb)   02/22/24 1111 82.6 kg (182 lb)   01/24/24 1456 82.6 kg (182 lb)   01/17/24 0811 82.6 kg (182 lb)   01/09/24 1609 82.6 kg (182 lb)   12/27/23 1534 84.4 kg (186 lb)   12/21/23 0400 82.9 kg (182 lb 12.2 oz)   12/20/23 0400 85.8 kg (189 lb 2.5 oz)   12/18/23 1730 90.7 kg (200 lb)   10/19/23 1327 83.3 kg (183 lb 9.6 oz)   10/18/23 0839 83.3 kg (183 lb 9.6 oz)   10/14/23 0344 84.4 kg (186 lb 1.1 oz)   10/13/23 2251 84.8 kg (186 lb 15.2 oz)   10/13/23 0400 83.2 kg (183 lb 6.8 oz)   10/12/23 0300 79.4 kg (175 lb 0.7 oz)   10/11/23 2200 79.4 kg (175 lb 0.7 oz)   10/11/23 1920 83 kg (183 lb)   10/11/23 1407 83 kg (183 lb)   10/03/23 1541 83 kg (183 lb)   08/18/23 1506 " 77.5 kg (170 lb 12.8 oz)   08/16/23 1315 77.5 kg (170 lb 12.8 oz)   06/21/23 0956 77.6 kg (171 lb)   06/02/23 0813 77.2 kg (170 lb 3.2 oz)   04/19/23 0900 78 kg (172 lb)   03/06/23 1456 76.7 kg (169 lb)   03/02/23 1904 76.7 kg (169 lb)   02/15/23 1029 76.2 kg (168 lb)   12/21/22 0837 76.4 kg (168 lb 7 oz)   10/19/22 0855 77.1 kg (170 lb)   09/06/22 1522 75.8 kg (167 lb)   08/18/22 1340 75.3 kg (165 lb 14.4 oz)   08/17/22 0946 75.3 kg (166 lb)   07/25/22 1624 76 kg (167 lb 8 oz)   07/22/22 0936 76 kg (167 lb 8 oz)      BMI kg/m2 Body mass index is 29.03 kg/m².     Labs        Pertinent Labs     Results from last 7 days   Lab Units 07/17/24  0345 07/16/24  1852   SODIUM mmol/L 136 131*   POTASSIUM mmol/L 4.2 4.6   CHLORIDE mmol/L 95* 89*   CO2 mmol/L 25.7 27.6   BUN mg/dL 20 16   CREATININE mg/dL 0.63 0.71   CALCIUM mg/dL 8.8 10.1   BILIRUBIN mg/dL  --  0.4   ALK PHOS U/L  --  119*   ALT (SGPT) U/L  --  42*   AST (SGOT) U/L  --  28   GLUCOSE mg/dL 339* 352*       Results from last 7 days   Lab Units 07/17/24  0345   HEMOGLOBIN g/dL 13.6   HEMATOCRIT % 41.4       Lab Results   Component Value Date    HGBA1C 10.10 (H) 07/16/2024            Medications       Scheduled Medications apixaban, 5 mg, Oral, BID  divalproex, 500 mg, Oral, BID  gabapentin, 400 mg, Oral, TID  insulin glargine, 1-200 Units, Subcutaneous, Nightly - Glucommander  insulin lispro, 1-200 Units, Subcutaneous, 4x Daily With Meals & Nightly  latanoprost, 1 drop, Both Eyes, Nightly  metoclopramide, 5 mg, Intravenous, Q6H  pantoprazole, 40 mg, Oral, Q AM  sodium chloride, 10 mL, Intravenous, Q12H        Infusions sodium chloride, 75 mL/hr, Last Rate: 75 mL/hr (07/17/24 0041)         PRN Medications   senna-docusate sodium **AND** polyethylene glycol **AND** bisacodyl **AND** bisacodyl    dextrose    dextrose    glucagon (human recombinant)    insulin lispro    ondansetron    sodium chloride    sodium chloride    sodium chloride     Physical Findings         Trending Physical   Appearance, NFPE    --  Edema  None noted     Bowel Function LBM: 7/17     Tubes Peripheral IV     Chewing/Swallowing Requires mechanically altered diet     Skin WNL       Estimated/Assessed Needs       Energy Requirements    EST Needs, Method, Wt used 0742-4790 kcal (25-30 kcal/kg CBW)       Protein Requirements    EST Needs, Method, Wt used 68-86 g pro (.8-1 g pro/kg CBW)       Fluid Requirements     Estimated Needs (mL/day) 0733-2793 mL       Current Nutrition Orders & Evaluation of Intake       Oral Nutrition     Food Allergies    Current PO Diet Diet: Regular/House, Diabetic; Consistent Carbohydrate; Texture: Soft to Chew (NDD 3); Soft to Chew: Chopped Meat; Fluid Consistency: Thin (IDDSI 0)   Supplement    PO Evaluation     Trending % PO Intake 50% x 1 meal       Nutrition Diagnosis         Nutrition Dx Problem 1 Altered nutrition lab value r/t DM AEB A1C=10.10.      Nutrition Dx Problem 2        Intervention Goal         Intervention Goal(s) Maintain CBW  PO Intake meet >50% of estimated needs  Blood glucose WNL     Nutrition Intervention        RD Action No action at this time     Nutrition Prescription          Diet Prescription Regular, CCD, soft to chew   Supplement Prescription      Enteral Prescription        TPN Prescription      Monitor/Evaluation        Monitor Per protocol, I&O, PO intake, Supplement intake, Pertinent labs, Weight, Skin status, GI status, Symptoms, POC/GOC, Swallow function, Hemodynamic stability     RD to f/up    Electronically signed by:  Alison Kay RD  07/17/24 12:21 EDT

## 2024-07-17 NOTE — CASE MANAGEMENT/SOCIAL WORK
"Discharge Planning Assessment  [facility]     Patient Name: Izabella Agudelo  MRN: [id number]  Today's Date: 7/17/2024    Admit Date: 7/16/2024    Plan: Pt awake at time of visit.  Noted pt has a Legal Guardian--Emi Horvath (pt's sister).  Pt was able to tell me she is living at [facility].  Informed her I was going to contact her sister & guardian, Emi, for some answers to questions.  Pt was agreeable.   Contacted Emi Horvath, Legal Guardian--[phone].  She confirmed pt is Long Term Care at [facility] and has been there since her \"storke\" in 2021.  Ms Horvath confirmed pt's PCP, telephone numbers and contact persons.  She requested pt's brother, Rene Agudelo, be listed as second contact ---[phone].  She will provide his telephone number when she visits pt this am.  Ms Horvath reported pt is essentially non-ambulatory since her stroke.  She does receive \"restorative PT\" she thinks they see her 2-3 times a week.  She reported pt is dependent on most of ADLs but is able to feed herself.  Guardian confirmed she does make decisions for pt.  Plans are for pt to return to [facility] [place]   Explained the MOON form and stressed pt would need to be \"Inpt status\" for Medicare to pay for short term rehab.  She verbalized understanding.  SDOH completed as possible.   Discharge Needs Assessment       Row Name 07/17/24 7950       Living Environment    People in Home other (see comments)  [facility]    Potentially Unsafe Housing Conditions unable to assess    In the past 12 months has the electric, gas, oil, or water company threatened to shut off services in your home? No    Primary Care Provided by other (see comments)  Facility staff    Provides Primary Care For no one, unable/limited ability to care for self    Family Caregiver if Needed other (see comments)  Facility staff    Quality of Family Relationships supportive    Able to Return to Prior " "Arrangements yes       Resource/Environmental Concerns    Resource/Environmental Concerns none    Transportation Concerns none       Transportation Needs    In the past 12 months, has lack of transportation kept you from medical appointments or from getting medications? no    In the past 12 months, has lack of transportation kept you from meetings, work, or from getting things needed for daily living? No       Food Insecurity    Within the past 12 months, you worried that your food would run out before you got the money to buy more. Never true    Within the past 12 months, the food you bought just didn't last and you didn't have money to get more. Never true       Transition Planning    Patient/Family Anticipates Transition to long-term care facility    Patient/Family Anticipated Services at Transition other (see comments)  Guardian reports pt receives \"restorative PT\"    Transportation Anticipated health plan transportation       Discharge Needs Assessment    Readmission Within the Last 30 Days no previous admission in last 30 days    Equipment Currently Used at Home wheelchair;glucometer;hospital bed;other (see comments)  Pt is resident of LTC    Concerns to be Addressed denies needs/concerns at this time    Anticipated Changes Related to Illness inability to care for self    Equipment Needed After Discharge none    Discharge Facility/Level of Care Needs nursing facility, intermediate;other (see comments)  Return to Long Term Care    Provided Post Acute Provider Quality & Resource List? N/A    Current Discharge Risk chronically ill;cognitively impaired                   Discharge Plan    No documentation.                 Continued Care and Services - Admitted Since 7/16/2024       Destination Coordination complete.      Service Provider Request Status Selected Services Address Phone Fax Patient Preferred    [facility]  Selected Nursing Home [address] [address] " [phone] --                  Expected Discharge Date and Time       Expected Discharge Date Expected Discharge Time    Jul 17, 2024            Demographic Summary    No documentation.                  Functional Status       Row Name 07/17/24 1438       Functional Status    Usual Activity Tolerance poor    Current Activity Tolerance poor    Functional Status Comments Dependent of ADL's   Is able to feed self.  Non-ambulatory--uses WC       Physical Activity    On average, how many days per week do you engage in moderate to strenuous exercise (like a brisk walk)? 3 days    On average, how many minutes do you engage in exercise at this level? 20 min    Number of minutes of exercise per week 60       Functional Status, IADL    Medications completely dependent    Meal Preparation completely dependent    Housekeeping completely dependent    Laundry completely dependent    Shopping completely dependent    IADL Comments Dependent of ADS's except is able to feed self       Mental Status Summary    Recent Changes in Mental Status/Cognitive Functioning unable to assess                   Psychosocial    No documentation.                  Abuse/Neglect    No documentation.                  Legal    No documentation.                  Substance Abuse    No documentation.                  Patient Forms    No documentation.                     Kristy Camarena RN

## 2024-07-17 NOTE — H&P
[facility]   HISTORY AND PHYSICAL      Name:  Izabella Agudelo   Age:  64 y.o.  Sex:  female  :  1960  MRN:  [id number]   Visit Number:  [id number]  Admission Date:  2024  Date Of Service:  24  Primary Care Physician:  Fabian Santos DO    Chief Complaint:     Abdominal pain and vomiting.    History Of Presenting Illness:      Izabella Agudelo is a 64-year-old resident of nursing home facility with a history of hemiplegia, COPD, A-fib on Eliquis, diastolic CHF, dementia, diabetes mellitus type 2 was brought to the emergency room by EMS with symptoms of abdominal pain and vomiting.  Patient unfortunately has speech difficulty from her previous stroke and is unable to give much history.  She does however nod when asked about abdominal pain.    In the emergency room, she was afebrile and hemodynamically stable saturating at 94% on room air.  Initial troponin 18 with a repeat at 17.  Sodium 131, glucose 352, alkaline phosphatase 119, ALT 42.  Lactic acid was 3.4 with repeat at 2.4.  Lipase was normal.  CBC was unremarkable.  Urinalysis was unremarkable.  CT of the chest was negative for any pneumonia but showed atelectasis.  CT of the abdomen and pelvis showed severe fluid and gaseous distention of the stomach without evidence of obstructing process, findings may represent gastroparesis.  No evidence of small bowel obstruction.  Patient was given 500 mL of normal saline bolus, IV Zofran and was subsequently admitted to the medical floor with telemetry due to intractable nausea and vomiting and possible gastroparesis and gastric distention.    Review Of Systems:    All systems were reviewed and negative except as mentioned in history of presenting illness, assessment and plan.    Past Medical History: Patient  has a past medical history of Acute angle-closure glaucoma, bilateral, Aphasia, Cardiac abnormality, CHF (congestive heart failure), COPD (chronic  obstructive pulmonary disease), Diabetes, Glaucoma, Hemiparesis, Hemiplegia, Impaired functional mobility, balance, gait, and endurance, Schizophrenia, chronic condition, and Vascular dementia.    Past Surgical History: Patient  has no past surgical history on file.    Social History: Patient  reports that she has never smoked. She has never been exposed to tobacco smoke. She has never used smokeless tobacco. She reports that she does not drink alcohol and does not use drugs.    Family History:  Patient family history includes Diabetes in an other family member; Glaucoma in an other family member.      Allergies:      Penicillins    Home Medications:    Prior to Admission Medications       Prescriptions Last Dose Informant Patient Reported? Taking?    acetaminophen (TYLENOL) 650 MG 8 hr tablet   Yes No    Take 1 tablet by mouth 4 (Four) Times a Day As Needed for Mild Pain.    acetaminophen (TYLENOL) 650 MG suppository   Yes No    Insert 1 suppository into the rectum Every 4 (Four) Hours As Needed for Mild Pain.    aluminum-magnesium hydroxide-simethicone (MAALOX/MYLANTA) 200-200-20 MG/5ML suspension   Yes No    Take 20 mL by mouth Every 8 (Eight) Hours As Needed for Indigestion or Heartburn.    apixaban (ELIQUIS) 5 MG tablet tablet   Yes No    Take 1 tablet by mouth 2 (Two) Times a Day.    ARIPiprazole (ABILIFY) 20 MG tablet   No No    Take 1.5 tablets by mouth Daily.    atorvastatin (LIPITOR) 40 MG tablet   Yes No    Take 1 tablet by mouth Every Night.    B-D UF III MINI PEN NEEDLES 31G X 5 MM misc   Yes No    USE 3 TIMES A DAY    bisacodyl (Dulcolax) 10 MG suppository   Yes No    Insert 1 suppository into the rectum Daily As Needed for Constipation.    divalproex (DEPAKOTE) 500 MG DR tablet   Yes No    Take 1 tablet by mouth 2 (Two) Times a Day.    gabapentin (NEURONTIN) 400 MG capsule   No No    Take 1 capsule by mouth 3 (Three) Times a Day.    Insulin Aspart (novoLOG) 100 UNIT/ML injection   No No    Inject 3-14  Units under the skin into the appropriate area as directed 4 (Four) Times a Day Before Meals & at Bedtime.    insulin detemir (LEVEMIR) 100 UNIT/ML injection   No No    Inject 15 Units under the skin into the appropriate area as directed 2 (Two) Times a Day.    mirtazapine (REMERON) 15 MG tablet   Yes No    Take 1 tablet by mouth Every Night.    nitrofurantoin, macrocrystal-monohydrate, (Macrobid) 100 MG capsule   No No    Take 1 capsule by mouth Every Night.    omeprazole (priLOSEC) 20 MG capsule   Yes No    Take 1 capsule by mouth Daily.    ondansetron ODT (ZOFRAN-ODT) 4 MG disintegrating tablet   Yes No    Place 1 tablet on the tongue Every 6 (Six) Hours As Needed for Nausea or Vomiting.    ONE TOUCH ULTRA TEST test strip   Yes No    TEST 2 TIMES A DAY    ONETOUCH DELICA LANCETS 33G misc   Yes No    TEST TWICE A DAY    pantoprazole (Protonix) 40 MG EC tablet   No No    Take 1 tablet by mouth Daily.    senna 8.6 MG tablet   Yes No    Take 1 tablet by mouth 2 (Two) Times a Day As Needed for Constipation.    travoprost, BAK free, (TRAVATAN) 0.004 % solution ophthalmic solution   Yes No    Administer 1 drop to both eyes Every Evening. in affected eye(s)    vitamin C (ASCORBIC ACID) 250 MG tablet   Yes No    vitamin D (ERGOCALCIFEROL) 1.25 MG (23123 UT) capsule capsule   Yes No    Take 1 capsule by mouth 1 (One) Time Per Week. On Thursdays     ED Medications:    Medications   sodium chloride 0.9 % flush 10 mL (has no administration in time range)   ondansetron (ZOFRAN) injection 4 mg (4 mg Intravenous Given 7/16/24 1917)   sodium chloride 0.9 % bolus 500 mL (0 mL Intravenous Stopped 7/16/24 2106)   iopamidol (ISOVUE-300) 61 % injection 100 mL (100 mL Intravenous Given 7/16/24 1939)   ondansetron (ZOFRAN) injection 4 mg (4 mg Intravenous Given 7/16/24 2306)     Vital Signs:  Temp:  [97.8 °F (36.6 °C)] 97.8 °F (36.6 °C)  Heart Rate:  [78-98] 93  Resp:  [20] 20  BP: (137-157)/(83-99) 154/99        07/16/24  2759  "  Weight: 86.6 kg (190 lb 14.7 oz)     Body mass index is 29.03 kg/m².    Physical Exam:     Most recent vital Signs: /99 (BP Location: Left arm, Patient Position: Lying)   Pulse 93   Temp 97.8 °F (36.6 °C) (Oral)   Resp 20   Ht 172.7 cm (68\")   Wt 86.6 kg (190 lb 14.7 oz)   SpO2 93%   BMI 29.03 kg/m²     Physical Exam  Constitutional:       General: She is not in acute distress.     Appearance: She is obese. She is ill-appearing.   HENT:      Head: Normocephalic and atraumatic.      Right Ear: External ear normal.      Left Ear: External ear normal.      Nose: Nose normal.      Mouth/Throat:      Mouth: Mucous membranes are moist.   Eyes:      Extraocular Movements: Extraocular movements intact.      Conjunctiva/sclera: Conjunctivae normal.   Cardiovascular:      Rate and Rhythm: Normal rate and regular rhythm.      Pulses: Normal pulses.      Heart sounds: Normal heart sounds. No murmur heard.  Pulmonary:      Effort: Pulmonary effort is normal.      Breath sounds: Normal breath sounds. No wheezing or rales.   Abdominal:      General: There is distension.      Tenderness: There is abdominal tenderness. There is no rebound.      Comments: Abdomen is distended and tympanic on percussion.  Bowel sounds decreased.  Diffuse minimal tenderness noted.   Musculoskeletal:      Cervical back: Neck supple.      Right lower leg: Edema present.      Left lower leg: Edema present.   Skin:     General: Skin is warm.      Findings: No erythema or rash.   Neurological:      Mental Status: She is alert. Mental status is at baseline.      Comments: Alert but does seem to have dysphasia.  Moves all 4 limbs but does have generalized weakness.   Psychiatric:         Mood and Affect: Mood normal.         Behavior: Behavior normal.       Laboratory data:    I have reviewed the labs done in the emergency room.    Results from last 7 days   Lab Units 07/16/24  1852   SODIUM mmol/L 131*   POTASSIUM mmol/L 4.6   CHLORIDE mmol/L " 89*   CO2 mmol/L 27.6   BUN mg/dL 16   CREATININE mg/dL 0.71   CALCIUM mg/dL 10.1   BILIRUBIN mg/dL 0.4   ALK PHOS U/L 119*   ALT (SGPT) U/L 42*   AST (SGOT) U/L 28   GLUCOSE mg/dL 352*     Results from last 7 days   Lab Units 07/16/24  1852   WBC 10*3/mm3 9.76   HEMOGLOBIN g/dL 15.2   HEMATOCRIT % 45.9   PLATELETS 10*3/mm3 276         Results from last 7 days   Lab Units 07/16/24  2105 07/16/24  1852   HSTROP T ng/L 17* 18*       Results from last 7 days   Lab Units 07/16/24  1852   LIPASE U/L 21         Results from last 7 days   Lab Units 07/16/24  1915   COLOR UA  Yellow   GLUCOSE UA  Negative   KETONES UA  Trace*   BLOOD UA  Trace*   LEUKOCYTES UA  Trace*   PH, URINE  5.5   BILIRUBIN UA  Negative   UROBILINOGEN UA  0.2 E.U./dL   RBC UA /HPF 0-2   WBC UA /HPF 0-2     EKG:      EKG done in the emergency room was reviewed by me.  It shows sinus rhythm at 95 bpm.  First-degree AV block noted.  Normal axis.  T inversions noted in lead III and aVF.  Poor R wave progression noted on the chest leads.    Radiology:    CT Chest With Contrast Diagnostic    Result Date: 7/16/2024  PROCEDURE: CT CHEST W CONTRAST DIAGNOSTIC-  HISTORY: altered mental status rule out pneumonia, cough  COMPARISON: 12/18/2023.  TECHNIQUE: Axial CT with IV contrast administration.  FINDINGS:  Coarse linear densities are seen of the lung bases compatible with atelectasis and scarring. There is no evidence of pneumonia. A small nodule of the right upper lobe is noted most likely granuloma.  Aberrant right subclavian artery is present as a normal variant..  No pleural or pericardial effusion is seen. No adenopathy or mass lesion is present.      1. No evidence of pneumonia. Atelectasis.   This study was performed with techniques to keep radiation doses as low as reasonably achievable (ALARA). Individualized dose reduction techniques using automated exposure control or adjustment of vA and/or kV according to the patient size were employed.  This  report was signed and finalized on 7/16/2024 8:26 PM by Warner Sigala MD.      CT Abdomen Pelvis With Contrast    Result Date: 7/16/2024  PRELIMINARY REPORT TECHNIQUE: Postcontrast axial images through the abdomen and pelvis were performed. This study was performed with techniques to keep radiation doses as low as reasonably achievable, (ALARA). Individualized dose reduction techniques using automated exposure control or adjustment of mA and/or kV according to the patient's size were employed. CLINICAL HISTORY: nausea vomiting assess bowel obstruction, reported prior imaging showed possib FINDINGS: Abdomen: There is severe distention with fluid and gas of the stomach without evidence of obstructing lesion or inflammatory process within the distal stomach.  Findings likely reflective of gastroparesis.  The liver is normal in size and attenuation. The gallbladder is unremarkable.  The spleen is unremarkable. The adrenals are normal.  The pancreas is unremarkable. The kidneys enhance appropriately. There is no hydronephrosis, renal calculi, or evidence of a solid renal mass. The aorta is normal in caliber. No free air, free fluid or adenopathy is identified. No findings for mechanical bowel obstruction are identified. Pelvis: The appendix is not identified but there is no inflammatory change in the pericecal region/right lower quadrant to suggest acute appendicitis.  The urinary bladder is unremarkable. No free fluid, free air, abscess or adenopathy is identified.     1.  Severe fluid and gaseous distention of the stomach without evidence of obstructing process or lesion.  Findings may represent gastroparesis.  2.  No evidence of small bowel obstruction. This is a preliminary report.     Assessment:    Intractable nausea and vomiting secondary to #2, POA.  Diabetic gastroparesis, POA.  Diabetes mellitus type 2 with hyperglycemia, POA.  Paroxysmal atrial fibrillation on apixaban.  HFpEF.  History of  CVA.    Plan:    Intractable nausea and vomiting.  - Suspected diabetic gastroparesis.  - We will keep her n.p.o. and start her on Reglan along with Zofran.  - At this time there is no evidence of small bowel obstruction.  I do not suspect gastroenteritis.  - No indication for NG tube at this time.    Diabetes mellitus with hyperglycemia.  - She will be placed on subcutaneous insulin protocol per Glucomander.  - Obtain hemoglobin A1c levels.    Paroxysmal atrial fibrillation.  - Continue apixaban.    Risk Assessment: Moderate  DVT Prophylaxis: Apixaban  Code Status: Full  Diet: N.p.o. with ice chips      Alvarez Laughlin MD  07/16/24  23:07 EDT    Dictated utilizing Dragon dictation.

## 2024-07-17 NOTE — CASE MANAGEMENT/SOCIAL WORK
"Discharge Planning Assessment  [facility]     Patient Name: Izabella Agudelo  MRN: [id number]  Today's Date: 7/17/2024    Admit Date: 7/16/2024    Plan: Pt awake at time of visit.  Noted pt has a Legal Guardian--Emi Horvath (pt's sister).  Pt was able to tell me she is living at [facility].  Informed her I was going to contact her sister & guardian, Emi, for some answers to questions.  Pt was agreeable.   Contacted Emi Horvath, Legal Guardian--[phone].  She confirmed pt is Long Term Care at [facility] and has been there since her \"storke\" in 2021.  Ms Horvath confirmed pt's PCP, telephone numbers and contact persons.  She requested pt's brother, Rene Agudelo, be listed as second contact ---[phone].  She will provide his telephone number when she visits pt this am.  Ms Horvath reported pt is essentially non-ambulatory since her stroke.  She does receive \"restorative PT\" she thinks they see her 2-3 times a week.  She reported pt is dependent on most of ADLs but is able to feed herself.  Guardian confirmed she does make decisions for pt.  Plans are for pt to return to [facility] [place]   Explained the MOON form and stressed pt would need to be \"Inpt status\" for Medicare to pay for short term rehab.  She verbalized understanding.  SDOH completed as possible.   Discharge Needs Assessment    No documentation.                  Discharge Plan    No documentation.                 Continued Care and Services - Admitted Since 7/16/2024    No active coordination exists for this encounter.       Expected Discharge Date and Time       Expected Discharge Date Expected Discharge Time    Jul 17, 2024            Demographic Summary    No documentation.                  Functional Status    No documentation.                  Psychosocial    No documentation.                  Abuse/Neglect    No documentation.                  Legal    No documentation.                  Substance Abuse  "   No documentation.                  Patient Forms    No documentation.                     Kristy Camarena RN

## 2024-07-17 NOTE — DISCHARGE INSTRUCTIONS
Follow-up with primary care physician.  Zofran has been sent as needed for nausea vomiting.  Return to the ER for worsening symptoms.

## 2024-07-17 NOTE — CASE MANAGEMENT/SOCIAL WORK
Case Management Discharge Note      Final Note: Pt discharged back to [facility] via [facility].    Provided Post Acute Provider List?: N/A  Provided Post Acute Provider Quality & Resource List?: N/A    Selected Continued Care - Admitted Since 7/16/2024       Destination Coordination complete.      Service Provider Selected Services Address Phone Fax Patient Preferred    [facility] [address] [facility] [address] [phone] --              Durable Medical Equipment    No services have been selected for the patient.                Dialysis/Infusion    No services have been selected for the patient.                Home Medical Care    No services have been selected for the patient.                Therapy    No services have been selected for the patient.                Community Resources    No services have been selected for the patient.                Community & DME    No services have been selected for the patient.                    Transportation Services  Ambulance: [facility]    Final Discharge Disposition Code: 04 - intermediate care facility

## 2024-07-19 ENCOUNTER — NURSING HOME (OUTPATIENT)
Dept: FAMILY MEDICINE CLINIC | Facility: CLINIC | Age: 64
End: 2024-07-19
Payer: MEDICARE

## 2024-07-19 VITALS
DIASTOLIC BLOOD PRESSURE: 67 MMHG | HEART RATE: 75 BPM | SYSTOLIC BLOOD PRESSURE: 109 MMHG | RESPIRATION RATE: 18 BRPM | TEMPERATURE: 97.4 F

## 2024-07-19 DIAGNOSIS — Z78.9 IMPAIRED MOBILITY AND ADLS: ICD-10-CM

## 2024-07-19 DIAGNOSIS — Z87.898 HISTORY OF NAUSEA AND VOMITING: ICD-10-CM

## 2024-07-19 DIAGNOSIS — Z79.899 MEDICATION MANAGEMENT: ICD-10-CM

## 2024-07-19 DIAGNOSIS — E11.65 UNCONTROLLED DIABETES MELLITUS WITH HYPERGLYCEMIA, WITH LONG-TERM CURRENT USE OF INSULIN: ICD-10-CM

## 2024-07-19 DIAGNOSIS — K31.84 DIABETIC GASTROPARESIS: ICD-10-CM

## 2024-07-19 DIAGNOSIS — Z09 HOSPITAL DISCHARGE FOLLOW-UP: ICD-10-CM

## 2024-07-19 DIAGNOSIS — Z74.09 IMPAIRED MOBILITY AND ADLS: ICD-10-CM

## 2024-07-19 DIAGNOSIS — E11.43 DIABETIC GASTROPARESIS: ICD-10-CM

## 2024-07-19 DIAGNOSIS — Z79.4 UNCONTROLLED DIABETES MELLITUS WITH HYPERGLYCEMIA, WITH LONG-TERM CURRENT USE OF INSULIN: ICD-10-CM

## 2024-07-19 LAB — VRE SPEC QL CULT: NORMAL

## 2024-07-19 PROCEDURE — 99309 SBSQ NF CARE MODERATE MDM 30: CPT | Performed by: NURSE PRACTITIONER

## 2024-07-19 NOTE — LETTER
"Nursing Home Follow Up Note      Fabian Santos DO []   [facility] [x]  [address] [place] [address]  Phone: [phone]  Fax: [phone] Yohan Cuellar MD []    Chaz Mandel DO []   [address] [place] [address]  Phone: [phone]  Fax: [phone]     PATIENT NAME: Izabella Agudelo                                                                          YOB: 1960           DATE OF SERVICE: 7/20/2024  FACILITY:  [][facility]   [facility]   [x] [facility]   [] [facility]   [] Other ______________________________________________________________________      CHIEF COMPLAINT:    Follow-up hospital visit.     HISTORY OF PRESENT ILLNESS:     Patient was sent to [facility] on 7/16, for abdominal pain, vomiting and abnormal KUB, which reported constipation and possible blockage.   \"In the emergency room, she was afebrile and hemodynamically stable saturating at 94% on room air.  Initial troponin 18 with a repeat at 17.  Sodium 131, glucose 352, alkaline phosphatase 119, ALT 42.  Lactic acid was 3.4 with repeat at 2.4.  Lipase was normal.  CBC was unremarkable.  Urinalysis was unremarkable.  CT of the chest was negative for any pneumonia but showed atelectasis.  CT of the abdomen and pelvis showed severe fluid and gaseous distention of the stomach without evidence of obstructing process, findings may represent gastroparesis.  No evidence of small bowel obstruction.  Patient was given 500 mL of normal saline bolus, IV Zofran and was subsequently admitted to the medical floor with telemetry due to intractable nausea and vomiting and possible gastroparesis and gastric distention.\"     Once stable, she was readmitted back to facility on 7/17. Nursing reports she ate her entire dinner tray, extra food and then some snacks, then had vomiting. They had her to full liquids this morning and she did well. They feel she just ate too much. No vomiting or complaints today. Resting in " bed.       PAST MEDICAL & SURGICAL HISTORY:   Past Medical History:   Diagnosis Date    Acute angle-closure glaucoma, bilateral     Aphasia     Cardiac abnormality     CHF (congestive heart failure)     COPD (chronic obstructive pulmonary disease)     Diabetes     Glaucoma     Hemiparesis     Hemiplegia     Impaired functional mobility, balance, gait, and endurance     Schizophrenia, chronic condition     Vascular dementia       No past surgical history on file.      MEDICATIONS:  I have reviewed and reconciled the patients medication list in the patients chart at the skilled nursing facility today.      ALLERGIES:    Allergies   Allergen Reactions    Penicillins Rash         SOCIAL HISTORY:    Social History     Socioeconomic History    Marital status: Single   Tobacco Use    Smoking status: Never     Passive exposure: Never    Smokeless tobacco: Never   Vaping Use    Vaping status: Never Used   Substance and Sexual Activity    Alcohol use: No    Drug use: Never    Sexual activity: Defer       FAMILY HISTORY:    Family History   Problem Relation Age of Onset    Diabetes Other     Glaucoma Other        REVIEW OF SYSTEMS:    Review of Systems   Reason unable to perform ROS: ROS per nursing and patient.   Constitutional:  Negative for activity change, appetite change, chills, diaphoresis, fatigue, fever, unexpected weight gain and unexpected weight loss.   HENT:  Positive for trouble swallowing. Negative for congestion, mouth sores, nosebleeds and sore throat.    Respiratory:  Negative for cough, choking, chest tightness and shortness of breath.    Cardiovascular:  Negative for chest pain.   Gastrointestinal:  Negative for abdominal pain, constipation, diarrhea, nausea, rectal pain and vomiting.   Endocrine: Positive for polyphagia. Negative for polydipsia and polyuria.   Genitourinary:  Positive for urinary incontinence. Negative for decreased urine volume, difficulty urinating, dysuria, frequency and hematuria.    Musculoskeletal:  Positive for arthralgias (chronic). Negative for joint swelling and myalgias.   Skin:  Negative for color change and rash.   Neurological:  Positive for speech difficulty, weakness, memory problem and confusion. Negative for dizziness.   Psychiatric/Behavioral:  Positive for behavioral problems (intermittently). Negative for dysphoric mood, hallucinations, sleep disturbance and depressed mood. The patient is not nervous/anxious.          PHYSICAL EXAMINATION:   VITAL SIGNS:   Vitals:    07/19/24 1543   BP: 109/67   Pulse: 75   Resp: 18   Temp: 97.4 °F (36.3 °C)       Physical Exam  Vitals and nursing note reviewed.   Constitutional:       General: She is not in acute distress.     Appearance: She is well-developed.   Eyes:      Conjunctiva/sclera: Conjunctivae normal.   Cardiovascular:      Rate and Rhythm: Normal rate and regular rhythm.      Heart sounds: Normal heart sounds.   Pulmonary:      Effort: Pulmonary effort is normal.      Breath sounds: Normal breath sounds. No wheezing or rales.   Abdominal:      General: Bowel sounds are normal. There is no distension.      Palpations: Abdomen is soft.      Tenderness: There is no abdominal tenderness.   Skin:     General: Skin is warm and dry.   Neurological:      Mental Status: She is alert. Mental status is at baseline. She is disoriented.      Comments: Chronic NM deficits related to CVA   Psychiatric:         Mood and Affect: Mood normal. Affect is flat.         Behavior: Behavior normal.         Cognition and Memory: Cognition is impaired. Memory is impaired.         RECORDS REVIEW:   I have reviewed and interpreted the discharge summary and medications    ASSESSMENT     Diagnoses and all orders for this visit:    1. Hospital discharge follow-up    2. Medication management    3. Diabetic gastroparesis    4. History of nausea and vomiting    5. Uncontrolled diabetes mellitus with hyperglycemia, with long-term current use of insulin    6.  Impaired mobility and ADLs              PLAN    Hospital follow up/Diabetes with hyperglycemia/gastroparesis/history nausea and vomiting   -Did have some vomiting last night after overeating. No vomiting today. She was on full liquid diet this morning but will advance to her regular diet on Monday. She was stared on Reglan at hospital for gastroparesis. A1c 10.1 in hospital. Insulin is continuously adjusted but she is very non compliant with diet. Increase Tresibal to 55 units bid. Increased meal time insulin to 15 units. Continue sliding scale. Will follow up and continue to monitor.     Nursing encouraged to keep me informed of any acute changes, lack of improvement, or any new concerning symptoms.    Staff to continue supportive care for all ADLs.    [x]  Discussed Patient in detail with nursing/staff, addressed all needs today.     [x]  Plan of Care Reviewed   [x]  PT/OT Reviewed   [x]  Order Changes  []  Discharge Plans Reviewed  [x]  Advance Directive on file with Nursing Home.   [x]  POA on file with Nursing Home.   [x]  Code Status listed: [x]  Full Code   []  DNR       “I confirm accuracy of unchanged data/findings which have been carried forward from previous visit, as well as I have updated appropriately those that have changed.”     I spent 40 minutes caring for Izabella on this date of service. This time includes time spent by me in the following activities:preparing for the visit, reviewing tests, obtaining and/or reviewing a separately obtained history, performing a medically appropriate examination and/or evaluation , counseling and educating the patient/family/caregiver, ordering medications, tests, or procedures, referring and communicating with other health care professionals , documenting information in the medical record, independently interpreting results and communicating that information with the patient/family/caregiver, and care coordination                                       Fariha  Cathie Cardona, APRN.

## 2024-07-20 LAB — ACINETOBACTER SCREEN CX: NORMAL

## 2024-07-20 NOTE — PROGRESS NOTES
"Nursing Home Follow Up Note      Fabian Santos DO []   [facility] [x]  [address] [place] [address]  Phone: [phone]  Fax: [phone] Yohan Cuellar MD []    Chaz Mandel DO []   [address] [place] [address]  Phone: [phone]  Fax: [phone]     PATIENT NAME: Izabella Agudelo                                                                          YOB: 1960           DATE OF SERVICE: 7/20/2024  FACILITY:  [][facility]   [facility]   [x] [facility]   [] [facility]   [] Other ______________________________________________________________________      CHIEF COMPLAINT:    Follow-up hospital visit.     HISTORY OF PRESENT ILLNESS:     Patient was sent to [facility] on 7/16, for abdominal pain, vomiting and abnormal KUB, which reported constipation and possible blockage.   \"In the emergency room, she was afebrile and hemodynamically stable saturating at 94% on room air.  Initial troponin 18 with a repeat at 17.  Sodium 131, glucose 352, alkaline phosphatase 119, ALT 42.  Lactic acid was 3.4 with repeat at 2.4.  Lipase was normal.  CBC was unremarkable.  Urinalysis was unremarkable.  CT of the chest was negative for any pneumonia but showed atelectasis.  CT of the abdomen and pelvis showed severe fluid and gaseous distention of the stomach without evidence of obstructing process, findings may represent gastroparesis.  No evidence of small bowel obstruction.  Patient was given 500 mL of normal saline bolus, IV Zofran and was subsequently admitted to the medical floor with telemetry due to intractable nausea and vomiting and possible gastroparesis and gastric distention.\"     Once stable, she was readmitted back to facility on 7/17. Nursing reports she ate her entire dinner tray, extra food and then some snacks, then had vomiting. They had her to full liquids this morning and she did well. They feel she just ate too much. No vomiting or complaints today. Resting in " bed.       PAST MEDICAL & SURGICAL HISTORY:   Past Medical History:   Diagnosis Date    Acute angle-closure glaucoma, bilateral     Aphasia     Cardiac abnormality     CHF (congestive heart failure)     COPD (chronic obstructive pulmonary disease)     Diabetes     Glaucoma     Hemiparesis     Hemiplegia     Impaired functional mobility, balance, gait, and endurance     Schizophrenia, chronic condition     Vascular dementia       No past surgical history on file.      MEDICATIONS:  I have reviewed and reconciled the patients medication list in the patients chart at the skilled nursing facility today.      ALLERGIES:    Allergies   Allergen Reactions    Penicillins Rash         SOCIAL HISTORY:    Social History     Socioeconomic History    Marital status: Single   Tobacco Use    Smoking status: Never     Passive exposure: Never    Smokeless tobacco: Never   Vaping Use    Vaping status: Never Used   Substance and Sexual Activity    Alcohol use: No    Drug use: Never    Sexual activity: Defer       FAMILY HISTORY:    Family History   Problem Relation Age of Onset    Diabetes Other     Glaucoma Other        REVIEW OF SYSTEMS:    Review of Systems   Reason unable to perform ROS: ROS per nursing and patient.   Constitutional:  Negative for activity change, appetite change, chills, diaphoresis, fatigue, fever, unexpected weight gain and unexpected weight loss.   HENT:  Positive for trouble swallowing. Negative for congestion, mouth sores, nosebleeds and sore throat.    Respiratory:  Negative for cough, choking, chest tightness and shortness of breath.    Cardiovascular:  Negative for chest pain.   Gastrointestinal:  Negative for abdominal pain, constipation, diarrhea, nausea, rectal pain and vomiting.   Endocrine: Positive for polyphagia. Negative for polydipsia and polyuria.   Genitourinary:  Positive for urinary incontinence. Negative for decreased urine volume, difficulty urinating, dysuria, frequency and hematuria.    Musculoskeletal:  Positive for arthralgias (chronic). Negative for joint swelling and myalgias.   Skin:  Negative for color change and rash.   Neurological:  Positive for speech difficulty, weakness, memory problem and confusion. Negative for dizziness.   Psychiatric/Behavioral:  Positive for behavioral problems (intermittently). Negative for dysphoric mood, hallucinations, sleep disturbance and depressed mood. The patient is not nervous/anxious.          PHYSICAL EXAMINATION:   VITAL SIGNS:   Vitals:    07/19/24 1543   BP: 109/67   Pulse: 75   Resp: 18   Temp: 97.4 °F (36.3 °C)       Physical Exam  Vitals and nursing note reviewed.   Constitutional:       General: She is not in acute distress.     Appearance: She is well-developed.   Eyes:      Conjunctiva/sclera: Conjunctivae normal.   Cardiovascular:      Rate and Rhythm: Normal rate and regular rhythm.      Heart sounds: Normal heart sounds.   Pulmonary:      Effort: Pulmonary effort is normal.      Breath sounds: Normal breath sounds. No wheezing or rales.   Abdominal:      General: Bowel sounds are normal. There is no distension.      Palpations: Abdomen is soft.      Tenderness: There is no abdominal tenderness.   Skin:     General: Skin is warm and dry.   Neurological:      Mental Status: She is alert. Mental status is at baseline. She is disoriented.      Comments: Chronic NM deficits related to CVA   Psychiatric:         Mood and Affect: Mood normal. Affect is flat.         Behavior: Behavior normal.         Cognition and Memory: Cognition is impaired. Memory is impaired.         RECORDS REVIEW:   I have reviewed and interpreted the discharge summary and medications    ASSESSMENT     Diagnoses and all orders for this visit:    1. Hospital discharge follow-up    2. Medication management    3. Diabetic gastroparesis    4. History of nausea and vomiting    5. Uncontrolled diabetes mellitus with hyperglycemia, with long-term current use of insulin    6.  Impaired mobility and ADLs              PLAN    Hospital follow up/Diabetes with hyperglycemia/gastroparesis/history nausea and vomiting   -Did have some vomiting last night after overeating. No vomiting today. She was on full liquid diet this morning but will advance to her regular diet on Monday. She was stared on Reglan at hospital for gastroparesis. A1c 10.1 in hospital. Insulin is continuously adjusted but she is very non compliant with diet. Increase Tresibal to 55 units bid. Increased meal time insulin to 15 units. Continue sliding scale. Will follow up and continue to monitor.     Nursing encouraged to keep me informed of any acute changes, lack of improvement, or any new concerning symptoms.    Staff to continue supportive care for all ADLs.    [x]  Discussed Patient in detail with nursing/staff, addressed all needs today.     [x]  Plan of Care Reviewed   [x]  PT/OT Reviewed   [x]  Order Changes  []  Discharge Plans Reviewed  [x]  Advance Directive on file with Nursing Home.   [x]  POA on file with Nursing Home.   [x]  Code Status listed: [x]  Full Code   []  DNR       “I confirm accuracy of unchanged data/findings which have been carried forward from previous visit, as well as I have updated appropriately those that have changed.”     I spent 40 minutes caring for Izabella on this date of service. This time includes time spent by me in the following activities:preparing for the visit, reviewing tests, obtaining and/or reviewing a separately obtained history, performing a medically appropriate examination and/or evaluation , counseling and educating the patient/family/caregiver, ordering medications, tests, or procedures, referring and communicating with other health care professionals , documenting information in the medical record, independently interpreting results and communicating that information with the patient/family/caregiver, and care coordination                                       Fariha  Cathie Cardona, APRN.

## 2024-07-22 ENCOUNTER — NURSING HOME (OUTPATIENT)
Dept: FAMILY MEDICINE CLINIC | Facility: CLINIC | Age: 64
End: 2024-07-22
Payer: MEDICARE

## 2024-07-22 VITALS
TEMPERATURE: 97.1 F | HEART RATE: 66 BPM | DIASTOLIC BLOOD PRESSURE: 74 MMHG | OXYGEN SATURATION: 95 % | RESPIRATION RATE: 16 BRPM | SYSTOLIC BLOOD PRESSURE: 118 MMHG

## 2024-07-22 DIAGNOSIS — F01.B18 MODERATE VASCULAR DEMENTIA WITH OTHER BEHAVIORAL DISTURBANCE: ICD-10-CM

## 2024-07-22 DIAGNOSIS — R40.0 DAYTIME SLEEPINESS: Primary | ICD-10-CM

## 2024-07-22 DIAGNOSIS — Z74.09 IMPAIRED MOBILITY AND ADLS: ICD-10-CM

## 2024-07-22 DIAGNOSIS — R41.82 ALTERED MENTAL STATUS, UNSPECIFIED ALTERED MENTAL STATUS TYPE: ICD-10-CM

## 2024-07-22 DIAGNOSIS — Z78.9 IMPAIRED MOBILITY AND ADLS: ICD-10-CM

## 2024-07-22 PROCEDURE — 99308 SBSQ NF CARE LOW MDM 20: CPT | Performed by: NURSE PRACTITIONER

## 2024-07-22 NOTE — LETTER
Nursing Home Follow Up Note      Fabian Santos DO []   [facility] [x]  [address] [place] [address]  Phone: [phone]  Fax: [phone] Yohan Cuellar MD []    Chaz Mandel DO []   [address] [place] [address]  Phone: [phone]  Fax: [phone]     PATIENT NAME: Izabella Agudelo                                                                          YOB: 1960           DATE OF SERVICE: 7/22/2024  FACILITY:  [][facility]   [][facility]   [x] [facility]   [] [facility]   [] Other ______________________________________________________________________      CHIEF COMPLAINT:    Excessive daytime sleepiness today.     HISTORY OF PRESENT ILLNESS:     Nursing reports that patient has been sleeping most of the day. She did wake up to eat but has been sleeping the rest of the day. She has not had any nausea and vomiting since Thursday night. Blood sugars have been normal to high,normal for her, no hypoglycemia. Sleeping during visit. She did wake but did not converse much. No signs and symptoms of distress. Vital signs normal.       PAST MEDICAL & SURGICAL HISTORY:   Past Medical History:   Diagnosis Date    Acute angle-closure glaucoma, bilateral     Aphasia     Cardiac abnormality     CHF (congestive heart failure)     COPD (chronic obstructive pulmonary disease)     Diabetes     Glaucoma     Hemiparesis     Hemiplegia     Impaired functional mobility, balance, gait, and endurance     Schizophrenia, chronic condition     Vascular dementia       No past surgical history on file.      MEDICATIONS:  I have reviewed and reconciled the patients medication list in the patients chart at the skilled nursing facility today.      ALLERGIES:    Allergies   Allergen Reactions    Penicillins Rash         SOCIAL HISTORY:    Social History     Socioeconomic History    Marital status: Single   Tobacco Use    Smoking status: Never     Passive exposure: Never    Smokeless  tobacco: Never   Vaping Use    Vaping status: Never Used   Substance and Sexual Activity    Alcohol use: No    Drug use: Never    Sexual activity: Defer       FAMILY HISTORY:    Family History   Problem Relation Age of Onset    Diabetes Other     Glaucoma Other        REVIEW OF SYSTEMS:    Review of Systems   Reason unable to perform ROS: ROS per nursing and patient.   Constitutional:  Positive for activity change and fatigue. Negative for appetite change, chills and fever.   HENT:  Positive for trouble swallowing. Negative for congestion, mouth sores and nosebleeds.    Respiratory:  Negative for cough, choking and shortness of breath.    Cardiovascular:  Negative for chest pain.   Gastrointestinal:  Negative for abdominal pain, constipation, diarrhea and vomiting.   Genitourinary:  Positive for urinary incontinence. Negative for decreased urine volume, dysuria, frequency and hematuria.   Musculoskeletal:  Positive for arthralgias (chronic).   Skin:  Negative for color change and rash.   Neurological:  Positive for speech difficulty, weakness, memory problem and confusion. Negative for dizziness.   Psychiatric/Behavioral:  Positive for sleep disturbance. Negative for behavioral problems (intermittently), dysphoric mood, hallucinations and depressed mood. The patient is not nervous/anxious.          PHYSICAL EXAMINATION:   VITAL SIGNS:   Vitals:    07/22/24 1607   BP: 118/74   Pulse: 66   Resp: 16   Temp: 97.1 °F (36.2 °C)   SpO2: 95%       Physical Exam  Vitals and nursing note reviewed.   Constitutional:       General: She is not in acute distress.     Appearance: She is well-developed.   Eyes:      Conjunctiva/sclera: Conjunctivae normal.   Cardiovascular:      Rate and Rhythm: Normal rate and regular rhythm.      Heart sounds: Normal heart sounds.   Pulmonary:      Effort: Pulmonary effort is normal.      Breath sounds: Normal breath sounds. No wheezing or rales.   Abdominal:      General: Bowel sounds are  normal. There is no distension.      Palpations: Abdomen is soft.      Tenderness: There is no abdominal tenderness.   Skin:     General: Skin is warm and dry.   Neurological:      Mental Status: She is alert. Mental status is at baseline. She is disoriented.      Comments: Chronic NM deficits related to CVA   Psychiatric:         Mood and Affect: Affect is flat.         Behavior: Behavior is slowed.         Cognition and Memory: Cognition is impaired. Memory is impaired.         RECORDS REVIEW:   I have reviewed and interpreted the discharge summary and medications    ASSESSMENT     Diagnoses and all orders for this visit:    1. Daytime sleepiness (Primary)    2. Altered mental status, unspecified altered mental status type    3. Moderate vascular dementia with other behavioral disturbance    4. Impaired mobility and ADLs          PLAN    Daytime sleepiness/AMS/dementia  -Will check CBC, CMP and UA C&S. Will follow up results.     Will follow up and continue to monitor.     Nursing encouraged to keep me informed of any acute changes, lack of improvement, or any new concerning symptoms.    Staff to continue supportive care for all ADLs.    [x]  Discussed Patient in detail with nursing/staff, addressed all needs today.     [x]  Plan of Care Reviewed   [x]  PT/OT Reviewed   [x]  Order Changes  []  Discharge Plans Reviewed  [x]  Advance Directive on file with Nursing Home.   [x]  POA on file with Nursing Home.   [x]  Code Status listed: [x]  Full Code   []  DNR       “I confirm accuracy of unchanged data/findings which have been carried forward from previous visit, as well as I have updated appropriately those that have changed.”                                        FL[facility] Arreguin.

## 2024-07-23 NOTE — PROGRESS NOTES
Nursing Home Follow Up Note      Fabian Santos DO []   [facility] [x]  [address] [place] [address]  Phone: [phone]  Fax: [phone] Yohan Cuellar MD []    Chaz Mandel DO []   [address] [place] [address]  Phone: [phone]  Fax: [phone]     PATIENT NAME: Izabella Agudelo                                                                          YOB: 1960           DATE OF SERVICE: 7/22/2024  FACILITY:  [][facility]   [][facility]   [x] [facility]   [] [facility]   [] Other ______________________________________________________________________      CHIEF COMPLAINT:    Excessive daytime sleepiness today.     HISTORY OF PRESENT ILLNESS:     Nursing reports that patient has been sleeping most of the day. She did wake up to eat but has been sleeping the rest of the day. She has not had any nausea and vomiting since Thursday night. Blood sugars have been normal to high,normal for her, no hypoglycemia. Sleeping during visit. She did wake but did not converse much. No signs and symptoms of distress. Vital signs normal.       PAST MEDICAL & SURGICAL HISTORY:   Past Medical History:   Diagnosis Date    Acute angle-closure glaucoma, bilateral     Aphasia     Cardiac abnormality     CHF (congestive heart failure)     COPD (chronic obstructive pulmonary disease)     Diabetes     Glaucoma     Hemiparesis     Hemiplegia     Impaired functional mobility, balance, gait, and endurance     Schizophrenia, chronic condition     Vascular dementia       No past surgical history on file.      MEDICATIONS:  I have reviewed and reconciled the patients medication list in the patients chart at the skilled nursing facility today.      ALLERGIES:    Allergies   Allergen Reactions    Penicillins Rash         SOCIAL HISTORY:    Social History     Socioeconomic History    Marital status: Single   Tobacco Use    Smoking status: Never     Passive exposure: Never    Smokeless  tobacco: Never   Vaping Use    Vaping status: Never Used   Substance and Sexual Activity    Alcohol use: No    Drug use: Never    Sexual activity: Defer       FAMILY HISTORY:    Family History   Problem Relation Age of Onset    Diabetes Other     Glaucoma Other        REVIEW OF SYSTEMS:    Review of Systems   Reason unable to perform ROS: ROS per nursing and patient.   Constitutional:  Positive for activity change and fatigue. Negative for appetite change, chills and fever.   HENT:  Positive for trouble swallowing. Negative for congestion, mouth sores and nosebleeds.    Respiratory:  Negative for cough, choking and shortness of breath.    Cardiovascular:  Negative for chest pain.   Gastrointestinal:  Negative for abdominal pain, constipation, diarrhea and vomiting.   Genitourinary:  Positive for urinary incontinence. Negative for decreased urine volume, dysuria, frequency and hematuria.   Musculoskeletal:  Positive for arthralgias (chronic).   Skin:  Negative for color change and rash.   Neurological:  Positive for speech difficulty, weakness, memory problem and confusion. Negative for dizziness.   Psychiatric/Behavioral:  Positive for sleep disturbance. Negative for behavioral problems (intermittently), dysphoric mood, hallucinations and depressed mood. The patient is not nervous/anxious.          PHYSICAL EXAMINATION:   VITAL SIGNS:   Vitals:    07/22/24 1607   BP: 118/74   Pulse: 66   Resp: 16   Temp: 97.1 °F (36.2 °C)   SpO2: 95%       Physical Exam  Vitals and nursing note reviewed.   Constitutional:       General: She is not in acute distress.     Appearance: She is well-developed.   Eyes:      Conjunctiva/sclera: Conjunctivae normal.   Cardiovascular:      Rate and Rhythm: Normal rate and regular rhythm.      Heart sounds: Normal heart sounds.   Pulmonary:      Effort: Pulmonary effort is normal.      Breath sounds: Normal breath sounds. No wheezing or rales.   Abdominal:      General: Bowel sounds are  normal. There is no distension.      Palpations: Abdomen is soft.      Tenderness: There is no abdominal tenderness.   Skin:     General: Skin is warm and dry.   Neurological:      Mental Status: She is alert. Mental status is at baseline. She is disoriented.      Comments: Chronic NM deficits related to CVA   Psychiatric:         Mood and Affect: Affect is flat.         Behavior: Behavior is slowed.         Cognition and Memory: Cognition is impaired. Memory is impaired.         RECORDS REVIEW:   I have reviewed and interpreted the discharge summary and medications    ASSESSMENT     Diagnoses and all orders for this visit:    1. Daytime sleepiness (Primary)    2. Altered mental status, unspecified altered mental status type    3. Moderate vascular dementia with other behavioral disturbance    4. Impaired mobility and ADLs          PLAN    Daytime sleepiness/AMS/dementia  -Will check CBC, CMP and UA C&S. Will follow up results.     Will follow up and continue to monitor.     Nursing encouraged to keep me informed of any acute changes, lack of improvement, or any new concerning symptoms.    Staff to continue supportive care for all ADLs.    [x]  Discussed Patient in detail with nursing/staff, addressed all needs today.     [x]  Plan of Care Reviewed   [x]  PT/OT Reviewed   [x]  Order Changes  []  Discharge Plans Reviewed  [x]  Advance Directive on file with Nursing Home.   [x]  POA on file with Nursing Home.   [x]  Code Status listed: [x]  Full Code   []  DNR       “I confirm accuracy of unchanged data/findings which have been carried forward from previous visit, as well as I have updated appropriately those that have changed.”                                        [facility].

## 2024-07-25 NOTE — PROGRESS NOTES
Patient seen and evaluated at bedside.  She is resting comfortably.  Had no complaints and overall stated that she felt well.  Chart has been reviewed.  We will continue current treatment plan.  Antibiotics, Rocephin and azithromycin.  Continue supportive care.  Provide oxygen supplementation to maintain saturation greater than 88%.  Replete electrolytes.  Patient is telemetry overflow.  Plan for transfer out of ICU once bed is available.   (V5) oriented

## 2024-08-21 ENCOUNTER — NURSING HOME (OUTPATIENT)
Dept: FAMILY MEDICINE CLINIC | Facility: CLINIC | Age: 64
End: 2024-08-21
Payer: MEDICARE

## 2024-08-21 VITALS
TEMPERATURE: 97.7 F | WEIGHT: 187.4 LBS | SYSTOLIC BLOOD PRESSURE: 143 MMHG | BODY MASS INDEX: 28.49 KG/M2 | DIASTOLIC BLOOD PRESSURE: 78 MMHG | HEART RATE: 61 BPM | RESPIRATION RATE: 18 BRPM | OXYGEN SATURATION: 96 %

## 2024-08-21 DIAGNOSIS — E11.44 TYPE 2 DIABETES MELLITUS WITH DIABETIC AMYOTROPHY, WITH LONG-TERM CURRENT USE OF INSULIN: ICD-10-CM

## 2024-08-21 DIAGNOSIS — E78.5 DYSLIPIDEMIA: ICD-10-CM

## 2024-08-21 DIAGNOSIS — I10 ESSENTIAL HYPERTENSION: ICD-10-CM

## 2024-08-21 DIAGNOSIS — K21.9 GERD WITHOUT ESOPHAGITIS: ICD-10-CM

## 2024-08-21 DIAGNOSIS — R13.12 OROPHARYNGEAL DYSPHAGIA: ICD-10-CM

## 2024-08-21 DIAGNOSIS — Z79.4 TYPE 2 DIABETES MELLITUS WITH DIABETIC AMYOTROPHY, WITH LONG-TERM CURRENT USE OF INSULIN: ICD-10-CM

## 2024-08-21 DIAGNOSIS — Z78.9 IMPAIRED MOBILITY AND ADLS: Primary | ICD-10-CM

## 2024-08-21 DIAGNOSIS — Z74.09 IMPAIRED MOBILITY AND ADLS: Primary | ICD-10-CM

## 2024-08-21 DIAGNOSIS — F01.518 VASCULAR DEMENTIA WITH OTHER BEHAVIORAL DISTURBANCE, UNSPECIFIED DEMENTIA SEVERITY: ICD-10-CM

## 2024-08-21 DIAGNOSIS — I69.90 LATE EFFECTS OF CVA (CEREBROVASCULAR ACCIDENT): ICD-10-CM

## 2024-08-21 PROCEDURE — 99309 SBSQ NF CARE MODERATE MDM 30: CPT | Performed by: FAMILY MEDICINE

## 2024-08-21 NOTE — LETTER
Nursing Home Progress Note        Fabian Santos DO [x]  [facility] []  [address] [place] [address]  Phone: [phone]  Fax: [phone] Yohan Cuellar MD []  Chaz Mandel DO []  [address] [place] [address]  Phone: [phone]  Fax: [phone]     PATIENT NAME: Izabella Agudelo                                                                          YOB: 1960           DATE OF SERVICE: 8/21/2024  FACILITY: []  [facility]  [] [facility]  [x]  [facility]  [] [facility]  []  Other ______________________________________________________________________      CHIEF COMPLAINT:  Impaired mobility and ADL/paranoid schizophrenia/diabetes mellitus treated with insulin/hypertension/dyslipidemia/diabetic amyotrophy      HISTORY OF PRESENT ILLNESS:   [x]  Follow Up visit for coordination of long term care issues and chronic medical management of Diagnoses and all orders for this visit:    1. Impaired mobility and ADLs (Primary)    2. Late effects of CVA (cerebrovascular accident)    3. Vascular dementia with other behavioral disturbance, unspecified dementia severity    4. GERD without esophagitis    5. Oropharyngeal dysphagia    6. 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 Essential hypertension    8. Dyslipidemia    Patient remains pleasant during interaction, nursing/staff reports that she has been receptive to supportive care for mobilization/transfers.  No new falls or injuries reported.  No nutritional/hydration deficits reported.    No reports of any focal aspiration.    No reports of any cyclical/persistent hypoglycemic episodes.    Vital signs have been stable.    No acute behavioral changes, sleeping well.          PAST MEDICAL & SURGICAL HISTORY:   Past Medical History:   Diagnosis Date    Acute angle-closure glaucoma, bilateral     Aphasia     Cardiac abnormality     CHF (congestive heart failure)      COPD (chronic obstructive pulmonary disease)     Diabetes     Glaucoma     Hemiparesis     Hemiplegia     Impaired functional mobility, balance, gait, and endurance     Schizophrenia, chronic condition     Vascular dementia       No past surgical history on file.      MEDICATIONS:  I have reviewed and reconciled the patients medication list in the patients chart at the skilled nursing facility today.      ALLERGIES:    Allergies   Allergen Reactions    Penicillins Rash         SOCIAL HISTORY:    Social History     Socioeconomic History    Marital status: Single   Tobacco Use    Smoking status: Never     Passive exposure: Never    Smokeless tobacco: Never   Vaping Use    Vaping status: Never Used   Substance and Sexual Activity    Alcohol use: No    Drug use: Never    Sexual activity: Defer       FAMILY HISTORY:    Family History   Problem Relation Age of Onset    Diabetes Other     Glaucoma Other        REVIEW OF SYSTEMS:    Review of Systems  Appetite: Fair []   Good [x]   Poor []   Weight Loss []  [x]  Weight Stable   Unavoidable Weight Loss []  Tolerating Tube Feeding []    Supplements Provided []   Constitutional: Negative for fever, chills, diaphoresis or fatigue and weight change.  Patient is interactive but a poor historian.  HENT: No dysphagia; no changes to vision/hearing/smell/taste; no epistaxis  Eyes: Negative for redness and visual disturbance.   Respiratory: Occasional cough, no hemoptysis.  Cardiovascular: Negative for chest pain and palpitations.   Gastrointestinal: Negative for abdominal distention, abdominal pain and blood in stool.   Endocrine: Negative for cold intolerance and heat intolerance.   Genitourinary: Negative for difficulty urinating, dysuria and frequency.Negative for hematuria   Musculoskeletal: Chronic myalgias and arthralgias.  Worsened lumbago as per above.  Integumentary: No open wounds, rash or concerning skin lesions  Neurological: Negative for syncope, weakness and headaches.    Hematological: Negative for adenopathy. Does not bruise/bleed easily.   Immunological: Negative for reported allergies or immunological disorders  Psychological: Chronic behavioral issues, no acute changes.    PHYSICAL EXAMINATION:   VITAL SIGNS:   Vitals:    08/21/24 1033   BP: 143/78   Pulse: 61   Resp: 18   Temp: 97.7 °F (36.5 °C)   SpO2: 96%             Physical Exam    General Appearance:  [x]  Alert   [x]  Oriented x person  [x]  No acute distress     [x]  Confused, chronically []  Disoriented   []  Comatose   Head:  Atraumatic and normocephalic, without obvious abnormality.   Eyes:         PERRLA, conjunctivae and sclerae normal, no Icterus. No pallor. Extra-occular movements are within normal limits.   Ears:  Ears appear intact with no abnormalities noted.   Throat: No oral lesions, no thrush, oral mucosa moist.   Neck: Supple, trachea midline, no thyromegaly, no carotid bruit.   Back:   No kyphoscoliosis. No tenderness to palpation.   Lungs:   Chest shape is normal. Breath sounds heard bilaterally equally.  No wheezing.  Audible air exchange noted all lung fields.   Heart:  Normal S1 and S2, no murmur, no gallop, no rub. No JVD.   Abdomen:   Normal bowel sounds, no masses, no organomegaly. Soft, non-tender, non-distended, no guarding.  No CVA tenderness.   Extremities: Moves all extremities, without edema, cyanosis or clubbing.  Frail build.   Poor core strength and stability.   Pulses: Pulses palpable and equal bilaterally.   Skin: Generalized dry skin noted.  Age-related atrophy of skin.   Neurologic: [x] Normal speech []  Normal mental status    [x] Cranial nerves II through XII intact   [x]  No anosmia [x]  DTR 2+ [x]  Proprioception intact  [x]  Chronic motor/sensory deficits      Psych/Mood:                    [x]  No acute changes, flat affect[]  Depressed      Urinary:      [x]  Continent  [x]  Incontinent, at times[]  Retention  []  F/C     []  UTI w/treatment in progress         ASSESSMENT      Diagnoses and all orders for this visit:    1. Impaired mobility and ADLs (Primary)    2. Late effects of CVA (cerebrovascular accident)    3. Vascular dementia with other behavioral disturbance, unspecified dementia severity    4. GERD without esophagitis    5. Oropharyngeal dysphagia    6. 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 Essential hypertension    8. Dyslipidemia          PLAN  Continue current supportive care for mobilization/transfer assistance, fall precautions in place given her impaired mobility and late effects of CVA.  Continue to follow her nutritional/hydration status, no deficits reported.    Continue blood glucose monitoring, consider changes to treatment regimen when needed in an effort to maximize blood glucose control and minimize hypoglycemic episodes.  Continue healthy dietary choices as best able, as well as maintain appropriate hydration status.  Avoid prolonged fasting periods as best able.    Vital signs demonstrate hemodynamic stability, blood pressure is at goal.    Continue risk lowering medications including statin therapy given her history of dyslipidemia and CVA.    Surveillance labs as per routine/needed.  Ns    [x]  Discussed Patient in detail with nursing/staff, addressed all needs today.     [x]  Plan of Care Reviewed   []  PT/OT Reviewed   []  Order Changes  []  Discharge Plans Reviewed   []  Code Status Changes      I spent 35 minutes caring for Izabella on this date of service. This time includes time spent by me in the following activities:preparing for the visit, performing a medically appropriate examination and/or evaluation , counseling and educating the patient/family/caregiver, ordering medications, tests, or procedures, documenting information in the medical record, and care coordination    I confirm accuracy of unchanged data/findings which have been carried forward from previous visit, as well as I have  updated appropriately those that have changed.         Fabian Santos DO  8/21/2024

## 2024-08-21 NOTE — PROGRESS NOTES
Nursing Home Progress Note        Fabian Santos DO [x]  [facility] []  [address] [place] [address]  Phone: [phone]  Fax: [phone] Yohan Cuellar MD []  Chaz Mandel DO []  [address] [place] [address]  Phone: [phone]  Fax: [phone]     PATIENT NAME: Izabella Agudelo                                                                          YOB: 1960           DATE OF SERVICE: 8/21/2024  FACILITY: []  [facility]  [] [facility]  [x]  [facility]  [] [facility]  []  Other ______________________________________________________________________      CHIEF COMPLAINT:  Impaired mobility and ADL/paranoid schizophrenia/diabetes mellitus treated with insulin/hypertension/dyslipidemia/diabetic amyotrophy      HISTORY OF PRESENT ILLNESS:   [x]  Follow Up visit for coordination of long term care issues and chronic medical management of Diagnoses and all orders for this visit:    1. Impaired mobility and ADLs (Primary)    2. Late effects of CVA (cerebrovascular accident)    3. Vascular dementia with other behavioral disturbance, unspecified dementia severity    4. GERD without esophagitis    5. Oropharyngeal dysphagia    6. 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 Essential hypertension    8. Dyslipidemia    Patient remains pleasant during interaction, nursing/staff reports that she has been receptive to supportive care for mobilization/transfers.  No new falls or injuries reported.  No nutritional/hydration deficits reported.    No reports of any focal aspiration.    No reports of any cyclical/persistent hypoglycemic episodes.    Vital signs have been stable.    No acute behavioral changes, sleeping well.          PAST MEDICAL & SURGICAL HISTORY:   Past Medical History:   Diagnosis Date    Acute angle-closure glaucoma, bilateral     Aphasia     Cardiac abnormality     CHF (congestive heart failure)      COPD (chronic obstructive pulmonary disease)     Diabetes     Glaucoma     Hemiparesis     Hemiplegia     Impaired functional mobility, balance, gait, and endurance     Schizophrenia, chronic condition     Vascular dementia       No past surgical history on file.      MEDICATIONS:  I have reviewed and reconciled the patients medication list in the patients chart at the skilled nursing facility today.      ALLERGIES:    Allergies   Allergen Reactions    Penicillins Rash         SOCIAL HISTORY:    Social History     Socioeconomic History    Marital status: Single   Tobacco Use    Smoking status: Never     Passive exposure: Never    Smokeless tobacco: Never   Vaping Use    Vaping status: Never Used   Substance and Sexual Activity    Alcohol use: No    Drug use: Never    Sexual activity: Defer       FAMILY HISTORY:    Family History   Problem Relation Age of Onset    Diabetes Other     Glaucoma Other        REVIEW OF SYSTEMS:    Review of Systems  Appetite: Fair []   Good [x]   Poor []   Weight Loss []  [x]  Weight Stable   Unavoidable Weight Loss []  Tolerating Tube Feeding []    Supplements Provided []   Constitutional: Negative for fever, chills, diaphoresis or fatigue and weight change.  Patient is interactive but a poor historian.  HENT: No dysphagia; no changes to vision/hearing/smell/taste; no epistaxis  Eyes: Negative for redness and visual disturbance.   Respiratory: Occasional cough, no hemoptysis.  Cardiovascular: Negative for chest pain and palpitations.   Gastrointestinal: Negative for abdominal distention, abdominal pain and blood in stool.   Endocrine: Negative for cold intolerance and heat intolerance.   Genitourinary: Negative for difficulty urinating, dysuria and frequency.Negative for hematuria   Musculoskeletal: Chronic myalgias and arthralgias.  Worsened lumbago as per above.  Integumentary: No open wounds, rash or concerning skin lesions  Neurological: Negative for syncope, weakness and headaches.    Hematological: Negative for adenopathy. Does not bruise/bleed easily.   Immunological: Negative for reported allergies or immunological disorders  Psychological: Chronic behavioral issues, no acute changes.    PHYSICAL EXAMINATION:   VITAL SIGNS:   Vitals:    08/21/24 1033   BP: 143/78   Pulse: 61   Resp: 18   Temp: 97.7 °F (36.5 °C)   SpO2: 96%             Physical Exam    General Appearance:  [x]  Alert   [x]  Oriented x person  [x]  No acute distress     [x]  Confused, chronically []  Disoriented   []  Comatose   Head:  Atraumatic and normocephalic, without obvious abnormality.   Eyes:         PERRLA, conjunctivae and sclerae normal, no Icterus. No pallor. Extra-occular movements are within normal limits.   Ears:  Ears appear intact with no abnormalities noted.   Throat: No oral lesions, no thrush, oral mucosa moist.   Neck: Supple, trachea midline, no thyromegaly, no carotid bruit.   Back:   No kyphoscoliosis. No tenderness to palpation.   Lungs:   Chest shape is normal. Breath sounds heard bilaterally equally.  No wheezing.  Audible air exchange noted all lung fields.   Heart:  Normal S1 and S2, no murmur, no gallop, no rub. No JVD.   Abdomen:   Normal bowel sounds, no masses, no organomegaly. Soft, non-tender, non-distended, no guarding.  No CVA tenderness.   Extremities: Moves all extremities, without edema, cyanosis or clubbing.  Frail build.   Poor core strength and stability.   Pulses: Pulses palpable and equal bilaterally.   Skin: Generalized dry skin noted.  Age-related atrophy of skin.   Neurologic: [x] Normal speech []  Normal mental status    [x] Cranial nerves II through XII intact   [x]  No anosmia [x]  DTR 2+ [x]  Proprioception intact  [x]  Chronic motor/sensory deficits      Psych/Mood:                    [x]  No acute changes, flat affect[]  Depressed      Urinary:      [x]  Continent  [x]  Incontinent, at times[]  Retention  []  F/C     []  UTI w/treatment in progress         ASSESSMENT      Diagnoses and all orders for this visit:    1. Impaired mobility and ADLs (Primary)    2. Late effects of CVA (cerebrovascular accident)    3. Vascular dementia with other behavioral disturbance, unspecified dementia severity    4. GERD without esophagitis    5. Oropharyngeal dysphagia    6. 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 Essential hypertension    8. Dyslipidemia          PLAN  Continue current supportive care for mobilization/transfer assistance, fall precautions in place given her impaired mobility and late effects of CVA.  Continue to follow her nutritional/hydration status, no deficits reported.    Continue blood glucose monitoring, consider changes to treatment regimen when needed in an effort to maximize blood glucose control and minimize hypoglycemic episodes.  Continue healthy dietary choices as best able, as well as maintain appropriate hydration status.  Avoid prolonged fasting periods as best able.    Vital signs demonstrate hemodynamic stability, blood pressure is at goal.    Continue risk lowering medications including statin therapy given her history of dyslipidemia and CVA.    Surveillance labs as per routine/needed.  Ns    [x]  Discussed Patient in detail with nursing/staff, addressed all needs today.     [x]  Plan of Care Reviewed   []  PT/OT Reviewed   []  Order Changes  []  Discharge Plans Reviewed   []  Code Status Changes      I spent 35 minutes caring for Izabella on this date of service. This time includes time spent by me in the following activities:preparing for the visit, performing a medically appropriate examination and/or evaluation , counseling and educating the patient/family/caregiver, ordering medications, tests, or procedures, documenting information in the medical record, and care coordination    I confirm accuracy of unchanged data/findings which have been carried forward from previous visit, as well as I have  updated appropriately those that have changed.         Fabian Santos DO  8/21/2024

## 2024-09-17 ENCOUNTER — NURSING HOME (OUTPATIENT)
Dept: FAMILY MEDICINE CLINIC | Facility: CLINIC | Age: 64
End: 2024-09-17
Payer: MEDICARE

## 2024-09-17 VITALS
WEIGHT: 190.7 LBS | RESPIRATION RATE: 20 BRPM | DIASTOLIC BLOOD PRESSURE: 70 MMHG | BODY MASS INDEX: 29 KG/M2 | SYSTOLIC BLOOD PRESSURE: 140 MMHG | HEART RATE: 60 BPM

## 2024-09-17 DIAGNOSIS — Z74.09 IMPAIRED MOBILITY AND ADLS: ICD-10-CM

## 2024-09-17 DIAGNOSIS — Z78.9 IMPAIRED MOBILITY AND ADLS: ICD-10-CM

## 2024-09-17 DIAGNOSIS — E11.65 TYPE 2 DIABETES MELLITUS WITH HYPERGLYCEMIA, WITH LONG-TERM CURRENT USE OF INSULIN: Primary | ICD-10-CM

## 2024-09-17 DIAGNOSIS — Z79.4 TYPE 2 DIABETES MELLITUS WITH HYPERGLYCEMIA, WITH LONG-TERM CURRENT USE OF INSULIN: Primary | ICD-10-CM

## 2024-09-17 DIAGNOSIS — Z87.19 HISTORY OF CHRONIC CONSTIPATION: ICD-10-CM

## 2024-09-17 DIAGNOSIS — Z79.899 MEDICATION MANAGEMENT: ICD-10-CM

## 2024-09-17 DIAGNOSIS — F01.B18 MODERATE VASCULAR DEMENTIA WITH OTHER BEHAVIORAL DISTURBANCE: ICD-10-CM

## 2024-09-17 DIAGNOSIS — R19.7 DIARRHEA, UNSPECIFIED TYPE: ICD-10-CM

## 2024-09-17 PROCEDURE — 99309 SBSQ NF CARE MODERATE MDM 30: CPT | Performed by: NURSE PRACTITIONER

## 2024-10-08 ENCOUNTER — NURSING HOME (OUTPATIENT)
Dept: FAMILY MEDICINE CLINIC | Facility: CLINIC | Age: 64
End: 2024-10-08
Payer: MEDICARE

## 2024-10-08 VITALS
OXYGEN SATURATION: 96 % | WEIGHT: 189 LBS | BODY MASS INDEX: 28.74 KG/M2 | TEMPERATURE: 97.7 F | HEART RATE: 60 BPM | RESPIRATION RATE: 18 BRPM | SYSTOLIC BLOOD PRESSURE: 140 MMHG | DIASTOLIC BLOOD PRESSURE: 78 MMHG

## 2024-10-08 DIAGNOSIS — Z74.09 IMPAIRED MOBILITY AND ADLS: ICD-10-CM

## 2024-10-08 DIAGNOSIS — Z79.4 TYPE 2 DIABETES MELLITUS WITH HYPERGLYCEMIA, WITH LONG-TERM CURRENT USE OF INSULIN: Primary | ICD-10-CM

## 2024-10-08 DIAGNOSIS — F01.B18 MODERATE VASCULAR DEMENTIA WITH OTHER BEHAVIORAL DISTURBANCE: ICD-10-CM

## 2024-10-08 DIAGNOSIS — E11.65 TYPE 2 DIABETES MELLITUS WITH HYPERGLYCEMIA, WITH LONG-TERM CURRENT USE OF INSULIN: Primary | ICD-10-CM

## 2024-10-08 DIAGNOSIS — Z79.4 TYPE 2 DIABETES MELLITUS WITH DIABETIC AMYOTROPHY, WITH LONG-TERM CURRENT USE OF INSULIN: ICD-10-CM

## 2024-10-08 DIAGNOSIS — E11.44 TYPE 2 DIABETES MELLITUS WITH DIABETIC AMYOTROPHY, WITH LONG-TERM CURRENT USE OF INSULIN: ICD-10-CM

## 2024-10-08 DIAGNOSIS — Z91.199 NON-COMPLIANCE: ICD-10-CM

## 2024-10-08 DIAGNOSIS — Z78.9 IMPAIRED MOBILITY AND ADLS: ICD-10-CM

## 2024-10-08 PROCEDURE — 99308 SBSQ NF CARE LOW MDM 20: CPT | Performed by: NURSE PRACTITIONER

## 2024-10-08 NOTE — LETTER
Nursing Home Follow Up Note      Fabian Santos DO []   [facility] [x]  [address] [place] [address]  Phone: [phone]  Fax: [phone] Yohan Cuellar MD []    Chaz Mandel DO []   [address] [place] [address]  Phone: [phone]  Fax: [phone]     PATIENT NAME: Izabella Agudelo                                                                          YOB: 1960           DATE OF SERVICE: 10/8/2024  FACILITY:  [][facility]   [][facility]   [x] [facility]   [] [facility]   [] Other ______________________________________________________________________      CHIEF COMPLAINT:    Uncontrolled Diabetes with hyperglycemia.    HISTORY OF PRESENT ILLNESS:     Nursing reports that patient continues to have elevated blood sugars. She had insulin increased a couple weeks ago.  They are never controlled due to non compliance with diet, and eating a lot of snacks. Her family brings her a lot of foods and snacks that are not on a Diabetic diet.  She had not and fever,  abnormalities, respiratory abnormalities or other signs of infection. She is on 60 units bid of Trulicity and 18 units of short acting insulin with meals for treatment.     Moving about the facility with no signs and symptoms of any distress. No complaints.       PAST MEDICAL & SURGICAL HISTORY:   Past Medical History:   Diagnosis Date    Acute angle-closure glaucoma, bilateral     Aphasia     Cardiac abnormality     CHF (congestive heart failure)     COPD (chronic obstructive pulmonary disease)     Diabetes     Glaucoma     Hemiparesis     Hemiplegia     Impaired functional mobility, balance, gait, and endurance     Schizophrenia, chronic condition     Vascular dementia       No past surgical history on file.      MEDICATIONS:  I have reviewed and reconciled the patients medication list in the patients chart at the skilled nursing facility today.      ALLERGIES:    Allergies   Allergen Reactions     Penicillins Rash         SOCIAL HISTORY:    Social History     Socioeconomic History    Marital status: Single   Tobacco Use    Smoking status: Never     Passive exposure: Never    Smokeless tobacco: Never   Vaping Use    Vaping status: Never Used   Substance and Sexual Activity    Alcohol use: No    Drug use: Never    Sexual activity: Defer       FAMILY HISTORY:    Family History   Problem Relation Age of Onset    Diabetes Other     Glaucoma Other        REVIEW OF SYSTEMS:    Review of Systems   Reason unable to perform ROS: ROS per nursing and patient.   Constitutional:  Negative for activity change, appetite change, chills, diaphoresis, fatigue, fever, unexpected weight gain and unexpected weight loss.   HENT:  Negative for congestion, mouth sores, nosebleeds and trouble swallowing.    Respiratory:  Negative for cough, choking, chest tightness and shortness of breath.    Cardiovascular:  Negative for chest pain.   Gastrointestinal:  Negative for abdominal pain, constipation, diarrhea, nausea and vomiting.   Endocrine: Positive for polyphagia. Negative for polydipsia and polyuria.   Genitourinary:  Positive for urinary incontinence. Negative for decreased urine volume, difficulty urinating, dysuria, frequency and hematuria.   Musculoskeletal:  Positive for arthralgias (chronic). Negative for joint swelling and myalgias.   Skin:  Negative for color change and rash.   Neurological:  Positive for speech difficulty, weakness, memory problem and confusion. Negative for dizziness.        Neuropathy BLE   Psychiatric/Behavioral:  Positive for behavioral problems (intermittently). Negative for dysphoric mood, hallucinations, sleep disturbance and depressed mood. The patient is not nervous/anxious.          PHYSICAL EXAMINATION:   VITAL SIGNS:   Vitals:    10/08/24 1441   BP: 140/78   Pulse: 60   Resp: 18   Temp: 97.7 °F (36.5 °C)   SpO2: 96%   Weight: 85.7 kg (189 lb)       Physical Exam  Vitals and nursing note reviewed.    Constitutional:       General: She is not in acute distress.     Appearance: She is well-developed.   Eyes:      Conjunctiva/sclera: Conjunctivae normal.   Cardiovascular:      Rate and Rhythm: Normal rate and regular rhythm.      Heart sounds: Normal heart sounds.   Pulmonary:      Effort: Pulmonary effort is normal.      Breath sounds: Normal breath sounds. No wheezing or rales.   Abdominal:      General: Bowel sounds are normal. There is no distension.      Palpations: Abdomen is soft.      Tenderness: There is no abdominal tenderness.   Feet:      Comments: Unable to perform accurate foot exam due to dementia  Skin:     General: Skin is warm and dry.   Neurological:      Mental Status: She is alert. Mental status is at baseline. She is disoriented.      Gait: Gait abnormal.      Comments: Chronic NM deficits related to CVA   Psychiatric:         Mood and Affect: Mood normal. Affect is flat.         Behavior: Behavior normal.         Cognition and Memory: Cognition is impaired. Memory is impaired.         RECORDS REVIEW:   I have reviewed and interpreted the discharge summary and medications    ASSESSMENT     Diagnoses and all orders for this visit:    1. Type 2 diabetes mellitus with hyperglycemia, with long-term current use of insulin (Primary)    2. 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 Non-compliance    4. Moderate vascular dementia with other behavioral disturbance    5. Impaired mobility and ADLs            PLAN    Uncontrolled DM with hyperglycemia and neuropathy  -Will increase Tresiba to 62 units bid. Continue meal time insulin 18 units with meals.   Will continue to adjust accordingly. She is non compliant with diet.  Will follow up and continue to monitor. Will continue routine labs.     Nursing encouraged to keep me informed of any acute changes, lack of improvement, or any new concerning symptoms.    Staff to continue supportive care for all  ADLs.    [x]  Discussed Patient in detail with nursing/staff, addressed all needs today.     [x]  Plan of Care Reviewed   [x]  PT/OT Reviewed   [x]  Order Changes  []  Discharge Plans Reviewed  [x]  Advance Directive on file with Nursing Home.   [x]  POA on file with Nursing Home.   [x]  Code Status listed: [x]  Full Code   []  DNR       “I confirm accuracy of unchanged data/findings which have been carried forward from previous visit, as well as I have updated appropriately those that have changed.”                                   Fariha Cardona, APRN.

## 2024-10-09 NOTE — PROGRESS NOTES
Nursing Home Follow Up Note      Fabian Santos DO []   [facility] [x]  [address] [place] [address]  Phone: [phone]  Fax: [phone] Yohan Cuellar MD []    Chaz Mandel DO []   [address] [place] [address]  Phone: [phone]  Fax: [phone]     PATIENT NAME: Izabella Agudelo                                                                          YOB: 1960           DATE OF SERVICE: 10/8/2024  FACILITY:  [][facility]   [][facility]   [x] [facility]   [] [facility]   [] Other ______________________________________________________________________      CHIEF COMPLAINT:    Uncontrolled Diabetes with hyperglycemia.    HISTORY OF PRESENT ILLNESS:     Nursing reports that patient continues to have elevated blood sugars. She had insulin increased a couple weeks ago.  They are never controlled due to non compliance with diet, and eating a lot of snacks. Her family brings her a lot of foods and snacks that are not on a Diabetic diet.  She had not and fever,  abnormalities, respiratory abnormalities or other signs of infection. She is on 60 units bid of Trulicity and 18 units of short acting insulin with meals for treatment.     Moving about the facility with no signs and symptoms of any distress. No complaints.       PAST MEDICAL & SURGICAL HISTORY:   Past Medical History:   Diagnosis Date    Acute angle-closure glaucoma, bilateral     Aphasia     Cardiac abnormality     CHF (congestive heart failure)     COPD (chronic obstructive pulmonary disease)     Diabetes     Glaucoma     Hemiparesis     Hemiplegia     Impaired functional mobility, balance, gait, and endurance     Schizophrenia, chronic condition     Vascular dementia       No past surgical history on file.      MEDICATIONS:  I have reviewed and reconciled the patients medication list in the patients chart at the skilled nursing facility today.      ALLERGIES:    Allergies   Allergen Reactions     Penicillins Rash         SOCIAL HISTORY:    Social History     Socioeconomic History    Marital status: Single   Tobacco Use    Smoking status: Never     Passive exposure: Never    Smokeless tobacco: Never   Vaping Use    Vaping status: Never Used   Substance and Sexual Activity    Alcohol use: No    Drug use: Never    Sexual activity: Defer       FAMILY HISTORY:    Family History   Problem Relation Age of Onset    Diabetes Other     Glaucoma Other        REVIEW OF SYSTEMS:    Review of Systems   Reason unable to perform ROS: ROS per nursing and patient.   Constitutional:  Negative for activity change, appetite change, chills, diaphoresis, fatigue, fever, unexpected weight gain and unexpected weight loss.   HENT:  Negative for congestion, mouth sores, nosebleeds and trouble swallowing.    Respiratory:  Negative for cough, choking, chest tightness and shortness of breath.    Cardiovascular:  Negative for chest pain.   Gastrointestinal:  Negative for abdominal pain, constipation, diarrhea, nausea and vomiting.   Endocrine: Positive for polyphagia. Negative for polydipsia and polyuria.   Genitourinary:  Positive for urinary incontinence. Negative for decreased urine volume, difficulty urinating, dysuria, frequency and hematuria.   Musculoskeletal:  Positive for arthralgias (chronic). Negative for joint swelling and myalgias.   Skin:  Negative for color change and rash.   Neurological:  Positive for speech difficulty, weakness, memory problem and confusion. Negative for dizziness.        Neuropathy BLE   Psychiatric/Behavioral:  Positive for behavioral problems (intermittently). Negative for dysphoric mood, hallucinations, sleep disturbance and depressed mood. The patient is not nervous/anxious.          PHYSICAL EXAMINATION:   VITAL SIGNS:   Vitals:    10/08/24 1441   BP: 140/78   Pulse: 60   Resp: 18   Temp: 97.7 °F (36.5 °C)   SpO2: 96%   Weight: 85.7 kg (189 lb)       Physical Exam  Vitals and nursing note reviewed.    Constitutional:       General: She is not in acute distress.     Appearance: She is well-developed.   Eyes:      Conjunctiva/sclera: Conjunctivae normal.   Cardiovascular:      Rate and Rhythm: Normal rate and regular rhythm.      Heart sounds: Normal heart sounds.   Pulmonary:      Effort: Pulmonary effort is normal.      Breath sounds: Normal breath sounds. No wheezing or rales.   Abdominal:      General: Bowel sounds are normal. There is no distension.      Palpations: Abdomen is soft.      Tenderness: There is no abdominal tenderness.   Feet:      Comments: Unable to perform accurate foot exam due to dementia  Skin:     General: Skin is warm and dry.   Neurological:      Mental Status: She is alert. Mental status is at baseline. She is disoriented.      Gait: Gait abnormal.      Comments: Chronic NM deficits related to CVA   Psychiatric:         Mood and Affect: Mood normal. Affect is flat.         Behavior: Behavior normal.         Cognition and Memory: Cognition is impaired. Memory is impaired.         RECORDS REVIEW:   I have reviewed and interpreted the discharge summary and medications    ASSESSMENT     Diagnoses and all orders for this visit:    1. Type 2 diabetes mellitus with hyperglycemia, with long-term current use of insulin (Primary)    2. 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 Non-compliance    4. Moderate vascular dementia with other behavioral disturbance    5. Impaired mobility and ADLs            PLAN    Uncontrolled DM with hyperglycemia and neuropathy  -Will increase Tresiba to 62 units bid. Continue meal time insulin 18 units with meals.   Will continue to adjust accordingly. She is non compliant with diet.  Will follow up and continue to monitor. Will continue routine labs.     Nursing encouraged to keep me informed of any acute changes, lack of improvement, or any new concerning symptoms.    Staff to continue supportive care for all  ADLs.    [x]  Discussed Patient in detail with nursing/staff, addressed all needs today.     [x]  Plan of Care Reviewed   [x]  PT/OT Reviewed   [x]  Order Changes  []  Discharge Plans Reviewed  [x]  Advance Directive on file with Nursing Home.   [x]  POA on file with Nursing Home.   [x]  Code Status listed: [x]  Full Code   []  DNR       “I confirm accuracy of unchanged data/findings which have been carried forward from previous visit, as well as I have updated appropriately those that have changed.”                                   Fariha Cardona, APRN.      No

## 2024-10-15 ENCOUNTER — NURSING HOME (OUTPATIENT)
Dept: FAMILY MEDICINE CLINIC | Facility: CLINIC | Age: 64
End: 2024-10-15
Payer: MEDICARE

## 2024-10-15 VITALS
RESPIRATION RATE: 18 BRPM | WEIGHT: 189 LBS | BODY MASS INDEX: 28.74 KG/M2 | SYSTOLIC BLOOD PRESSURE: 147 MMHG | OXYGEN SATURATION: 96 % | DIASTOLIC BLOOD PRESSURE: 78 MMHG | TEMPERATURE: 97.7 F | HEART RATE: 61 BPM

## 2024-10-15 DIAGNOSIS — F01.B18 MODERATE VASCULAR DEMENTIA WITH OTHER BEHAVIORAL DISTURBANCE: ICD-10-CM

## 2024-10-15 DIAGNOSIS — Z74.09 IMPAIRED MOBILITY AND ADLS: ICD-10-CM

## 2024-10-15 DIAGNOSIS — E11.65 TYPE 2 DIABETES MELLITUS WITH HYPERGLYCEMIA, WITH LONG-TERM CURRENT USE OF INSULIN: ICD-10-CM

## 2024-10-15 DIAGNOSIS — Z79.4 TYPE 2 DIABETES MELLITUS WITH HYPERGLYCEMIA, WITH LONG-TERM CURRENT USE OF INSULIN: ICD-10-CM

## 2024-10-15 DIAGNOSIS — Z79.899 MEDICATION MANAGEMENT: ICD-10-CM

## 2024-10-15 DIAGNOSIS — Z78.9 IMPAIRED MOBILITY AND ADLS: ICD-10-CM

## 2024-10-15 PROCEDURE — 99309 SBSQ NF CARE MODERATE MDM 30: CPT | Performed by: NURSE PRACTITIONER

## 2024-10-15 NOTE — LETTER
Nursing Home Follow Up Note      Fabian Santos DO []   [facility] [x]  [address] [place] [address]  Phone: [phone]  Fax: [phone] Yohan Cuellar MD []    Chaz Mandel DO []   [address] [place] [address]  Phone: [phone]  Fax: [phone]     PATIENT NAME: Izablela Agudelo                                                                          YOB: 1960           DATE OF SERVICE: 10/15/2024  FACILITY:  [][facility]   [facility]   [x] [facility]   [] [facility]   [] Other ______________________________________________________________________      CHIEF COMPLAINT:    Uncontrolled Diabetes with hyperglycemia.    HISTORY OF PRESENT ILLNESS:     Patient's blood sugars reviewed and they continue to have elevated. They have improved some but still elevated. She had insulin increased last week.  They are never controlled due to non compliance with diet, and eating a lot of snacks. Her family brings her a lot of foods and snacks that are not on a Diabetic diet.  She had not and fever,  abnormalities, respiratory abnormalities or other signs of infection. She is on 62 units bid of Tresiba and 18 units of short acting insulin with meals, for treatment. Also has sliding scale.     Resting in bed during visit with no signs and symptoms of any distress. No complaints.       PAST MEDICAL & SURGICAL HISTORY:   Past Medical History:   Diagnosis Date   • Acute angle-closure glaucoma, bilateral    • Aphasia    • Cardiac abnormality    • CHF (congestive heart failure)    • COPD (chronic obstructive pulmonary disease)    • Diabetes    • Glaucoma    • Hemiparesis    • Hemiplegia    • Impaired functional mobility, balance, gait, and endurance    • Schizophrenia, chronic condition    • Vascular dementia       No past surgical history on file.      MEDICATIONS:  I have reviewed and reconciled the patients medication list in the patients chart at the [facility]  facility today.      ALLERGIES:    Allergies   Allergen Reactions   • Penicillins Rash         SOCIAL HISTORY:    Social History     Socioeconomic History   • Marital status: Single   Tobacco Use   • Smoking status: Never     Passive exposure: Never   • Smokeless tobacco: Never   Vaping Use   • Vaping status: Never Used   Substance and Sexual Activity   • Alcohol use: No   • Drug use: Never   • Sexual activity: Defer       FAMILY HISTORY:    Family History   Problem Relation Age of Onset   • Diabetes Other    • Glaucoma Other        REVIEW OF SYSTEMS:    Review of Systems   Reason unable to perform ROS: ROS per nursing and patient.   Constitutional:  Negative for activity change, appetite change, chills, diaphoresis, fatigue, fever, unexpected weight gain and unexpected weight loss.   HENT:  Negative for congestion, mouth sores, nosebleeds and trouble swallowing.    Respiratory:  Negative for cough, choking, chest tightness and shortness of breath.    Cardiovascular:  Negative for chest pain.   Gastrointestinal:  Negative for abdominal pain, constipation, diarrhea, nausea and vomiting.   Endocrine: Positive for polyphagia. Negative for polydipsia and polyuria.   Genitourinary:  Positive for urinary incontinence. Negative for decreased urine volume, difficulty urinating, dysuria, frequency and hematuria.   Musculoskeletal:  Positive for arthralgias (chronic). Negative for joint swelling and myalgias.   Skin:  Negative for color change and rash.   Neurological:  Positive for speech difficulty, weakness, memory problem and confusion. Negative for dizziness.        Neuropathy BLE   Psychiatric/Behavioral:  Positive for behavioral problems (intermittently). Negative for dysphoric mood, hallucinations, sleep disturbance and depressed mood. The patient is not nervous/anxious.          PHYSICAL EXAMINATION:   VITAL SIGNS:   Vitals:    10/15/24 1435   BP: 147/78   Pulse: 61   Resp: 18   Temp: 97.7 °F (36.5 °C)   SpO2: 96%    Weight: 85.7 kg (189 lb)       Physical Exam  Vitals and nursing note reviewed.   Constitutional:       General: She is not in acute distress.     Appearance: She is well-developed.   Eyes:      Conjunctiva/sclera: Conjunctivae normal.   Cardiovascular:      Rate and Rhythm: Normal rate and regular rhythm.      Heart sounds: Normal heart sounds.   Pulmonary:      Effort: Pulmonary effort is normal.      Breath sounds: Normal breath sounds. No wheezing or rales.   Abdominal:      General: Bowel sounds are normal. There is no distension.      Palpations: Abdomen is soft.      Tenderness: There is no abdominal tenderness.   Feet:      Comments: Unable to perform accurate foot exam due to dementia  Skin:     General: Skin is warm and dry.   Neurological:      Mental Status: She is alert. Mental status is at baseline. She is disoriented.      Gait: Gait abnormal.      Comments: Chronic NM deficits related to CVA   Psychiatric:         Mood and Affect: Mood normal. Affect is flat.         Behavior: Behavior normal.         Cognition and Memory: Cognition is impaired. Memory is impaired.         RECORDS REVIEW:   I have reviewed and interpreted the discharge summary and medications    ASSESSMENT     Diagnoses and all orders for this visit:    1. Medication management    2. Type 2 diabetes mellitus with hyperglycemia, with long-term current use of insulin    3. Moderate vascular dementia with other behavioral disturbance    4. Impaired mobility and ADLs          PLAN    Uncontrolled DM with hyperglycemia and neuropathy  -Will increase Tresiba to 64 units bid. Increase meal time insulin to 20 units with meals.   Will continue to adjust accordingly. She is non compliant with diet.  Will follow up and continue to monitor. Will continue routine labs.     Nursing encouraged to keep me informed of any acute changes, lack of improvement, or any new concerning symptoms.    Staff to continue supportive care for all ADLs.    [x]   Discussed Patient in detail with nursing/staff, addressed all needs today.     [x]  Plan of Care Reviewed   [x]  PT/OT Reviewed   [x]  Order Changes  []  Discharge Plans Reviewed  [x]  Advance Directive on file with Nursing Home.   [x]  POA on file with Nursing Home.   [x]  Code Status listed: [x]  Full Code   []  DNR       I spent 35 minutes caring for Izabella on this date of service. This time includes time spent by me in the following activities:preparing for the visit, obtaining and/or reviewing a separately obtained history, performing a medically appropriate examination and/or evaluation , counseling and educating the patient/family/caregiver, ordering medications, tests, or procedures, referring and communicating with other health care professionals , documenting information in the medical record, independently interpreting results and communicating that information with the patient/family/caregiver, and care coordination    I confirm accuracy of unchanged data/findings which have been carried forward from previous visit, as well as I have updated appropriately those that have changed.                                 Fariha Cardona, APRN.

## 2024-10-16 ENCOUNTER — NURSING HOME (OUTPATIENT)
Dept: FAMILY MEDICINE CLINIC | Facility: CLINIC | Age: 64
End: 2024-10-16
Payer: MEDICARE

## 2024-10-16 VITALS
RESPIRATION RATE: 16 BRPM | OXYGEN SATURATION: 96 % | SYSTOLIC BLOOD PRESSURE: 146 MMHG | DIASTOLIC BLOOD PRESSURE: 74 MMHG | WEIGHT: 189 LBS | HEART RATE: 60 BPM | BODY MASS INDEX: 28.74 KG/M2 | TEMPERATURE: 98 F

## 2024-10-16 DIAGNOSIS — R13.12 OROPHARYNGEAL DYSPHAGIA: ICD-10-CM

## 2024-10-16 DIAGNOSIS — Z86.73 HISTORY OF ISCHEMIC STROKE: ICD-10-CM

## 2024-10-16 DIAGNOSIS — I69.90 LATE EFFECTS OF CVA (CEREBROVASCULAR ACCIDENT): ICD-10-CM

## 2024-10-16 DIAGNOSIS — F01.B18 MODERATE VASCULAR DEMENTIA WITH OTHER BEHAVIORAL DISTURBANCE: ICD-10-CM

## 2024-10-16 DIAGNOSIS — E11.44 TYPE 2 DIABETES MELLITUS WITH DIABETIC AMYOTROPHY, WITH LONG-TERM CURRENT USE OF INSULIN: ICD-10-CM

## 2024-10-16 DIAGNOSIS — K21.9 GERD WITHOUT ESOPHAGITIS: ICD-10-CM

## 2024-10-16 DIAGNOSIS — Z74.09 IMPAIRED MOBILITY AND ADLS: Primary | ICD-10-CM

## 2024-10-16 DIAGNOSIS — I10 ESSENTIAL HYPERTENSION: ICD-10-CM

## 2024-10-16 DIAGNOSIS — F20.9 SCHIZOPHRENIA, CHRONIC CONDITION: ICD-10-CM

## 2024-10-16 DIAGNOSIS — Z79.4 TYPE 2 DIABETES MELLITUS WITH DIABETIC AMYOTROPHY, WITH LONG-TERM CURRENT USE OF INSULIN: ICD-10-CM

## 2024-10-16 DIAGNOSIS — Z78.9 IMPAIRED MOBILITY AND ADLS: Primary | ICD-10-CM

## 2024-10-16 PROCEDURE — 99309 SBSQ NF CARE MODERATE MDM 30: CPT | Performed by: FAMILY MEDICINE

## 2024-10-16 NOTE — PROGRESS NOTES
Nursing Home Progress Note        Fabian Santos DO [x]  [facility] []  [address], [place] [address]  Phone: [phone]  Fax: [phone] Yohan Cuellar MD []  Chaz Mandel DO []  [address] [place] [address]  Phone: [phone]  Fax: [phone]     PATIENT NAME: Izabella Agudelo                                                                          YOB: 1960           DATE OF SERVICE: 10/16/2024  FACILITY: []  [facility]  [] [facility]  [x]  [facility]  [] [facility]  []  Other ______________________________________________________________________      CHIEF COMPLAINT:  Impaired mobility and ADL/paranoid schizophrenia/diabetes mellitus treated with insulin/hypertension/dyslipidemia/diabetic amyotrophy      HISTORY OF PRESENT ILLNESS:   [x]  Follow Up visit for coordination of long term care issues and chronic medical management of Diagnoses and all orders for this visit:    1. Impaired mobility and ADLs (Primary)    2. Late effects of CVA (cerebrovascular accident)    3. Moderate vascular dementia with other behavioral disturbance    4. Schizophrenia, chronic condition    5. Essential hypertension    6. 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 GERD without esophagitis    8. Oropharyngeal dysphagia    9. History of ischemic stroke    Nursing/staff reports the patient has been receptive to supportive care, no new falls or injuries reported.  No acute behavioral changes, although does continue to demonstrate changes consistent with her known vascular dementia.  Also has underlying chronic schizophrenia.  Reports from staff for that patient is sleeping well.    No reports of any focal aspiration.    No reports of any cyclical/persistent hypoglycemia, although patient does continue to have significant hyperglycemia as a result of noncompliance with dietary recommendations.    Vital signs been stable, no  complaints of chest pain.    No acute/new neurological deficits.          PAST MEDICAL & SURGICAL HISTORY:   Past Medical History:   Diagnosis Date    Acute angle-closure glaucoma, bilateral     Aphasia     Cardiac abnormality     CHF (congestive heart failure)     COPD (chronic obstructive pulmonary disease)     Diabetes     Glaucoma     Hemiparesis     Hemiplegia     Impaired functional mobility, balance, gait, and endurance     Schizophrenia, chronic condition     Vascular dementia       No past surgical history on file.      MEDICATIONS:  I have reviewed and reconciled the patients medication list in the patients chart at the skilled nursing facility today.      ALLERGIES:    Allergies   Allergen Reactions    Penicillins Rash         SOCIAL HISTORY:    Social History     Socioeconomic History    Marital status: Single   Tobacco Use    Smoking status: Never     Passive exposure: Never    Smokeless tobacco: Never   Vaping Use    Vaping status: Never Used   Substance and Sexual Activity    Alcohol use: No    Drug use: Never    Sexual activity: Defer       FAMILY HISTORY:    Family History   Problem Relation Age of Onset    Diabetes Other     Glaucoma Other        REVIEW OF SYSTEMS:    Review of Systems  Appetite: Fair []   Good [x]   Poor []   Weight Loss []  [x]  Weight Stable   Unavoidable Weight Loss []  Tolerating Tube Feeding []    Supplements Provided []   Constitutional: Negative for fever, chills, diaphoresis or fatigue and weight change.  Patient is interactive but a poor historian.  HENT: No dysphagia; no changes to vision/hearing/smell/taste; no epistaxis  Eyes: Negative for redness and visual disturbance.   Respiratory: Occasional cough, no hemoptysis.  Cardiovascular: Negative for chest pain and palpitations.   Gastrointestinal: Negative for abdominal distention, abdominal pain and blood in stool.   Endocrine: Negative for cold intolerance and heat intolerance.   Genitourinary: Negative for difficulty  urinating, dysuria and frequency.Negative for hematuria   Musculoskeletal: Chronic myalgias and arthralgias.  Worsened lumbago as per above.  Integumentary: No open wounds, rash or concerning skin lesions  Neurological: Negative for syncope, weakness and headaches.   Hematological: Negative for adenopathy. Does not bruise/bleed easily.   Immunological: Negative for reported allergies or immunological disorders  Psychological: Chronic behavioral issues, no acute changes.    PHYSICAL EXAMINATION:   VITAL SIGNS:   Vitals:    10/16/24 0857   BP: 146/74   Pulse: 60   Resp: 16   Temp: 98 °F (36.7 °C)   SpO2: 96%             Physical Exam    General Appearance:  [x]  Alert   [x]  Oriented x person  [x]  No acute distress     [x]  Confused, chronically []  Disoriented   []  Comatose   Head:  Atraumatic and normocephalic, without obvious abnormality.   Eyes:         PERRLA, conjunctivae and sclerae normal, no Icterus. No pallor. Extra-occular movements are within normal limits.   Ears:  Ears appear intact with no abnormalities noted.   Throat: No oral lesions, no thrush, oral mucosa moist.   Neck: Supple, trachea midline, no thyromegaly, no carotid bruit.   Back:   No kyphoscoliosis. No tenderness to palpation.   Lungs:   Chest shape is normal. Breath sounds heard bilaterally equally.  No wheezing.  Audible air exchange noted all lung fields.   Heart:  Normal S1 and S2, no murmur, no gallop, no rub. No JVD.   Abdomen:   Normal bowel sounds, no masses, no organomegaly. Soft, non-tender, non-distended, no guarding.  No CVA tenderness.   Extremities: Moves all extremities, without edema, cyanosis or clubbing.  Frail build.   Poor core strength and stability.   Pulses: Pulses palpable and equal bilaterally.   Skin: Generalized dry skin noted.  Age-related atrophy of skin.   Neurologic: [x] Normal speech []  Normal mental status    [x] Cranial nerves II through XII intact   [x]  No anosmia [x]  DTR 2+ [x]  Proprioception  intact  [x]  Chronic motor/sensory deficits      Psych/Mood:                    [x]  No acute changes, flat affect[]  Depressed      Urinary:      [x]  Continent  [x]  Incontinent, at times[]  Retention  []  F/C     []  UTI w/treatment in progress         ASSESSMENT     Diagnoses and all orders for this visit:    1. Impaired mobility and ADLs (Primary)    2. Late effects of CVA (cerebrovascular accident)    3. Moderate vascular dementia with other behavioral disturbance    4. Schizophrenia, chronic condition    5. Essential hypertension    6. 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 GERD without esophagitis    8. Oropharyngeal dysphagia    9. History of ischemic stroke          PLAN  Planned continuation of supportive care for mobilization/transfer assistance, fall precautions in place given her impaired mobility and chronic neurological deficits of known CVA.  Continue to follow nutritional/hydration status, no deficit reported at this time.  Continue aspiration precautions.    Vital signs demonstrate hemodynamic stability, blood pressure is at goal.  Demonstrates no findings suggestive of unstable angina.    Continue blood glucose monitoring, likely will need continued adjustments to treatment regimen in an effort to maximize blood glucose control, as well as minimize any potential hypoglycemic episodes.  Continue to encourage adherence to dietary recommendations.    No acute behavioral changes, sleeping well.    Surveillance labs as per routine/need.    [x]  Discussed Patient in detail with nursing/staff, addressed all needs today.     [x]  Plan of Care Reviewed   []  PT/OT Reviewed   []  Order Changes  []  Discharge Plans Reviewed   []  Code Status Changes      I spent 35 minutes caring for Izabella on this date of service. This time includes time spent by me in the following activities:preparing for the visit, performing a medically appropriate examination  and/or evaluation , counseling and educating the patient/family/caregiver, ordering medications, tests, or procedures, documenting information in the medical record, and care coordination    I confirm accuracy of unchanged data/findings which have been carried forward from previous visit, as well as I have updated appropriately those that have changed.             Fabian Santos DO  10/16/2024

## 2024-10-16 NOTE — PROGRESS NOTES
Nursing Home Follow Up Note      Fabian Santos DO []   [facility] [x]  [address] [place] [address]  Phone: [phone]  Fax: [phone] Yohan Cuellar MD []    Chaz Mandel DO []   [address] [place] [address]  Phone: [phone]  Fax: [phone]     PATIENT NAME: Izabella Agudelo                                                                          YOB: 1960           DATE OF SERVICE: 10/15/2024  FACILITY:  [][place]   [][place]   [x] [facility]   [] [facility]   [] Other ______________________________________________________________________      CHIEF COMPLAINT:    Uncontrolled Diabetes with hyperglycemia.    HISTORY OF PRESENT ILLNESS:     Patient's blood sugars reviewed and they continue to have elevated. They have improved some but still elevated. She had insulin increased last week.  They are never controlled due to non compliance with diet, and eating a lot of snacks. Her family brings her a lot of foods and snacks that are not on a Diabetic diet.  She had not and fever,  abnormalities, respiratory abnormalities or other signs of infection. She is on 62 units bid of Tresiba and 18 units of short acting insulin with meals, for treatment. Also has sliding scale.     Resting in bed during visit with no signs and symptoms of any distress. No complaints.       PAST MEDICAL & SURGICAL HISTORY:   Past Medical History:   Diagnosis Date    Acute angle-closure glaucoma, bilateral     Aphasia     Cardiac abnormality     CHF (congestive heart failure)     COPD (chronic obstructive pulmonary disease)     Diabetes     Glaucoma     Hemiparesis     Hemiplegia     Impaired functional mobility, balance, gait, and endurance     Schizophrenia, chronic condition     Vascular dementia       No past surgical history on file.      MEDICATIONS:  I have reviewed and reconciled the patients medication list in the patients chart at the [facility] nursing facility  today.      ALLERGIES:    Allergies   Allergen Reactions    Penicillins Rash         SOCIAL HISTORY:    Social History     Socioeconomic History    Marital status: Single   Tobacco Use    Smoking status: Never     Passive exposure: Never    Smokeless tobacco: Never   Vaping Use    Vaping status: Never Used   Substance and Sexual Activity    Alcohol use: No    Drug use: Never    Sexual activity: Defer       FAMILY HISTORY:    Family History   Problem Relation Age of Onset    Diabetes Other     Glaucoma Other        REVIEW OF SYSTEMS:    Review of Systems   Reason unable to perform ROS: ROS per nursing and patient.   Constitutional:  Negative for activity change, appetite change, chills, diaphoresis, fatigue, fever, unexpected weight gain and unexpected weight loss.   HENT:  Negative for congestion, mouth sores, nosebleeds and trouble swallowing.    Respiratory:  Negative for cough, choking, chest tightness and shortness of breath.    Cardiovascular:  Negative for chest pain.   Gastrointestinal:  Negative for abdominal pain, constipation, diarrhea, nausea and vomiting.   Endocrine: Positive for polyphagia. Negative for polydipsia and polyuria.   Genitourinary:  Positive for urinary incontinence. Negative for decreased urine volume, difficulty urinating, dysuria, frequency and hematuria.   Musculoskeletal:  Positive for arthralgias (chronic). Negative for joint swelling and myalgias.   Skin:  Negative for color change and rash.   Neurological:  Positive for speech difficulty, weakness, memory problem and confusion. Negative for dizziness.        Neuropathy BLE   Psychiatric/Behavioral:  Positive for behavioral problems (intermittently). Negative for dysphoric mood, hallucinations, sleep disturbance and depressed mood. The patient is not nervous/anxious.          PHYSICAL EXAMINATION:   VITAL SIGNS:   Vitals:    10/15/24 1435   BP: 147/78   Pulse: 61   Resp: 18   Temp: 97.7 °F (36.5 °C)   SpO2: 96%   Weight: 85.7 kg  (189 lb)       Physical Exam  Vitals and nursing note reviewed.   Constitutional:       General: She is not in acute distress.     Appearance: She is well-developed.   Eyes:      Conjunctiva/sclera: Conjunctivae normal.   Cardiovascular:      Rate and Rhythm: Normal rate and regular rhythm.      Heart sounds: Normal heart sounds.   Pulmonary:      Effort: Pulmonary effort is normal.      Breath sounds: Normal breath sounds. No wheezing or rales.   Abdominal:      General: Bowel sounds are normal. There is no distension.      Palpations: Abdomen is soft.      Tenderness: There is no abdominal tenderness.   Feet:      Comments: Unable to perform accurate foot exam due to dementia  Skin:     General: Skin is warm and dry.   Neurological:      Mental Status: She is alert. Mental status is at baseline. She is disoriented.      Gait: Gait abnormal.      Comments: Chronic NM deficits related to CVA   Psychiatric:         Mood and Affect: Mood normal. Affect is flat.         Behavior: Behavior normal.         Cognition and Memory: Cognition is impaired. Memory is impaired.         RECORDS REVIEW:   I have reviewed and interpreted the discharge summary and medications    ASSESSMENT     Diagnoses and all orders for this visit:    1. Medication management    2. Type 2 diabetes mellitus with hyperglycemia, with long-term current use of insulin    3. Moderate vascular dementia with other behavioral disturbance    4. Impaired mobility and ADLs          PLAN    Uncontrolled DM with hyperglycemia and neuropathy  -Will increase Tresiba to 64 units bid. Increase meal time insulin to 20 units with meals.   Will continue to adjust accordingly. She is non compliant with diet.  Will follow up and continue to monitor. Will continue routine labs.     Nursing encouraged to keep me informed of any acute changes, lack of improvement, or any new concerning symptoms.    Staff to continue supportive care for all ADLs.    [x]  Discussed Patient  in detail with nursing/staff, addressed all needs today.     [x]  Plan of Care Reviewed   [x]  PT/OT Reviewed   [x]  Order Changes  []  Discharge Plans Reviewed  [x]  Advance Directive on file with Nursing Home.   [x]  POA on file with Nursing Home.   [x]  Code Status listed: [x]  Full Code   []  DNR       I spent 35 minutes caring for Izabella on this date of service. This time includes time spent by me in the following activities:preparing for the visit, obtaining and/or reviewing a separately obtained history, performing a medically appropriate examination and/or evaluation , counseling and educating the patient/family/caregiver, ordering medications, tests, or procedures, referring and communicating with other health care professionals , documenting information in the medical record, independently interpreting results and communicating that information with the patient/family/caregiver, and care coordination    I confirm accuracy of unchanged data/findings which have been carried forward from previous visit, as well as I have updated appropriately those that have changed.                                 Fariha Cardona, APRN.

## 2024-12-04 ENCOUNTER — NURSING HOME (OUTPATIENT)
Age: 64
End: 2024-12-04
Payer: MEDICARE

## 2024-12-04 VITALS
OXYGEN SATURATION: 96 % | HEIGHT: 63 IN | SYSTOLIC BLOOD PRESSURE: 122 MMHG | TEMPERATURE: 98 F | HEART RATE: 74 BPM | RESPIRATION RATE: 20 BRPM | BODY MASS INDEX: 35.12 KG/M2 | DIASTOLIC BLOOD PRESSURE: 70 MMHG | WEIGHT: 198.2 LBS

## 2024-12-04 DIAGNOSIS — F20.9 SCHIZOPHRENIA, CHRONIC CONDITION: ICD-10-CM

## 2024-12-04 DIAGNOSIS — K21.9 GERD WITHOUT ESOPHAGITIS: ICD-10-CM

## 2024-12-04 DIAGNOSIS — I10 ESSENTIAL HYPERTENSION: ICD-10-CM

## 2024-12-04 DIAGNOSIS — E11.44 TYPE 2 DIABETES MELLITUS WITH DIABETIC AMYOTROPHY, WITH LONG-TERM CURRENT USE OF INSULIN: ICD-10-CM

## 2024-12-04 DIAGNOSIS — Z74.09 IMPAIRED MOBILITY AND ADLS: Primary | ICD-10-CM

## 2024-12-04 DIAGNOSIS — R13.12 OROPHARYNGEAL DYSPHAGIA: ICD-10-CM

## 2024-12-04 DIAGNOSIS — F01.518 VASCULAR DEMENTIA WITH OTHER BEHAVIORAL DISTURBANCE, UNSPECIFIED DEMENTIA SEVERITY: ICD-10-CM

## 2024-12-04 DIAGNOSIS — I69.90 LATE EFFECTS OF CVA (CEREBROVASCULAR ACCIDENT): ICD-10-CM

## 2024-12-04 DIAGNOSIS — Z79.4 TYPE 2 DIABETES MELLITUS WITH DIABETIC AMYOTROPHY, WITH LONG-TERM CURRENT USE OF INSULIN: ICD-10-CM

## 2024-12-04 DIAGNOSIS — Z78.9 IMPAIRED MOBILITY AND ADLS: Primary | ICD-10-CM

## 2024-12-04 PROCEDURE — 99309 SBSQ NF CARE MODERATE MDM 30: CPT | Performed by: FAMILY MEDICINE

## 2024-12-04 NOTE — PROGRESS NOTES
Nursing Home Progress Note        Fabian Santos DO [x]  [facility] []  [address], [place] [address]  Phone: [phone]  Fax: [phone] Yohan Cuellar MD []  Chaz Mandel DO []  [address] [place] [address]  Phone: [phone]  Fax: [phone]     PATIENT NAME: Izabella Agudelo                                                                          YOB: 1960           DATE OF SERVICE: 12/4/2024  FACILITY: []  [place]  [] [place]  [x]  [facility]  [] [facility]  []  Other ______________________________________________________________________      CHIEF COMPLAINT:  Impaired mobility and ADL/paranoid schizophrenia/diabetes mellitus treated with insulin/hypertension/dyslipidemia/diabetic amyotrophy      HISTORY OF PRESENT ILLNESS:   [x]  Follow Up visit for coordination of long term care issues and chronic medical management of Diagnoses and all orders for this visit:    1. Impaired mobility and ADLs (Primary)    2. Vascular dementia with other behavioral disturbance, unspecified dementia severity    3. Late effects of CVA (cerebrovascular accident)    4. Schizophrenia, chronic condition    5. Essential hypertension    6. 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 GERD without esophagitis    8. Oropharyngeal dysphagia    Patient does interact to questioning, nursing/staff reports that she has been receptive to supportive care.  No new falls or injuries reported.  No nutritional/hydration deficits reported.    No reports of any focal aspiration.    Vital signs have been stable, no complaints of chest pain.    No new neurological deficits noted.    No reports of any cyclical/persistent hypoglycemic episodes.    No acute behavioral changes, sleeping well.      PAST MEDICAL & SURGICAL HISTORY:   Past Medical History:   Diagnosis Date    Acute angle-closure glaucoma, bilateral     Aphasia     Cardiac  abnormality     CHF (congestive heart failure)     COPD (chronic obstructive pulmonary disease)     Diabetes     Glaucoma     Hemiparesis     Hemiplegia     Impaired functional mobility, balance, gait, and endurance     Schizophrenia, chronic condition     Vascular dementia       No past surgical history on file.      MEDICATIONS:  I have reviewed and reconciled the patients medication list in the patients chart at the skilled nursing facility today.      ALLERGIES:    Allergies   Allergen Reactions    Penicillins Rash         SOCIAL HISTORY:    Social History     Socioeconomic History    Marital status: Single   Tobacco Use    Smoking status: Never     Passive exposure: Never    Smokeless tobacco: Never   Vaping Use    Vaping status: Never Used   Substance and Sexual Activity    Alcohol use: No    Drug use: Never    Sexual activity: Defer       FAMILY HISTORY:    Family History   Problem Relation Age of Onset    Diabetes Other     Glaucoma Other        REVIEW OF SYSTEMS:    Review of Systems  Appetite: Fair []   Good [x]   Poor []   Weight Loss []  [x]  Weight Stable   Unavoidable Weight Loss []  Tolerating Tube Feeding []    Supplements Provided []   Constitutional: Negative for fever, chills, diaphoresis or fatigue and weight change.  Patient is interactive but a poor historian.  HENT: No dysphagia; no changes to vision/hearing/smell/taste; no epistaxis  Eyes: Negative for redness and visual disturbance.   Respiratory: Occasional cough, no hemoptysis.  Cardiovascular: Negative for chest pain and palpitations.   Gastrointestinal: Negative for abdominal distention, abdominal pain and blood in stool.   Endocrine: Negative for cold intolerance and heat intolerance.   Genitourinary: Negative for difficulty urinating, dysuria and frequency.Negative for hematuria   Musculoskeletal: Chronic myalgias and arthralgias.  Worsened lumbago as per above.  Integumentary: No open wounds, rash or concerning skin  lesions  Neurological: Negative for syncope, weakness and headaches.   Hematological: Negative for adenopathy. Does not bruise/bleed easily.   Immunological: Negative for reported allergies or immunological disorders  Psychological: Chronic behavioral issues, no acute changes.    PHYSICAL EXAMINATION:   VITAL SIGNS:   Vitals:    12/04/24 0852   BP: 122/70   Pulse: 74   Resp: 20   Temp: 98 °F (36.7 °C)   SpO2: 96%             Physical Exam    General Appearance:  [x]  Alert   [x]  Oriented x person  [x]  No acute distress     [x]  Confused, chronically []  Disoriented   []  Comatose   Head:  Atraumatic and normocephalic, without obvious abnormality.   Eyes:         PERRLA, conjunctivae and sclerae normal, no Icterus. No pallor. Extra-occular movements are within normal limits.   Ears:  Ears appear intact with no abnormalities noted.   Throat: No oral lesions, no thrush, oral mucosa moist.   Neck: Supple, trachea midline, no thyromegaly, no carotid bruit.   Back:   No kyphoscoliosis. No tenderness to palpation.   Lungs:   Chest shape is normal. Breath sounds heard bilaterally equally.  No wheezing.  Audible air exchange noted all lung fields.   Heart:  Normal S1 and S2, no murmur, no gallop, no rub. No JVD.   Abdomen:   Normal bowel sounds, no masses, no organomegaly. Soft, non-tender, non-distended, no guarding.  No CVA tenderness.   Extremities: Moves all extremities, without edema, cyanosis or clubbing.  Frail build.   Poor core strength and stability.   Pulses: Pulses palpable and equal bilaterally.   Skin: Generalized dry skin noted.  Age-related atrophy of skin.   Neurologic: [x] Normal speech []  Normal mental status    [x] Cranial nerves II through XII intact   [x]  No anosmia [x]  DTR 2+ [x]  Proprioception intact  [x]  Chronic motor/sensory deficits      Psych/Mood:                    [x]  No acute changes, flat affect[]  Depressed      Urinary:      [x]  Continent  [x]  Incontinent, at times[]  Retention   []  F/C     []  UTI w/treatment in progress         ASSESSMENT     Diagnoses and all orders for this visit:    1. Impaired mobility and ADLs (Primary)    2. Vascular dementia with other behavioral disturbance, unspecified dementia severity    3. Late effects of CVA (cerebrovascular accident)    4. Schizophrenia, chronic condition    5. Essential hypertension    6. 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 GERD without esophagitis    8. Oropharyngeal dysphagia          PLAN  Planned continuation of supportive care for mobilization/transfer assistance, fall precautions in place given her advanced age and impaired mobility.  Assist ADLs of need.    Continue aspiration precautions due to her oropharyngeal dysphagia, as well as dietary/lifestyle modifications to aid in symptom management of chronic GERD.    Continue healthy dietary choices, avoid prolonged fasting periods as best able.  Maintain appropriate hydration status, changes to her treatment regimen can be made when needed to maximize blood glucose control and minimize hypoglycemic episodes.    Pain appears clinically well-controlled.  No acute behavioral changes.    Surveillance labs as per routine/need.    [x]  Discussed Patient in detail with nursing/staff, addressed all needs today.     [x]  Plan of Care Reviewed   []  PT/OT Reviewed   []  Order Changes  []  Discharge Plans Reviewed   []  Code Status Changes      I spent 35 minutes caring for Izabella on this date of service. This time includes time spent by me in the following activities:preparing for the visit, performing a medically appropriate examination and/or evaluation , counseling and educating the patient/family/caregiver, ordering medications, tests, or procedures, documenting information in the medical record, and care coordination    I confirm accuracy of unchanged data/findings which have been carried forward from previous visit, as well as I  have updated appropriately those that have changed.           Fabian Santos DO  12/4/2024

## 2025-01-10 DIAGNOSIS — E11.42 TYPE 2 DIABETES MELLITUS WITH DIABETIC POLYNEUROPATHY, WITH LONG-TERM CURRENT USE OF INSULIN: ICD-10-CM

## 2025-01-10 DIAGNOSIS — Z79.4 TYPE 2 DIABETES MELLITUS WITH DIABETIC POLYNEUROPATHY, WITH LONG-TERM CURRENT USE OF INSULIN: ICD-10-CM

## 2025-01-10 RX ORDER — GABAPENTIN 400 MG/1
400 CAPSULE ORAL 3 TIMES DAILY
Qty: 90 CAPSULE | Refills: 5 | Status: SHIPPED | OUTPATIENT
Start: 2025-01-10

## 2025-02-01 ENCOUNTER — HOSPITAL ENCOUNTER (OUTPATIENT)
Facility: HOSPITAL | Age: 65
Setting detail: OBSERVATION
Discharge: NURSING, REHAB OR LONG-TERM CARE | End: 2025-02-03
Attending: STUDENT IN AN ORGANIZED HEALTH CARE EDUCATION/TRAINING PROGRAM | Admitting: INTERNAL MEDICINE
Payer: MEDICARE

## 2025-02-01 ENCOUNTER — APPOINTMENT (OUTPATIENT)
Dept: CT IMAGING | Facility: HOSPITAL | Age: 65
End: 2025-02-01
Payer: MEDICARE

## 2025-02-01 ENCOUNTER — APPOINTMENT (OUTPATIENT)
Dept: GENERAL RADIOLOGY | Facility: HOSPITAL | Age: 65
End: 2025-02-01
Payer: MEDICARE

## 2025-02-01 DIAGNOSIS — J10.1 INFLUENZA A: Primary | ICD-10-CM

## 2025-02-01 PROBLEM — J44.1 COPD EXACERBATION: Status: ACTIVE | Noted: 2025-02-01

## 2025-02-01 LAB
ALBUMIN SERPL-MCNC: 3.4 G/DL (ref 3.5–5.2)
ALBUMIN/GLOB SERPL: 1 G/DL
ALP SERPL-CCNC: 67 U/L (ref 39–117)
ALT SERPL W P-5'-P-CCNC: 17 U/L (ref 1–33)
ANION GAP SERPL CALCULATED.3IONS-SCNC: 10 MMOL/L (ref 5–15)
ANION GAP SERPL CALCULATED.3IONS-SCNC: 6.6 MMOL/L (ref 5–15)
AST SERPL-CCNC: 23 U/L (ref 1–32)
BASOPHILS # BLD AUTO: 0.02 10*3/MM3 (ref 0–0.2)
BASOPHILS # BLD AUTO: 0.02 10*3/MM3 (ref 0–0.2)
BASOPHILS NFR BLD AUTO: 0.3 % (ref 0–1.5)
BASOPHILS NFR BLD AUTO: 0.4 % (ref 0–1.5)
BILIRUB SERPL-MCNC: 0.3 MG/DL (ref 0–1.2)
BUN SERPL-MCNC: 19 MG/DL (ref 8–23)
BUN SERPL-MCNC: 21 MG/DL (ref 8–23)
BUN/CREAT SERPL: 30.2 (ref 7–25)
BUN/CREAT SERPL: 41.2 (ref 7–25)
CALCIUM SPEC-SCNC: 8.7 MG/DL (ref 8.6–10.5)
CALCIUM SPEC-SCNC: 9 MG/DL (ref 8.6–10.5)
CHLORIDE SERPL-SCNC: 93 MMOL/L (ref 98–107)
CHLORIDE SERPL-SCNC: 99 MMOL/L (ref 98–107)
CK SERPL-CCNC: 70 U/L (ref 20–180)
CO2 SERPL-SCNC: 31.4 MMOL/L (ref 22–29)
CO2 SERPL-SCNC: 33 MMOL/L (ref 22–29)
CREAT SERPL-MCNC: 0.51 MG/DL (ref 0.57–1)
CREAT SERPL-MCNC: 0.63 MG/DL (ref 0.57–1)
CRP SERPL-MCNC: 7.71 MG/DL (ref 0–0.5)
DEPRECATED RDW RBC AUTO: 47.3 FL (ref 37–54)
DEPRECATED RDW RBC AUTO: 48.7 FL (ref 37–54)
EGFRCR SERPLBLD CKD-EPI 2021: 105 ML/MIN/1.73
EGFRCR SERPLBLD CKD-EPI 2021: 99.8 ML/MIN/1.73
EOSINOPHIL # BLD AUTO: 0.1 10*3/MM3 (ref 0–0.4)
EOSINOPHIL # BLD AUTO: 0.15 10*3/MM3 (ref 0–0.4)
EOSINOPHIL NFR BLD AUTO: 2.1 % (ref 0.3–6.2)
EOSINOPHIL NFR BLD AUTO: 2.5 % (ref 0.3–6.2)
ERYTHROCYTE [DISTWIDTH] IN BLOOD BY AUTOMATED COUNT: 15.2 % (ref 12.3–15.4)
ERYTHROCYTE [DISTWIDTH] IN BLOOD BY AUTOMATED COUNT: 15.5 % (ref 12.3–15.4)
FLUAV SUBTYP SPEC NAA+PROBE: DETECTED
FLUBV RNA ISLT QL NAA+PROBE: NOT DETECTED
GEN 5 1HR TROPONIN T REFLEX: 31 NG/L
GLOBULIN UR ELPH-MCNC: 3.4 GM/DL
GLUCOSE BLDC GLUCOMTR-MCNC: 129 MG/DL (ref 70–130)
GLUCOSE SERPL-MCNC: 149 MG/DL (ref 65–99)
GLUCOSE SERPL-MCNC: 78 MG/DL (ref 65–99)
HCT VFR BLD AUTO: 36.5 % (ref 34–46.6)
HCT VFR BLD AUTO: 37.1 % (ref 34–46.6)
HGB BLD-MCNC: 11.3 G/DL (ref 12–15.9)
HGB BLD-MCNC: 11.5 G/DL (ref 12–15.9)
IMM GRANULOCYTES # BLD AUTO: 0.03 10*3/MM3 (ref 0–0.05)
IMM GRANULOCYTES # BLD AUTO: 0.04 10*3/MM3 (ref 0–0.05)
IMM GRANULOCYTES NFR BLD AUTO: 0.6 % (ref 0–0.5)
IMM GRANULOCYTES NFR BLD AUTO: 0.7 % (ref 0–0.5)
LYMPHOCYTES # BLD AUTO: 0.91 10*3/MM3 (ref 0.7–3.1)
LYMPHOCYTES # BLD AUTO: 0.94 10*3/MM3 (ref 0.7–3.1)
LYMPHOCYTES NFR BLD AUTO: 15.4 % (ref 19.6–45.3)
LYMPHOCYTES NFR BLD AUTO: 19.7 % (ref 19.6–45.3)
MAGNESIUM SERPL-MCNC: 1.6 MG/DL (ref 1.6–2.4)
MCH RBC QN AUTO: 26.3 PG (ref 26.6–33)
MCH RBC QN AUTO: 26.5 PG (ref 26.6–33)
MCHC RBC AUTO-ENTMCNC: 31 G/DL (ref 31.5–35.7)
MCHC RBC AUTO-ENTMCNC: 31 G/DL (ref 31.5–35.7)
MCV RBC AUTO: 84.7 FL (ref 79–97)
MCV RBC AUTO: 85.7 FL (ref 79–97)
MONOCYTES # BLD AUTO: 0.32 10*3/MM3 (ref 0.1–0.9)
MONOCYTES # BLD AUTO: 0.41 10*3/MM3 (ref 0.1–0.9)
MONOCYTES NFR BLD AUTO: 6.7 % (ref 5–12)
MONOCYTES NFR BLD AUTO: 6.9 % (ref 5–12)
NEUTROPHILS NFR BLD AUTO: 3.37 10*3/MM3 (ref 1.7–7)
NEUTROPHILS NFR BLD AUTO: 4.38 10*3/MM3 (ref 1.7–7)
NEUTROPHILS NFR BLD AUTO: 70.5 % (ref 42.7–76)
NEUTROPHILS NFR BLD AUTO: 74.2 % (ref 42.7–76)
NRBC BLD AUTO-RTO: 0 /100 WBC (ref 0–0.2)
NRBC BLD AUTO-RTO: 0 /100 WBC (ref 0–0.2)
NT-PROBNP SERPL-MCNC: 439.1 PG/ML (ref 0–900)
PLATELET # BLD AUTO: 205 10*3/MM3 (ref 140–450)
PLATELET # BLD AUTO: 226 10*3/MM3 (ref 140–450)
PMV BLD AUTO: 9.1 FL (ref 6–12)
PMV BLD AUTO: 9.3 FL (ref 6–12)
POTASSIUM SERPL-SCNC: 3.6 MMOL/L (ref 3.5–5.2)
POTASSIUM SERPL-SCNC: 4.1 MMOL/L (ref 3.5–5.2)
PROCALCITONIN SERPL-MCNC: 1.71 NG/ML (ref 0–0.25)
PROT SERPL-MCNC: 6.8 G/DL (ref 6–8.5)
RBC # BLD AUTO: 4.26 10*6/MM3 (ref 3.77–5.28)
RBC # BLD AUTO: 4.38 10*6/MM3 (ref 3.77–5.28)
SARS-COV-2 RNA RESP QL NAA+PROBE: NOT DETECTED
SODIUM SERPL-SCNC: 136 MMOL/L (ref 136–145)
SODIUM SERPL-SCNC: 137 MMOL/L (ref 136–145)
TROPONIN T % DELTA: 15
TROPONIN T NUMERIC DELTA: 4 NG/L
TROPONIN T SERPL HS-MCNC: 27 NG/L
WBC NRBC COR # BLD AUTO: 4.78 10*3/MM3 (ref 3.4–10.8)
WBC NRBC COR # BLD AUTO: 5.91 10*3/MM3 (ref 3.4–10.8)

## 2025-02-01 PROCEDURE — 87641 MR-STAPH DNA AMP PROBE: CPT | Performed by: STUDENT IN AN ORGANIZED HEALTH CARE EDUCATION/TRAINING PROGRAM

## 2025-02-01 PROCEDURE — 84145 PROCALCITONIN (PCT): CPT | Performed by: STUDENT IN AN ORGANIZED HEALTH CARE EDUCATION/TRAINING PROGRAM

## 2025-02-01 PROCEDURE — 82948 REAGENT STRIP/BLOOD GLUCOSE: CPT

## 2025-02-01 PROCEDURE — 96372 THER/PROPH/DIAG INJ SC/IM: CPT

## 2025-02-01 PROCEDURE — 86140 C-REACTIVE PROTEIN: CPT | Performed by: STUDENT IN AN ORGANIZED HEALTH CARE EDUCATION/TRAINING PROGRAM

## 2025-02-01 PROCEDURE — 84484 ASSAY OF TROPONIN QUANT: CPT | Performed by: STUDENT IN AN ORGANIZED HEALTH CARE EDUCATION/TRAINING PROGRAM

## 2025-02-01 PROCEDURE — 99285 EMERGENCY DEPT VISIT HI MDM: CPT

## 2025-02-01 PROCEDURE — 99291 CRITICAL CARE FIRST HOUR: CPT | Performed by: STUDENT IN AN ORGANIZED HEALTH CARE EDUCATION/TRAINING PROGRAM

## 2025-02-01 PROCEDURE — 25010000002 HEPARIN (PORCINE) PER 1000 UNITS: Performed by: STUDENT IN AN ORGANIZED HEALTH CARE EDUCATION/TRAINING PROGRAM

## 2025-02-01 PROCEDURE — G0378 HOSPITAL OBSERVATION PER HR: HCPCS

## 2025-02-01 PROCEDURE — 82550 ASSAY OF CK (CPK): CPT | Performed by: STUDENT IN AN ORGANIZED HEALTH CARE EDUCATION/TRAINING PROGRAM

## 2025-02-01 PROCEDURE — 85025 COMPLETE CBC W/AUTO DIFF WBC: CPT | Performed by: STUDENT IN AN ORGANIZED HEALTH CARE EDUCATION/TRAINING PROGRAM

## 2025-02-01 PROCEDURE — 93005 ELECTROCARDIOGRAM TRACING: CPT | Performed by: STUDENT IN AN ORGANIZED HEALTH CARE EDUCATION/TRAINING PROGRAM

## 2025-02-01 PROCEDURE — 87081 CULTURE SCREEN ONLY: CPT | Performed by: STUDENT IN AN ORGANIZED HEALTH CARE EDUCATION/TRAINING PROGRAM

## 2025-02-01 PROCEDURE — 80053 COMPREHEN METABOLIC PANEL: CPT | Performed by: STUDENT IN AN ORGANIZED HEALTH CARE EDUCATION/TRAINING PROGRAM

## 2025-02-01 PROCEDURE — 83735 ASSAY OF MAGNESIUM: CPT | Performed by: STUDENT IN AN ORGANIZED HEALTH CARE EDUCATION/TRAINING PROGRAM

## 2025-02-01 PROCEDURE — 94761 N-INVAS EAR/PLS OXIMETRY MLT: CPT

## 2025-02-01 PROCEDURE — 83880 ASSAY OF NATRIURETIC PEPTIDE: CPT | Performed by: STUDENT IN AN ORGANIZED HEALTH CARE EDUCATION/TRAINING PROGRAM

## 2025-02-01 PROCEDURE — 86738 MYCOPLASMA ANTIBODY: CPT | Performed by: STUDENT IN AN ORGANIZED HEALTH CARE EDUCATION/TRAINING PROGRAM

## 2025-02-01 PROCEDURE — 96374 THER/PROPH/DIAG INJ IV PUSH: CPT

## 2025-02-01 PROCEDURE — 99222 1ST HOSP IP/OBS MODERATE 55: CPT | Performed by: STUDENT IN AN ORGANIZED HEALTH CARE EDUCATION/TRAINING PROGRAM

## 2025-02-01 PROCEDURE — 94799 UNLISTED PULMONARY SVC/PX: CPT

## 2025-02-01 PROCEDURE — 94640 AIRWAY INHALATION TREATMENT: CPT

## 2025-02-01 PROCEDURE — 70450 CT HEAD/BRAIN W/O DYE: CPT

## 2025-02-01 PROCEDURE — 25010000002 METHYLPREDNISOLONE PER 125 MG: Performed by: STUDENT IN AN ORGANIZED HEALTH CARE EDUCATION/TRAINING PROGRAM

## 2025-02-01 PROCEDURE — 87636 SARSCOV2 & INF A&B AMP PRB: CPT | Performed by: STUDENT IN AN ORGANIZED HEALTH CARE EDUCATION/TRAINING PROGRAM

## 2025-02-01 PROCEDURE — 71045 X-RAY EXAM CHEST 1 VIEW: CPT

## 2025-02-01 RX ORDER — INSULIN LISPRO 100 [IU]/ML
2-7 INJECTION, SOLUTION INTRAVENOUS; SUBCUTANEOUS
Status: DISCONTINUED | OUTPATIENT
Start: 2025-02-01 | End: 2025-02-02

## 2025-02-01 RX ORDER — ACETAMINOPHEN 325 MG/1
650 TABLET ORAL ONCE
Status: COMPLETED | OUTPATIENT
Start: 2025-02-01 | End: 2025-02-01

## 2025-02-01 RX ORDER — NICOTINE POLACRILEX 4 MG
15 LOZENGE BUCCAL
Status: DISCONTINUED | OUTPATIENT
Start: 2025-02-01 | End: 2025-02-03 | Stop reason: HOSPADM

## 2025-02-01 RX ORDER — IPRATROPIUM BROMIDE AND ALBUTEROL SULFATE 2.5; .5 MG/3ML; MG/3ML
3 SOLUTION RESPIRATORY (INHALATION) ONCE
Status: COMPLETED | OUTPATIENT
Start: 2025-02-01 | End: 2025-02-01

## 2025-02-01 RX ORDER — METHYLPREDNISOLONE SODIUM SUCCINATE 40 MG/ML
40 INJECTION, POWDER, LYOPHILIZED, FOR SOLUTION INTRAMUSCULAR; INTRAVENOUS EVERY 8 HOURS
Status: DISCONTINUED | OUTPATIENT
Start: 2025-02-02 | End: 2025-02-03 | Stop reason: HOSPADM

## 2025-02-01 RX ORDER — HEPARIN SODIUM 5000 [USP'U]/ML
5000 INJECTION, SOLUTION INTRAVENOUS; SUBCUTANEOUS EVERY 12 HOURS SCHEDULED
Status: DISCONTINUED | OUTPATIENT
Start: 2025-02-01 | End: 2025-02-02 | Stop reason: SDUPTHER

## 2025-02-01 RX ORDER — BISACODYL 10 MG
10 SUPPOSITORY, RECTAL RECTAL DAILY PRN
Status: DISCONTINUED | OUTPATIENT
Start: 2025-02-01 | End: 2025-02-03 | Stop reason: HOSPADM

## 2025-02-01 RX ORDER — AMOXICILLIN 250 MG
2 CAPSULE ORAL 2 TIMES DAILY PRN
Status: DISCONTINUED | OUTPATIENT
Start: 2025-02-01 | End: 2025-02-03 | Stop reason: HOSPADM

## 2025-02-01 RX ORDER — POLYETHYLENE GLYCOL 3350 17 G/17G
17 POWDER, FOR SOLUTION ORAL DAILY PRN
Status: DISCONTINUED | OUTPATIENT
Start: 2025-02-01 | End: 2025-02-03 | Stop reason: HOSPADM

## 2025-02-01 RX ORDER — IPRATROPIUM BROMIDE AND ALBUTEROL SULFATE 2.5; .5 MG/3ML; MG/3ML
3 SOLUTION RESPIRATORY (INHALATION)
Status: DISCONTINUED | OUTPATIENT
Start: 2025-02-01 | End: 2025-02-02

## 2025-02-01 RX ORDER — DEXTROSE MONOHYDRATE 25 G/50ML
25 INJECTION, SOLUTION INTRAVENOUS
Status: DISCONTINUED | OUTPATIENT
Start: 2025-02-01 | End: 2025-02-03 | Stop reason: HOSPADM

## 2025-02-01 RX ORDER — OSELTAMIVIR PHOSPHATE 75 MG/1
75 CAPSULE ORAL EVERY 12 HOURS SCHEDULED
Status: DISCONTINUED | OUTPATIENT
Start: 2025-02-01 | End: 2025-02-03 | Stop reason: HOSPADM

## 2025-02-01 RX ORDER — OSELTAMIVIR PHOSPHATE 75 MG/1
75 CAPSULE ORAL 2 TIMES DAILY
Qty: 10 CAPSULE | Refills: 0 | Status: SHIPPED | OUTPATIENT
Start: 2025-02-01 | End: 2025-02-06

## 2025-02-01 RX ORDER — SODIUM CHLORIDE 0.9 % (FLUSH) 0.9 %
10 SYRINGE (ML) INJECTION EVERY 12 HOURS SCHEDULED
Status: DISCONTINUED | OUTPATIENT
Start: 2025-02-01 | End: 2025-02-03 | Stop reason: HOSPADM

## 2025-02-01 RX ORDER — NITROGLYCERIN 0.4 MG/1
0.4 TABLET SUBLINGUAL
Status: DISCONTINUED | OUTPATIENT
Start: 2025-02-01 | End: 2025-02-03 | Stop reason: HOSPADM

## 2025-02-01 RX ORDER — SODIUM CHLORIDE 0.9 % (FLUSH) 0.9 %
10 SYRINGE (ML) INJECTION AS NEEDED
Status: DISCONTINUED | OUTPATIENT
Start: 2025-02-01 | End: 2025-02-03 | Stop reason: HOSPADM

## 2025-02-01 RX ORDER — SODIUM CHLORIDE 9 MG/ML
40 INJECTION, SOLUTION INTRAVENOUS AS NEEDED
Status: DISCONTINUED | OUTPATIENT
Start: 2025-02-01 | End: 2025-02-03 | Stop reason: HOSPADM

## 2025-02-01 RX ORDER — METHYLPREDNISOLONE SODIUM SUCCINATE 125 MG/2ML
125 INJECTION, POWDER, LYOPHILIZED, FOR SOLUTION INTRAMUSCULAR; INTRAVENOUS ONCE
Status: COMPLETED | OUTPATIENT
Start: 2025-02-01 | End: 2025-02-01

## 2025-02-01 RX ORDER — BISACODYL 5 MG/1
5 TABLET, DELAYED RELEASE ORAL DAILY PRN
Status: DISCONTINUED | OUTPATIENT
Start: 2025-02-01 | End: 2025-02-03 | Stop reason: HOSPADM

## 2025-02-01 RX ADMIN — OSELTAMIVIR PHOSPHATE 75 MG: 75 CAPSULE ORAL at 18:28

## 2025-02-01 RX ADMIN — HEPARIN SODIUM 5000 UNITS: 5000 INJECTION INTRAVENOUS; SUBCUTANEOUS at 20:50

## 2025-02-01 RX ADMIN — IPRATROPIUM BROMIDE AND ALBUTEROL SULFATE 3 ML: 2.5; .5 SOLUTION RESPIRATORY (INHALATION) at 17:36

## 2025-02-01 RX ADMIN — Medication 10 ML: at 20:50

## 2025-02-01 RX ADMIN — METHYLPREDNISOLONE SODIUM SUCCINATE 125 MG: 125 INJECTION, POWDER, FOR SOLUTION INTRAMUSCULAR; INTRAVENOUS at 18:25

## 2025-02-01 RX ADMIN — ACETAMINOPHEN 650 MG: 325 TABLET, FILM COATED ORAL at 15:05

## 2025-02-01 NOTE — H&P
[facility]    Patient Identification:  Name:  Izabella Agudelo  Age:  64 y.o.  Sex:  female  :  1960  MRN:  [id number]   Visit Number:  [id number]  Admit Date: 2025   Room number:    Primary Care Physician:  Fabian Santos DO    Date of Admission: 2025     Subjective     Chief complaint:    Chief Complaint   Patient presents with    Shortness of Breath       History of presenting illness:    This is a 64-year-old female from nursing home with a past medical history of hyperlipidemia, diabetes mellitus, GERD, COPD not on home oxygen, dementia, hemiplegia presenting with shortness of breath.  Per the nursing home staff, for the last week she has been experiencing cough, and fevers.  Today, she became more confused and with worsening shortness of breath. At this time, she is awake and only oriented to self. She is complaining of shortness of breath.     In ED, blood pressure 183/52, heart rate 70, respiratory to 22, temperature 101.3 and O2 saturation 95% on 3 L NC cannula.  Labs on arrival showed a sodium 136, potassium 3.6, BUN 19, creatinine 0.6, procalcitonin 1.7, WBC 4.7, hemoglobin 11.3 and platelet count 226.  Patient was found to be positive for influenza A.  ---------------------------------------------------------------------------------------------------------------------   Review of Systems   Constitutional:  Positive for fatigue.   HENT:  Negative for dental problem and nosebleeds.    Eyes:  Negative for pain.   Respiratory:  Positive for shortness of breath. Negative for choking.    Cardiovascular:  Negative for leg swelling.   Gastrointestinal:  Negative for anal bleeding.   Endocrine: Negative for polydipsia.   Genitourinary:  Negative for frequency.   Musculoskeletal:  Negative for gait problem.   Allergic/Immunologic: Negative for food allergies.   Neurological:  Negative for light-headedness.   Hematological:  Negative for  adenopathy.   Psychiatric/Behavioral:  Negative for behavioral problems.      ---------------------------------------------------------------------------------------------------------------------   Past Medical History:   Diagnosis Date    Acute angle-closure glaucoma, bilateral     Aphasia     Cardiac abnormality     CHF (congestive heart failure)     COPD (chronic obstructive pulmonary disease)     Diabetes     Glaucoma     Hemiparesis     Hemiplegia     Impaired functional mobility, balance, gait, and endurance     Schizophrenia, chronic condition     Vascular dementia      History reviewed. No pertinent surgical history.  Family History   Problem Relation Age of Onset    Diabetes Other     Glaucoma Other      Social History     Socioeconomic History    Marital status: Single   Tobacco Use    Smoking status: Never     Passive exposure: Never    Smokeless tobacco: Never   Vaping Use    Vaping status: Never Used   Substance and Sexual Activity    Alcohol use: No    Drug use: Never    Sexual activity: Defer     ---------------------------------------------------------------------------------------------------------------------   Allergies:  Penicillins  ---------------------------------------------------------------------------------------------------------------------   Medications below are reported home medications pulling from within the system; at this time, these medications have not been reconciled unless otherwise specified and are in the verification process for further verifcation as current home medications.      Prior to Admission Medications       Prescriptions Last Dose Informant Patient Reported? Taking?    acetaminophen (TYLENOL) 650 MG 8 hr tablet   Yes No    Take 1 tablet by mouth 4 (Four) Times a Day As Needed for Mild Pain.    acetaminophen (TYLENOL) 650 MG suppository   Yes No    Insert 1 suppository into the rectum Every 4 (Four) Hours As Needed for Mild Pain.    aluminum-magnesium  hydroxide-simethicone (MAALOX/MYLANTA) 200-200-20 MG/5ML suspension   Yes No    Take 20 mL by mouth Every 8 (Eight) Hours As Needed for Indigestion or Heartburn.    apixaban (ELIQUIS) 5 MG tablet tablet   Yes No    Take 1 tablet by mouth 2 (Two) Times a Day.    ARIPiprazole (ABILIFY) 20 MG tablet   No No    Take 1.5 tablets by mouth Daily.    atorvastatin (LIPITOR) 40 MG tablet   Yes No    Take 1 tablet by mouth Every Night.    B-D UF III MINI PEN NEEDLES 31G X 5 MM misc   Yes No    USE 3 TIMES A DAY    bisacodyl (Dulcolax) 10 MG suppository   Yes No    Insert 1 suppository into the rectum Daily As Needed for Constipation.    cholecalciferol (VITAMIN D3) 1.25 MG (53632 UT) capsule   Yes No    Take 1 capsule by mouth Every 7 (Seven) Days. Every Thursday    divalproex (DEPAKOTE) 500 MG DR tablet   Yes No    Take 1 tablet by mouth 2 (Two) Times a Day.    Patient not taking:  Reported on 7/17/2024    Divalproex Sodium (DEPAKOTE SPRINKLE) 125 MG capsule   Yes No    Take 4 capsules by mouth 2 (Two) Times a Day.    gabapentin (NEURONTIN) 400 MG capsule   No No    Take 1 capsule by mouth 3 (Three) Times a Day.    Insulin Aspart (novoLOG) 100 UNIT/ML injection   No No    Inject 3-14 Units under the skin into the appropriate area as directed 4 (Four) Times a Day Before Meals & at Bedtime.    Patient not taking:  Reported on 7/17/2024    Insulin Degludec (TRESIBA) 100 UNIT/ML solution injection  Pharmacy Yes No    Inject 50 Units under the skin into the appropriate area as directed 2 (Two) Times a Day.    Insulin Lispro (humaLOG) 100 UNIT/ML injection   Yes No    Inject 14 Units under the skin into the appropriate area as directed 3 (Three) Times a Day Before Meals.    mirtazapine (REMERON) 15 MG tablet   Yes No    Take 1 tablet by mouth Every Night.    nitrofurantoin, macrocrystal-monohydrate, (Macrobid) 100 MG capsule   No No    Take 1 capsule by mouth Every Night.    omeprazole (priLOSEC) 20 MG capsule   Yes No    Take 1  capsule by mouth Daily.    ondansetron ODT (ZOFRAN-ODT) 4 MG disintegrating tablet   Yes No    Place 1 tablet on the tongue Every 6 (Six) Hours As Needed for Nausea or Vomiting.    ondansetron ODT (ZOFRAN-ODT) 4 MG disintegrating tablet   No No    Place 1 tablet on the tongue Every 8 (Eight) Hours As Needed for Nausea or Vomiting.    ONE TOUCH ULTRA TEST test strip   Yes No    TEST 2 TIMES A DAY    ONETOUCH DELICA LANCETS 33G misc   Yes No    TEST TWICE A DAY    senna 8.6 MG tablet   Yes No    Take 1 tablet by mouth 2 (Two) Times a Day As Needed for Constipation.    travoprost, BAK free, (TRAVATAN) 0.004 % solution ophthalmic solution   Yes No    Administer 1 drop to both eyes Every Evening. in affected eye(s)    vitamin C (ASCORBIC ACID) 250 MG tablet   Yes No    Take 1 tablet by mouth 2 (Two) Times a Day.    zinc sulfate (ZINCATE) 220 (50 Zn) MG capsule   Yes No    Take 1 capsule by mouth Daily.          Objective     Vital Signs:  Temp:  [100.2 °F (37.9 °C)-101.3 °F (38.5 °C)] 100.2 °F (37.9 °C)  Heart Rate:  [67-73] 68  Resp:  [18-22] 18  BP: ()/(52-69) 98/52    Mean Arterial Pressure (Non-Invasive) for the past 24 hrs (Last 3 readings):   Noninvasive MAP (mmHg)   02/01/25 1600 68   02/01/25 1553 75   02/01/25 1532 73     SpO2:  [85 %-95 %] 85 %  on  Flow (L/min) (Oxygen Therapy):  [3] 3;   Device (Oxygen Therapy): room air  Body mass index is 35.11 kg/m².    Wt Readings from Last 3 Encounters:   02/01/25 89.9 kg (198 lb 3.1 oz)   12/04/24 89.9 kg (198 lb 3.2 oz)   10/16/24 85.7 kg (189 lb)      ----------------------------------------------------------------------------------------------------------------------  Physical Exam  Constitutional:       General: She is not in acute distress.     Appearance: She is obese. She is ill-appearing.   HENT:      Head: Normocephalic and atraumatic.      Right Ear: External ear normal.      Left Ear: External ear normal.      Nose: Nose normal.      Mouth/Throat:       Mouth: Mucous membranes are moist.   Eyes:      Extraocular Movements: Extraocular movements intact.      Conjunctiva/sclera: Conjunctivae normal.   Cardiovascular:      Rate and Rhythm: Normal rate and regular rhythm.      Pulses: Normal pulses.      Heart sounds: Normal heart sounds. No murmur heard.  Pulmonary:      Effort: Pulmonary effort is normal.      Breath sounds: Normal breath sounds. No wheezing or rales.   Abdominal:      General:      Tenderness:  There is no rebound.      Comments: Abdomen is distended and tympanic on percussion.  Bowel sounds decreased.  Diffuse minimal tenderness noted.   Musculoskeletal:      Cervical back: Neck supple.      Skin:     General: Skin is warm.      Findings: No erythema or rash.   Neurological:      Mental Status: She is alert. Mental status is at baseline.      Comments: Alert but does seem to have dysphasia.  Moves all 4 limbs but does have generalized weakness.   Psychiatric:         Mood and Affect: Mood normal.         Behavior: Behavior normal.     ---------------------------------------------------------------------------------------------------------------------  --------------------------------------------------------------------------------------------------------------------  LABS:    CBC and coagulation:  Results from last 7 days   Lab Units 02/01/25  1427   PROCALCITONIN ng/mL 1.71*   CRP mg/dL 7.71*   WBC 10*3/mm3 4.78   HEMOGLOBIN g/dL 11.3*   HEMATOCRIT % 36.5   MCV fL 85.7   MCHC g/dL 31.0*   PLATELETS 10*3/mm3 226     Acid/base balance:      Renal and electrolytes:  Results from last 7 days   Lab Units 02/01/25  1427   SODIUM mmol/L 136   POTASSIUM mmol/L 3.6   MAGNESIUM mg/dL 1.6   CHLORIDE mmol/L 93*   CO2 mmol/L 33.0*   BUN mg/dL 19   CREATININE mg/dL 0.63   CALCIUM mg/dL 9.0   GLUCOSE mg/dL 149*     Estimated Creatinine Clearance: 97.3 mL/min (by C-G formula based on SCr of 0.63 mg/dL).    Liver and pancreatic function:  Results from last 7 days  "  Lab Units 02/01/25  1427   ALBUMIN g/dL 3.4*   BILIRUBIN mg/dL 0.3   ALK PHOS U/L 67   AST (SGOT) U/L 23   ALT (SGPT) U/L 17     Endocrine function:  Lab Results   Component Value Date    HGBA1C 10.10 (H) 07/16/2024     Point of care bedside glucose levels:      Lab Results   Component Value Date    TSH 2.180 12/21/2023     Cardiac:  Results from last 7 days   Lab Units 02/01/25  1556 02/01/25  1427   HSTROP T ng/L 31* 27*   PROBNP pg/mL  --  439.1       Cultures:  Lab Results   Component Value Date    COLORU Yellow 07/16/2024    CLARITYU Clear 07/16/2024    PHUR 5.5 07/16/2024    GLUCOSEU Negative 07/16/2024    KETONESU Trace (A) 07/16/2024    BLOODU Trace (A) 07/16/2024    NITRITEU Negative 07/16/2024    LEUKOCYTESUR Trace (A) 07/16/2024    BILIRUBINUR Negative 07/16/2024    UROBILINOGEN 0.2 E.U./dL 07/16/2024    RBCUA 0-2 07/16/2024    WBCUA 0-2 07/16/2024    BACTERIA Trace (A) 07/16/2024     Microbiology Results (last 10 days)       Procedure Component Value - Date/Time    COVID-19 and FLU A/B PCR, 1 HR TAT - Swab, Nasopharynx [[id number]]  (Abnormal) Collected: 02/01/25 1430    Lab Status: Final result Specimen: Swab from Nasopharynx Updated: 02/01/25 1454     COVID19 Not Detected     Influenza A PCR Detected     Influenza B PCR Not Detected    Narrative:      Fact sheet for providers: https://www.fda.gov/media/462478/download    Fact sheet for patients: https://www.fda.gov/media/690671/download    Test performed by PCR.            No results found for: \"PREGTESTUR\", \"PREGSERUM\", \"HCG\", \"HCGQUANT\"  Pain Management Panel           No data to display                I have personally looked at the labs and they are summarized above.  ----------------------------------------------------------------------------------------------------------------------  Detailed radiology reports for the last 24 hours:    Imaging Results (Last 24 Hours)       Procedure Component Value Units Date/Time    XR Chest 1 View [[id number]] " Resulted: 02/01/25 1440     Updated: 02/01/25 1441          Final impressions for the last 30 days of radiology reports:    No radiology results for the last 30 days.      Assessment & Plan     This is a 64-year-old female from nursing home with a past medical history of hyperlipidemia, diabetes mellitus, GERD, COPD not on home oxygen, dementia, hemiplegia presenting with shortness of breath.     Assessment  COPD exacerbation  Encephalopathy, likely infectious  Hyperlipidemia  Diabetes mellitus  Dementia  Hemiplegia    Plan:  - Admit to observation  - DuoNebs every 4 hours  - Titrate oxygen to SaO2 of 88% 92%  - Follow-up sputum and blood cultures  - r/o MRSA, Legionella, Strep pneumo, Mycoplasma  - Solu-Medrol 40 q8h  - Start Tamiflu  - Monitor SaO2  - Follow-up CT head, TSH, B12, ammonia, urine drug screen and folate  - Patient blood glucose goal of 140-180  - Start insulin sliding scale  - Hold Metformin while inpatient  - Carbohydrate controlled diet  - PT/OT      Osman Torres MD  [facility]  02/01/25  17:30 EST

## 2025-02-01 NOTE — DISCHARGE INSTRUCTIONS
Izabella was evaluated for shortness of breath.  Her oxygen was stable on her home O2 in the emergency department.  We got lab work and a chest x-ray with no concerns for pneumonia.  We did however find that she had the flu.  Given her history of COPD we have prescribed her Tamiflu and she is now stable for discharge.  Would take this as directed and have her continue to follow-up with her chronic care providers.  She is now stable for discharge.

## 2025-02-01 NOTE — ED PROVIDER NOTES
"Subjective:  History of Present Illness:    Patient is a 64-year-old female with history of CHF, COPD, hemiparesis, vascular dementia who presents today with fever and shortness of breath.  Has had cough congestion fever over the last week.  Symptoms worse today and she now presents to emergency department.  No reported abdominal pain.  Patient round reactive to abdominal palpation, no significant leg swelling noted on exam.  Further history unable to be obtained secondary to the patient's baseline altered mental status.      Nurses Notes reviewed and agree, including vitals, allergies, social history and prior medical history.     REVIEW OF SYSTEMS: All systems reviewed and not pertinent unless noted.  Review of Systems   Unable to perform ROS: Dementia       Past Medical History:   Diagnosis Date    Acute angle-closure glaucoma, bilateral     Aphasia     Cardiac abnormality     CHF (congestive heart failure)     COPD (chronic obstructive pulmonary disease)     Diabetes     Glaucoma     Hemiparesis     Hemiplegia     Impaired functional mobility, balance, gait, and endurance     Schizophrenia, chronic condition     Vascular dementia        Allergies:    Penicillins      History reviewed. No pertinent surgical history.      Social History     Socioeconomic History    Marital status: Single   Tobacco Use    Smoking status: Never     Passive exposure: Never    Smokeless tobacco: Never   Vaping Use    Vaping status: Never Used   Substance and Sexual Activity    Alcohol use: No    Drug use: Never    Sexual activity: Defer         Family History   Problem Relation Age of Onset    Diabetes Other     Glaucoma Other        Objective  Physical Exam:  /63 (BP Location: Left arm, Patient Position: Lying)   Pulse 66   Temp 98.9 °F (37.2 °C) (Axillary)   Resp 18   Ht 160 cm (63\")   Wt 89.9 kg (198 lb 3.1 oz)   SpO2 94%   BMI 35.11 kg/m²      Physical Exam  Constitutional:       General: She is not in acute " distress.     Appearance: Normal appearance. She is obese. She is ill-appearing. She is not toxic-appearing.   HENT:      Head: Normocephalic and atraumatic.      Nose: Nose normal. No congestion or rhinorrhea.      Mouth/Throat:      Mouth: Mucous membranes are dry.      Pharynx: Oropharynx is clear. No oropharyngeal exudate or posterior oropharyngeal erythema.   Eyes:      Extraocular Movements: Extraocular movements intact.      Conjunctiva/sclera: Conjunctivae normal.      Pupils: Pupils are equal, round, and reactive to light.   Cardiovascular:      Rate and Rhythm: Normal rate and regular rhythm.      Pulses: Normal pulses.      Heart sounds: Normal heart sounds.   Pulmonary:      Effort: Pulmonary effort is normal. No tachypnea, accessory muscle usage or respiratory distress.      Breath sounds: Normal breath sounds. No wheezing or rhonchi.   Abdominal:      General: Abdomen is flat. Bowel sounds are normal. There is no distension.      Palpations: Abdomen is soft.      Tenderness: There is no abdominal tenderness.   Musculoskeletal:         General: No swelling or tenderness. Normal range of motion.      Cervical back: Normal range of motion and neck supple.   Skin:     General: Skin is warm and dry.      Capillary Refill: Capillary refill takes less than 2 seconds.   Neurological:      General: No focal deficit present.      Mental Status: She is alert and oriented to person, place, and time. Mental status is at baseline.      Cranial Nerves: No cranial nerve deficit.      Sensory: No sensory deficit.      Motor: No weakness.      Coordination: Coordination normal.   Psychiatric:         Mood and Affect: Mood normal.         Behavior: Behavior normal.         Thought Content: Thought content normal.         Judgment: Judgment normal.         Critical Care    Performed by: Tony Ledesma MD  Authorized by: Osman Torres MD    Critical care provider statement:     Critical care time (minutes):  30     Critical care time was exclusive of:  Separately billable procedures and treating other patients    Critical care was necessary to treat or prevent imminent or life-threatening deterioration of the following conditions:  Respiratory failure    Critical care was time spent personally by me on the following activities:  Blood draw for specimens, development of treatment plan with patient or surrogate, discussions with primary provider, evaluation of patient's response to treatment, examination of patient, obtaining history from patient or surrogate, ordering and performing treatments and interventions, ordering and review of laboratory studies, ordering and review of radiographic studies, re-evaluation of patient's condition, pulse oximetry and review of old charts    Care discussed with: admitting provider        ED Course:    ED Course as of 02/01/25 1849   Sat Feb 01, 2025   1440 No acute process per my independent interpretation of chest x-ray prior to radiology overread [JE]   1446 EKG interpreted by me, normal sinus rhythm with left axis deviation, no concerning ST changes noted, rate of 74 [JE]   1656 Informed by nursing staff that per nursing home report patient is not normally on oxygen.  Given this, will trial off oxygen and if desats will admit, if stays stable will discharge. [JE]      ED Course User Index  [JE] Tony Ledesma MD       Lab Results (last 24 hours)       Procedure Component Value Units Date/Time    CBC & Differential [[id number]]  (Abnormal) Collected: 02/01/25 1427    Specimen: Blood Updated: 02/01/25 1436    Narrative:      The following orders were created for panel order CBC & Differential.  Procedure                               Abnormality         Status                     ---------                               -----------         ------                     CBC Auto Differential[[id number]]        Abnormal            Final result                 Please view results for these  tests on the individual orders.    Comprehensive Metabolic Panel [[id number]]  (Abnormal) Collected: 02/01/25 1427    Specimen: Blood Updated: 02/01/25 1457     Glucose 149 mg/dL      BUN 19 mg/dL      Creatinine 0.63 mg/dL      Sodium 136 mmol/L      Potassium 3.6 mmol/L      Chloride 93 mmol/L      CO2 33.0 mmol/L      Calcium 9.0 mg/dL      Total Protein 6.8 g/dL      Albumin 3.4 g/dL      ALT (SGPT) 17 U/L      AST (SGOT) 23 U/L      Alkaline Phosphatase 67 U/L      Total Bilirubin 0.3 mg/dL      Globulin 3.4 gm/dL      A/G Ratio 1.0 g/dL      BUN/Creatinine Ratio 30.2     Anion Gap 10.0 mmol/L      eGFR 99.8 mL/min/1.73     Narrative:      GFR Categories in Chronic Kidney Disease (CKD)      GFR Category          GFR (mL/min/1.73)    Interpretation  G1                     90 or greater         Normal or high (1)  G2                      60-89                Mild decrease (1)  G3a                   45-59                Mild to moderate decrease  G3b                   30-44                Moderate to severe decrease  G4                    15-29                Severe decrease  G5                    14 or less           Kidney failure          (1)In the absence of evidence of kidney disease, neither GFR category G1 or G2 fulfill the criteria for CKD.    eGFR calculation 2021 CKD-EPI creatinine equation, which does not include race as a factor    High Sensitivity Troponin T [[id number]]  (Abnormal) Collected: 02/01/25 1427    Specimen: Blood Updated: 02/01/25 1500     HS Troponin T 27 ng/L     Narrative:      High Sensitive Troponin T Reference Range:  <14.0 ng/L- Negative Female for AMI  <22.0 ng/L- Negative Male for AMI  >=14 - Abnormal Female indicating possible myocardial injury.  >=22 - Abnormal Male indicating possible myocardial injury.   Clinicians would have to utilize clinical acumen, EKG, Troponin, and serial changes to determine if it is an Acute Myocardial Infarction or myocardial injury due to an  "underlying chronic condition.         BNP [[id number]]  (Normal) Collected: 02/01/25 1427    Specimen: Blood Updated: 02/01/25 1500     proBNP 439.1 pg/mL     Narrative:      This assay is used as an aid in the diagnosis of individuals suspected of having heart failure. It can be used as an aid in the diagnosis of acute decompensated heart failure (ADHF) in patients presenting with signs and symptoms of ADHF to the emergency department (ED). In addition, NT-proBNP of <300 pg/mL indicates ADHF is not likely.    Age Range Result Interpretation  NT-proBNP Concentration (pg/mL:      <50             Positive            >450                   Gray                 300-450                    Negative             <300    50-75           Positive            >900                  Gray                300-900                  Negative            <300      >75             Positive            >1800                  Gray                300-1800                  Negative            <300    C-reactive Protein [[id number]]  (Abnormal) Collected: 02/01/25 1427    Specimen: Blood Updated: 02/01/25 1457     C-Reactive Protein 7.71 mg/dL     Procalcitonin [[id number]]  (Abnormal) Collected: 02/01/25 1427    Specimen: Blood Updated: 02/01/25 1505     Procalcitonin 1.71 ng/mL     Narrative:      As a Marker for Sepsis (Non-Neonates):    1. <0.5 ng/mL represents a low risk of severe sepsis and/or septic shock.  2. >2 ng/mL represents a high risk of severe sepsis and/or septic shock.    As a Marker for Lower Respiratory Tract Infections that require antibiotic therapy:    PCT on Admission    Antibiotic Therapy       6-12 Hrs later    >0.5                Strongly Recommended  >0.25 - <0.5        Recommended   0.1 - 0.25          Discouraged              Remeasure/reassess PCT  <0.1                Strongly Discouraged     Remeasure/reassess PCT    As 28 day mortality risk marker: \"Change in Procalcitonin Result\" (>80% or <=80%) if Day 0 (or Day 1) " and Day 4 values are available. Refer to http://www.[facility]-pct-calculator.com    Change in PCT <=80%  A decrease of PCT levels below or equal to 80% defines a positive change in PCT test result representing a higher risk for 28-day all-cause mortality of patients diagnosed with severe sepsis for septic shock.    Change in PCT >80%  A decrease of PCT levels of more than 80% defines a negative change in PCT result representing a lower risk for 28-day all-cause mortality of patients diagnosed with severe sepsis or septic shock.       Magnesium [[id number]]  (Normal) Collected: 02/01/25 1427    Specimen: Blood Updated: 02/01/25 1457     Magnesium 1.6 mg/dL     CBC Auto Differential [[id number]]  (Abnormal) Collected: 02/01/25 1427    Specimen: Blood Updated: 02/01/25 1436     WBC 4.78 10*3/mm3      RBC 4.26 10*6/mm3      Hemoglobin 11.3 g/dL      Hematocrit 36.5 %      MCV 85.7 fL      MCH 26.5 pg      MCHC 31.0 g/dL      RDW 15.5 %      RDW-SD 48.7 fl      MPV 9.3 fL      Platelets 226 10*3/mm3      Neutrophil % 70.5 %      Lymphocyte % 19.7 %      Monocyte % 6.7 %      Eosinophil % 2.1 %      Basophil % 0.4 %      Immature Grans % 0.6 %      Neutrophils, Absolute 3.37 10*3/mm3      Lymphocytes, Absolute 0.94 10*3/mm3      Monocytes, Absolute 0.32 10*3/mm3      Eosinophils, Absolute 0.10 10*3/mm3      Basophils, Absolute 0.02 10*3/mm3      Immature Grans, Absolute 0.03 10*3/mm3      nRBC 0.0 /100 WBC     Mycoplasma Pneumoniae Antibody, IgM - Blood, Arm, Left [[id number]] Collected: 02/01/25 1427    Specimen: Blood from Arm, Left Updated: 02/01/25 1849    COVID-19 and FLU A/B PCR, 1 HR TAT - Swab, Nasopharynx [[id number]]  (Abnormal) Collected: 02/01/25 1430    Specimen: Swab from Nasopharynx Updated: 02/01/25 1454     COVID19 Not Detected     Influenza A PCR Detected     Influenza B PCR Not Detected    Narrative:      Fact sheet for providers: https://www.fda.gov/media/409633/download    Fact sheet for patients:  https://www.fda.gov/media/725598/download    Test performed by PCR.    High Sensitivity Troponin T 1Hr [[id number]]  (Abnormal) Collected: 02/01/25 1556    Specimen: Blood from Arm, Left Updated: 02/01/25 1618     HS Troponin T 31 ng/L      Troponin T Numeric Delta 4 ng/L      Troponin T % Delta 15    Narrative:      High Sensitive Troponin T Reference Range:  <14.0 ng/L- Negative Female for AMI  <22.0 ng/L- Negative Male for AMI  >=14 - Abnormal Female indicating possible myocardial injury.  >=22 - Abnormal Male indicating possible myocardial injury.   Clinicians would have to utilize clinical acumen, EKG, Troponin, and serial changes to determine if it is an Acute Myocardial Infarction or myocardial injury due to an underlying chronic condition.         CK [[id number]]  (Normal) Collected: 02/01/25 1556    Specimen: Blood from Arm, Left Updated: 02/01/25 1847     Creatine Kinase 70 U/L     MRSA Screen, PCR (Inpatient) - Swab, Nares [[id number]] Collected: 02/01/25 1830    Specimen: Swab from Nares Updated: 02/01/25 1834             CT Head Without Contrast    Result Date: 2/1/2025  FINAL REPORT TECHNIQUE: null CLINICAL HISTORY: Altered mental status COMPARISON: null FINDINGS: CT head without contrast Comparison: CT - CT HEAD WO CONTRAST - 12/18/23 18:09 EST Findings: No intra-axial mass, midline shift, hydrocephalus, or acute hemorrhage. Mild diffuse cerebral volume loss. Mild degree of patchy low-density within the periventricular and subcortical white matter. Moderate bilateral ethmoid sinus mucosal thickening. Mild bilateral maxillary sinus mucosal thickening. Mild sphenoid sinus mucosal thickening. Mastoids are clear. The orbits are unremarkable. There is no acute fracture.     Impression: IMPRESSION: 1. No acute intracranial findings. 2. Sinus disease. Authenticated and Electronically Signed by Chapo Fishman on 02/01/2025 06:41:26 PM        MDM     Amount and/or Complexity of Data Reviewed  Decide to obtain  previous medical records or to obtain history from someone other than the patient: yes  Independent visualization of images, tracings, or specimens: yes        Initial impression of presenting illness: Shortness of breath, cough, fever    DDX: includes but is not limited to: Bacterial pneumonia, viral URI, influenza, COVID-19, PE    Patient arrives stable with vitals interpreted by myself.     Pertinent features from physical exam: Clear to auscultation, regular rate and rhythm, no murmur, nontender to abdominal palpation.    Initial diagnostic plan: CBC, CMP, troponin, BNP, EKG, chest x-ray, CRP, Pro-David, magnesium, COVID swab    Results from initial plan were reviewed and interpreted by me revealing patient with no significant elevation in inflammatory markers, flu positive    Diagnostic information from other sources: Reviewed past medical records and discussed with EMS on arrival    Interventions / Re-evaluation: [facility] emergency department for over 2 hours with no change in vital signs, given Tylenol due to fever given concern for COPD exacerbation given Solu-Medrol, DuoNeb and given acute hypoxic respiratory failure secondary to influenza given Tamiflu    Results/clinical rationale were discussed with nursing staff via nursing report    Consultations/Discussion of results with other physicians: Had originally considered discharge for the patient as she has been stable on 3 L nasal cannula throughout her ER course.  However, on discussion with nursing staff at facility, they report that she actually had not been on any oxygen prior and oxygen was initiated prior to EMS arrival.  Given that, treated for COPD exacerbation and still requiring O2.  Given this, discussed with Dr. Torres, hospitalist, and admitted to his service for continued management of acute epoxy respiratory failure secondary to COPD exacerbation secondary to influenza    Disposition plan: Admit  -----        Final diagnoses:   Influenza A           Baltazar, Tony Srinivasan MD  02/01/25 5560

## 2025-02-01 NOTE — Clinical Note
[facility] EMERGENCY DEPARTMENT  [address] [address]  Phone: [phone]    Izabella Agudelo was seen and treated in our emergency department on 2/1/2025.  She may return to work on 02/04/2025.         Thank you for choosing [facility].    Tony Ledesma MD

## 2025-02-02 LAB
ANION GAP SERPL CALCULATED.3IONS-SCNC: 11.6 MMOL/L (ref 5–15)
BASOPHILS # BLD AUTO: 0.01 10*3/MM3 (ref 0–0.2)
BASOPHILS NFR BLD AUTO: 0.2 % (ref 0–1.5)
BUN SERPL-MCNC: 23 MG/DL (ref 8–23)
BUN/CREAT SERPL: 41.8 (ref 7–25)
CALCIUM SPEC-SCNC: 8.9 MG/DL (ref 8.6–10.5)
CHLORIDE SERPL-SCNC: 94 MMOL/L (ref 98–107)
CO2 SERPL-SCNC: 29.4 MMOL/L (ref 22–29)
CREAT SERPL-MCNC: 0.55 MG/DL (ref 0.57–1)
DEPRECATED RDW RBC AUTO: 46.7 FL (ref 37–54)
EGFRCR SERPLBLD CKD-EPI 2021: 103.1 ML/MIN/1.73
EOSINOPHIL # BLD AUTO: 0 10*3/MM3 (ref 0–0.4)
EOSINOPHIL NFR BLD AUTO: 0 % (ref 0.3–6.2)
ERYTHROCYTE [DISTWIDTH] IN BLOOD BY AUTOMATED COUNT: 15.1 % (ref 12.3–15.4)
GLUCOSE BLDC GLUCOMTR-MCNC: 313 MG/DL (ref 70–130)
GLUCOSE BLDC GLUCOMTR-MCNC: 350 MG/DL (ref 70–130)
GLUCOSE BLDC GLUCOMTR-MCNC: 352 MG/DL (ref 70–130)
GLUCOSE BLDC GLUCOMTR-MCNC: 411 MG/DL (ref 70–130)
GLUCOSE SERPL-MCNC: 369 MG/DL (ref 65–99)
HCT VFR BLD AUTO: 38 % (ref 34–46.6)
HGB BLD-MCNC: 11.9 G/DL (ref 12–15.9)
IMM GRANULOCYTES # BLD AUTO: 0.01 10*3/MM3 (ref 0–0.05)
IMM GRANULOCYTES NFR BLD AUTO: 0.2 % (ref 0–0.5)
LYMPHOCYTES # BLD AUTO: 0.61 10*3/MM3 (ref 0.7–3.1)
LYMPHOCYTES NFR BLD AUTO: 13.3 % (ref 19.6–45.3)
MCH RBC QN AUTO: 26.6 PG (ref 26.6–33)
MCHC RBC AUTO-ENTMCNC: 31.3 G/DL (ref 31.5–35.7)
MCV RBC AUTO: 84.8 FL (ref 79–97)
MONOCYTES # BLD AUTO: 0.08 10*3/MM3 (ref 0.1–0.9)
MONOCYTES NFR BLD AUTO: 1.7 % (ref 5–12)
MRSA DNA SPEC QL NAA+PROBE: NORMAL
NEUTROPHILS NFR BLD AUTO: 3.88 10*3/MM3 (ref 1.7–7)
NEUTROPHILS NFR BLD AUTO: 84.6 % (ref 42.7–76)
NRBC BLD AUTO-RTO: 0 /100 WBC (ref 0–0.2)
PLATELET # BLD AUTO: 226 10*3/MM3 (ref 140–450)
PMV BLD AUTO: 9.3 FL (ref 6–12)
POTASSIUM SERPL-SCNC: 4.8 MMOL/L (ref 3.5–5.2)
RBC # BLD AUTO: 4.48 10*6/MM3 (ref 3.77–5.28)
SODIUM SERPL-SCNC: 135 MMOL/L (ref 136–145)
WBC NRBC COR # BLD AUTO: 4.59 10*3/MM3 (ref 3.4–10.8)

## 2025-02-02 PROCEDURE — G0378 HOSPITAL OBSERVATION PER HR: HCPCS

## 2025-02-02 PROCEDURE — 63710000001 INSULIN LISPRO (HUMAN) PER 5 UNITS: Performed by: STUDENT IN AN ORGANIZED HEALTH CARE EDUCATION/TRAINING PROGRAM

## 2025-02-02 PROCEDURE — 63710000001 INSULIN GLARGINE PER 5 UNITS: Performed by: INTERNAL MEDICINE

## 2025-02-02 PROCEDURE — 63710000001 INSULIN LISPRO (HUMAN) PER 5 UNITS: Performed by: INTERNAL MEDICINE

## 2025-02-02 PROCEDURE — 99232 SBSQ HOSP IP/OBS MODERATE 35: CPT | Performed by: INTERNAL MEDICINE

## 2025-02-02 PROCEDURE — 80048 BASIC METABOLIC PNL TOTAL CA: CPT | Performed by: STUDENT IN AN ORGANIZED HEALTH CARE EDUCATION/TRAINING PROGRAM

## 2025-02-02 PROCEDURE — 25010000002 METHYLPREDNISOLONE PER 40 MG: Performed by: INTERNAL MEDICINE

## 2025-02-02 PROCEDURE — 25010000002 METHYLPREDNISOLONE PER 40 MG: Performed by: STUDENT IN AN ORGANIZED HEALTH CARE EDUCATION/TRAINING PROGRAM

## 2025-02-02 PROCEDURE — 96376 TX/PRO/DX INJ SAME DRUG ADON: CPT

## 2025-02-02 PROCEDURE — 85025 COMPLETE CBC W/AUTO DIFF WBC: CPT | Performed by: STUDENT IN AN ORGANIZED HEALTH CARE EDUCATION/TRAINING PROGRAM

## 2025-02-02 PROCEDURE — 82948 REAGENT STRIP/BLOOD GLUCOSE: CPT

## 2025-02-02 PROCEDURE — 82948 REAGENT STRIP/BLOOD GLUCOSE: CPT | Performed by: STUDENT IN AN ORGANIZED HEALTH CARE EDUCATION/TRAINING PROGRAM

## 2025-02-02 PROCEDURE — 82948 REAGENT STRIP/BLOOD GLUCOSE: CPT | Performed by: INTERNAL MEDICINE

## 2025-02-02 RX ORDER — DIVALPROEX SODIUM 500 MG/1
500 TABLET, DELAYED RELEASE ORAL 2 TIMES DAILY
Status: DISCONTINUED | OUTPATIENT
Start: 2025-02-02 | End: 2025-02-02 | Stop reason: SDUPTHER

## 2025-02-02 RX ORDER — INSULIN LISPRO 100 [IU]/ML
14 INJECTION, SOLUTION INTRAVENOUS; SUBCUTANEOUS
Status: DISCONTINUED | OUTPATIENT
Start: 2025-02-02 | End: 2025-02-03 | Stop reason: HOSPADM

## 2025-02-02 RX ORDER — BISACODYL 10 MG
10 SUPPOSITORY, RECTAL RECTAL DAILY PRN
Status: DISCONTINUED | OUTPATIENT
Start: 2025-02-02 | End: 2025-02-03 | Stop reason: HOSPADM

## 2025-02-02 RX ORDER — ONDANSETRON 4 MG/1
4 TABLET, ORALLY DISINTEGRATING ORAL EVERY 8 HOURS PRN
Status: DISCONTINUED | OUTPATIENT
Start: 2025-02-02 | End: 2025-02-03 | Stop reason: HOSPADM

## 2025-02-02 RX ORDER — SENNOSIDES A AND B 8.6 MG/1
1 TABLET, FILM COATED ORAL 2 TIMES DAILY PRN
Status: DISCONTINUED | OUTPATIENT
Start: 2025-02-02 | End: 2025-02-03 | Stop reason: HOSPADM

## 2025-02-02 RX ORDER — GABAPENTIN 400 MG/1
400 CAPSULE ORAL 3 TIMES DAILY
Status: DISCONTINUED | OUTPATIENT
Start: 2025-02-02 | End: 2025-02-03 | Stop reason: HOSPADM

## 2025-02-02 RX ORDER — DIVALPROEX SODIUM 125 MG/1
500 CAPSULE, COATED PELLETS ORAL 2 TIMES DAILY
Status: DISCONTINUED | OUTPATIENT
Start: 2025-02-02 | End: 2025-02-03 | Stop reason: HOSPADM

## 2025-02-02 RX ORDER — PANTOPRAZOLE SODIUM 40 MG/1
40 TABLET, DELAYED RELEASE ORAL
Status: DISCONTINUED | OUTPATIENT
Start: 2025-02-03 | End: 2025-02-03 | Stop reason: HOSPADM

## 2025-02-02 RX ORDER — MIRTAZAPINE 15 MG/1
15 TABLET, FILM COATED ORAL NIGHTLY
Status: DISCONTINUED | OUTPATIENT
Start: 2025-02-02 | End: 2025-02-03 | Stop reason: HOSPADM

## 2025-02-02 RX ORDER — IPRATROPIUM BROMIDE AND ALBUTEROL SULFATE 2.5; .5 MG/3ML; MG/3ML
3 SOLUTION RESPIRATORY (INHALATION) EVERY 4 HOURS PRN
Status: DISCONTINUED | OUTPATIENT
Start: 2025-02-02 | End: 2025-02-03 | Stop reason: HOSPADM

## 2025-02-02 RX ORDER — ERGOCALCIFEROL 1.25 MG/1
50000 CAPSULE, LIQUID FILLED ORAL
Status: DISCONTINUED | OUTPATIENT
Start: 2025-02-06 | End: 2025-02-03 | Stop reason: HOSPADM

## 2025-02-02 RX ORDER — ARIPIPRAZOLE 5 MG/1
30 TABLET ORAL DAILY
Status: DISCONTINUED | OUTPATIENT
Start: 2025-02-02 | End: 2025-02-03 | Stop reason: HOSPADM

## 2025-02-02 RX ORDER — AZITHROMYCIN 250 MG/1
250 TABLET, FILM COATED ORAL DAILY
COMMUNITY
End: 2025-02-03 | Stop reason: HOSPADM

## 2025-02-02 RX ORDER — ATORVASTATIN CALCIUM 40 MG/1
40 TABLET, FILM COATED ORAL NIGHTLY
Status: DISCONTINUED | OUTPATIENT
Start: 2025-02-02 | End: 2025-02-03 | Stop reason: HOSPADM

## 2025-02-02 RX ADMIN — ATORVASTATIN CALCIUM 40 MG: 40 TABLET, FILM COATED ORAL at 21:21

## 2025-02-02 RX ADMIN — APIXABAN 5 MG: 5 TABLET, FILM COATED ORAL at 13:06

## 2025-02-02 RX ADMIN — INSULIN LISPRO 7 UNITS: 100 INJECTION, SOLUTION INTRAVENOUS; SUBCUTANEOUS at 08:32

## 2025-02-02 RX ADMIN — INSULIN GLARGINE 50 UNITS: 100 INJECTION, SOLUTION SUBCUTANEOUS at 13:00

## 2025-02-02 RX ADMIN — INSULIN GLARGINE 50 UNITS: 100 INJECTION, SOLUTION SUBCUTANEOUS at 21:21

## 2025-02-02 RX ADMIN — ARIPIPRAZOLE 30 MG: 5 TABLET ORAL at 13:03

## 2025-02-02 RX ADMIN — METHYLPREDNISOLONE SODIUM SUCCINATE 40 MG: 40 INJECTION, POWDER, FOR SOLUTION INTRAMUSCULAR; INTRAVENOUS at 02:31

## 2025-02-02 RX ADMIN — INSULIN LISPRO 14 UNITS: 100 INJECTION, SOLUTION INTRAVENOUS; SUBCUTANEOUS at 13:00

## 2025-02-02 RX ADMIN — DIVALPROEX SODIUM 500 MG: 125 CAPSULE ORAL at 13:02

## 2025-02-02 RX ADMIN — GABAPENTIN 400 MG: 400 CAPSULE ORAL at 21:21

## 2025-02-02 RX ADMIN — APIXABAN 5 MG: 5 TABLET, FILM COATED ORAL at 21:21

## 2025-02-02 RX ADMIN — INSULIN LISPRO 14 UNITS: 100 INJECTION, SOLUTION INTRAVENOUS; SUBCUTANEOUS at 17:58

## 2025-02-02 RX ADMIN — MIRTAZAPINE 15 MG: 15 TABLET, FILM COATED ORAL at 21:21

## 2025-02-02 RX ADMIN — DIVALPROEX SODIUM 500 MG: 125 CAPSULE ORAL at 21:21

## 2025-02-02 RX ADMIN — GABAPENTIN 400 MG: 400 CAPSULE ORAL at 15:43

## 2025-02-02 RX ADMIN — Medication 10 ML: at 21:21

## 2025-02-02 RX ADMIN — METHYLPREDNISOLONE SODIUM SUCCINATE 40 MG: 40 INJECTION, POWDER, FOR SOLUTION INTRAMUSCULAR; INTRAVENOUS at 17:58

## 2025-02-02 RX ADMIN — Medication 10 ML: at 08:33

## 2025-02-02 RX ADMIN — OSELTAMIVIR PHOSPHATE 75 MG: 75 CAPSULE ORAL at 21:21

## 2025-02-02 RX ADMIN — METHYLPREDNISOLONE SODIUM SUCCINATE 40 MG: 40 INJECTION, POWDER, FOR SOLUTION INTRAMUSCULAR; INTRAVENOUS at 08:32

## 2025-02-02 NOTE — THERAPY EVALUATION
Attempted PT evaluation this date with pt presenting in fowlers position watching television. As soon as pt saw therapist she asked for her IV to be removed. Therapist explained that while she was in hospital the IV needed to stay in. Pt very fixated on this and even with several attempts to redirect pt's attention, pt would return to wanting IV out. Pt then began asking when her mother would there to take her home. Pt was not able to safely focus her attention for PT assessment this afternoon. Will plan to check back tomorrow. Nursing made aware of pt's concerns over IV line.

## 2025-02-02 NOTE — PLAN OF CARE
Goal Outcome Evaluation:  Plan of Care Reviewed With: patient        Progress: improving  Outcome Evaluation: No acute events overnight. patient has rested well. VSS. Oxygen lowered to 2L and patient tollerating well. Plan of care ongoing.

## 2025-02-02 NOTE — CASE MANAGEMENT/SOCIAL WORK
Discharge Planning Assessment   [place]     Patient Name: Izabella Agudelo  MRN: [id number]  Today's Date: 2/2/2025    Admit Date: 2/1/2025    Plan: retrun to [facility] and [facility] Long term care   Discharge Needs Assessment       Row Name 02/02/25 0951       Living Environment    People in Home other (see comments)  [facility] and [facility] Term  Care    Current Living Arrangements extended care facility    In the past 12 months has the electric, gas, oil, or water company threatened to shut off services in your home? No    Primary Care Provided by other (see comments)    Provides Primary Care For no one, unable/limited ability to care for self    Family Caregiver if Needed sibling(s)    Quality of Family Relationships involved    Able to Return to Prior Arrangements yes       Resource/Environmental Concerns    Transportation Concerns none       Transportation Needs    In the past 12 months, has lack of transportation kept you from medical appointments or from getting medications? no    In the past 12 months, has lack of transportation kept you from meetings, work, or from getting things needed for daily living? No       Food Insecurity    Within the past 12 months, you worried that your food would run out before you got the money to buy more. Never true    Within the past 12 months, the food you bought just didn't last and you didn't have money to get more. Never true       Transition Planning    Patient/Family Anticipates Transition to long-term care facility    Patient/Family Anticipated Services at Transition skilled nursing    Transportation Anticipated health plan transportation       Discharge Needs Assessment    Readmission Within the Last 30 Days no previous admission in last 30 days    Equipment Currently Used at Home --  all DME needs met by SNF    Do you want help finding or keeping work or a job? Patient unable to answer    Do you want help with school or training? For example, starting  or completing job training or getting a high school diploma, GED or equivalent No                   Discharge Plan       Row Name 02/02/25 0953       Plan    Plan return to [facility] Long term care    Plan Comments Spoke to pt Guardian Emi Horvath Pt has been in Long term care at [facility] since 2021 plan is to return there Case Management to follow                  Continued Care and Services - Admitted Since 2/1/2025       Destination Coordination complete.      Service Provider Request Status Services Address Phone Fax Patient Preferred    [facility]  [facility] [address] [facility] [address] [phone] --                     Demographic Summary       Row Name 02/02/25 0944       General Information    Admission Type inpatient    Arrived From emergency department    Required Notices Provided Observation Status Notice    Referral Source admission list    Reason for Consult discharge planning    Preferred Language English                   Functional Status       Row Name 02/02/25 0950       Functional Status    Usual Activity Tolerance other (see comments)    Current Activity Tolerance poor       Physical Activity    On average, how many days per week do you engage in moderate to strenuous exercise (like a brisk walk)? 0 days    On average, how many minutes do you engage in exercise at this level? 0 min    Number of minutes of exercise per week 0       Functional Status, IADL    Medications completely dependent    Meal Preparation completely dependent    Housekeeping completely dependent    Laundry completely dependent    Shopping completely dependent    If for any reason you need help with day-to-day activities such as bathing, preparing meals, shopping, managing finances, etc., do you get the help you need? Patient unable to answer                   Psychosocial    No documentation.                  Abuse/Neglect    No  documentation.                  Legal    No documentation.                  Substance Abuse    No documentation.                  Patient Forms    No documentation.                     Emi Pena RN

## 2025-02-02 NOTE — PROGRESS NOTES
[facility]  INTERNAL MEDICINE PROGRESS NOTE    Name:  Izabella Agudelo   Age:  64 y.o.  Sex:  female  :  1960  MRN:  [id number]   Visit Number:  [id number]  Admission Date:  2025  Date Of Service:  25  Primary Care Physician:  Fabian Santos DO     LOS: 0 days :  Patient Care Team:  Fabian Santos DO as PCP - General (Long Term Care Facility):      Subjective / Interval History:     64-year-old female from nursing home with a past medical history of hyperlipidemia, diabetes mellitus, GERD, COPD not on home oxygen, dementia, hemiplegia presenting with shortness of breath.  Per the nursing home staff, for the last week she has been experiencing cough, and fevers.  Today, she became more confused and with worsening shortness of breath. At this time, she is awake and only oriented to self. She is complaining of shortness of breath.      In ED, blood pressure 183/52, heart rate 70, respiratory to 22, temperature 101.3 and O2 saturation 95% on 3 L NC cannula.  Labs on arrival showed a sodium 136, potassium 3.6, BUN 19, creatinine 0.6, procalcitonin 1.7, WBC 4.7, hemoglobin 11.3 and platelet count 226.  Patient was found to be positive for influenza A.    After admission overnight patient has been seen today on .  She has been started on antibiotic with Tamiflu for her flu, bronchodilators for COPD exacerbation and she is feeling better.  She is a poor historian and no specific complaints noted      Vital Signs:    Temp:  [97.9 °F (36.6 °C)-101.3 °F (38.5 °C)] 98.1 °F (36.7 °C)  Heart Rate:  [62-73] 73  Resp:  [16-22] 18  BP: ()/(52-84) 111/83    Intake and output:    I/O last 3 completed shifts:  In: 720 [P.O.:720]  Out: -   I/O this shift:  In: 240 [P.O.:240]  Out: -     Physical Examination:    General Appearance:    Alert and cooperative, not in any acute distress.   Head:    Atraumatic and normocephalic, without obvious abnormality.   Eyes:             PERRLA,  No pallor. Extraocular movements are within normal limits.   Neck:   Supple,  No lymph glands, no bruit.   Lungs:     Chest shape is normal. Breath sounds heard bilaterally equally.  No crackles or wheezing.     Heart:    Normal S1 and S2, no murmur, no JVD.   Abdomen:     Normal bowel sounds, no masses, no organomegaly. Soft     nontender, no guarding, no rebound tenderness.   Extremities:   Moves all extremities well, no edema, no cyanosis,    Skin:   No  bruising or rash.   Neurologic: She does have hemiplegia from baseline.  She does have baseline dementia     Laboratory results:  Results from last 7 days   Lab Units 02/02/25  0509 02/01/25 1948 02/01/25  1427   SODIUM mmol/L 135* 137 136   POTASSIUM mmol/L 4.8 4.1 3.6   CHLORIDE mmol/L 94* 99 93*   CO2 mmol/L 29.4* 31.4* 33.0*   BUN mg/dL 23 21 19   CREATININE mg/dL 0.55* 0.51* 0.63   CALCIUM mg/dL 8.9 8.7 9.0   BILIRUBIN mg/dL  --   --  0.3   ALK PHOS U/L  --   --  67   ALT (SGPT) U/L  --   --  17   AST (SGOT) U/L  --   --  23   GLUCOSE mg/dL 369* 78 149*     Results from last 7 days   Lab Units 02/02/25  0509 02/01/25 1948 02/01/25  1427   WBC 10*3/mm3 4.59 5.91 4.78   HEMOGLOBIN g/dL 11.9* 11.5* 11.3*   HEMATOCRIT % 38.0 37.1 36.5   PLATELETS 10*3/mm3 226 205 226         Results from last 7 days   Lab Units 02/01/25  1556 02/01/25  1427   CK TOTAL U/L 70  --    HSTROP T ng/L 31* 27*           Radiology results:    Imaging Results (Last 24 Hours)       Procedure Component Value Units Date/Time    CT Head Without Contrast [[id number]] Collected: 02/01/25 1841     Updated: 02/01/25 1842    Narrative:      FINAL REPORT    TECHNIQUE:  null    CLINICAL HISTORY:  Altered mental status    COMPARISON:  null    FINDINGS:  CT head without contrast    Comparison: CT - CT HEAD WO CONTRAST - 12/18/23 18:09 EST    Findings:    No intra-axial mass, midline shift, hydrocephalus, or acute hemorrhage.    Mild diffuse cerebral volume loss. Mild degree of patchy  low-density within the periventricular and subcortical white matter.    Moderate bilateral ethmoid sinus mucosal thickening. Mild bilateral maxillary sinus mucosal thickening. Mild sphenoid sinus mucosal thickening. Mastoids are clear.    The orbits are unremarkable.    There is no acute fracture.      Impression:      IMPRESSION:    1. No acute intracranial findings.    2. Sinus disease.    Authenticated and Electronically Signed by Chapo Fishman on  02/01/2025 06:41:26 PM    XR Chest 1 View [[id number]] Resulted: 02/01/25 1440     Updated: 02/01/25 1441            I have reviewed the patient's radiology reports.    Medication Review:     I have reviewed the patient's active and prn medications.     Assessment:      COPD exacerbation    COPD exacerbation  Encephalopathy, likely infectious  Hyperlipidemia  Diabetes mellitus  Dementia  Hemiplegia      Plan:    1.  Influenza A-she has been started on flu and she has defervesced this morning.  Continue with the supportive care and oxygen to keep saturation 90% and above    2.  COPD exacerbation-this seems to be more related because of the flu and exacerbation is mild.  At present on oxygen and stable and try to taper it down as she improves with possible discharge back to nursing home in 24 to 48 hours.  She is not on IV steroids at present and wheezing is improved    3.  Diabetes-we will continue her home dose and low-carb diet    Rest of her home medications for her other chronic medical problems have been restarted and close follow-up to be done.  If she is stable in 24 hours could consider discharge back to nursing home    Audie Tejada MD  02/02/25  11:47 EST      Please note that portions of this note were completed with a voice recognition program.

## 2025-02-03 VITALS
TEMPERATURE: 97.8 F | SYSTOLIC BLOOD PRESSURE: 118 MMHG | WEIGHT: 136.69 LBS | DIASTOLIC BLOOD PRESSURE: 70 MMHG | HEIGHT: 63 IN | RESPIRATION RATE: 18 BRPM | BODY MASS INDEX: 24.22 KG/M2 | HEART RATE: 57 BPM | OXYGEN SATURATION: 91 %

## 2025-02-03 LAB
GLUCOSE BLDC GLUCOMTR-MCNC: 509 MG/DL (ref 70–130)
M PNEUMO IGM SER IA-ACNC: <770 U/ML (ref 0–769)

## 2025-02-03 PROCEDURE — 82948 REAGENT STRIP/BLOOD GLUCOSE: CPT | Performed by: INTERNAL MEDICINE

## 2025-02-03 PROCEDURE — 99239 HOSP IP/OBS DSCHRG MGMT >30: CPT | Performed by: INTERNAL MEDICINE

## 2025-02-03 PROCEDURE — G0378 HOSPITAL OBSERVATION PER HR: HCPCS

## 2025-02-03 PROCEDURE — 25010000002 METHYLPREDNISOLONE PER 40 MG: Performed by: INTERNAL MEDICINE

## 2025-02-03 PROCEDURE — 63710000001 INSULIN GLARGINE PER 5 UNITS: Performed by: INTERNAL MEDICINE

## 2025-02-03 PROCEDURE — 96376 TX/PRO/DX INJ SAME DRUG ADON: CPT

## 2025-02-03 PROCEDURE — 63710000001 INSULIN LISPRO (HUMAN) PER 5 UNITS: Performed by: INTERNAL MEDICINE

## 2025-02-03 RX ORDER — ALBUTEROL SULFATE 90 UG/1
2 INHALANT RESPIRATORY (INHALATION) EVERY 4 HOURS PRN
Qty: 8 G | Refills: 0 | Status: SHIPPED | OUTPATIENT
Start: 2025-02-03

## 2025-02-03 RX ORDER — PREDNISONE 20 MG/1
40 TABLET ORAL DAILY
Qty: 10 TABLET | Refills: 0 | Status: SHIPPED | OUTPATIENT
Start: 2025-02-03 | End: 2025-02-08

## 2025-02-03 RX ADMIN — ARIPIPRAZOLE 30 MG: 5 TABLET ORAL at 08:28

## 2025-02-03 RX ADMIN — OSELTAMIVIR PHOSPHATE 75 MG: 75 CAPSULE ORAL at 08:28

## 2025-02-03 RX ADMIN — INSULIN LISPRO 14 UNITS: 100 INJECTION, SOLUTION INTRAVENOUS; SUBCUTANEOUS at 08:28

## 2025-02-03 RX ADMIN — DIVALPROEX SODIUM 500 MG: 125 CAPSULE ORAL at 08:28

## 2025-02-03 RX ADMIN — Medication 10 ML: at 08:29

## 2025-02-03 RX ADMIN — INSULIN GLARGINE 50 UNITS: 100 INJECTION, SOLUTION SUBCUTANEOUS at 08:28

## 2025-02-03 RX ADMIN — APIXABAN 5 MG: 5 TABLET, FILM COATED ORAL at 08:28

## 2025-02-03 RX ADMIN — METHYLPREDNISOLONE SODIUM SUCCINATE 40 MG: 40 INJECTION, POWDER, FOR SOLUTION INTRAMUSCULAR; INTRAVENOUS at 08:28

## 2025-02-03 RX ADMIN — METHYLPREDNISOLONE SODIUM SUCCINATE 40 MG: 40 INJECTION, POWDER, FOR SOLUTION INTRAMUSCULAR; INTRAVENOUS at 00:20

## 2025-02-03 RX ADMIN — GABAPENTIN 400 MG: 400 CAPSULE ORAL at 08:28

## 2025-02-03 RX ADMIN — PANTOPRAZOLE SODIUM 40 MG: 40 TABLET, DELAYED RELEASE ORAL at 06:12

## 2025-02-03 NOTE — CASE MANAGEMENT/SOCIAL WORK
Case Management Discharge Note                Selected Continued Care - Discharged on 2/3/2025 Admission date: 2/1/2025 - Discharge disposition: Long Term Care (DC - External)      Destination Coordination complete.      Service Provider Services Address Phone Fax Patient Preferred    [facility] [address] [id number] [phone] --              Durable Medical Equipment    No services have been selected for the patient.                Dialysis/Infusion    No services have been selected for the patient.                Home Medical Care    No services have been selected for the patient.                Therapy    No services have been selected for the patient.                Community Resources    No services have been selected for the patient.                Community & DME    No services have been selected for the patient.                    Transportation Services  Ambulance: [facility] Status: Accepted     Case Management/Social Work    Patient Name:  Izabella Agudelo  YOB: 1960  MRN: [id number]  Admit Date:  2/1/2025        13:23 EST   Discharge Plan       Row Name 02/03/25 1321       Plan    Plan DCP; LTC    Plan Comments Pt returned to LTC at [facility] via [facility].                        Electronically signed by:  Katherine Gillette RN  02/03/25 13:23 EST

## 2025-02-03 NOTE — CASE MANAGEMENT/SOCIAL WORK
Case Management/Social Work    Patient Name:  Izabella Agudelo  YOB: 1960  MRN: [id number]  Admit Date:  2/1/2025        BRYAN confirmed with Steven/Alphonse RUIZ that pt can return to facility today for LTC. BRYAN updated team.       Electronically signed by:  ROLAND Veliz  02/03/25 09:11 EST

## 2025-02-03 NOTE — DISCHARGE SUMMARY
[facility]      Name:  Izabella Agudelo   Age:  64 y.o.  Sex:  female  :  1960  MRN:  [id number]   Visit Number:  [id number]    Admission Date:  2025  Date of Discharge:  2/3/2025  Primary Care Physician:  Fabian Santos DO      Discharge Diagnoses:     Influenza A  COPD exacerbation  Encephalopathy, likely infectious  Hyperlipidemia  Diabetes mellitus  Dementia  Hemiplegia       Problem List:     Active Hospital Problems    Diagnosis  POA    **COPD exacerbation [J44.1]  Yes      Resolved Hospital Problems   No resolved problems to display.     Presenting Problem:    Chief Complaint   Patient presents with    Shortness of Breath      Consults:     Consulting Physician(s)                     None              Procedures Performed:        History of presenting illness/Hospital Course:    64-year-old female from nursing home with a past medical history of hyperlipidemia, diabetes mellitus, GERD, COPD not on home oxygen, dementia, hemiplegia presenting with shortness of breath. Per the nursing home staff, for the last week she has been experiencing cough, and fevers.   In ED, blood pressure 183/52, heart rate 70, respiratory to 22, temperature 101.3 and O2 saturation 95% on 3 L NC cannula. Labs on arrival showed a sodium 136, potassium 3.6, BUN 19, creatinine 0.6, procalcitonin 1.7, WBC 4.7, hemoglobin 11.3 and platelet count 226. Patient was found to be positive for influenza A.   Patient initiated on Tamiflu.  Patient defervesced night of admission.  Continued to improve and mentation returned to baseline.  Patient was initiated on Solu-Medrol due to component of COPD exacerbation.  She will be discharged on 5-day burst of prednisone.  Patient to return to long-term nursing home.  Follow-up with primary physician in 1 week.    Vital Signs:    Temp:  [97.3 °F (36.3 °C)-97.8 °F (36.6 °C)] 97.8 °F (36.6 °C)  Heart Rate:  [57-61] 57  Resp:  [18]  18  BP: (103-143)/(69-89) 118/70    Physical Exam:    General Appearance:  Alert and cooperative.    Head:  Atraumatic and normocephalic.   Eyes: Conjunctivae and sclerae normal, no icterus. No pallor.   Ears:  Ears with no abnormalities noted.   Throat: No oral lesions, no thrush, oral mucosa moist.   Neck: Supple, trachea midline, no thyromegaly.       Lungs:   Breath sounds heard bilaterally equally.  No crackles or wheezing. Non labored breathing   Heart:  Normal S1 and S2, no murmur, no gallop, no rub. No JVD.   Abdomen:   Normal bowel sounds, no masses, no organomegaly. Soft, nontender, nondistended, no rebound tenderness.   Extremities: Supple, no edema, no cyanosis, no clubbing.   Pulses: Pulses palpable bilaterally.   Skin: No bleeding or rash.   Neurologic: Dementia, Mentation at baseline.  Chronic baseline hemiplegia and bilateral tremor     Pertinent Lab Results:     Results from last 7 days   Lab Units 02/02/25  0509 02/01/25 1948 02/01/25  1427   SODIUM mmol/L 135* 137 136   POTASSIUM mmol/L 4.8 4.1 3.6   CHLORIDE mmol/L 94* 99 93*   CO2 mmol/L 29.4* 31.4* 33.0*   BUN mg/dL 23 21 19   CREATININE mg/dL 0.55* 0.51* 0.63   CALCIUM mg/dL 8.9 8.7 9.0   BILIRUBIN mg/dL  --   --  0.3   ALK PHOS U/L  --   --  67   ALT (SGPT) U/L  --   --  17   AST (SGOT) U/L  --   --  23   GLUCOSE mg/dL 369* 78 149*     Results from last 7 days   Lab Units 02/02/25  0509 02/01/25 1948 02/01/25  1427   WBC 10*3/mm3 4.59 5.91 4.78   HEMOGLOBIN g/dL 11.9* 11.5* 11.3*   HEMATOCRIT % 38.0 37.1 36.5   PLATELETS 10*3/mm3 226 205 226         Results from last 7 days   Lab Units 02/01/25  1556 02/01/25  1427   CK TOTAL U/L 70  --    HSTROP T ng/L 31* 27*     Results from last 7 days   Lab Units 02/01/25  1427   PROBNP pg/mL 439.1                       Pertinent Radiology Results:    Imaging Results (All)       Procedure Component Value Units Date/Time    XR Chest 1 View [[id number]] Collected: 02/02/25 1618     Updated: 02/02/25 1621     Narrative:      PROCEDURE: XR CHEST 1 VW-     HISTORY: Shortness of breath     COMPARISON: December 18, 2023.     FINDINGS: The heart is normal in size. The mediastinum is unremarkable.  There are increased interstitial markings which may represent  interstitial infiltrates or edema.. There is no pneumothorax.  There are  no acute osseous abnormalities.       Impression:      There are increased interstitial markings which may  represent interstitial infiltrates or edema..     Continued followup is recommended.     This report was signed and finalized on 2/2/2025 4:19 PM by Pierre Perez DO.       CT Head Without Contrast [[id number]] Collected: 02/01/25 1841     Updated: 02/01/25 1842    Narrative:      FINAL REPORT    TECHNIQUE:  null    CLINICAL HISTORY:  Altered mental status    COMPARISON:  null    FINDINGS:  CT head without contrast    Comparison: CT - CT HEAD WO CONTRAST - 12/18/23 18:09 EST    Findings:    No intra-axial mass, midline shift, hydrocephalus, or acute hemorrhage.    Mild diffuse cerebral volume loss. Mild degree of patchy low-density within the periventricular and subcortical white matter.    Moderate bilateral ethmoid sinus mucosal thickening. Mild bilateral maxillary sinus mucosal thickening. Mild sphenoid sinus mucosal thickening. Mastoids are clear.    The orbits are unremarkable.    There is no acute fracture.      Impression:      IMPRESSION:    1. No acute intracranial findings.    2. Sinus disease.    Authenticated and Electronically Signed by Chapo Fishman on  02/01/2025 06:41:26 PM            Echo:    Results for orders placed during the hospital encounter of 01/14/19    Adult Transthoracic Echo Complete W/ Cont if Necessary Per Protocol    Interpretation Summary  · Left ventricular wall thickness is consistent with mild concentric hypertrophy.    Technically adequate study  1) Mild LVH with normal LV systolic function ( EF is over 55%)  2) Moderate bi atrial enlargement with mild  elevation in LVedp  3) Calcified posterior leaflet of the mitral valve with mild MR  4) Sigmoid septum with dynamic narrowing of the aortic outflow in midsystolic - no gradient noted  5) Aortic sclerosis without stenosis  6) Mild TR with PAsp of 40 mm of hg  7) Mild Dilation of the RV with normal function    Condition on Discharge:      Stable.    Code status during the hospital stay:    Code Status and Medical Interventions: No CPR (Do Not Attempt to Resuscitate); Limited Support; No intubation (DNI)   Ordered at: 02/01/25 4573     Medical Intervention Limits:    No intubation (DNI)     Code Status (Patient has no pulse and is not breathing):    No CPR (Do Not Attempt to Resuscitate)     Medical Interventions (Patient has pulse or is breathing):    Limited Support     Discharge Disposition:    Long Term Care (DC - External)    Discharge Medications:       Discharge Medications        New Medications        Instructions Start Date   albuterol sulfate  (90 Base) MCG/ACT inhaler  Commonly known as: Proventil HFA   2 puffs, Inhalation, Every 4 Hours PRN      oseltamivir 75 MG capsule  Commonly known as: TAMIFLU   75 mg, Oral, 2 Times Daily      predniSONE 20 MG tablet  Commonly known as: DELTASONE   40 mg, Oral, Daily             Continue These Medications        Instructions Start Date   acetaminophen 650 MG 8 hr tablet  Commonly known as: TYLENOL   650 mg, Oral, 4 Times Daily PRN      acetaminophen 650 MG suppository  Commonly known as: TYLENOL   650 mg, Rectal, Every 4 Hours PRN      aluminum-magnesium hydroxide-simethicone 200-200-20 MG/5ML suspension  Commonly known as: MAALOX/MYLANTA   20 mL, Every 8 Hours PRN      apixaban 5 MG tablet tablet  Commonly known as: ELIQUIS   5 mg, 2 Times Daily      ARIPiprazole 20 MG tablet  Commonly known as: ABILIFY   30 mg, Oral, Daily      atorvastatin 40 MG tablet  Commonly known as: LIPITOR   40 mg, Nightly      B-D UF III MINI PEN NEEDLES 31G X 5 MM misc  Generic  drug: Insulin Pen Needle   USE 3 TIMES A DAY      cholecalciferol 1.25 MG (64185 UT) capsule  Commonly known as: VITAMIN D3   50,000 Units, Every 7 Days      divalproex 500 MG DR tablet  Commonly known as: DEPAKOTE   500 mg, 2 Times Daily      Divalproex Sodium 125 MG capsule  Commonly known as: DEPAKOTE SPRINKLE   500 mg, 2 Times Daily      Dulcolax 10 MG suppository  Generic drug: bisacodyl   10 mg, Rectal, Daily PRN      gabapentin 400 MG capsule  Commonly known as: NEURONTIN   400 mg, Oral, 3 Times Daily      Insulin Aspart 100 UNIT/ML injection  Commonly known as: novoLOG   3-14 Units, Subcutaneous, 4 Times Daily Before Meals & Nightly      Insulin Degludec 100 UNIT/ML solution injection  Commonly known as: TRESIBA   50 Units, Subcutaneous, 2 Times Daily      Insulin Lispro 100 UNIT/ML injection  Commonly known as: humaLOG   14 Units, 3 Times Daily Before Meals      mirtazapine 15 MG tablet  Commonly known as: REMERON   15 mg, Nightly      nitrofurantoin (macrocrystal-monohydrate) 100 MG capsule  Commonly known as: Macrobid   100 mg, Oral, Nightly      omeprazole 20 MG capsule  Commonly known as: priLOSEC   20 mg, Daily      ondansetron ODT 4 MG disintegrating tablet  Commonly known as: ZOFRAN-ODT   4 mg, Translingual, Every 6 Hours PRN      ondansetron ODT 4 MG disintegrating tablet  Commonly known as: ZOFRAN-ODT   4 mg, Translingual, Every 8 Hours PRN      ONE TOUCH ULTRA TEST test strip  Generic drug: glucose blood   TEST 2 TIMES A DAY      OneTouch Delica Lancets 33G misc   TEST TWICE A DAY      senna 8.6 MG tablet  Commonly known as: SENOKOT   1 tablet, 2 Times Daily PRN      travoprost (ZENOBIA free) 0.004 % solution ophthalmic solution  Commonly known as: TRAVATAN   1 drop, Both Eyes, Every Evening, in affected eye(s)      vitamin C 250 MG tablet  Commonly known as: ASCORBIC ACID   Take 1 tablet by mouth 2 (Two) Times a Day.      zinc sulfate 220 (50 Zn) MG capsule  Commonly known as: ZINCATE   220 mg,  Daily             Stop These Medications      azithromycin 250 MG tablet  Commonly known as: ZITHROMAX            Discharge Diet:     Diet Instructions       Diet: Diabetic Diets; Consistent Carbohydrate; Regular (IDDSI 7); Thin (IDDSI 0)      Discharge Diet: Diabetic Diets    Diabetic Diet: Consistent Carbohydrate    Texture: Regular (IDDSI 7)    Fluid Consistency: Thin (IDDSI 0)          Activity at Discharge:     Activity Instructions       Activity as Tolerated            Follow-up Appointments:    Additional Instructions for the Follow-ups that You Need to Schedule       Discharge Follow-up with PCP   As directed       Currently Documented PCP:    Fabian Santos DO    PCP Phone Number:    [phone]     Follow Up Details: 1 week               Contact information for follow-up providers       Schedule an appointment as soon as possible for a visit  with Fabian Santos DO.    Specialty: Family Medicine  Contact information:  [address] [address] [phone]               Fabian Santos DO .    Specialty: Family Medicine  Why: 1 week  Contact information:  [address] [address] [phone]                       Contact information for after-discharge care       [facility] .    Service: Intermediate Care  Contact information:  [address] [facility] [address] [phone]                                 No future appointments.  Test Results Pending at Discharge:    Pending Results       Procedure [Order ID] Specimen - Date/Time    CBC & Differential [[id number]]     Specimen: Blood     Narrative:      The following orders were created for panel order CBC & Differential.  Procedure                               Abnormality         Status                     ---------                               -----------         ------                     CBC Auto Differential[[id number]]                                                          Please view results for these tests on the individual orders.    CBC Auto Differential [[id number]]     Specimen: Blood     Comprehensive Metabolic Panel [[id number]]     Specimen: Blood     Legionella Antigen, Urine - Urine, Urine, Clean Catch [[id number]]     Specimen: Urine, Clean Catch     Mycoplasma Pneumoniae Antibody, IgM - Blood, Arm, Left [[id number]] Collected: 02/01/25 1427    Specimen: Blood from Arm, Left Updated: 02/01/25 1849    Respiratory Culture - Sputum, Cough [[id number]]     Specimen: Sputum from Cough     S. Pneumo Ag Urine or CSF - Urine, Urine, Clean Catch [[id number]]     Specimen: Urine, Clean Catch     VRE Culture - Swab, Per Rectum [[id number]] Collected: 02/01/25 2335    Specimen: Swab from Per Rectum Updated: 02/02/25 0049                 Ras Schmidt DO  02/03/25  09:03 EST    Time: I spent >30 minutes on this discharge activity which included: face-to-face encounter with the patient, reviewing the data in the system, coordination of the care with the nursing staff as well as consultants, documentation, and entering orders.     Dictated utilizing Dragon dictation.

## 2025-02-03 NOTE — PLAN OF CARE
Goal Outcome Evaluation:  Plan of Care Reviewed With: patient        Progress: improving  Outcome Evaluation: No acute events overnight. patient has rested well late in the shift. Tolerating room air well while awake. Plan of care ongoing.

## 2025-02-04 LAB
ACINETOBACTER SCREEN CX: NORMAL
VRE SPEC QL CULT: NORMAL

## 2025-02-19 ENCOUNTER — NURSING HOME (OUTPATIENT)
Age: 65
End: 2025-02-19
Payer: MEDICARE

## 2025-02-19 VITALS
RESPIRATION RATE: 16 BRPM | HEART RATE: 74 BPM | BODY MASS INDEX: 31.64 KG/M2 | HEIGHT: 63 IN | DIASTOLIC BLOOD PRESSURE: 74 MMHG | TEMPERATURE: 97.7 F | WEIGHT: 178.6 LBS | OXYGEN SATURATION: 95 % | SYSTOLIC BLOOD PRESSURE: 123 MMHG

## 2025-02-19 DIAGNOSIS — E11.44 TYPE 2 DIABETES MELLITUS WITH DIABETIC AMYOTROPHY, WITH LONG-TERM CURRENT USE OF INSULIN: ICD-10-CM

## 2025-02-19 DIAGNOSIS — F01.518 VASCULAR DEMENTIA WITH OTHER BEHAVIORAL DISTURBANCE, UNSPECIFIED DEMENTIA SEVERITY: ICD-10-CM

## 2025-02-19 DIAGNOSIS — R13.12 OROPHARYNGEAL DYSPHAGIA: ICD-10-CM

## 2025-02-19 DIAGNOSIS — K21.9 GERD WITHOUT ESOPHAGITIS: ICD-10-CM

## 2025-02-19 DIAGNOSIS — Z78.9 IMPAIRED MOBILITY AND ADLS: Primary | ICD-10-CM

## 2025-02-19 DIAGNOSIS — Z79.4 TYPE 2 DIABETES MELLITUS WITH DIABETIC AMYOTROPHY, WITH LONG-TERM CURRENT USE OF INSULIN: ICD-10-CM

## 2025-02-19 DIAGNOSIS — I69.90 LATE EFFECTS OF CVA (CEREBROVASCULAR ACCIDENT): ICD-10-CM

## 2025-02-19 DIAGNOSIS — Z74.09 IMPAIRED MOBILITY AND ADLS: Primary | ICD-10-CM

## 2025-02-19 DIAGNOSIS — F20.0 PARANOID SCHIZOPHRENIA: ICD-10-CM

## 2025-02-19 DIAGNOSIS — I10 ESSENTIAL HYPERTENSION: ICD-10-CM

## 2025-02-19 PROCEDURE — 99309 SBSQ NF CARE MODERATE MDM 30: CPT | Performed by: FAMILY MEDICINE

## 2025-02-19 NOTE — PROGRESS NOTES
Nursing Home Progress Note        Fabian Santos DO [x]  [facility] []  [address] [place] [address]  Phone: [phone]  Fax: [phone] Yohan Cuellar MD []  Chaz Mandel DO []  [address] [place] [address]  Phone: [phone]  Fax: [phone]     PATIENT NAME: Izabella Agudelo                                                                          YOB: 1960           DATE OF SERVICE: 2/19/2025  FACILITY: []  [facility]  [] [facility]  [x]  [facility]  [] [facility]  []  Other ______________________________________________________________________      CHIEF COMPLAINT:  Impaired mobility and ADL/paranoid schizophrenia/diabetes mellitus treated with insulin/hypertension/dyslipidemia/diabetic amyotrophy      HISTORY OF PRESENT ILLNESS:   [x]  Follow Up visit for coordination of long term care issues and chronic medical management of Diagnoses and all orders for this visit:    1. Impaired mobility and ADLs (Primary)    2. Late effects of CVA (cerebrovascular accident)    3. Vascular dementia with other behavioral disturbance, unspecified dementia severity    4. 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 Essential hypertension    6. GERD without esophagitis    7. Oropharyngeal dysphagia    8. Paranoid schizophrenia    Patient remains interactive during questioning/examination.  Nursing/staff reports that she has been receptive to supportive care, no new falls or injuries ported.  No acute behavior changes, she has slept well.    No reports of any cyclical/persistent hypoglycemic episodes.  No reports of any focal aspiration.    Vital signs have been stable, no complaints of chest pain.      PAST MEDICAL & SURGICAL HISTORY:   Past Medical History:   Diagnosis Date    Acute angle-closure glaucoma, bilateral     Aphasia     Cardiac abnormality     CHF (congestive heart failure)     COPD (chronic obstructive  pulmonary disease)     Diabetes     Glaucoma     Hemiparesis     Hemiplegia     Impaired functional mobility, balance, gait, and endurance     Schizophrenia, chronic condition     Vascular dementia       No past surgical history on file.      MEDICATIONS:  I have reviewed and reconciled the patients medication list in the patients chart at the skilled nursing facility today.      ALLERGIES:    Allergies   Allergen Reactions    Penicillins Rash         SOCIAL HISTORY:    Social History     Socioeconomic History    Marital status: Single   Tobacco Use    Smoking status: Never     Passive exposure: Never    Smokeless tobacco: Never   Vaping Use    Vaping status: Never Used   Substance and Sexual Activity    Alcohol use: No    Drug use: Never    Sexual activity: Defer       FAMILY HISTORY:    Family History   Problem Relation Age of Onset    Diabetes Other     Glaucoma Other        REVIEW OF SYSTEMS:    Review of Systems  Appetite: Fair []   Good [x]   Poor []   Weight Loss []  [x]  Weight Stable   Unavoidable Weight Loss []  Tolerating Tube Feeding []    Supplements Provided []   Constitutional: Negative for fever, chills, diaphoresis or fatigue and weight change.  Patient is interactive but a poor historian.  HENT: No dysphagia; no changes to vision/hearing/smell/taste; no epistaxis  Eyes: Negative for redness and visual disturbance.   Respiratory: Occasional cough, no hemoptysis.  Cardiovascular: Negative for chest pain and palpitations.   Gastrointestinal: Negative for abdominal distention, abdominal pain and blood in stool.   Endocrine: Negative for cold intolerance and heat intolerance.   Genitourinary: Negative for difficulty urinating, dysuria and frequency.Negative for hematuria   Musculoskeletal: Chronic myalgias and arthralgias.  Worsened lumbago as per above.  Integumentary: No open wounds, rash or concerning skin lesions  Neurological: Negative for syncope, weakness and headaches.   Hematological: Negative  for adenopathy. Does not bruise/bleed easily.   Immunological: Negative for reported allergies or immunological disorders  Psychological: Chronic behavioral issues, no acute changes.    PHYSICAL EXAMINATION:   VITAL SIGNS:   Vitals:    02/19/25 0932   BP: 123/74   Pulse: 74   Resp: 16   Temp: 97.7 °F (36.5 °C)   SpO2: 95%             Physical Exam    General Appearance:  [x]  Alert   [x]  Oriented x person  [x]  No acute distress     [x]  Confused, chronically []  Disoriented   []  Comatose   Head:  Atraumatic and normocephalic, without obvious abnormality.   Eyes:         PERRLA, conjunctivae and sclerae normal, no Icterus. No pallor. Extra-occular movements are within normal limits.   Ears:  Ears appear intact with no abnormalities noted.   Throat: No oral lesions, no thrush, oral mucosa moist.   Neck: Supple, trachea midline, no thyromegaly, no carotid bruit.   Back:   No kyphoscoliosis. No tenderness to palpation.   Lungs:   Chest shape is normal. Breath sounds heard bilaterally equally.  No wheezing.  Audible air exchange noted all lung fields.   Heart:  Normal S1 and S2, no murmur, no gallop, no rub. No JVD.   Abdomen:   Normal bowel sounds, no masses, no organomegaly. Soft, non-tender, non-distended, no guarding.  No CVA tenderness.   Extremities: Moves all extremities, without edema, cyanosis or clubbing.  Frail build.   Poor core strength and stability.   Pulses: Pulses palpable and equal bilaterally.   Skin: Generalized dry skin noted.  Age-related atrophy of skin.   Neurologic: [x] Normal speech []  Normal mental status    [x] Cranial nerves II through XII intact   [x]  No anosmia [x]  DTR 2+ [x]  Proprioception intact  [x]  Chronic motor/sensory deficits      Psych/Mood:                    [x]  No acute changes, flat affect[]  Depressed      Urinary:      [x]  Continent  [x]  Incontinent, at times[]  Retention  []  F/C     []  UTI w/treatment in progress         ASSESSMENT     Diagnoses and all orders  for this visit:    1. Impaired mobility and ADLs (Primary)    2. Late effects of CVA (cerebrovascular accident)    3. Vascular dementia with other behavioral disturbance, unspecified dementia severity    4. 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 Essential hypertension    6. GERD without esophagitis    7. Oropharyngeal dysphagia    8. Paranoid schizophrenia          PLAN  Planned continuation of supportive care for mobilization/transfers, fall precautions in place given her impaired mobility and chronic comorbid conditions.  Continue to follow nutritional/hydration status, as well as aspiration precautions to remain in place.    Continue dietary//to modifications to aid in symptom management of chronic GERD.    Vital signs demonstrate hemodynamic stability, blood pressure is at goal.  No findings suggestive of unstable angina.    Continue blood glucose monitoring, avoid prolonged fasting periods as best able.  Changes to treatment regimen can be made when needed.    Surveillance labs as per routine/need.    [x]  Discussed Patient in detail with nursing/staff, addressed all needs today.     [x]  Plan of Care Reviewed   []  PT/OT Reviewed   []  Order Changes  []  Discharge Plans Reviewed   []  Code Status Changes      I spent 35 minutes caring for Izabella on this date of service. This time includes time spent by me in the following activities:preparing for the visit, performing a medically appropriate examination and/or evaluation , counseling and educating the patient/family/caregiver, ordering medications, tests, or procedures, documenting information in the medical record, and care coordination    I confirm accuracy of unchanged data/findings which have been carried forward from previous visit, as well as I have updated appropriately those that have changed.       Fabian Santos DO  2/19/2025

## 2025-04-23 ENCOUNTER — NURSING HOME (OUTPATIENT)
Age: 65
End: 2025-04-23
Payer: MEDICARE

## 2025-04-23 VITALS
HEART RATE: 72 BPM | OXYGEN SATURATION: 97 % | RESPIRATION RATE: 18 BRPM | TEMPERATURE: 98.1 F | SYSTOLIC BLOOD PRESSURE: 132 MMHG | WEIGHT: 199.6 LBS | HEIGHT: 63 IN | DIASTOLIC BLOOD PRESSURE: 78 MMHG | BODY MASS INDEX: 35.37 KG/M2

## 2025-04-23 DIAGNOSIS — E11.44 TYPE 2 DIABETES MELLITUS WITH DIABETIC AMYOTROPHY, WITH LONG-TERM CURRENT USE OF INSULIN: ICD-10-CM

## 2025-04-23 DIAGNOSIS — Z74.09 IMPAIRED MOBILITY AND ADLS: Primary | ICD-10-CM

## 2025-04-23 DIAGNOSIS — I10 ESSENTIAL HYPERTENSION: ICD-10-CM

## 2025-04-23 DIAGNOSIS — F01.518 VASCULAR DEMENTIA WITH OTHER BEHAVIORAL DISTURBANCE, UNSPECIFIED DEMENTIA SEVERITY: ICD-10-CM

## 2025-04-23 DIAGNOSIS — F20.9 SCHIZOPHRENIA, CHRONIC CONDITION: ICD-10-CM

## 2025-04-23 DIAGNOSIS — Z79.4 TYPE 2 DIABETES MELLITUS WITH DIABETIC AMYOTROPHY, WITH LONG-TERM CURRENT USE OF INSULIN: ICD-10-CM

## 2025-04-23 DIAGNOSIS — R13.12 OROPHARYNGEAL DYSPHAGIA: ICD-10-CM

## 2025-04-23 DIAGNOSIS — I69.90 LATE EFFECTS OF CVA (CEREBROVASCULAR ACCIDENT): ICD-10-CM

## 2025-04-23 DIAGNOSIS — Z78.9 IMPAIRED MOBILITY AND ADLS: Primary | ICD-10-CM

## 2025-04-25 ENCOUNTER — NURSING HOME (OUTPATIENT)
Age: 65
End: 2025-04-25
Payer: MEDICARE

## 2025-04-25 VITALS
DIASTOLIC BLOOD PRESSURE: 78 MMHG | RESPIRATION RATE: 18 BRPM | HEIGHT: 63 IN | BODY MASS INDEX: 35.37 KG/M2 | HEART RATE: 72 BPM | WEIGHT: 199.6 LBS | OXYGEN SATURATION: 97 % | SYSTOLIC BLOOD PRESSURE: 132 MMHG | TEMPERATURE: 98.1 F

## 2025-04-25 DIAGNOSIS — E11.42 TYPE 2 DIABETES MELLITUS WITH DIABETIC POLYNEUROPATHY, WITH LONG-TERM CURRENT USE OF INSULIN: ICD-10-CM

## 2025-04-25 DIAGNOSIS — Z79.4 TYPE 2 DIABETES MELLITUS WITH DIABETIC POLYNEUROPATHY, WITH LONG-TERM CURRENT USE OF INSULIN: ICD-10-CM

## 2025-04-25 DIAGNOSIS — Z79.899 MEDICATION MANAGEMENT: Primary | ICD-10-CM

## 2025-04-25 NOTE — LETTER
Nursing Home History and Physical Note      Fabian Santos DO  []  [facility]  []  [facility] [x]  [address] [place] [address]  Phone: [phone]  Fax: [phone] Yohan Cuellar MD  []  Chaz Mandel DO  []  Jenna Alamo PA-C  []  [address] [place] [address]  Phone: [phone]  Fax: [phone]     PATIENT NAME: Izabella Agudelo                                                                          YOB: 1960            DATE OF SERVICE: 04/25/2025    FACILITY:   [] [facility]    [] [facility]    [x] [facility]     [] [facility]    [] Other     ______________________________________________________________________      CHIEF COMPLAINT:   T2DM  Review labs       HISTORY OF PRESENT ILLNESS:   Resident seen today for type 2 DM with high glucose readings 327-407 this month. She continues lispro 20 units with meals TID, Basaglar 76 units BID, A1c 9.5, glucose 226. Denies any distress at this time. No complaints noted. Nursing reports increased snacking during day. New orders noted.     PAST MEDICAL & SURGICAL HISTORY:   Past Medical History:   Diagnosis Date   • Acute angle-closure glaucoma, bilateral    • Aphasia    • Cardiac abnormality    • CHF (congestive heart failure)    • COPD (chronic obstructive pulmonary disease)    • Diabetes    • Glaucoma    • Hemiparesis    • Hemiplegia    • Impaired functional mobility, balance, gait, and endurance    • Schizophrenia, chronic condition    • Vascular dementia       History reviewed. No pertinent surgical history.       MEDICATIONS:  I have reviewed and reconciled the patients medication list in the patients chart at the skilled nursing facility today.      Allergies   Allergen Reactions   • Penicillins Rash         Social History     Socioeconomic History   • Marital status: Single   Tobacco Use   • Smoking status: Never     Passive exposure: Never   • Smokeless tobacco: Never   Vaping Use   • Vaping status:  "Never Used   Substance and Sexual Activity   • Alcohol use: No   • Drug use: Never   • Sexual activity: Defer       Family History   Problem Relation Age of Onset   • Diabetes Other    • Glaucoma Other        Review of Systems   Reason unable to perform ROS: ROS per nursing and patient.   Constitutional:  Negative for activity change, appetite change, chills, diaphoresis, fatigue, fever, unexpected weight gain and unexpected weight loss.   HENT:  Negative for congestion, mouth sores, nosebleeds and trouble swallowing.    Respiratory:  Negative for cough, choking, chest tightness and shortness of breath.    Cardiovascular:  Negative for chest pain.   Gastrointestinal:  Negative for abdominal pain, constipation, diarrhea, nausea and vomiting.   Endocrine: Positive for polyphagia. Negative for polydipsia and polyuria.   Genitourinary:  Positive for urinary incontinence. Negative for decreased urine volume, difficulty urinating, dysuria, frequency and hematuria.   Musculoskeletal:  Positive for arthralgias (chronic). Negative for joint swelling and myalgias.   Skin:  Negative for color change and rash.   Neurological:  Positive for speech difficulty, weakness, memory problem and confusion. Negative for dizziness.        Neuropathy BLE   Psychiatric/Behavioral:  Positive for behavioral problems (intermittently). Negative for dysphoric mood, hallucinations, sleep disturbance and depressed mood. The patient is not nervous/anxious.          PHYSICAL EXAMINATION:   VITAL SIGNS:   Vitals:    04/25/25 1421   BP: 132/78   Pulse: 72   Resp: 18   Temp: 98.1 °F (36.7 °C)   SpO2: 97%   Weight: 90.5 kg (199 lb 9.6 oz)   Height: 160 cm (63\")         Physical Exam  Vitals and nursing note reviewed.   Constitutional:       General: She is not in acute distress.     Appearance: She is well-developed.   Eyes:      Conjunctiva/sclera: Conjunctivae normal.   Cardiovascular:      Rate and Rhythm: Normal rate and regular rhythm.      Heart " sounds: Normal heart sounds.   Pulmonary:      Effort: Pulmonary effort is normal.      Breath sounds: Normal breath sounds. No wheezing or rales.   Abdominal:      General: Bowel sounds are normal. There is no distension.      Palpations: Abdomen is soft.      Tenderness: There is no abdominal tenderness.   Feet:      Comments: Unable to perform accurate foot exam due to dementia  Skin:     General: Skin is warm and dry.   Neurological:      Mental Status: She is alert. Mental status is at baseline. She is disoriented.      Gait: Gait abnormal.      Comments: Chronic NM deficits related to CVA   Psychiatric:         Mood and Affect: Mood normal. Affect is flat.         Behavior: Behavior normal.         Cognition and Memory: Cognition is impaired. Memory is impaired.         RECORDS REVIEW:   I have reviewed and interpreted the following lab test results  and medications obtained at the time of the visit today.     ASSESSMENT     Assessment & Plan  Medication management         Type 2 diabetes mellitus with diabetic polyneuropathy, with long-term current use of insulin  Basaglar 76 untis BID  Lispro 20 units TID with meals  Increased snacking diet non compliant noted by nursing  Reviewed labs glucose 225 and A1c 9.5  Ordered Jardiance 10mg Qam         [x]  Discussed Patient in detail with nursing/staff, addressed all needs today.     [x]  Plan of Care Reviewed   []  PT/OT Reviewed   [x]  Order Changes  []  Discharge Plans Reviewed  [x]  Advance Directive on file with Nursing Home.   [x]  POA on file with Nursing Home.   [x]  Code Status listed:   []  Full Code   [x]  DNR          Total face to face time spent with patient 35 minutes. Of which 20 minutes were in counseling the patient and family.     [facility]

## 2025-04-26 NOTE — PROGRESS NOTES
Nursing Home History and Physical Note      Fabian Santos DO  []  [facility]  []  [facility] [x]  [address] [place] [address]  Phone: [phone]  Fax: [phone] Yohan Cuellar MD  []  Chaz Mandel DO  []  Jenna Alamo PA-C  []  [address]  [facility] [address]  Phone: [phone]  Fax: [phone]     PATIENT NAME: Izabella Agudelo                                                                          YOB: 1960            DATE OF SERVICE: 04/25/2025    FACILITY:   [] [facility]    [] [facility]    [x] [facility]     [] [facility]    [] Other     ______________________________________________________________________      CHIEF COMPLAINT:   T2DM  Review labs       HISTORY OF PRESENT ILLNESS:   Resident seen today for type 2 DM with high glucose readings 327-407 this month. She continues lispro 20 units with meals TID, Basaglar 76 units BID, A1c 9.5, glucose 226. Denies any distress at this time. No complaints noted. Nursing reports increased snacking during day. New orders noted.     PAST MEDICAL & SURGICAL HISTORY:   Past Medical History:   Diagnosis Date    Acute angle-closure glaucoma, bilateral     Aphasia     Cardiac abnormality     CHF (congestive heart failure)     COPD (chronic obstructive pulmonary disease)     Diabetes     Glaucoma     Hemiparesis     Hemiplegia     Impaired functional mobility, balance, gait, and endurance     Schizophrenia, chronic condition     Vascular dementia       History reviewed. No pertinent surgical history.       MEDICATIONS:  I have reviewed and reconciled the patients medication list in the patients chart at the skilled nursing facility today.      Allergies   Allergen Reactions    Penicillins Rash         Social History     Socioeconomic History    Marital status: Single   Tobacco Use    Smoking status: Never     Passive exposure: Never    Smokeless tobacco: Never   Vaping Use    Vaping status: Never Used  "  Substance and Sexual Activity    Alcohol use: No    Drug use: Never    Sexual activity: Defer       Family History   Problem Relation Age of Onset    Diabetes Other     Glaucoma Other        Review of Systems   Reason unable to perform ROS: ROS per nursing and patient.   Constitutional:  Negative for activity change, appetite change, chills, diaphoresis, fatigue, fever, unexpected weight gain and unexpected weight loss.   HENT:  Negative for congestion, mouth sores, nosebleeds and trouble swallowing.    Respiratory:  Negative for cough, choking, chest tightness and shortness of breath.    Cardiovascular:  Negative for chest pain.   Gastrointestinal:  Negative for abdominal pain, constipation, diarrhea, nausea and vomiting.   Endocrine: Positive for polyphagia. Negative for polydipsia and polyuria.   Genitourinary:  Positive for urinary incontinence. Negative for decreased urine volume, difficulty urinating, dysuria, frequency and hematuria.   Musculoskeletal:  Positive for arthralgias (chronic). Negative for joint swelling and myalgias.   Skin:  Negative for color change and rash.   Neurological:  Positive for speech difficulty, weakness, memory problem and confusion. Negative for dizziness.        Neuropathy BLE   Psychiatric/Behavioral:  Positive for behavioral problems (intermittently). Negative for dysphoric mood, hallucinations, sleep disturbance and depressed mood. The patient is not nervous/anxious.          PHYSICAL EXAMINATION:   VITAL SIGNS:   Vitals:    04/25/25 1421   BP: 132/78   Pulse: 72   Resp: 18   Temp: 98.1 °F (36.7 °C)   SpO2: 97%   Weight: 90.5 kg (199 lb 9.6 oz)   Height: 160 cm (63\")         Physical Exam  Vitals and nursing note reviewed.   Constitutional:       General: She is not in acute distress.     Appearance: She is well-developed.   Eyes:      Conjunctiva/sclera: Conjunctivae normal.   Cardiovascular:      Rate and Rhythm: Normal rate and regular rhythm.      Heart sounds: Normal " heart sounds.   Pulmonary:      Effort: Pulmonary effort is normal.      Breath sounds: Normal breath sounds. No wheezing or rales.   Abdominal:      General: Bowel sounds are normal. There is no distension.      Palpations: Abdomen is soft.      Tenderness: There is no abdominal tenderness.   Feet:      Comments: Unable to perform accurate foot exam due to dementia  Skin:     General: Skin is warm and dry.   Neurological:      Mental Status: She is alert. Mental status is at baseline. She is disoriented.      Gait: Gait abnormal.      Comments: Chronic NM deficits related to CVA   Psychiatric:         Mood and Affect: Mood normal. Affect is flat.         Behavior: Behavior normal.         Cognition and Memory: Cognition is impaired. Memory is impaired.         RECORDS REVIEW:   I have reviewed and interpreted the following lab test results  and medications obtained at the time of the visit today.     ASSESSMENT     Assessment & Plan  Medication management         Type 2 diabetes mellitus with diabetic polyneuropathy, with long-term current use of insulin  Basaglar 76 untis BID  Lispro 20 units TID with meals  Increased snacking diet non compliant noted by nursing  Reviewed labs glucose 225 and A1c 9.5  Ordered Jardiance 10mg Qam         [x]  Discussed Patient in detail with nursing/staff, addressed all needs today.     [x]  Plan of Care Reviewed   []  PT/OT Reviewed   [x]  Order Changes  []  Discharge Plans Reviewed  [x]  Advance Directive on file with Nursing Home.   [x]  POA on file with Nursing Home.   [x]  Code Status listed:   []  Full Code   [x]  DNR          Total face to face time spent with patient 35 minutes. Of which 20 minutes were in counseling the patient and family.     FL[facility] Moreno

## 2025-04-26 NOTE — ASSESSMENT & PLAN NOTE
Basaglar 76 untis BID  Lispro 20 units TID with meals  Increased snacking diet non compliant noted by nursing  Reviewed labs glucose 225 and A1c 9.5  Ordered Jardiance 10mg Qam

## 2025-05-16 ENCOUNTER — NURSING HOME (OUTPATIENT)
Age: 65
End: 2025-05-16
Payer: MEDICARE

## 2025-05-16 VITALS
BODY MASS INDEX: 32.96 KG/M2 | RESPIRATION RATE: 18 BRPM | DIASTOLIC BLOOD PRESSURE: 76 MMHG | TEMPERATURE: 98.8 F | WEIGHT: 186 LBS | SYSTOLIC BLOOD PRESSURE: 126 MMHG | HEART RATE: 66 BPM | HEIGHT: 63 IN

## 2025-05-16 DIAGNOSIS — Z79.4 TYPE 2 DIABETES MELLITUS WITH DIABETIC AMYOTROPHY, WITH LONG-TERM CURRENT USE OF INSULIN: ICD-10-CM

## 2025-05-16 DIAGNOSIS — E11.44 TYPE 2 DIABETES MELLITUS WITH DIABETIC AMYOTROPHY, WITH LONG-TERM CURRENT USE OF INSULIN: ICD-10-CM

## 2025-05-16 DIAGNOSIS — Z79.899 MEDICATION MANAGEMENT: Primary | ICD-10-CM

## 2025-05-16 NOTE — ASSESSMENT & PLAN NOTE
Continues long acting and jardiance  Monitor for hypoglycemia  Changed long acting 76 units bid to 70 units bid  Order increase to Jardiance from 10 mg to 25 mg qd AM

## 2025-05-16 NOTE — PROGRESS NOTES
Nursing Home History and Physical Note      Fabian Santos DO  []  [facility]  []  [facility] [x]  [address] [place] [address]  Phone: [phone]  Fax: [phone] Yohan Cuellar MD  []  Chaz Mandel DO  []  Jenna Alamo PA-C  []  [address]  [facility] [address]  Phone: [phone]  Fax: [phone]     PATIENT NAME: Izabella Agudelo                                                                          YOB: 1960            DATE OF SERVICE: 05/16/2025    FACILITY:   [] [place]    [] [place]    [x] [facility]     [] [facility]    [] Other     ______________________________________________________________________      CHIEF COMPLAINT:   Review medications t2Dm       HISTORY OF PRESENT ILLNESS:   Resident seen today to review medications. Three weeks ago she was ordered jardiance 10 mg, fasting blood glucose levels have continued to be 200. She has had one FBGS 90 one morning in three weeks. Changes to insulin order and antidiabetic oral tablet given. Discussed changes with nurse and resident.     PAST MEDICAL & SURGICAL HISTORY:   Past Medical History:   Diagnosis Date    Acute angle-closure glaucoma, bilateral     Aphasia     Cardiac abnormality     CHF (congestive heart failure)     COPD (chronic obstructive pulmonary disease)     Diabetes     Glaucoma     Hemiparesis     Hemiplegia     Impaired functional mobility, balance, gait, and endurance     Schizophrenia, chronic condition     Vascular dementia       No past surgical history on file.       MEDICATIONS:  I have reviewed and reconciled the patients medication list in the patients chart at the skilled nursing facility today.      Allergies   Allergen Reactions    Penicillins Rash         Social History     Socioeconomic History    Marital status: Single   Tobacco Use    Smoking status: Never     Passive exposure: Never    Smokeless tobacco: Never   Vaping Use    Vaping status: Never Used  "  Substance and Sexual Activity    Alcohol use: No    Drug use: Never    Sexual activity: Defer       Family History   Problem Relation Age of Onset    Diabetes Other     Glaucoma Other        Review of Systems   Constitutional:  Negative for activity change and appetite change.   HENT:  Negative for congestion.    Respiratory:  Negative for cough and chest tightness.    Cardiovascular:  Negative for chest pain.   Gastrointestinal:  Negative for abdominal distention and abdominal pain.   Genitourinary:  Negative for dysuria and vaginal pain.         PHYSICAL EXAMINATION:   VITAL SIGNS:   Vitals:    05/16/25 1140   BP: 126/76   Pulse: 66   Resp: 18   Temp: 98.8 °F (37.1 °C)   Weight: 84.4 kg (186 lb)   Height: 160 cm (63\")         Physical Exam  Vitals and nursing note reviewed.   Constitutional:       General: She is not in acute distress.     Appearance: She is well-developed.   Eyes:      Conjunctiva/sclera: Conjunctivae normal.   Cardiovascular:      Rate and Rhythm: Normal rate and regular rhythm.      Heart sounds: Normal heart sounds.   Pulmonary:      Effort: Pulmonary effort is normal.      Breath sounds: Normal breath sounds. No wheezing or rales.   Abdominal:      General: Bowel sounds are normal. There is no distension.      Palpations: Abdomen is soft.      Tenderness: There is no abdominal tenderness.   Feet:      Comments: Unable to perform accurate foot exam due to dementia  Skin:     General: Skin is warm and dry.   Neurological:      Mental Status: She is alert. Mental status is at baseline. She is disoriented.      Gait: Gait abnormal.      Comments: Chronic NM deficits related to CVA   Psychiatric:         Mood and Affect: Mood normal. Affect is flat.         Behavior: Behavior normal.         Cognition and Memory: Cognition is impaired. Memory is impaired.         RECORDS REVIEW:   I have reviewed and interpreted the following lab test results  and medications obtained at the time of the visit " today.     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     [x]  Plan of Care Reviewed   []  PT/OT Reviewed   [x]  Order Changes  []  Discharge Plans Reviewed  [x]  Advance Directive on file with Nursing Home.   [x]  POA on file with Nursing Home.   [x]  Code Status listed:   [x]  Full Code   []  DNR          Total face to face time spent with patient 30 minutes. Of which 15 minutes were in counseling the patient and family.     Racheal Cole, APRN

## 2025-05-16 NOTE — LETTER
Nursing Home History and Physical Note      Fabian Santos DO  []  [facility]  []  [facility] [x]  [address] [place] [address]  Phone: [phone]  Fax: [phone] Yohan Cuellar MD  []  Chaz Mandel DO  []  Jenna Alamo PA-C  []  [address] [place] [address]  Phone: [phone]  Fax: [phone]     PATIENT NAME: Izabella Agudelo                                                                          YOB: 1960            DATE OF SERVICE: 05/16/2025    FACILITY:   [] [place]    [] [place]    [x] [facility]     [] [facility]    [] Other     ______________________________________________________________________      CHIEF COMPLAINT:   Review medications t2Dm       HISTORY OF PRESENT ILLNESS:   Resident seen today to review medications. Three weeks ago she was ordered jardiance 10 mg, fasting blood glucose levels have continued to be 200. She has had one FBGS 90 one morning in three weeks. Changes to insulin order and antidiabetic oral tablet given. Discussed changes with nurse and resident.     PAST MEDICAL & SURGICAL HISTORY:   Past Medical History:   Diagnosis Date   • Acute angle-closure glaucoma, bilateral    • Aphasia    • Cardiac abnormality    • CHF (congestive heart failure)    • COPD (chronic obstructive pulmonary disease)    • Diabetes    • Glaucoma    • Hemiparesis    • Hemiplegia    • Impaired functional mobility, balance, gait, and endurance    • Schizophrenia, chronic condition    • Vascular dementia       No past surgical history on file.       MEDICATIONS:  I have reviewed and reconciled the patients medication list in the patients chart at the skilled nursing facility today.      Allergies   Allergen Reactions   • Penicillins Rash         Social History     Socioeconomic History   • Marital status: Single   Tobacco Use   • Smoking status: Never     Passive exposure: Never   • Smokeless tobacco: Never   Vaping Use   • Vaping  "status: Never Used   Substance and Sexual Activity   • Alcohol use: No   • Drug use: Never   • Sexual activity: Defer       Family History   Problem Relation Age of Onset   • Diabetes Other    • Glaucoma Other        Review of Systems   Constitutional:  Negative for activity change and appetite change.   HENT:  Negative for congestion.    Respiratory:  Negative for cough and chest tightness.    Cardiovascular:  Negative for chest pain.   Gastrointestinal:  Negative for abdominal distention and abdominal pain.   Genitourinary:  Negative for dysuria and vaginal pain.         PHYSICAL EXAMINATION:   VITAL SIGNS:   Vitals:    05/16/25 1140   BP: 126/76   Pulse: 66   Resp: 18   Temp: 98.8 °F (37.1 °C)   Weight: 84.4 kg (186 lb)   Height: 160 cm (63\")         Physical Exam  Vitals and nursing note reviewed.   Constitutional:       General: She is not in acute distress.     Appearance: She is well-developed.   Eyes:      Conjunctiva/sclera: Conjunctivae normal.   Cardiovascular:      Rate and Rhythm: Normal rate and regular rhythm.      Heart sounds: Normal heart sounds.   Pulmonary:      Effort: Pulmonary effort is normal.      Breath sounds: Normal breath sounds. No wheezing or rales.   Abdominal:      General: Bowel sounds are normal. There is no distension.      Palpations: Abdomen is soft.      Tenderness: There is no abdominal tenderness.   Feet:      Comments: Unable to perform accurate foot exam due to dementia  Skin:     General: Skin is warm and dry.   Neurological:      Mental Status: She is alert. Mental status is at baseline. She is disoriented.      Gait: Gait abnormal.      Comments: Chronic NM deficits related to CVA   Psychiatric:         Mood and Affect: Mood normal. Affect is flat.         Behavior: Behavior normal.         Cognition and Memory: Cognition is impaired. Memory is impaired.         RECORDS REVIEW:   I have reviewed and interpreted the following lab test results  and medications obtained at " the time of the visit today.     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     [x]  Plan of Care Reviewed   []  PT/OT Reviewed   [x]  Order Changes  []  Discharge Plans Reviewed  [x]  Advance Directive on file with Nursing Home.   [x]  POA on file with Nursing Home.   [x]  Code Status listed:   [x]  Full Code   []  DNR          Total face to face time spent with patient 30 minutes. Of which 15 minutes were in counseling the patient and family.     Racheal Cole, APRN

## 2025-05-23 ENCOUNTER — NURSING HOME (OUTPATIENT)
Age: 65
End: 2025-05-23
Payer: MEDICARE

## 2025-05-23 VITALS
BODY MASS INDEX: 32.96 KG/M2 | WEIGHT: 186 LBS | HEIGHT: 63 IN | OXYGEN SATURATION: 90 % | TEMPERATURE: 97.1 F | RESPIRATION RATE: 18 BRPM | SYSTOLIC BLOOD PRESSURE: 115 MMHG | HEART RATE: 62 BPM | DIASTOLIC BLOOD PRESSURE: 80 MMHG

## 2025-05-23 DIAGNOSIS — Z79.4 TYPE 2 DIABETES MELLITUS WITH DIABETIC POLYNEUROPATHY, WITH LONG-TERM CURRENT USE OF INSULIN: Primary | ICD-10-CM

## 2025-05-23 DIAGNOSIS — E11.42 TYPE 2 DIABETES MELLITUS WITH DIABETIC POLYNEUROPATHY, WITH LONG-TERM CURRENT USE OF INSULIN: Primary | ICD-10-CM

## 2025-05-23 NOTE — PROGRESS NOTES
Nursing Home History and Physical Note      Fabian Santos DO  []  [facility]  []  [facility] [x]  [address] [place] [address]  Phone: [phone]  Fax: [phone] Yohan Cuellar MD  []  Chaz Mandel DO  []  Jenna Alamo PA-C  []  [address]  [facility] [address]  Phone: [phone]  Fax: [phone]     PATIENT NAME: Izabella Agudelo                                                                          YOB: 1960            DATE OF SERVICE: 5/23/2025    FACILITY:   [] [place]    [] [place]    [x] [facility]     [] [facility]    [] Other     ______________________________________________________________________      CHIEF COMPLAINT:   T2DM       HISTORY OF PRESENT ILLNESS:   Resident seen to f/u on t2dm changes made two weeks ago without much improvement in FSBG.   Nursing reports resident continues high intake of diet and snacking. Nursing reports >200-300 glucose levels daily. She denies and changes or complaints. No distress noted. Resting in bed.     PAST MEDICAL & SURGICAL HISTORY:   Past Medical History:   Diagnosis Date    Acute angle-closure glaucoma, bilateral     Aphasia     Cardiac abnormality     CHF (congestive heart failure)     COPD (chronic obstructive pulmonary disease)     Diabetes     Glaucoma     Hemiparesis     Hemiplegia     Impaired functional mobility, balance, gait, and endurance     Schizophrenia, chronic condition     Vascular dementia       No past surgical history on file.       MEDICATIONS:  I have reviewed and reconciled the patients medication list in the patients chart at the skilled nursing facility today.      Allergies   Allergen Reactions    Penicillins Rash         Social History     Socioeconomic History    Marital status: Single   Tobacco Use    Smoking status: Never     Passive exposure: Never    Smokeless tobacco: Never   Vaping Use    Vaping status: Never Used   Substance and Sexual Activity    Alcohol  "use: No    Drug use: Never    Sexual activity: Defer       Family History   Problem Relation Age of Onset    Diabetes Other     Glaucoma Other        Review of Systems   Constitutional:  Negative for activity change and appetite change.   HENT:  Negative for congestion.    Respiratory:  Negative for cough and chest tightness.    Cardiovascular:  Negative for chest pain.   Gastrointestinal:  Negative for abdominal distention and abdominal pain.   Endocrine: Positive for polyphagia. Negative for cold intolerance, heat intolerance, polydipsia and polyuria.   Genitourinary:  Negative for dysuria and vaginal pain.         PHYSICAL EXAMINATION:   VITAL SIGNS:   Vitals:    05/23/25 1238   BP: 115/80   Pulse: 62   Resp: 18   Temp: 97.1 °F (36.2 °C)   SpO2: 90%   Weight: 84.4 kg (186 lb)   Height: 160 cm (63\")         Physical Exam  Vitals and nursing note reviewed.   Constitutional:       General: She is not in acute distress.     Appearance: She is well-developed.   Eyes:      Conjunctiva/sclera: Conjunctivae normal.   Cardiovascular:      Rate and Rhythm: Normal rate and regular rhythm.      Heart sounds: Normal heart sounds.   Pulmonary:      Effort: Pulmonary effort is normal.      Breath sounds: Normal breath sounds. No wheezing or rales.   Abdominal:      General: Bowel sounds are normal. There is no distension.      Palpations: Abdomen is soft.      Tenderness: There is no abdominal tenderness.   Feet:      Comments: Unable to perform accurate foot exam due to dementia  Skin:     General: Skin is warm and dry.   Neurological:      Mental Status: She is alert. Mental status is at baseline. She is disoriented.      Gait: Gait abnormal.      Comments: Chronic NM deficits related to CVA   Psychiatric:         Mood and Affect: Mood normal. Affect is flat.         Behavior: Behavior normal.         Cognition and Memory: Cognition is impaired. Memory is impaired.         RECORDS REVIEW:   I have reviewed and interpreted the " following medications obtained at the time of the visit today.     ASSESSMENT     Assessment & Plan  Type 2 diabetes mellitus with diabetic polyneuropathy, with long-term current use of insulin  FBGS elevated in afternoon 300-380  Increase long acting insulin from 70 units to 73 units bid  Continue Jardiance 25mg  Nursing to monitor changes  Monitor Hypoglycemia  Notify MD NP changes             [x]  Discussed Patient in detail with nursing/staff, addressed all needs today.     []  Plan of Care Reviewed   []  PT/OT Reviewed   [x]  Order Changes  []  Discharge Plans Reviewed  [x]  Advance Directive on file with Nursing Home.   [x]  POA on file with Nursing Home.   [x]  Code Status listed:   []  Full Code   []  DNR          Total face to face time spent with patient 25 minutes. Of which 15 minutes were in counseling the patient and family.     Racheal Cole, APRN

## 2025-05-23 NOTE — ASSESSMENT & PLAN NOTE
FBGS elevated in afternoon 300-380  Increase long acting insulin from 70 units to 73 units bid  Continue Jardiance 25mg  Nursing to monitor changes  Monitor Hypoglycemia  Notify MD NP changes

## 2025-05-23 NOTE — LETTER
Nursing Home History and Physical Note      Fabian Santos DO  []  [facility]  []  [facility] Moreno [x]  [address] [place] [address]  Phone: [phone]  Fax: [phone] Yohan Cuellar MD  []  Chaz Mandel DO  []  Jenna Alamo PA-C  []  [address]  [facility] [address]  Phone: [phone]  Fax: [phone]     PATIENT NAME: Izabella Agudelo                                                                          YOB: 1960            DATE OF SERVICE: 5/23/2025    FACILITY:   [] [place]    [] [place]    [x] [facility]     [] [facility]    [] Other     ______________________________________________________________________      CHIEF COMPLAINT:   T2DM       HISTORY OF PRESENT ILLNESS:   Resident seen to f/u on t2dm changes made two weeks ago without much improvement in FSBG.   Nursing reports resident continues high intake of diet and snacking. Nursing reports >200-300 glucose levels daily. She denies and changes or complaints. No distress noted. Resting in bed.     PAST MEDICAL & SURGICAL HISTORY:   Past Medical History:   Diagnosis Date   • Acute angle-closure glaucoma, bilateral    • Aphasia    • Cardiac abnormality    • CHF (congestive heart failure)    • COPD (chronic obstructive pulmonary disease)    • Diabetes    • Glaucoma    • Hemiparesis    • Hemiplegia    • Impaired functional mobility, balance, gait, and endurance    • Schizophrenia, chronic condition    • Vascular dementia       No past surgical history on file.       MEDICATIONS:  I have reviewed and reconciled the patients medication list in the patients chart at the skilled nursing facility today.      Allergies   Allergen Reactions   • Penicillins Rash         Social History     Socioeconomic History   • Marital status: Single   Tobacco Use   • Smoking status: Never     Passive exposure: Never   • Smokeless tobacco: Never   Vaping Use   • Vaping status: Never Used   Substance and Sexual  "Activity   • Alcohol use: No   • Drug use: Never   • Sexual activity: Defer       Family History   Problem Relation Age of Onset   • Diabetes Other    • Glaucoma Other        Review of Systems   Constitutional:  Negative for activity change and appetite change.   HENT:  Negative for congestion.    Respiratory:  Negative for cough and chest tightness.    Cardiovascular:  Negative for chest pain.   Gastrointestinal:  Negative for abdominal distention and abdominal pain.   Endocrine: Positive for polyphagia. Negative for cold intolerance, heat intolerance, polydipsia and polyuria.   Genitourinary:  Negative for dysuria and vaginal pain.         PHYSICAL EXAMINATION:   VITAL SIGNS:   Vitals:    05/23/25 1238   BP: 115/80   Pulse: 62   Resp: 18   Temp: 97.1 °F (36.2 °C)   SpO2: 90%   Weight: 84.4 kg (186 lb)   Height: 160 cm (63\")         Physical Exam  Vitals and nursing note reviewed.   Constitutional:       General: She is not in acute distress.     Appearance: She is well-developed.   Eyes:      Conjunctiva/sclera: Conjunctivae normal.   Cardiovascular:      Rate and Rhythm: Normal rate and regular rhythm.      Heart sounds: Normal heart sounds.   Pulmonary:      Effort: Pulmonary effort is normal.      Breath sounds: Normal breath sounds. No wheezing or rales.   Abdominal:      General: Bowel sounds are normal. There is no distension.      Palpations: Abdomen is soft.      Tenderness: There is no abdominal tenderness.   Feet:      Comments: Unable to perform accurate foot exam due to dementia  Skin:     General: Skin is warm and dry.   Neurological:      Mental Status: She is alert. Mental status is at baseline. She is disoriented.      Gait: Gait abnormal.      Comments: Chronic NM deficits related to CVA   Psychiatric:         Mood and Affect: Mood normal. Affect is flat.         Behavior: Behavior normal.         Cognition and Memory: Cognition is impaired. Memory is impaired.         RECORDS REVIEW:   I have " reviewed and interpreted the following medications obtained at the time of the visit today.     ASSESSMENT     Assessment & Plan  Type 2 diabetes mellitus with diabetic polyneuropathy, with long-term current use of insulin  FBGS elevated in afternoon 300-380  Increase long acting insulin from 70 units to 73 units bid  Continue Jardiance 25mg  Nursing to monitor changes  Monitor Hypoglycemia  Notify MD NP changes             [x]  Discussed Patient in detail with nursing/staff, addressed all needs today.     []  Plan of Care Reviewed   []  PT/OT Reviewed   [x]  Order Changes  []  Discharge Plans Reviewed  [x]  Advance Directive on file with Nursing Home.   [x]  POA on file with Nursing Home.   [x]  Code Status listed:   []  Full Code   []  DNR          Total face to face time spent with patient 25 minutes. Of which 15 minutes were in counseling the patient and family.     Racheal Cole, APRN

## 2025-07-02 ENCOUNTER — NURSING HOME (OUTPATIENT)
Age: 65
End: 2025-07-02
Payer: MEDICARE

## 2025-07-02 VITALS
WEIGHT: 183.3 LBS | OXYGEN SATURATION: 98 % | TEMPERATURE: 97.5 F | HEIGHT: 63 IN | RESPIRATION RATE: 18 BRPM | SYSTOLIC BLOOD PRESSURE: 128 MMHG | DIASTOLIC BLOOD PRESSURE: 76 MMHG | BODY MASS INDEX: 32.48 KG/M2 | HEART RATE: 70 BPM

## 2025-07-02 DIAGNOSIS — Z78.9 IMPAIRED MOBILITY AND ADLS: Primary | ICD-10-CM

## 2025-07-02 DIAGNOSIS — R13.12 OROPHARYNGEAL DYSPHAGIA: ICD-10-CM

## 2025-07-02 DIAGNOSIS — F20.9 SCHIZOPHRENIA, CHRONIC CONDITION: ICD-10-CM

## 2025-07-02 DIAGNOSIS — I10 ESSENTIAL HYPERTENSION: ICD-10-CM

## 2025-07-02 DIAGNOSIS — K21.9 GERD WITHOUT ESOPHAGITIS: ICD-10-CM

## 2025-07-02 DIAGNOSIS — Z79.4 TYPE 2 DIABETES MELLITUS WITH DIABETIC AMYOTROPHY, WITH LONG-TERM CURRENT USE OF INSULIN: ICD-10-CM

## 2025-07-02 DIAGNOSIS — F01.518 VASCULAR DEMENTIA WITH OTHER BEHAVIORAL DISTURBANCE, UNSPECIFIED DEMENTIA SEVERITY: ICD-10-CM

## 2025-07-02 DIAGNOSIS — E11.44 TYPE 2 DIABETES MELLITUS WITH DIABETIC AMYOTROPHY, WITH LONG-TERM CURRENT USE OF INSULIN: ICD-10-CM

## 2025-07-02 DIAGNOSIS — I69.90 LATE EFFECTS OF CVA (CEREBROVASCULAR ACCIDENT): ICD-10-CM

## 2025-07-02 DIAGNOSIS — Z74.09 IMPAIRED MOBILITY AND ADLS: Primary | ICD-10-CM

## 2025-07-02 NOTE — PROGRESS NOTES
Nursing Home Progress Note        Fabian Santos DO [x]  [facility] []  [address] [facility], [place] [address]  Phone: [phone]  Fax: [phone] Yohan Cuellar MD []  Chaz Mandel DO []  [address] [place] [address]  Phone: [phone]  Fax: [phone]     PATIENT NAME: Izabella Agudelo                                                                          YOB: 1960           DATE OF SERVICE: 7/2/2025  FACILITY: []  [facility]  [] [facility]  [x]  [facility]  [] [facility]  []  Other ______________________________________________________________________      CHIEF COMPLAINT:  Impaired mobility and ADL/paranoid schizophrenia/diabetes mellitus treated with insulin/hypertension/dyslipidemia/diabetic amyotrophy      HISTORY OF PRESENT ILLNESS:   [x]  Follow Up visit for coordination of long term care issues and chronic medical management of Diagnoses and all orders for this visit:    1. Impaired mobility and ADLs (Primary)    2. Late effects of CVA (cerebrovascular accident)    3. Schizophrenia, chronic condition    4. Essential hypertension    5. 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 GERD without esophagitis    7. Oropharyngeal dysphagia    8. Vascular dementia with other behavioral disturbance, unspecified dementia severity    Patient remains interactive during questioning.  Nursing/staff reports that she has been receptive to supportive care, no new falls or injuries reported.    No acute behavioral changes, sleeping well.  No nutritional/hydration deficits reported.    No reports of any focal aspiration.  No reports of any cyclical/persistent hypoglycemic episodes.    No new neurological deficits noted.  No reports of cyclical/persistent hypoglycemic episodes.      PAST MEDICAL & SURGICAL HISTORY:   Past Medical History:   Diagnosis Date    Acute angle-closure glaucoma, bilateral     Aphasia     Cardiac  abnormality     CHF (congestive heart failure)     COPD (chronic obstructive pulmonary disease)     Diabetes     Glaucoma     Hemiparesis     Hemiplegia     Impaired functional mobility, balance, gait, and endurance     Schizophrenia, chronic condition     Vascular dementia       No past surgical history on file.      MEDICATIONS:  I have reviewed and reconciled the patients medication list in the patients chart at the skilled nursing facility today.      ALLERGIES:    Allergies   Allergen Reactions    Penicillins Rash         SOCIAL HISTORY:    Social History     Socioeconomic History    Marital status: Single   Tobacco Use    Smoking status: Never     Passive exposure: Never    Smokeless tobacco: Never   Vaping Use    Vaping status: Never Used   Substance and Sexual Activity    Alcohol use: No    Drug use: Never    Sexual activity: Defer       FAMILY HISTORY:    Family History   Problem Relation Age of Onset    Diabetes Other     Glaucoma Other        REVIEW OF SYSTEMS:    Review of Systems  Appetite: Fair []   Good [x]   Poor []   Weight Loss []  [x]  Weight Stable   Unavoidable Weight Loss []  Tolerating Tube Feeding []    Supplements Provided []   Constitutional: Negative for fever, chills, diaphoresis or fatigue and weight change.  Patient is interactive but a poor historian.  HENT: No dysphagia; no changes to vision/hearing/smell/taste; no epistaxis  Eyes: Negative for redness and visual disturbance.   Respiratory: Occasional cough, no hemoptysis.  Cardiovascular: Negative for chest pain and palpitations.   Gastrointestinal: Negative for abdominal distention, abdominal pain and blood in stool.   Endocrine: Negative for cold intolerance and heat intolerance.   Genitourinary: Negative for difficulty urinating, dysuria and frequency.Negative for hematuria   Musculoskeletal: Chronic myalgias and arthralgias.  Worsened lumbago as per above.  Integumentary: No open wounds, rash or concerning skin  lesions  Neurological: Negative for syncope, weakness and headaches.   Hematological: Negative for adenopathy. Does not bruise/bleed easily.   Immunological: Negative for reported allergies or immunological disorders  Psychological: Chronic behavioral issues, no acute changes.    PHYSICAL EXAMINATION:   VITAL SIGNS:   Vitals:    07/02/25 0935   BP: 128/76   Pulse: 70   Resp: 18   Temp: 97.5 °F (36.4 °C)   SpO2: 98%             Physical Exam    General Appearance:  [x]  Alert   [x]  Oriented x person  [x]  No acute distress     [x]  Confused, chronically []  Disoriented   []  Comatose   Head:  Atraumatic and normocephalic, without obvious abnormality.   Eyes:         PERRLA, conjunctivae and sclerae normal, no Icterus. No pallor. Extra-occular movements are within normal limits.   Ears:  Ears appear intact with no abnormalities noted.   Throat: No oral lesions, no thrush, oral mucosa moist.   Neck: Supple, trachea midline, no thyromegaly, no carotid bruit.   Back:   No kyphoscoliosis. No tenderness to palpation.   Lungs:   Chest shape is normal. Breath sounds heard bilaterally equally.  No wheezing.  Audible air exchange noted all lung fields.   Heart:  Normal S1 and S2, no murmur, no gallop, no rub. No JVD.   Abdomen:   Normal bowel sounds, no masses, no organomegaly. Soft, non-tender, non-distended, no guarding.  No CVA tenderness.   Extremities: Moves all extremities, without edema, cyanosis or clubbing.  Frail build.   Poor core strength and stability.   Pulses: Pulses palpable and equal bilaterally.   Skin: Generalized dry skin noted.  Age-related atrophy of skin.   Neurologic: [x] Normal speech []  Normal mental status    [x] Cranial nerves II through XII intact   [x]  No anosmia [x]  DTR 2+ [x]  Proprioception intact  [x]  Chronic motor/sensory deficits      Psych/Mood:                    [x]  No acute changes, flat affect[]  Depressed      Urinary:      [x]  Continent  [x]  Incontinent, at times[]  Retention   []  F/C     []  UTI w/treatment in progress         ASSESSMENT     Diagnoses and all orders for this visit:    1. Impaired mobility and ADLs (Primary)    2. Late effects of CVA (cerebrovascular accident)    3. Schizophrenia, chronic condition    4. Essential hypertension    5. 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 GERD without esophagitis    7. Oropharyngeal dysphagia    8. Vascular dementia with other behavioral disturbance, unspecified dementia severity          PLAN  Continued utilization of skilled nursing facility services to aid in mobilization/transfers, as well as any ADLs of need.    Vital signs demonstrate hemodynamic stability, heart rate and blood pressure are at goal.  Demonstrates no findings of unstable angina or increased work of breathing.    Continue blood glucose monitoring, changes to diabetic treatment regimen, and made when needed to minimize hypoglycemic episodes, as well as maximize blood glucose control.    Continue dietary/lifestyle modifications to aid in symptom management of chronic GERD, as well as aspiration precautions given her known history of oropharyngeal dysphagia.    Surveillance labs as per routine/need.    [x]  Discussed Patient in detail with nursing/staff, addressed all needs today.     [x]  Plan of Care Reviewed   []  PT/OT Reviewed   []  Order Changes  []  Discharge Plans Reviewed   []  Code Status Changes      I spent 35 minutes caring for Izabella on this date of service. This time includes time spent by me in the following activities:preparing for the visit, performing a medically appropriate examination and/or evaluation , counseling and educating the patient/family/caregiver, ordering medications, tests, or procedures, documenting information in the medical record, and care coordination    I confirm accuracy of unchanged data/findings which have been carried forward from previous visit, as well as I have updated  appropriately those that have changed.       Fabian Santos DO  7/2/2025

## 2025-07-15 DIAGNOSIS — Z79.4 TYPE 2 DIABETES MELLITUS WITH DIABETIC POLYNEUROPATHY, WITH LONG-TERM CURRENT USE OF INSULIN: ICD-10-CM

## 2025-07-15 DIAGNOSIS — E11.42 TYPE 2 DIABETES MELLITUS WITH DIABETIC POLYNEUROPATHY, WITH LONG-TERM CURRENT USE OF INSULIN: ICD-10-CM

## 2025-07-15 NOTE — TELEPHONE ENCOUNTER
[facility] REQUESTING MED REFILL FOR GABAPENTIN 400 MG.    DIRECTIONS: GABAPENTIN 400 MG, 1 CAP PO TID.

## 2025-07-16 RX ORDER — GABAPENTIN 400 MG/1
400 CAPSULE ORAL 3 TIMES DAILY
Qty: 90 CAPSULE | Refills: 5 | Status: SHIPPED | OUTPATIENT
Start: 2025-07-16